# Patient Record
Sex: MALE | Race: WHITE | NOT HISPANIC OR LATINO | Employment: UNEMPLOYED | URBAN - METROPOLITAN AREA
[De-identification: names, ages, dates, MRNs, and addresses within clinical notes are randomized per-mention and may not be internally consistent; named-entity substitution may affect disease eponyms.]

---

## 2019-03-01 ENCOUNTER — HOSPITAL ENCOUNTER (EMERGENCY)
Facility: HOSPITAL | Age: 49
Discharge: HOME | End: 2019-03-01
Attending: EMERGENCY MEDICINE | Admitting: EMERGENCY MEDICINE

## 2019-03-01 ENCOUNTER — APPOINTMENT (EMERGENCY)
Dept: RADIOLOGY | Facility: HOSPITAL | Age: 49
End: 2019-03-01

## 2019-03-01 VITALS
TEMPERATURE: 98.4 F | RESPIRATION RATE: 16 BRPM | OXYGEN SATURATION: 98 % | HEART RATE: 75 BPM | DIASTOLIC BLOOD PRESSURE: 128 MMHG | SYSTOLIC BLOOD PRESSURE: 190 MMHG | WEIGHT: 165.12 LBS

## 2019-03-01 DIAGNOSIS — R07.9 CHEST PAIN, UNSPECIFIED TYPE: Primary | ICD-10-CM

## 2019-03-01 DIAGNOSIS — I10 HYPERTENSION, UNSPECIFIED TYPE: ICD-10-CM

## 2019-03-01 LAB
ALBUMIN SERPL BCP-MCNC: 4 G/DL (ref 3.5–5)
ALP SERPL-CCNC: 70 U/L (ref 46–116)
ALT SERPL W P-5'-P-CCNC: 48 U/L (ref 12–78)
ANION GAP SERPL CALCULATED.3IONS-SCNC: 11 MMOL/L (ref 4–13)
AST SERPL W P-5'-P-CCNC: 25 U/L (ref 5–45)
BASOPHILS # BLD AUTO: 0.09 THOUSANDS/ΜL (ref 0–0.1)
BASOPHILS NFR BLD AUTO: 1 % (ref 0–1)
BILIRUB SERPL-MCNC: 0.2 MG/DL (ref 0.2–1)
BUN SERPL-MCNC: 18 MG/DL (ref 5–25)
CALCIUM SERPL-MCNC: 8.9 MG/DL (ref 8.3–10.1)
CHLORIDE SERPL-SCNC: 108 MMOL/L (ref 100–108)
CO2 SERPL-SCNC: 24 MMOL/L (ref 21–32)
CREAT SERPL-MCNC: 1.36 MG/DL (ref 0.6–1.3)
EOSINOPHIL # BLD AUTO: 0.12 THOUSAND/ΜL (ref 0–0.61)
EOSINOPHIL NFR BLD AUTO: 1 % (ref 0–6)
ERYTHROCYTE [DISTWIDTH] IN BLOOD BY AUTOMATED COUNT: 12 % (ref 11.6–15.1)
GFR SERPL CREATININE-BSD FRML MDRD: 62 ML/MIN/1.73SQ M
GLUCOSE SERPL-MCNC: 92 MG/DL (ref 65–140)
HCT VFR BLD AUTO: 48.9 % (ref 36.5–49.3)
HGB BLD-MCNC: 17.1 G/DL (ref 12–17)
IMM GRANULOCYTES # BLD AUTO: 0.03 THOUSAND/UL (ref 0–0.2)
IMM GRANULOCYTES NFR BLD AUTO: 0 % (ref 0–2)
LYMPHOCYTES # BLD AUTO: 2.17 THOUSANDS/ΜL (ref 0.6–4.47)
LYMPHOCYTES NFR BLD AUTO: 20 % (ref 14–44)
MCH RBC QN AUTO: 32.7 PG (ref 26.8–34.3)
MCHC RBC AUTO-ENTMCNC: 35 G/DL (ref 31.4–37.4)
MCV RBC AUTO: 94 FL (ref 82–98)
MONOCYTES # BLD AUTO: 1.02 THOUSAND/ΜL (ref 0.17–1.22)
MONOCYTES NFR BLD AUTO: 10 % (ref 4–12)
NEUTROPHILS # BLD AUTO: 7.33 THOUSANDS/ΜL (ref 1.85–7.62)
NEUTS SEG NFR BLD AUTO: 68 % (ref 43–75)
NRBC BLD AUTO-RTO: 0 /100 WBCS
PLATELET # BLD AUTO: 261 THOUSANDS/UL (ref 149–390)
PMV BLD AUTO: 10.1 FL (ref 8.9–12.7)
POTASSIUM SERPL-SCNC: 4.2 MMOL/L (ref 3.5–5.3)
PROT SERPL-MCNC: 7.2 G/DL (ref 6.4–8.2)
RBC # BLD AUTO: 5.23 MILLION/UL (ref 3.88–5.62)
SODIUM SERPL-SCNC: 143 MMOL/L (ref 136–145)
TROPONIN I SERPL-MCNC: <0.02 NG/ML
WBC # BLD AUTO: 10.76 THOUSAND/UL (ref 4.31–10.16)

## 2019-03-01 PROCEDURE — 85025 COMPLETE CBC W/AUTO DIFF WBC: CPT | Performed by: EMERGENCY MEDICINE

## 2019-03-01 PROCEDURE — 99285 EMERGENCY DEPT VISIT HI MDM: CPT

## 2019-03-01 PROCEDURE — 84484 ASSAY OF TROPONIN QUANT: CPT | Performed by: EMERGENCY MEDICINE

## 2019-03-01 PROCEDURE — 71045 X-RAY EXAM CHEST 1 VIEW: CPT

## 2019-03-01 PROCEDURE — 80053 COMPREHEN METABOLIC PANEL: CPT | Performed by: EMERGENCY MEDICINE

## 2019-03-01 PROCEDURE — 93005 ELECTROCARDIOGRAM TRACING: CPT

## 2019-03-01 PROCEDURE — 36415 COLL VENOUS BLD VENIPUNCTURE: CPT | Performed by: EMERGENCY MEDICINE

## 2019-03-01 RX ORDER — LORAZEPAM 1 MG/1
1 TABLET ORAL ONCE
Status: COMPLETED | OUTPATIENT
Start: 2019-03-01 | End: 2019-03-01

## 2019-03-01 RX ORDER — AMLODIPINE BESYLATE 2.5 MG/1
5 TABLET ORAL DAILY
Qty: 30 TABLET | Refills: 12 | Status: ON HOLD | OUTPATIENT
Start: 2019-03-01 | End: 2021-10-19 | Stop reason: SDUPTHER

## 2019-03-01 RX ORDER — AMLODIPINE BESYLATE 5 MG/1
10 TABLET ORAL ONCE
Status: COMPLETED | OUTPATIENT
Start: 2019-03-01 | End: 2019-03-01

## 2019-03-01 RX ADMIN — LORAZEPAM 1 MG: 1 TABLET ORAL at 17:14

## 2019-03-01 RX ADMIN — AMLODIPINE BESYLATE 10 MG: 5 TABLET ORAL at 17:58

## 2019-03-01 NOTE — ED PROCEDURE NOTE
PROCEDURE  ECG 12 Lead Documentation  Date/Time: 3/1/2019 5:22 PM  Performed by: Loeksh Marvin MD  Authorized by: Lokesh Marvin MD     Indications / Diagnosis:  CP/SOB  ECG reviewed by me, the ED Provider: yes    Patient location:  ED and bedside  Previous ECG:     Previous ECG:  Unavailable    Comparison to cardiac monitor: Yes    Interpretation:     Interpretation: non-specific    Rate:     ECG rate:  88    ECG rate assessment: normal    Rhythm:     Rhythm: sinus rhythm    Ectopy:     Ectopy: none    QRS:     QRS axis:  Normal    QRS intervals:  Normal  Conduction:     Conduction: normal    ST segments:     ST segments:  Normal  T waves:     T waves: flattening      Flattening:  AVL, III, aVF, V5 and V6  Other findings:     Other findings: LVH, poor R wave progression and U wave    Comments:      NO ECG SIGNS OF ISCHEMIA/ INJURY / R HEART STRAIN / Lelon Cowden, MD  03/01/19 7953

## 2019-03-02 LAB
ATRIAL RATE: 73 BPM
ATRIAL RATE: 88 BPM
P AXIS: 60 DEGREES
P AXIS: 62 DEGREES
PR INTERVAL: 172 MS
PR INTERVAL: 176 MS
QRS AXIS: 35 DEGREES
QRS AXIS: 43 DEGREES
QRSD INTERVAL: 100 MS
QRSD INTERVAL: 84 MS
QT INTERVAL: 332 MS
QT INTERVAL: 378 MS
QTC INTERVAL: 401 MS
QTC INTERVAL: 416 MS
T WAVE AXIS: 55 DEGREES
T WAVE AXIS: 70 DEGREES
VENTRICULAR RATE: 73 BPM
VENTRICULAR RATE: 88 BPM

## 2019-03-02 PROCEDURE — 93010 ELECTROCARDIOGRAM REPORT: CPT | Performed by: INTERNAL MEDICINE

## 2019-03-02 NOTE — ED PROCEDURE NOTE
PROCEDURE  ECG 12 Lead Documentation  Date/Time: 3/1/2019 7:01 PM  Performed by: Marisol Guerrier MD  Authorized by: Marisol Guerrier MD     Indications / Diagnosis:  ER ECG # 2   ECG reviewed by me, the ED Provider: yes    Patient location:  ED and bedside  Previous ECG:     Previous ECG:  Compared to current    Comparison ECG info:  NO CHAGNE COMAPRED TO INITIAL ER ECG    Similarity:  No change    Comparison to cardiac monitor: Yes    Interpretation:     Interpretation: non-specific    Rate:     ECG rate:  73    ECG rate assessment: normal    Rhythm:     Rhythm: sinus rhythm      Rhythm comment:  73  Ectopy:     Ectopy: none    QRS:     QRS axis:  Normal    QRS intervals:  Normal  Conduction:     Conduction: normal    ST segments:     ST segments:  Normal  T waves:     T waves: flattening      Flattening:  AVL and V6  Q waves:     Q waves:  V1, V2 and V3  Other findings:     Other findings: LVH, poor R wave progression and U wave    Comments:      NO ECG SIGNS OF ISCHEMIA/ INJURY / R HEART STRAIN / Denton Saldana MD  03/01/19 Terra Carney

## 2019-03-02 NOTE — DISCHARGE INSTRUCTIONS
DIAGNOSIS; CHEST PAIN/ HIGH BLOOD PRESSURE    -  PLEASE START THE BLOOD PRESSURE MEDICATION TOMORROW--  NORVASC- 1 TABLET ONCE A DAY - THSIS IS A 4 DOLLAR MEDICATION AT [facility]    - IF ANXIETY- STRESS BECOMES MORE OF A PROBLEM-- PLEASE CALL [facility] TO SCHEDULE AN APPOINTMENT--  [phone]    - PLEASE CALL   THE [facility] NUMBER TO GET HELP FINDING A DOCTOR    - BY Our NEGATIVE ER CHEST PAIN WORKUP FOR CHEST PAIN - YOU ARE A LOW RISK CHEST PAIN PATIENT- Approximately 1 out of 100 er chest pain patients like you will go on to have a heart attack 1 month from now     - there is no such thing as a no risk chest pain pt- please return to  the er for any new/ worsening/concerning symptoms to you

## 2019-03-06 NOTE — ED PROVIDER NOTES
History  Chief Complaint   Patient presents with    Chest Pain     Pt  c/o chest pain for three days with n/v/d  Pt  denies cardiac hx  but reports having marriage problems that may be contributing to symptoms  53 yr male-  States going thru a divorice- under a lot of stress-- c /o 3 days of ant chest pain - describes as ache-- -- there more often then not--   Several days ago had  1 day of n/v/d- all non  Bloody -- no abd pain --   No sob-- pain is not pleuritic-- no abrupt onset of ripping/tearing   Migratory type pain       History provided by:  Patient   used: No    Chest Pain   Associated symptoms: nausea and vomiting    Associated symptoms: no abdominal pain and no palpitations        None       History reviewed  No pertinent past medical history  Past Surgical History:   Procedure Laterality Date    CARPAL TUNNEL RELEASE      ULNAR COLLATERAL LIGAMENT RECONSTRUCTION         History reviewed  No pertinent family history  I have reviewed and agree with the history as documented  Social History     Tobacco Use    Smoking status: Current Every Day Smoker   Substance Use Topics    Alcohol use: Yes     Alcohol/week: 1 2 oz     Types: 2 Shots of liquor per week     Comment: daily    Drug use: Never        Review of Systems   Constitutional: Negative  HENT: Negative  Eyes: Negative  Respiratory: Negative  Cardiovascular: Positive for chest pain  Negative for palpitations and leg swelling  Gastrointestinal: Positive for diarrhea, nausea and vomiting  Negative for abdominal distention, abdominal pain, anal bleeding, blood in stool, constipation and rectal pain  Endocrine: Negative  Genitourinary: Negative  Musculoskeletal: Negative  Skin: Negative  Allergic/Immunologic: Negative  Neurological: Negative  Hematological: Negative  Psychiatric/Behavioral: Negative          Physical Exam  Physical Exam   Constitutional: He is oriented to person, place, and time  Non-toxic appearance  He does not appear ill  No distress  avss- htnsive-- pulse ox 98 % on ra- interpretation is normal- no intervention - anxious appearing- non marfanoid body habitus   HENT:   Head: Normocephalic and atraumatic  Eyes: Pupils are equal, round, and reactive to light  EOM are normal    Mm pink   Neck: Normal range of motion  Neck supple  No hepatojugular reflux and no JVD present  No tracheal deviation present  No thyromegaly present  Cardiovascular: Normal rate, regular rhythm, intact distal pulses and normal pulses  No extrasystoles are present  PMI is not displaced  Exam reveals no gallop, no S3, no S4, no distant heart sounds and no friction rub  No murmur heard  No systolic murmur is present  No diastolic murmur is present  Pulmonary/Chest: Effort normal and breath sounds normal  No accessory muscle usage or stridor  No tachypnea  No respiratory distress  Abdominal: Soft  Bowel sounds are normal  He exhibits no distension, no ascites and no mass  There is no splenomegaly or hepatomegaly  There is no tenderness  There is no rebound and no guarding  Soft nt/nd- no peritoneal signs- no cva tenderness-    Musculoskeletal: Normal range of motion  Right lower leg: Normal  He exhibits no tenderness and no edema  Left lower leg: Normal  He exhibits no tenderness and no edema  Equal bilateral radial/dp pulses- no ble edema/calf tenderness/assym/ erythema   Lymphadenopathy:     He has no cervical adenopathy  Neurological: He is alert and oriented to person, place, and time  He is not disoriented  No cranial nerve deficit  Skin: Skin is warm  Capillary refill takes less than 2 seconds  No abrasion, no ecchymosis and no rash noted  He is not diaphoretic  No cyanosis or erythema  No pallor  Nails show no clubbing  Psychiatric: His behavior is normal  His mood appears anxious  He is not agitated  Nursing note and vitals reviewed        Vital Signs  ED Triage Vitals   Temperature Pulse Respirations Blood Pressure SpO2   03/01/19 1645 03/01/19 1645 03/01/19 1645 03/01/19 1709 03/01/19 1645   98 4 °F (36 9 °C) 91 20 (!) 218/126 100 %      Temp Source Heart Rate Source Patient Position - Orthostatic VS BP Location FiO2 (%)   03/01/19 1645 03/01/19 1733 03/01/19 1733 03/01/19 1733 --   Oral Monitor Sitting Left arm       Pain Score       03/01/19 1645       8           Vitals:    03/01/19 1645 03/01/19 1709 03/01/19 1733   BP:  (!) 218/126 (!) 190/128   Pulse: 91  75   Patient Position - Orthostatic VS:   Sitting       Visual Acuity      ED Medications  Medications   LORazepam (ATIVAN) tablet 1 mg (1 mg Oral Given 3/1/19 1714)   amLODIPine (NORVASC) tablet 10 mg (10 mg Oral Given 3/1/19 1758)       Diagnostic Studies  Results Reviewed     Procedure Component Value Units Date/Time    Comprehensive metabolic panel [[id number]]  (Abnormal) Collected:  03/01/19 1707    Lab Status:  Final result Specimen:  Blood from Arm, Left Updated:  03/01/19 1741     Sodium 143 mmol/L      Potassium 4 2 mmol/L      Chloride 108 mmol/L      CO2 24 mmol/L      ANION GAP 11 mmol/L      BUN 18 mg/dL      Creatinine 1 36 mg/dL      Glucose 92 mg/dL      Calcium 8 9 mg/dL      AST 25 U/L      ALT 48 U/L      Alkaline Phosphatase 70 U/L      Total Protein 7 2 g/dL      Albumin 4 0 g/dL      Total Bilirubin 0 20 mg/dL      eGFR 62 ml/min/1 73sq m     Narrative:       National Kidney Disease Education Program recommendations are as follows:  GFR calculation is accurate only with a steady state creatinine  Chronic Kidney disease less than 60 ml/min/1 73 sq  meters  Kidney failure less than 15 ml/min/1 73 sq  meters      Troponin I [[id number]]  (Normal) Collected:  03/01/19 1707    Lab Status:  Final result Specimen:  Blood from Arm, Left Updated:  03/01/19 1741     Troponin I <0 02 ng/mL     CBC and differential [[id number]]  (Abnormal) Collected:  03/01/19 1707    Lab Status:  Final result Specimen:  Blood from Arm, Left Updated:  03/01/19 1715     WBC 10 76 Thousand/uL      RBC 5 23 Million/uL      Hemoglobin 17 1 g/dL      Hematocrit 48 9 %      MCV 94 fL      MCH 32 7 pg      MCHC 35 0 g/dL      RDW 12 0 %      MPV 10 1 fL      Platelets 475 Thousands/uL      nRBC 0 /100 WBCs      Neutrophils Relative 68 %      Immat GRANS % 0 %      Lymphocytes Relative 20 %      Monocytes Relative 10 %      Eosinophils Relative 1 %      Basophils Relative 1 %      Neutrophils Absolute 7 33 Thousands/µL      Immature Grans Absolute 0 03 Thousand/uL      Lymphocytes Absolute 2 17 Thousands/µL      Monocytes Absolute 1 02 Thousand/µL      Eosinophils Absolute 0 12 Thousand/µL      Basophils Absolute 0 09 Thousands/µL                  XR chest 1 view portable   Final Result by Marcia Liang MD (03/01 2019)      No active pulmonary disease              Workstation performed: [id number]                    Procedures  Procedures       Phone Contacts  ED Phone Contact    ED Course  ED Course as of Mar 05 2109   Fri Mar 01, 2019   1721 ER MD MEDICAL DECISION MAKING NOTE-  PT IS LOW RISK FOR PE- WELL S SCORE OF 0- ER MD CLINICAL SUSPICION OF PE IS LOW-- PERC-NEG      1748 CXR PORTABLE- NO SIGN CHANGES FROM PREVIOUS CXR 4/27/12- NO CHANGE IN MEDIASTINUM/CARDIAC SILHOUETTE NO FREE/SQ AIR/ NO INFILTRATE/ PTX/ PULM EDEMA/ PLEURAL EFFUSIONS      1908 ER MD MEDICAL DECISION MAKING NOTE- BASED ON H AND P --  AND ECG/ CXR /LABS- ER MD CLINICAL SUSPICION OF NSTEMI/  TAD/VTE IS LOW            HEART Risk Score      Most Recent Value   History  0 Filed at: 03/01/2019 1908   ECG  1 Filed at: 03/01/2019 1908   Age  1 Filed at: 03/01/2019 1908   Risk Factors  1 Filed at: 03/01/2019 1908   Troponin  0 Filed at: 03/01/2019 1908   Heart Score Risk Calculator   History  0 Filed at: 03/01/2019 1908   ECG  1 Filed at: 03/01/2019 1908   Age  1 Filed at: 03/01/2019 1908   Risk Factors  1 Filed at: 03/01/2019 1908   Troponin  0 Filed at: 03/01/2019 1908   HEART Score  3 Filed at: 03/01/2019 1908   HEART Score  3 Filed at: 03/01/2019 1908                            MDM    Disposition  Final diagnoses:   Chest pain, unspecified type   Hypertension, unspecified type     Time reflects when diagnosis was documented in both MDM as applicable and the Disposition within this note     Time User Action Codes Description Comment    3/1/2019  7:10 PM Yobani Ty Add [R07 9] Chest pain, unspecified type     3/1/2019  7:10 PM Yobani Ty Add [I10] Hypertension, unspecified type       ED Disposition     ED Disposition Condition Date/Time Comment    Discharge Stable Fri Mar 1, 2019 130 [facility] discharge to home/self care  Follow-up Information    None         There are no discharge medications for this patient  No discharge procedures on file      ED Provider  Electronically Signed by           Charisma Carlos MD  03/05/19 0215

## 2021-10-16 ENCOUNTER — HOSPITAL ENCOUNTER (INPATIENT)
Facility: HOSPITAL | Age: 51
LOS: 3 days | Discharge: HOME | DRG: 720 | End: 2021-10-19
Attending: EMERGENCY MEDICINE | Admitting: COLON & RECTAL SURGERY
Payer: COMMERCIAL

## 2021-10-16 ENCOUNTER — APPOINTMENT (EMERGENCY)
Dept: CT IMAGING | Facility: HOSPITAL | Age: 51
DRG: 720 | End: 2021-10-16
Payer: COMMERCIAL

## 2021-10-16 DIAGNOSIS — K57.20 ABSCESS OF SIGMOID COLON DUE TO DIVERTICULITIS: Primary | ICD-10-CM

## 2021-10-16 DIAGNOSIS — K57.92 DIVERTICULITIS: ICD-10-CM

## 2021-10-16 DIAGNOSIS — I10 HYPERTENSION, UNSPECIFIED TYPE: ICD-10-CM

## 2021-10-16 PROBLEM — D72.829 LEUKOCYTOSIS: Status: ACTIVE | Noted: 2021-10-16

## 2021-10-16 PROBLEM — A41.9 SEPSIS (HCC): Status: ACTIVE | Noted: 2021-10-16

## 2021-10-16 PROBLEM — I16.0 HYPERTENSIVE URGENCY: Status: ACTIVE | Noted: 2021-10-16

## 2021-10-16 PROBLEM — R79.89 ELEVATED SERUM CREATININE: Status: ACTIVE | Noted: 2021-10-16

## 2021-10-16 PROBLEM — N20.0 RENAL CALCULI: Status: ACTIVE | Noted: 2021-10-16

## 2021-10-16 LAB
ALBUMIN SERPL BCP-MCNC: 3.5 G/DL (ref 3.5–5)
ALP SERPL-CCNC: 67 U/L (ref 46–116)
ALT SERPL W P-5'-P-CCNC: 27 U/L (ref 12–78)
ANION GAP SERPL CALCULATED.3IONS-SCNC: 12 MMOL/L (ref 4–13)
AST SERPL W P-5'-P-CCNC: 21 U/L (ref 5–45)
BACTERIA UR QL AUTO: NORMAL /HPF
BASOPHILS # BLD AUTO: 0.08 THOUSANDS/ΜL (ref 0–0.1)
BASOPHILS NFR BLD AUTO: 1 % (ref 0–1)
BILIRUB SERPL-MCNC: 0.53 MG/DL (ref 0.2–1)
BILIRUB UR QL STRIP: NEGATIVE
BUN SERPL-MCNC: 15 MG/DL (ref 5–25)
CALCIUM SERPL-MCNC: 8.6 MG/DL (ref 8.3–10.1)
CHLORIDE SERPL-SCNC: 102 MMOL/L (ref 100–108)
CLARITY UR: CLEAR
CO2 SERPL-SCNC: 22 MMOL/L (ref 21–32)
COLOR UR: YELLOW
CREAT SERPL-MCNC: 1.36 MG/DL (ref 0.6–1.3)
EOSINOPHIL # BLD AUTO: 0.02 THOUSAND/ΜL (ref 0–0.61)
EOSINOPHIL NFR BLD AUTO: 0 % (ref 0–6)
ERYTHROCYTE [DISTWIDTH] IN BLOOD BY AUTOMATED COUNT: 12.2 % (ref 11.6–15.1)
GFR SERPL CREATININE-BSD FRML MDRD: 61 ML/MIN/1.73SQ M
GLUCOSE SERPL-MCNC: 84 MG/DL (ref 65–140)
GLUCOSE UR STRIP-MCNC: NEGATIVE MG/DL
HCT VFR BLD AUTO: 45.9 % (ref 36.5–49.3)
HGB BLD-MCNC: 15.8 G/DL (ref 12–17)
HGB UR QL STRIP.AUTO: NEGATIVE
IMM GRANULOCYTES # BLD AUTO: 0.08 THOUSAND/UL (ref 0–0.2)
IMM GRANULOCYTES NFR BLD AUTO: 1 % (ref 0–2)
KETONES UR STRIP-MCNC: ABNORMAL MG/DL
LEUKOCYTE ESTERASE UR QL STRIP: NEGATIVE
LIPASE SERPL-CCNC: 50 U/L (ref 73–393)
LYMPHOCYTES # BLD AUTO: 1.19 THOUSANDS/ΜL (ref 0.6–4.47)
LYMPHOCYTES NFR BLD AUTO: 7 % (ref 14–44)
MCH RBC QN AUTO: 33.5 PG (ref 26.8–34.3)
MCHC RBC AUTO-ENTMCNC: 34.4 G/DL (ref 31.4–37.4)
MCV RBC AUTO: 97 FL (ref 82–98)
MONOCYTES # BLD AUTO: 1.37 THOUSAND/ΜL (ref 0.17–1.22)
MONOCYTES NFR BLD AUTO: 8 % (ref 4–12)
NEUTROPHILS # BLD AUTO: 14.6 THOUSANDS/ΜL (ref 1.85–7.62)
NEUTS SEG NFR BLD AUTO: 83 % (ref 43–75)
NITRITE UR QL STRIP: NEGATIVE
NON-SQ EPI CELLS URNS QL MICRO: NORMAL /HPF
NRBC BLD AUTO-RTO: 0 /100 WBCS
PH UR STRIP.AUTO: 6 [PH]
PLATELET # BLD AUTO: 208 THOUSANDS/UL (ref 149–390)
PMV BLD AUTO: 10.4 FL (ref 8.9–12.7)
POTASSIUM SERPL-SCNC: 4 MMOL/L (ref 3.5–5.3)
PROT SERPL-MCNC: 7.3 G/DL (ref 6.4–8.2)
PROT UR STRIP-MCNC: ABNORMAL MG/DL
RBC # BLD AUTO: 4.72 MILLION/UL (ref 3.88–5.62)
RBC #/AREA URNS AUTO: NORMAL /HPF
SODIUM SERPL-SCNC: 136 MMOL/L (ref 136–145)
SP GR UR STRIP.AUTO: 1.01 (ref 1–1.03)
UROBILINOGEN UR QL STRIP.AUTO: 0.2 E.U./DL
WBC # BLD AUTO: 17.34 THOUSAND/UL (ref 4.31–10.16)
WBC #/AREA URNS AUTO: NORMAL /HPF

## 2021-10-16 PROCEDURE — G1004 CDSM NDSC: HCPCS

## 2021-10-16 PROCEDURE — 96375 TX/PRO/DX INJ NEW DRUG ADDON: CPT

## 2021-10-16 PROCEDURE — 83690 ASSAY OF LIPASE: CPT | Performed by: EMERGENCY MEDICINE

## 2021-10-16 PROCEDURE — 74177 CT ABD & PELVIS W/CONTRAST: CPT

## 2021-10-16 PROCEDURE — 96366 THER/PROPH/DIAG IV INF ADDON: CPT

## 2021-10-16 PROCEDURE — 99254 IP/OBS CNSLTJ NEW/EST MOD 60: CPT | Performed by: NURSE PRACTITIONER

## 2021-10-16 PROCEDURE — 99285 EMERGENCY DEPT VISIT HI MDM: CPT

## 2021-10-16 PROCEDURE — 80053 COMPREHEN METABOLIC PANEL: CPT | Performed by: EMERGENCY MEDICINE

## 2021-10-16 PROCEDURE — 36415 COLL VENOUS BLD VENIPUNCTURE: CPT | Performed by: EMERGENCY MEDICINE

## 2021-10-16 PROCEDURE — 99285 EMERGENCY DEPT VISIT HI MDM: CPT | Performed by: EMERGENCY MEDICINE

## 2021-10-16 PROCEDURE — 81001 URINALYSIS AUTO W/SCOPE: CPT | Performed by: EMERGENCY MEDICINE

## 2021-10-16 PROCEDURE — 96365 THER/PROPH/DIAG IV INF INIT: CPT

## 2021-10-16 PROCEDURE — 85025 COMPLETE CBC W/AUTO DIFF WBC: CPT | Performed by: EMERGENCY MEDICINE

## 2021-10-16 RX ORDER — CALCIUM CARBONATE 200(500)MG
500 TABLET,CHEWABLE ORAL 3 TIMES DAILY PRN
Status: DISCONTINUED | OUTPATIENT
Start: 2021-10-16 | End: 2021-10-19 | Stop reason: HOSPADM

## 2021-10-16 RX ORDER — HYDROMORPHONE HCL/PF 1 MG/ML
1 SYRINGE (ML) INJECTION ONCE
Status: COMPLETED | OUTPATIENT
Start: 2021-10-16 | End: 2021-10-16

## 2021-10-16 RX ORDER — HEPARIN SODIUM 5000 [USP'U]/ML
5000 INJECTION, SOLUTION INTRAVENOUS; SUBCUTANEOUS EVERY 8 HOURS SCHEDULED
Status: DISCONTINUED | OUTPATIENT
Start: 2021-10-16 | End: 2021-10-19 | Stop reason: HOSPADM

## 2021-10-16 RX ORDER — AMLODIPINE BESYLATE 5 MG/1
5 TABLET ORAL ONCE
Status: COMPLETED | OUTPATIENT
Start: 2021-10-16 | End: 2021-10-16

## 2021-10-16 RX ORDER — NICOTINE 21 MG/24HR
21 PATCH, TRANSDERMAL 24 HOURS TRANSDERMAL ONCE
Status: COMPLETED | OUTPATIENT
Start: 2021-10-16 | End: 2021-10-17

## 2021-10-16 RX ORDER — TAMSULOSIN HYDROCHLORIDE 0.4 MG/1
0.4 CAPSULE ORAL
Status: DISCONTINUED | OUTPATIENT
Start: 2021-10-17 | End: 2021-10-19 | Stop reason: HOSPADM

## 2021-10-16 RX ORDER — ONDANSETRON 2 MG/ML
4 INJECTION INTRAMUSCULAR; INTRAVENOUS EVERY 6 HOURS PRN
Status: DISCONTINUED | OUTPATIENT
Start: 2021-10-16 | End: 2021-10-19 | Stop reason: HOSPADM

## 2021-10-16 RX ORDER — LANOLIN ALCOHOL/MO/W.PET/CERES
3 CREAM (GRAM) TOPICAL
Status: DISCONTINUED | OUTPATIENT
Start: 2021-10-16 | End: 2021-10-19 | Stop reason: HOSPADM

## 2021-10-16 RX ORDER — MORPHINE SULFATE 10 MG/ML
8 INJECTION, SOLUTION INTRAMUSCULAR; INTRAVENOUS ONCE
Status: COMPLETED | OUTPATIENT
Start: 2021-10-16 | End: 2021-10-16

## 2021-10-16 RX ORDER — OXYCODONE HYDROCHLORIDE 5 MG/1
5 TABLET ORAL EVERY 4 HOURS PRN
Status: DISCONTINUED | OUTPATIENT
Start: 2021-10-16 | End: 2021-10-19 | Stop reason: HOSPADM

## 2021-10-16 RX ORDER — SODIUM CHLORIDE, SODIUM LACTATE, POTASSIUM CHLORIDE, CALCIUM CHLORIDE 600; 310; 30; 20 MG/100ML; MG/100ML; MG/100ML; MG/100ML
125 INJECTION, SOLUTION INTRAVENOUS CONTINUOUS
Status: DISCONTINUED | OUTPATIENT
Start: 2021-10-16 | End: 2021-10-18

## 2021-10-16 RX ORDER — METOPROLOL TARTRATE 5 MG/5ML
5 INJECTION INTRAVENOUS EVERY 6 HOURS PRN
Status: DISCONTINUED | OUTPATIENT
Start: 2021-10-16 | End: 2021-10-19 | Stop reason: HOSPADM

## 2021-10-16 RX ORDER — METOPROLOL TARTRATE 5 MG/5ML
5 INJECTION INTRAVENOUS ONCE
Status: COMPLETED | OUTPATIENT
Start: 2021-10-16 | End: 2021-10-16

## 2021-10-16 RX ORDER — HYDROMORPHONE HCL/PF 1 MG/ML
0.5 SYRINGE (ML) INJECTION
Status: DISCONTINUED | OUTPATIENT
Start: 2021-10-16 | End: 2021-10-19 | Stop reason: HOSPADM

## 2021-10-16 RX ORDER — OXYCODONE HYDROCHLORIDE 10 MG/1
10 TABLET ORAL EVERY 4 HOURS PRN
Status: DISCONTINUED | OUTPATIENT
Start: 2021-10-16 | End: 2021-10-19 | Stop reason: HOSPADM

## 2021-10-16 RX ORDER — ACETAMINOPHEN 325 MG/1
650 TABLET ORAL EVERY 6 HOURS PRN
Status: DISCONTINUED | OUTPATIENT
Start: 2021-10-16 | End: 2021-10-19 | Stop reason: HOSPADM

## 2021-10-16 RX ADMIN — SODIUM CHLORIDE, SODIUM LACTATE, POTASSIUM CHLORIDE, AND CALCIUM CHLORIDE 500 ML: .6; .31; .03; .02 INJECTION, SOLUTION INTRAVENOUS at 17:04

## 2021-10-16 RX ADMIN — OXYCODONE HYDROCHLORIDE 10 MG: 10 TABLET ORAL at 23:38

## 2021-10-16 RX ADMIN — HYDROMORPHONE HYDROCHLORIDE 1 MG: 1 INJECTION, SOLUTION INTRAMUSCULAR; INTRAVENOUS; SUBCUTANEOUS at 17:29

## 2021-10-16 RX ADMIN — METRONIDAZOLE 500 MG: 500 INJECTION, SOLUTION INTRAVENOUS at 21:24

## 2021-10-16 RX ADMIN — Medication 3 MG: at 21:45

## 2021-10-16 RX ADMIN — AMLODIPINE BESYLATE 5 MG: 5 TABLET ORAL at 22:08

## 2021-10-16 RX ADMIN — HEPARIN SODIUM 5000 UNITS: 5000 INJECTION INTRAVENOUS; SUBCUTANEOUS at 22:08

## 2021-10-16 RX ADMIN — AZTREONAM 2000 MG: 2 INJECTION, POWDER, LYOPHILIZED, FOR SOLUTION INTRAMUSCULAR; INTRAVENOUS at 23:17

## 2021-10-16 RX ADMIN — SODIUM CHLORIDE, SODIUM LACTATE, POTASSIUM CHLORIDE, AND CALCIUM CHLORIDE 125 ML/HR: .6; .31; .03; .02 INJECTION, SOLUTION INTRAVENOUS at 21:23

## 2021-10-16 RX ADMIN — METOROPROLOL TARTRATE 5 MG: 5 INJECTION, SOLUTION INTRAVENOUS at 23:45

## 2021-10-16 RX ADMIN — NICOTINE 21 MG: 21 PATCH, EXTENDED RELEASE TRANSDERMAL at 21:21

## 2021-10-16 RX ADMIN — MORPHINE SULFATE 8 MG: 10 INJECTION INTRAVENOUS at 15:39

## 2021-10-16 RX ADMIN — HYDROMORPHONE HYDROCHLORIDE 0.5 MG: 1 INJECTION, SOLUTION INTRAMUSCULAR; INTRAVENOUS; SUBCUTANEOUS at 21:46

## 2021-10-16 RX ADMIN — IOHEXOL 100 ML: 350 INJECTION, SOLUTION INTRAVENOUS at 16:45

## 2021-10-16 RX ADMIN — METOROPROLOL TARTRATE 5 MG: 5 INJECTION, SOLUTION INTRAVENOUS at 21:37

## 2021-10-17 LAB
ANION GAP SERPL CALCULATED.3IONS-SCNC: 13 MMOL/L (ref 4–13)
BASOPHILS # BLD AUTO: 0.05 THOUSANDS/ΜL (ref 0–0.1)
BASOPHILS NFR BLD AUTO: 0 % (ref 0–1)
BUN SERPL-MCNC: 11 MG/DL (ref 5–25)
CALCIUM SERPL-MCNC: 8.6 MG/DL (ref 8.3–10.1)
CHLORIDE SERPL-SCNC: 103 MMOL/L (ref 100–108)
CO2 SERPL-SCNC: 18 MMOL/L (ref 21–32)
CREAT SERPL-MCNC: 1.1 MG/DL (ref 0.6–1.3)
EOSINOPHIL # BLD AUTO: 0.01 THOUSAND/ΜL (ref 0–0.61)
EOSINOPHIL NFR BLD AUTO: 0 % (ref 0–6)
ERYTHROCYTE [DISTWIDTH] IN BLOOD BY AUTOMATED COUNT: 12.2 % (ref 11.6–15.1)
GFR SERPL CREATININE-BSD FRML MDRD: 78 ML/MIN/1.73SQ M
GLUCOSE SERPL-MCNC: 88 MG/DL (ref 65–140)
HCT VFR BLD AUTO: 43.4 % (ref 36.5–49.3)
HGB BLD-MCNC: 15.1 G/DL (ref 12–17)
IMM GRANULOCYTES # BLD AUTO: 0.1 THOUSAND/UL (ref 0–0.2)
IMM GRANULOCYTES NFR BLD AUTO: 1 % (ref 0–2)
INR PPP: 1.09 (ref 0.84–1.19)
LACTATE SERPL-SCNC: 0.7 MMOL/L (ref 0.5–2)
LYMPHOCYTES # BLD AUTO: 1.11 THOUSANDS/ΜL (ref 0.6–4.47)
LYMPHOCYTES NFR BLD AUTO: 6 % (ref 14–44)
MCH RBC QN AUTO: 33.8 PG (ref 26.8–34.3)
MCHC RBC AUTO-ENTMCNC: 34.8 G/DL (ref 31.4–37.4)
MCV RBC AUTO: 97 FL (ref 82–98)
MONOCYTES # BLD AUTO: 2.09 THOUSAND/ΜL (ref 0.17–1.22)
MONOCYTES NFR BLD AUTO: 11 % (ref 4–12)
NEUTROPHILS # BLD AUTO: 15.22 THOUSANDS/ΜL (ref 1.85–7.62)
NEUTS SEG NFR BLD AUTO: 82 % (ref 43–75)
NRBC BLD AUTO-RTO: 0 /100 WBCS
PLATELET # BLD AUTO: 204 THOUSANDS/UL (ref 149–390)
PMV BLD AUTO: 10.4 FL (ref 8.9–12.7)
POTASSIUM SERPL-SCNC: 3.9 MMOL/L (ref 3.5–5.3)
PROTHROMBIN TIME: 14.1 SECONDS (ref 11.6–14.5)
RBC # BLD AUTO: 4.47 MILLION/UL (ref 3.88–5.62)
SODIUM SERPL-SCNC: 134 MMOL/L (ref 136–145)
WBC # BLD AUTO: 18.58 THOUSAND/UL (ref 4.31–10.16)

## 2021-10-17 PROCEDURE — 99252 IP/OBS CONSLTJ NEW/EST SF 35: CPT | Performed by: INTERNAL MEDICINE

## 2021-10-17 PROCEDURE — 80048 BASIC METABOLIC PNL TOTAL CA: CPT | Performed by: STUDENT IN AN ORGANIZED HEALTH CARE EDUCATION/TRAINING PROGRAM

## 2021-10-17 PROCEDURE — 85610 PROTHROMBIN TIME: CPT | Performed by: NURSE PRACTITIONER

## 2021-10-17 PROCEDURE — 36415 COLL VENOUS BLD VENIPUNCTURE: CPT | Performed by: NURSE PRACTITIONER

## 2021-10-17 PROCEDURE — 83605 ASSAY OF LACTIC ACID: CPT | Performed by: NURSE PRACTITIONER

## 2021-10-17 PROCEDURE — 85025 COMPLETE CBC W/AUTO DIFF WBC: CPT | Performed by: STUDENT IN AN ORGANIZED HEALTH CARE EDUCATION/TRAINING PROGRAM

## 2021-10-17 PROCEDURE — 87040 BLOOD CULTURE FOR BACTERIA: CPT | Performed by: NURSE PRACTITIONER

## 2021-10-17 RX ORDER — AMLODIPINE BESYLATE 10 MG/1
10 TABLET ORAL DAILY
Status: DISCONTINUED | OUTPATIENT
Start: 2021-10-18 | End: 2021-10-19 | Stop reason: HOSPADM

## 2021-10-17 RX ORDER — AMLODIPINE BESYLATE 5 MG/1
5 TABLET ORAL DAILY
Status: DISCONTINUED | OUTPATIENT
Start: 2021-10-17 | End: 2021-10-17

## 2021-10-17 RX ADMIN — HYDROMORPHONE HYDROCHLORIDE 0.5 MG: 1 INJECTION, SOLUTION INTRAMUSCULAR; INTRAVENOUS; SUBCUTANEOUS at 10:22

## 2021-10-17 RX ADMIN — METOROPROLOL TARTRATE 5 MG: 5 INJECTION, SOLUTION INTRAVENOUS at 18:10

## 2021-10-17 RX ADMIN — AZTREONAM 2000 MG: 2 INJECTION, POWDER, LYOPHILIZED, FOR SOLUTION INTRAMUSCULAR; INTRAVENOUS at 08:11

## 2021-10-17 RX ADMIN — METRONIDAZOLE 500 MG: 500 INJECTION, SOLUTION INTRAVENOUS at 21:05

## 2021-10-17 RX ADMIN — METRONIDAZOLE 500 MG: 500 INJECTION, SOLUTION INTRAVENOUS at 12:28

## 2021-10-17 RX ADMIN — AMLODIPINE BESYLATE 5 MG: 5 TABLET ORAL at 08:11

## 2021-10-17 RX ADMIN — HYDROMORPHONE HYDROCHLORIDE 0.5 MG: 1 INJECTION, SOLUTION INTRAMUSCULAR; INTRAVENOUS; SUBCUTANEOUS at 15:52

## 2021-10-17 RX ADMIN — OXYCODONE HYDROCHLORIDE 10 MG: 10 TABLET ORAL at 05:36

## 2021-10-17 RX ADMIN — HEPARIN SODIUM 5000 UNITS: 5000 INJECTION INTRAVENOUS; SUBCUTANEOUS at 05:16

## 2021-10-17 RX ADMIN — HEPARIN SODIUM 5000 UNITS: 5000 INJECTION INTRAVENOUS; SUBCUTANEOUS at 21:04

## 2021-10-17 RX ADMIN — METRONIDAZOLE 500 MG: 500 INJECTION, SOLUTION INTRAVENOUS at 05:16

## 2021-10-17 RX ADMIN — OXYCODONE HYDROCHLORIDE 10 MG: 10 TABLET ORAL at 21:05

## 2021-10-17 RX ADMIN — HEPARIN SODIUM 5000 UNITS: 5000 INJECTION INTRAVENOUS; SUBCUTANEOUS at 12:28

## 2021-10-17 RX ADMIN — AZTREONAM 2000 MG: 2 INJECTION, POWDER, LYOPHILIZED, FOR SOLUTION INTRAMUSCULAR; INTRAVENOUS at 22:53

## 2021-10-17 RX ADMIN — SODIUM CHLORIDE, SODIUM LACTATE, POTASSIUM CHLORIDE, AND CALCIUM CHLORIDE 125 ML/HR: .6; .31; .03; .02 INJECTION, SOLUTION INTRAVENOUS at 08:11

## 2021-10-17 RX ADMIN — TAMSULOSIN HYDROCHLORIDE 0.4 MG: 0.4 CAPSULE ORAL at 15:47

## 2021-10-17 RX ADMIN — AZTREONAM 2000 MG: 2 INJECTION, POWDER, LYOPHILIZED, FOR SOLUTION INTRAMUSCULAR; INTRAVENOUS at 15:11

## 2021-10-18 PROBLEM — M54.9 CHRONIC BACK PAIN: Status: ACTIVE | Noted: 2021-10-18

## 2021-10-18 PROBLEM — G89.29 CHRONIC BACK PAIN: Status: ACTIVE | Noted: 2021-10-18

## 2021-10-18 LAB
ANION GAP SERPL CALCULATED.3IONS-SCNC: 16 MMOL/L (ref 4–13)
BASOPHILS # BLD AUTO: 0.06 THOUSANDS/ΜL (ref 0–0.1)
BASOPHILS NFR BLD AUTO: 1 % (ref 0–1)
BUN SERPL-MCNC: 12 MG/DL (ref 5–25)
CALCIUM SERPL-MCNC: 8.1 MG/DL (ref 8.3–10.1)
CHLORIDE SERPL-SCNC: 104 MMOL/L (ref 100–108)
CO2 SERPL-SCNC: 17 MMOL/L (ref 21–32)
CREAT SERPL-MCNC: 1.01 MG/DL (ref 0.6–1.3)
EOSINOPHIL # BLD AUTO: 0.12 THOUSAND/ΜL (ref 0–0.61)
EOSINOPHIL NFR BLD AUTO: 1 % (ref 0–6)
ERYTHROCYTE [DISTWIDTH] IN BLOOD BY AUTOMATED COUNT: 11.8 % (ref 11.6–15.1)
GFR SERPL CREATININE-BSD FRML MDRD: 87 ML/MIN/1.73SQ M
GLUCOSE SERPL-MCNC: 72 MG/DL (ref 65–140)
HCT VFR BLD AUTO: 42.1 % (ref 36.5–49.3)
HGB BLD-MCNC: 14.4 G/DL (ref 12–17)
IMM GRANULOCYTES # BLD AUTO: 0.05 THOUSAND/UL (ref 0–0.2)
IMM GRANULOCYTES NFR BLD AUTO: 1 % (ref 0–2)
LYMPHOCYTES # BLD AUTO: 1.37 THOUSANDS/ΜL (ref 0.6–4.47)
LYMPHOCYTES NFR BLD AUTO: 12 % (ref 14–44)
MCH RBC QN AUTO: 33 PG (ref 26.8–34.3)
MCHC RBC AUTO-ENTMCNC: 34.2 G/DL (ref 31.4–37.4)
MCV RBC AUTO: 96 FL (ref 82–98)
MONOCYTES # BLD AUTO: 1.07 THOUSAND/ΜL (ref 0.17–1.22)
MONOCYTES NFR BLD AUTO: 10 % (ref 4–12)
NEUTROPHILS # BLD AUTO: 8.39 THOUSANDS/ΜL (ref 1.85–7.62)
NEUTS SEG NFR BLD AUTO: 75 % (ref 43–75)
NRBC BLD AUTO-RTO: 0 /100 WBCS
PLATELET # BLD AUTO: 217 THOUSANDS/UL (ref 149–390)
PMV BLD AUTO: 10.1 FL (ref 8.9–12.7)
POTASSIUM SERPL-SCNC: 3.6 MMOL/L (ref 3.5–5.3)
RBC # BLD AUTO: 4.37 MILLION/UL (ref 3.88–5.62)
SODIUM SERPL-SCNC: 137 MMOL/L (ref 136–145)
WBC # BLD AUTO: 11.06 THOUSAND/UL (ref 4.31–10.16)

## 2021-10-18 PROCEDURE — 85025 COMPLETE CBC W/AUTO DIFF WBC: CPT | Performed by: SURGERY

## 2021-10-18 PROCEDURE — 99232 SBSQ HOSP IP/OBS MODERATE 35: CPT | Performed by: INTERNAL MEDICINE

## 2021-10-18 PROCEDURE — 80048 BASIC METABOLIC PNL TOTAL CA: CPT | Performed by: SURGERY

## 2021-10-18 RX ORDER — POTASSIUM CHLORIDE 14.9 MG/ML
20 INJECTION INTRAVENOUS ONCE
Status: COMPLETED | OUTPATIENT
Start: 2021-10-18 | End: 2021-10-18

## 2021-10-18 RX ORDER — LIDOCAINE 50 MG/G
1 PATCH TOPICAL DAILY
Status: DISCONTINUED | OUTPATIENT
Start: 2021-10-18 | End: 2021-10-19 | Stop reason: HOSPADM

## 2021-10-18 RX ORDER — DEXTROSE AND SODIUM CHLORIDE 5; .45 G/100ML; G/100ML
100 INJECTION, SOLUTION INTRAVENOUS CONTINUOUS
Status: DISCONTINUED | OUTPATIENT
Start: 2021-10-18 | End: 2021-10-19 | Stop reason: HOSPADM

## 2021-10-18 RX ADMIN — AMLODIPINE BESYLATE 10 MG: 10 TABLET ORAL at 08:45

## 2021-10-18 RX ADMIN — TAMSULOSIN HYDROCHLORIDE 0.4 MG: 0.4 CAPSULE ORAL at 15:32

## 2021-10-18 RX ADMIN — SODIUM CHLORIDE, SODIUM LACTATE, POTASSIUM CHLORIDE, AND CALCIUM CHLORIDE 125 ML/HR: .6; .31; .03; .02 INJECTION, SOLUTION INTRAVENOUS at 01:03

## 2021-10-18 RX ADMIN — DEXTROSE AND SODIUM CHLORIDE 100 ML/HR: 5; .45 INJECTION, SOLUTION INTRAVENOUS at 09:48

## 2021-10-18 RX ADMIN — OXYCODONE HYDROCHLORIDE 10 MG: 10 TABLET ORAL at 18:03

## 2021-10-18 RX ADMIN — POTASSIUM CHLORIDE 20 MEQ: 14.9 INJECTION, SOLUTION INTRAVENOUS at 09:55

## 2021-10-18 RX ADMIN — OXYCODONE HYDROCHLORIDE 10 MG: 10 TABLET ORAL at 05:13

## 2021-10-18 RX ADMIN — LIDOCAINE 5% 1 PATCH: 700 PATCH TOPICAL at 21:59

## 2021-10-18 RX ADMIN — OXYCODONE HYDROCHLORIDE 5 MG: 5 TABLET ORAL at 13:38

## 2021-10-18 RX ADMIN — AZTREONAM 2000 MG: 2 INJECTION, POWDER, LYOPHILIZED, FOR SOLUTION INTRAMUSCULAR; INTRAVENOUS at 15:26

## 2021-10-18 RX ADMIN — METRONIDAZOLE 500 MG: 500 INJECTION, SOLUTION INTRAVENOUS at 13:42

## 2021-10-18 RX ADMIN — METRONIDAZOLE 500 MG: 500 INJECTION, SOLUTION INTRAVENOUS at 21:59

## 2021-10-18 RX ADMIN — AZTREONAM 2000 MG: 2 INJECTION, POWDER, LYOPHILIZED, FOR SOLUTION INTRAMUSCULAR; INTRAVENOUS at 22:34

## 2021-10-18 RX ADMIN — METRONIDAZOLE 500 MG: 500 INJECTION, SOLUTION INTRAVENOUS at 05:13

## 2021-10-18 RX ADMIN — AZTREONAM 2000 MG: 2 INJECTION, POWDER, LYOPHILIZED, FOR SOLUTION INTRAMUSCULAR; INTRAVENOUS at 07:12

## 2021-10-18 RX ADMIN — METOROPROLOL TARTRATE 5 MG: 5 INJECTION, SOLUTION INTRAVENOUS at 22:08

## 2021-10-18 RX ADMIN — Medication 3 MG: at 22:08

## 2021-10-19 VITALS
DIASTOLIC BLOOD PRESSURE: 90 MMHG | SYSTOLIC BLOOD PRESSURE: 142 MMHG | RESPIRATION RATE: 16 BRPM | OXYGEN SATURATION: 96 % | HEIGHT: 69 IN | WEIGHT: 163 LBS | BODY MASS INDEX: 24.14 KG/M2 | HEART RATE: 77 BPM | TEMPERATURE: 98.3 F

## 2021-10-19 PROBLEM — A41.9 SEPSIS (HCC): Status: RESOLVED | Noted: 2021-10-16 | Resolved: 2021-10-19

## 2021-10-19 LAB
ANION GAP SERPL CALCULATED.3IONS-SCNC: 10 MMOL/L (ref 4–13)
BASOPHILS # BLD AUTO: 0.07 THOUSANDS/ΜL (ref 0–0.1)
BASOPHILS NFR BLD AUTO: 1 % (ref 0–1)
BUN SERPL-MCNC: 7 MG/DL (ref 5–25)
CALCIUM SERPL-MCNC: 8.7 MG/DL (ref 8.3–10.1)
CHLORIDE SERPL-SCNC: 108 MMOL/L (ref 100–108)
CO2 SERPL-SCNC: 22 MMOL/L (ref 21–32)
CREAT SERPL-MCNC: 0.99 MG/DL (ref 0.6–1.3)
EOSINOPHIL # BLD AUTO: 0.13 THOUSAND/ΜL (ref 0–0.61)
EOSINOPHIL NFR BLD AUTO: 2 % (ref 0–6)
ERYTHROCYTE [DISTWIDTH] IN BLOOD BY AUTOMATED COUNT: 11.8 % (ref 11.6–15.1)
GFR SERPL CREATININE-BSD FRML MDRD: 89 ML/MIN/1.73SQ M
GLUCOSE SERPL-MCNC: 145 MG/DL (ref 65–140)
HCT VFR BLD AUTO: 42 % (ref 36.5–49.3)
HGB BLD-MCNC: 14.6 G/DL (ref 12–17)
IMM GRANULOCYTES # BLD AUTO: 0.05 THOUSAND/UL (ref 0–0.2)
IMM GRANULOCYTES NFR BLD AUTO: 1 % (ref 0–2)
LYMPHOCYTES # BLD AUTO: 1.32 THOUSANDS/ΜL (ref 0.6–4.47)
LYMPHOCYTES NFR BLD AUTO: 15 % (ref 14–44)
MAGNESIUM SERPL-MCNC: 2 MG/DL (ref 1.6–2.6)
MCH RBC QN AUTO: 32.9 PG (ref 26.8–34.3)
MCHC RBC AUTO-ENTMCNC: 34.8 G/DL (ref 31.4–37.4)
MCV RBC AUTO: 95 FL (ref 82–98)
MONOCYTES # BLD AUTO: 1.09 THOUSAND/ΜL (ref 0.17–1.22)
MONOCYTES NFR BLD AUTO: 12 % (ref 4–12)
NEUTROPHILS # BLD AUTO: 6.2 THOUSANDS/ΜL (ref 1.85–7.62)
NEUTS SEG NFR BLD AUTO: 69 % (ref 43–75)
NRBC BLD AUTO-RTO: 0 /100 WBCS
PLATELET # BLD AUTO: 269 THOUSANDS/UL (ref 149–390)
PMV BLD AUTO: 10.3 FL (ref 8.9–12.7)
POTASSIUM SERPL-SCNC: 3.7 MMOL/L (ref 3.5–5.3)
RBC # BLD AUTO: 4.44 MILLION/UL (ref 3.88–5.62)
SODIUM SERPL-SCNC: 140 MMOL/L (ref 136–145)
WBC # BLD AUTO: 8.86 THOUSAND/UL (ref 4.31–10.16)

## 2021-10-19 PROCEDURE — 80048 BASIC METABOLIC PNL TOTAL CA: CPT | Performed by: SURGERY

## 2021-10-19 PROCEDURE — 83735 ASSAY OF MAGNESIUM: CPT | Performed by: SURGERY

## 2021-10-19 PROCEDURE — 99232 SBSQ HOSP IP/OBS MODERATE 35: CPT | Performed by: PHYSICIAN ASSISTANT

## 2021-10-19 PROCEDURE — 85025 COMPLETE CBC W/AUTO DIFF WBC: CPT | Performed by: SURGERY

## 2021-10-19 RX ORDER — METRONIDAZOLE 500 MG/1
500 TABLET ORAL EVERY 8 HOURS SCHEDULED
Qty: 21 TABLET | Refills: 0 | Status: SHIPPED | OUTPATIENT
Start: 2021-10-19 | End: 2021-10-26

## 2021-10-19 RX ORDER — CIPROFLOXACIN 500 MG/1
500 TABLET, FILM COATED ORAL EVERY 12 HOURS SCHEDULED
Qty: 14 TABLET | Refills: 0 | Status: SHIPPED | OUTPATIENT
Start: 2021-10-19 | End: 2021-10-26

## 2021-10-19 RX ORDER — AMLODIPINE BESYLATE 2.5 MG/1
10 TABLET ORAL DAILY
Qty: 120 TABLET | Refills: 0 | Status: SHIPPED | OUTPATIENT
Start: 2021-10-19 | End: 2021-10-21 | Stop reason: SDUPTHER

## 2021-10-19 RX ORDER — POTASSIUM CHLORIDE 20 MEQ/1
20 TABLET, EXTENDED RELEASE ORAL ONCE
Status: COMPLETED | OUTPATIENT
Start: 2021-10-19 | End: 2021-10-19

## 2021-10-19 RX ADMIN — METRONIDAZOLE 500 MG: 500 INJECTION, SOLUTION INTRAVENOUS at 05:50

## 2021-10-19 RX ADMIN — DEXTROSE AND SODIUM CHLORIDE 100 ML/HR: 5; .45 INJECTION, SOLUTION INTRAVENOUS at 01:37

## 2021-10-19 RX ADMIN — AMLODIPINE BESYLATE 10 MG: 10 TABLET ORAL at 09:08

## 2021-10-19 RX ADMIN — POTASSIUM CHLORIDE 20 MEQ: 1500 TABLET, EXTENDED RELEASE ORAL at 09:08

## 2021-10-19 RX ADMIN — AZTREONAM 2000 MG: 2 INJECTION, POWDER, LYOPHILIZED, FOR SOLUTION INTRAMUSCULAR; INTRAVENOUS at 06:42

## 2021-10-21 RX ORDER — AMLODIPINE BESYLATE 10 MG/1
10 TABLET ORAL DAILY
Qty: 60 TABLET | Refills: 0 | Status: SHIPPED | OUTPATIENT
Start: 2021-10-21 | End: 2021-12-20

## 2021-10-22 LAB
BACTERIA BLD CULT: NORMAL
BACTERIA BLD CULT: NORMAL

## 2022-09-13 ENCOUNTER — APPOINTMENT (EMERGENCY)
Dept: CT IMAGING | Facility: HOSPITAL | Age: 52
DRG: 045 | End: 2022-09-13
Payer: COMMERCIAL

## 2022-09-13 ENCOUNTER — HOSPITAL ENCOUNTER (INPATIENT)
Facility: HOSPITAL | Age: 52
LOS: 3 days | Discharge: HOME | DRG: 045 | End: 2022-09-16
Attending: EMERGENCY MEDICINE | Admitting: INTERNAL MEDICINE
Payer: COMMERCIAL

## 2022-09-13 DIAGNOSIS — I10 HYPERTENSION, UNSPECIFIED TYPE: ICD-10-CM

## 2022-09-13 DIAGNOSIS — R09.89 SYMPTOMS OF CEREBROVASCULAR ACCIDENT (CVA): Primary | ICD-10-CM

## 2022-09-13 DIAGNOSIS — I10 HYPERTENSION: ICD-10-CM

## 2022-09-13 DIAGNOSIS — I65.23 BILATERAL CAROTID ARTERY STENOSIS: ICD-10-CM

## 2022-09-13 DIAGNOSIS — R29.90 STROKE-LIKE SYMPTOMS: ICD-10-CM

## 2022-09-13 DIAGNOSIS — I63.9 CVA (CEREBRAL VASCULAR ACCIDENT) (HCC): ICD-10-CM

## 2022-09-13 PROBLEM — I65.29 CAROTID STENOSIS: Status: ACTIVE | Noted: 2022-09-13

## 2022-09-13 PROBLEM — Z87.19 HISTORY OF DIVERTICULITIS: Status: ACTIVE | Noted: 2021-10-16

## 2022-09-13 LAB
2HR DELTA HS TROPONIN: 4 NG/L
ANION GAP SERPL CALCULATED.3IONS-SCNC: 9 MMOL/L (ref 4–13)
APTT PPP: 30 SECONDS (ref 23–37)
BUN SERPL-MCNC: 14 MG/DL (ref 5–25)
CALCIUM SERPL-MCNC: 10.3 MG/DL (ref 8.4–10.2)
CARDIAC TROPONIN I PNL SERPL HS: 13 NG/L
CARDIAC TROPONIN I PNL SERPL HS: 9 NG/L
CHLORIDE SERPL-SCNC: 103 MMOL/L (ref 96–108)
CO2 SERPL-SCNC: 24 MMOL/L (ref 21–32)
CREAT SERPL-MCNC: 1.06 MG/DL (ref 0.6–1.3)
CRP SERPL QL: 1.6 MG/L
ERYTHROCYTE [DISTWIDTH] IN BLOOD BY AUTOMATED COUNT: 12.2 % (ref 11.6–15.1)
ERYTHROCYTE [SEDIMENTATION RATE] IN BLOOD: 13 MM/HOUR (ref 0–19)
FLUAV RNA RESP QL NAA+PROBE: NEGATIVE
FLUBV RNA RESP QL NAA+PROBE: NEGATIVE
GFR SERPL CREATININE-BSD FRML MDRD: 81 ML/MIN/1.73SQ M
GLUCOSE SERPL-MCNC: 102 MG/DL (ref 65–140)
GLUCOSE SERPL-MCNC: 89 MG/DL (ref 65–140)
GLUCOSE SERPL-MCNC: 94 MG/DL (ref 65–140)
HCT VFR BLD AUTO: 51.4 % (ref 36.5–49.3)
HGB BLD-MCNC: 18 G/DL (ref 12–17)
INR PPP: 0.9 (ref 0.84–1.19)
MCH RBC QN AUTO: 32.4 PG (ref 26.8–34.3)
MCHC RBC AUTO-ENTMCNC: 35 G/DL (ref 31.4–37.4)
MCV RBC AUTO: 93 FL (ref 82–98)
PLATELET # BLD AUTO: 324 THOUSANDS/UL (ref 149–390)
PMV BLD AUTO: 9.5 FL (ref 8.9–12.7)
POTASSIUM SERPL-SCNC: 3.8 MMOL/L (ref 3.5–5.3)
PROTHROMBIN TIME: 12.4 SECONDS (ref 11.6–14.5)
RBC # BLD AUTO: 5.55 MILLION/UL (ref 3.88–5.62)
RSV RNA RESP QL NAA+PROBE: NEGATIVE
SARS-COV-2 RNA RESP QL NAA+PROBE: NEGATIVE
SODIUM SERPL-SCNC: 136 MMOL/L (ref 135–147)
TSH SERPL DL<=0.05 MIU/L-ACNC: 0.73 UIU/ML (ref 0.45–4.5)
WBC # BLD AUTO: 14.78 THOUSAND/UL (ref 4.31–10.16)

## 2022-09-13 PROCEDURE — 84484 ASSAY OF TROPONIN QUANT: CPT | Performed by: FAMILY MEDICINE

## 2022-09-13 PROCEDURE — 85610 PROTHROMBIN TIME: CPT | Performed by: EMERGENCY MEDICINE

## 2022-09-13 PROCEDURE — 82948 REAGENT STRIP/BLOOD GLUCOSE: CPT

## 2022-09-13 PROCEDURE — 99285 EMERGENCY DEPT VISIT HI MDM: CPT

## 2022-09-13 PROCEDURE — 85652 RBC SED RATE AUTOMATED: CPT | Performed by: EMERGENCY MEDICINE

## 2022-09-13 PROCEDURE — 85027 COMPLETE CBC AUTOMATED: CPT | Performed by: EMERGENCY MEDICINE

## 2022-09-13 PROCEDURE — 86140 C-REACTIVE PROTEIN: CPT | Performed by: EMERGENCY MEDICINE

## 2022-09-13 PROCEDURE — 36415 COLL VENOUS BLD VENIPUNCTURE: CPT | Performed by: EMERGENCY MEDICINE

## 2022-09-13 PROCEDURE — 70496 CT ANGIOGRAPHY HEAD: CPT

## 2022-09-13 PROCEDURE — 0241U HB NFCT DS VIR RESP RNA 4 TRGT: CPT | Performed by: EMERGENCY MEDICINE

## 2022-09-13 PROCEDURE — 85049 AUTOMATED PLATELET COUNT: CPT | Performed by: FAMILY MEDICINE

## 2022-09-13 PROCEDURE — 70498 CT ANGIOGRAPHY NECK: CPT

## 2022-09-13 PROCEDURE — 99291 CRITICAL CARE FIRST HOUR: CPT | Performed by: EMERGENCY MEDICINE

## 2022-09-13 PROCEDURE — 84484 ASSAY OF TROPONIN QUANT: CPT | Performed by: EMERGENCY MEDICINE

## 2022-09-13 PROCEDURE — 93005 ELECTROCARDIOGRAM TRACING: CPT

## 2022-09-13 PROCEDURE — 80048 BASIC METABOLIC PNL TOTAL CA: CPT | Performed by: EMERGENCY MEDICINE

## 2022-09-13 PROCEDURE — 96374 THER/PROPH/DIAG INJ IV PUSH: CPT

## 2022-09-13 PROCEDURE — 84443 ASSAY THYROID STIM HORMONE: CPT | Performed by: FAMILY MEDICINE

## 2022-09-13 PROCEDURE — 85730 THROMBOPLASTIN TIME PARTIAL: CPT | Performed by: EMERGENCY MEDICINE

## 2022-09-13 RX ORDER — ASPIRIN 81 MG/1
81 TABLET, CHEWABLE ORAL DAILY
Status: DISCONTINUED | OUTPATIENT
Start: 2022-09-14 | End: 2022-09-16 | Stop reason: HOSPADM

## 2022-09-13 RX ORDER — CLOPIDOGREL BISULFATE 75 MG/1
75 TABLET ORAL ONCE
Status: COMPLETED | OUTPATIENT
Start: 2022-09-13 | End: 2022-09-13

## 2022-09-13 RX ORDER — ATORVASTATIN CALCIUM 40 MG/1
40 TABLET, FILM COATED ORAL EVERY EVENING
Status: DISCONTINUED | OUTPATIENT
Start: 2022-09-13 | End: 2022-09-14

## 2022-09-13 RX ORDER — LABETALOL HYDROCHLORIDE 5 MG/ML
10 INJECTION, SOLUTION INTRAVENOUS ONCE
Status: COMPLETED | OUTPATIENT
Start: 2022-09-13 | End: 2022-09-13

## 2022-09-13 RX ORDER — NICOTINE 21 MG/24HR
1 PATCH, TRANSDERMAL 24 HOURS TRANSDERMAL DAILY
Status: DISCONTINUED | OUTPATIENT
Start: 2022-09-14 | End: 2022-09-14

## 2022-09-13 RX ORDER — ACETAMINOPHEN 325 MG/1
650 TABLET ORAL EVERY 6 HOURS PRN
Status: DISCONTINUED | OUTPATIENT
Start: 2022-09-13 | End: 2022-09-14

## 2022-09-13 RX ORDER — ASPIRIN 325 MG
325 TABLET ORAL ONCE
Status: COMPLETED | OUTPATIENT
Start: 2022-09-13 | End: 2022-09-13

## 2022-09-13 RX ORDER — CLOPIDOGREL BISULFATE 75 MG/1
75 TABLET ORAL DAILY
Status: DISCONTINUED | OUTPATIENT
Start: 2022-09-14 | End: 2022-09-16 | Stop reason: HOSPADM

## 2022-09-13 RX ORDER — HEPARIN SODIUM 5000 [USP'U]/ML
5000 INJECTION, SOLUTION INTRAVENOUS; SUBCUTANEOUS EVERY 8 HOURS SCHEDULED
Status: DISCONTINUED | OUTPATIENT
Start: 2022-09-13 | End: 2022-09-13

## 2022-09-13 RX ADMIN — IOHEXOL 100 ML: 350 INJECTION, SOLUTION INTRAVENOUS at 18:46

## 2022-09-13 RX ADMIN — CLOPIDOGREL BISULFATE 75 MG: 75 TABLET ORAL at 20:10

## 2022-09-13 RX ADMIN — ASPIRIN 325 MG ORAL TABLET 325 MG: 325 PILL ORAL at 20:10

## 2022-09-13 RX ADMIN — LABETALOL HYDROCHLORIDE 10 MG: 5 INJECTION, SOLUTION INTRAVENOUS at 19:03

## 2022-09-13 NOTE — ED PROVIDER NOTES
History  Chief Complaint   Patient presents with    STROKE Alert     Pt c/o dizziness L sided facial numbness, L sided arm numbness,L sided leg numbness  Slight facial droop on L side noted in triage  Wife states that patient has had increased confusion  Last known well Bernardo Hernández is a 49 y o  male who presents with the chief complaint of left sided facial droop, arm and leg weakness and some confusion/disorientation  He reports he didn't feel well starting around 11 am this morning but he went to work anyway  He called his wife around 12:30 and reported he felt disoriented and she told him to go home  He had trouble driving home and reports that he may have passed out  When his wife got home tonight he was complaining of left arm, leg and facial numnbess with confusion  His pmh is significant for hypertension and an extensive smoking habit  History provided by:  Patient   used: No    STROKE Alert  Location:  Left sided weakness and numbness  Severity:  Moderate  Onset quality:  Sudden  Duration:  7 hours  Timing:  Unable to specify  Progression:  Unable to specify  Chronicity:  New  Context:  Spontaneous  Relieved by:  Nothing  Worsened by:  Nothing  Ineffective treatments:  Nothing tried   Associated symptoms: no chest pain, no diarrhea, no fever, no nausea, no rash, no shortness of breath and no vomiting        Prior to Admission Medications   Prescriptions Last Dose Informant Patient Reported? Taking? amLODIPine (NORVASC) 10 mg tablet   No No   Sig: Take 1 tablet (10 mg total) by mouth daily      Facility-Administered Medications: None       No past medical history on file  Past Surgical History:   Procedure Laterality Date    CARPAL TUNNEL RELEASE      ULNAR COLLATERAL LIGAMENT RECONSTRUCTION         No family history on file  I have reviewed and agree with the history as documented      E-Cigarette/Vaping    E-Cigarette Use Never User      E-Cigarette/Vaping Substances     Social History     Tobacco Use    Smoking status: Current Every Day Smoker     Packs/day: 2 00    Smokeless tobacco: Never Used   Vaping Use    Vaping Use: Never used   Substance Use Topics    Alcohol use: Not Currently     Alcohol/week: 6 0 standard drinks     Types: 6 Cans of beer per week     Comment: daily    Drug use: Never       Review of Systems   Constitutional: Negative for chills, diaphoresis and fever  Respiratory: Negative for shortness of breath  Cardiovascular: Negative for chest pain and palpitations  Gastrointestinal: Negative for diarrhea, nausea and vomiting  Genitourinary: Negative for dysuria and frequency  Musculoskeletal: Positive for gait problem  Skin: Negative for rash  Neurological: Positive for syncope, weakness and numbness  Psychiatric/Behavioral: Positive for confusion  All other systems reviewed and are negative  Physical Exam  Physical Exam  Vitals and nursing note reviewed  Constitutional:       General: He is in acute distress (mild)  Appearance: He is well-developed  HENT:      Head: Normocephalic and atraumatic  Eyes:      General: Visual field deficit (left eye left outer quadrant) present  Extraocular Movements: Extraocular movements intact  Pupils: Pupils are equal, round, and reactive to light  Neck:      Vascular: No JVD  Cardiovascular:      Rate and Rhythm: Normal rate and regular rhythm  Heart sounds: Normal heart sounds  No murmur heard  No friction rub  No gallop  Pulmonary:      Effort: Pulmonary effort is normal  No respiratory distress  Breath sounds: Normal breath sounds  No wheezing or rales  Chest:      Chest wall: No tenderness  Musculoskeletal:         General: No tenderness  Normal range of motion  Cervical back: Normal range of motion  Skin:     General: Skin is warm and dry  Neurological:      Mental Status: He is alert and oriented to person, place, and time  GCS: GCS eye subscore is 4  GCS verbal subscore is 5  GCS motor subscore is 6  Cranial Nerves: Facial asymmetry (left sided facial droop) present  Motor: Weakness (left arm/leg) present  Comments: Slight left arm drift (more of a bouncing compared to right)   Psychiatric:         Behavior: Behavior normal          Thought Content:  Thought content normal          Judgment: Judgment normal          Vital Signs  ED Triage Vitals   Temperature Pulse Respirations Blood Pressure SpO2   09/13/22 1827 09/13/22 1825 09/13/22 1825 09/13/22 1825 09/13/22 1825   98 °F (36 7 °C) (!) 115 16 (!) 215/128 99 %      Temp src Heart Rate Source Patient Position - Orthostatic VS BP Location FiO2 (%)   -- 09/13/22 1856 09/13/22 1908 -- --    Monitor Lying        Pain Score       --                  Vitals:    09/13/22 1900 09/13/22 1908 09/13/22 1915 09/13/22 1930   BP: (!) 214/99 (!) 171/84 (!) 171/87 (!) 177/95   Pulse: 97 89 86 86   Patient Position - Orthostatic VS:  Lying           Visual Acuity  Visual Acuity    Flowsheet Row Most Recent Value   L Pupil Size (mm) 3   R Pupil Size (mm) 3          ED Medications  Medications   aspirin tablet 325 mg (has no administration in time range)   clopidogrel (PLAVIX) tablet 75 mg (has no administration in time range)   labetalol (NORMODYNE) injection 10 mg (10 mg Intravenous Given 9/13/22 1903)   iohexol (OMNIPAQUE) 350 MG/ML injection (SINGLE-DOSE) 100 mL (100 mL Intravenous Given 9/13/22 1846)       Diagnostic Studies  Results Reviewed     Procedure Component Value Units Date/Time    Sedimentation rate, automated [[id number]]     Lab Status: No result Specimen: Blood     C-reactive protein [[id number]]     Lab Status: No result Specimen: Blood     FLU/RSV/COVID - if FLU/RSV clinically relevant [[id number]]  (Normal) Collected: 09/13/22 1832    Lab Status: Final result Specimen: Nares from Nose Updated: 09/13/22 1917     SARS-CoV-2 Negative     INFLUENZA A PCR Negative INFLUENZA B PCR Negative     RSV PCR Negative    Narrative:      FOR PEDIATRIC PATIENTS - copy/paste COVID Guidelines URL to browser: [email or web address]    SARS-CoV-2 assay is a Nucleic Acid Amplification assay intended for the  qualitative detection of nucleic acid from SARS-CoV-2 in nasopharyngeal  swabs  Results are for the presumptive identification of SARS-CoV-2 RNA  Positive results are indicative of infection with SARS-CoV-2, the virus  causing COVID-19, but do not rule out bacterial infection or co-infection  with other viruses  Laboratories within the [place] and its  territories are required to report all positive results to the appropriate  public health authorities  Negative results do not preclude SARS-CoV-2  infection and should not be used as the sole basis for treatment or other  patient management decisions  Negative results must be combined with  clinical observations, patient history, and epidemiological information  This test has not been FDA cleared or approved  This test has been authorized by FDA under an Emergency Use Authorization  (EUA)  This test is only authorized for the duration of time the  declaration that circumstances exist justifying the authorization of the  emergency use of an in vitro diagnostic tests for detection of SARS-CoV-2  virus and/or diagnosis of COVID-19 infection under section 564(b)(1) of  the Act, 21 U  S C  643OHF-8(P)(8), unless the authorization is terminated  or revoked sooner  The test has been validated but independent review by FDA  and CLIA is pending  Test performed using [employer] GeneXpert: This RT-PCR assay targets N2,  a region unique to SARS-CoV-2  A conserved region in the E-gene was chosen  for pan-Sarbecovirus detection which includes SARS-CoV-2      HS Troponin 0hr (reflex protocol) [[id number]]  (Normal) Collected: 09/13/22 1832    Lab Status: Final result Specimen: Blood from Arm, Left Updated: 09/13/22 1905     hs TnI 0hr 9 ng/L     HS Troponin I 2hr [[id number]]     Lab Status: No result Specimen: Blood     Basic metabolic panel [[id number]]  (Abnormal) Collected: 09/13/22 1832    Lab Status: Final result Specimen: Blood from Arm, Left Updated: 09/13/22 1857     Sodium 136 mmol/L      Potassium 3 8 mmol/L      Chloride 103 mmol/L      CO2 24 mmol/L      ANION GAP 9 mmol/L      BUN 14 mg/dL      Creatinine 1 06 mg/dL      Glucose 102 mg/dL      Calcium 10 3 mg/dL      eGFR 81 ml/min/1 73sq m     Narrative:      [place] guidelines for Chronic Kidney Disease (CKD):     Stage 1 with normal or high GFR (GFR > 90 mL/min/1 73 square meters)    Stage 2 Mild CKD (GFR = 60-89 mL/min/1 73 square meters)    Stage 3A Moderate CKD (GFR = 45-59 mL/min/1 73 square meters)    Stage 3B Moderate CKD (GFR = 30-44 mL/min/1 73 square meters)    Stage 4 Severe CKD (GFR = 15-29 mL/min/1 73 square meters)    Stage 5 End Stage CKD (GFR <15 mL/min/1 73 square meters)  Note: GFR calculation is accurate only with a steady state creatinine    Protime-INR [[id number]]  (Normal) Collected: 09/13/22 1832    Lab Status: Final result Specimen: Blood from Arm, Left Updated: 09/13/22 1854     Protime 12 4 seconds      INR 0 90    APTT [[id number]]  (Normal) Collected: 09/13/22 1832    Lab Status: Final result Specimen: Blood from Arm, Left Updated: 09/13/22 1854     PTT 30 seconds     CBC and Platelet [[id number]]  (Abnormal) Collected: 09/13/22 1832    Lab Status: Final result Specimen: Blood from Arm, Left Updated: 09/13/22 1840     WBC 14 78 Thousand/uL      RBC 5 55 Million/uL      Hemoglobin 18 0 g/dL      Hematocrit 51 4 %      MCV 93 fL      MCH 32 4 pg      MCHC 35 0 g/dL      RDW 12 2 %      Platelets 580 Thousands/uL      MPV 9 5 fL     Fingerstick Glucose (POCT) [[id number]]  (Normal) Collected: 09/13/22 1832    Lab Status: Final result Updated: 09/13/22 1832     POC Glucose 94 mg/dl CTA stroke alert (head/neck)   Final Result by Tori Bah MD (09/13 1934)      1  Occlusion of the right PCA P2 segment, with distal branch reperfusion  2   Critical (greater than 90%) stenoses in the proximal cervical segments of the bilateral internal carotid arteries  Distal cervical and intracranial segments are patent and normal in caliber  Findings were directly discussed with Dr Gomez Romeo  at [facility] PM                         Workstation performed: [id number]         CT stroke alert brain   Final Result by Tori Bah MD (09/13 1935)         1  Possible acute lacunar infarct in the right thalamus  MRI of the brain may be helpful  2   No acute intracranial hemorrhage or mass effect        Findings were directly discussed with Hawa Mcfarland at [facility] PM       Workstation performed: [id number]                    Procedures  ECG 12 Lead Documentation Only    Date/Time: 9/13/2022 7:57 PM  Performed by: Harmony Lema MD  Authorized by: Harmony Lema MD     Indications / Diagnosis:  Cva  ECG reviewed by me, the ED Provider: yes    Patient location:  ED  Previous ECG:     Previous ECG:  Compared to current    Comparison ECG info:  03/01/2019    Similarity:  No change  Interpretation:     Interpretation: normal    Rate:     ECG rate:  99    ECG rate assessment: normal    Rhythm:     Rhythm: sinus rhythm    Ectopy:     Ectopy: none    QRS:     QRS axis:  Normal    QRS intervals:  Normal  Conduction:     Conduction: normal    ST segments:     ST segments:  Normal  T waves:     T waves: normal    CriticalCare Time  Performed by: Harmony eLma MD  Authorized by: Harmony Lema MD     Critical care provider statement:     Critical care time (minutes):  45    Critical care time was exclusive of:  Separately billable procedures and treating other patients and teaching time    Critical care was necessary to treat or prevent imminent or life-threatening deterioration of the following conditions:  CNS failure or compromise    Critical care was time spent personally by me on the following activities:  Blood draw for specimens, obtaining history from patient or surrogate, development of treatment plan with patient or surrogate, discussions with consultants, evaluation of patient's response to treatment, examination of patient, interpretation of cardiac output measurements, ordering and performing treatments and interventions, ordering and review of laboratory studies, ordering and review of radiographic studies and re-evaluation of patient's condition             ED Course                  Stroke Assessment     Row Name 09/13/22 1900             NIH Stroke Scale    Interval Baseline      Level of Consciousness (1a ) 0      LOC Questions (1b ) 0      LOC Commands (1c ) 0      Best Gaze (2 ) 0      Visual (3 ) 1  left visual field defect      Facial Palsy (4 ) 2      Motor Arm, Left (5a ) 1      Motor Arm, Right (5b ) 0      Motor Leg, Left (6a ) 0      Motor Leg, Right (6b ) 0      Limb Ataxia (7 ) 0      Sensory (8 ) 1      Best Language (9 ) 0      Dysarthria (10 ) 0      Extinction and Inattention (11 ) (Formerly Neglect) 0      Total 5              Flowsheet Row Most Recent Value   Thrombolytic Decision Options    Thrombolytic Decision Patient not a candidate  Patient is not a candidate options Unclear time of onset outside appropriate time window                                    MDM  Number of Diagnoses or Management Options  Hypertension: new and requires workup  Symptoms of cerebrovascular accident (CVA): new and requires workup  Diagnosis management comments: Background: 49 y o  male patient presents with s/s concerning for acute CVA including left facial droop, left arm and leg weakness, confusion    Differential DX includes but is not limited to: Acute ischemic CVA,  ICH, TIA, metabolic derangement, less likely seizure, less likely psychiatric disease    Plan: Stroke Alert process and workup, likely admission          Amount and/or Complexity of Data Reviewed  Clinical lab tests: ordered and reviewed  Tests in the radiology section of CPT®: ordered and reviewed  Discuss the patient with other providers: yes (Dr Karly Zambrano with neurology  )    Risk of Complications, Morbidity, and/or Mortality  Presenting problems: high  Diagnostic procedures: high  Management options: high    Patient Progress  Patient progress: stable      Disposition  Final diagnoses:   Symptoms of cerebrovascular accident (CVA)   Hypertension     Time reflects when diagnosis was documented in both MDM as applicable and the Disposition within this note     Time User Action Codes Description Comment    9/13/2022  6:29 PM Gennaro BOB Add [R09 [id number]] Symptoms of cerebrovascular accident (CVA)     9/13/2022  7:57 PM Chance Ashby Hypertension       ED Disposition     ED Disposition   Admit    Condition   Stable    Date/Time   Tue Sep 13, 2022  7:58 PM    Comment   Case was discussed with Dr Pilar Shannon and the patient's admission status was agreed to be Admission Status: inpatient status to the service of Dr Pilar Shannon   Follow-up Information    None         Patient's Medications   Discharge Prescriptions    No medications on file       No discharge procedures on file      PDMP Review     None          ED Provider  Electronically Signed by           Justin Driver MD  09/13/22 2001

## 2022-09-14 ENCOUNTER — APPOINTMENT (INPATIENT)
Dept: VASCULAR ULTRASOUND | Facility: HOSPITAL | Age: 52
DRG: 045 | End: 2022-09-14
Payer: COMMERCIAL

## 2022-09-14 ENCOUNTER — APPOINTMENT (INPATIENT)
Dept: NON INVASIVE DIAGNOSTICS | Facility: HOSPITAL | Age: 52
DRG: 045 | End: 2022-09-14
Payer: COMMERCIAL

## 2022-09-14 ENCOUNTER — APPOINTMENT (OUTPATIENT)
Dept: MRI IMAGING | Facility: HOSPITAL | Age: 52
DRG: 045 | End: 2022-09-14
Payer: COMMERCIAL

## 2022-09-14 PROBLEM — Z72.89 ALCOHOL INTAKE ABOVE RECOMMENDED SENSIBLE LIMITS: Status: ACTIVE | Noted: 2022-09-14

## 2022-09-14 PROBLEM — F10.90 ALCOHOL INTAKE ABOVE RECOMMENDED SENSIBLE LIMITS: Status: ACTIVE | Noted: 2022-09-14

## 2022-09-14 LAB
4HR DELTA HS TROPONIN: -1 NG/L
ALBUMIN SERPL BCP-MCNC: 4.1 G/DL (ref 3.5–5)
ALP SERPL-CCNC: 61 U/L (ref 34–104)
ALT SERPL W P-5'-P-CCNC: 28 U/L (ref 7–52)
ANION GAP SERPL CALCULATED.3IONS-SCNC: 8 MMOL/L (ref 4–13)
AORTIC ROOT: 2.9 CM
AORTIC VALVE MEAN VELOCITY: 11.9 M/S
APICAL FOUR CHAMBER EJECTION FRACTION: 54 %
ASCENDING AORTA: 3.4 CM
AST SERPL W P-5'-P-CCNC: 20 U/L (ref 13–39)
ATRIAL RATE: 99 BPM
AV AREA BY CONTINUOUS VTI: 1.8 CM2
AV AREA PEAK VELOCITY: 1.9 CM2
AV LVOT MEAN GRADIENT: 2 MMHG
AV LVOT PEAK GRADIENT: 4 MMHG
AV MEAN GRADIENT: 6 MMHG
AV PEAK GRADIENT: 13 MMHG
AV REGURGITATION PRESSURE HALF TIME: 564 MS
AV VALVE AREA: 1.77 CM2
AV VELOCITY RATIO: 0.54
BASOPHILS # BLD AUTO: 0.1 THOUSANDS/ΜL (ref 0–0.1)
BASOPHILS NFR BLD AUTO: 1 % (ref 0–1)
BILIRUB SERPL-MCNC: 0.43 MG/DL (ref 0.2–1)
BUN SERPL-MCNC: 13 MG/DL (ref 5–25)
CALCIUM SERPL-MCNC: 9.5 MG/DL (ref 8.4–10.2)
CARDIAC TROPONIN I PNL SERPL HS: 8 NG/L
CHLORIDE SERPL-SCNC: 104 MMOL/L (ref 96–108)
CHOLEST SERPL-MCNC: 255 MG/DL
CO2 SERPL-SCNC: 25 MMOL/L (ref 21–32)
CREAT SERPL-MCNC: 1.07 MG/DL (ref 0.6–1.3)
DOP CALC AO PEAK VEL: 1.82 M/S
DOP CALC AO VTI: 29.53 CM
DOP CALC LVOT AREA: 3.46 CM2
DOP CALC LVOT DIAMETER: 2.1 CM
DOP CALC LVOT PEAK VEL VTI: 15.13 CM
DOP CALC LVOT PEAK VEL: 0.98 M/S
DOP CALC LVOT STROKE INDEX: 26.3 ML/M2
DOP CALC LVOT STROKE VOLUME: 52.38 CM3
E WAVE DECELERATION TIME: 298 MS
EOSINOPHIL # BLD AUTO: 0.19 THOUSAND/ΜL (ref 0–0.61)
EOSINOPHIL NFR BLD AUTO: 2 % (ref 0–6)
ERYTHROCYTE [DISTWIDTH] IN BLOOD BY AUTOMATED COUNT: 12.4 % (ref 11.6–15.1)
EST. AVERAGE GLUCOSE BLD GHB EST-MCNC: 114 MG/DL
FRACTIONAL SHORTENING: 24 % (ref 28–44)
GFR SERPL CREATININE-BSD FRML MDRD: 80 ML/MIN/1.73SQ M
GLUCOSE SERPL-MCNC: 82 MG/DL (ref 65–140)
HBA1C MFR BLD: 5.6 %
HCT VFR BLD AUTO: 48.7 % (ref 36.5–49.3)
HDLC SERPL-MCNC: 53 MG/DL
HGB BLD-MCNC: 16.9 G/DL (ref 12–17)
IMM GRANULOCYTES # BLD AUTO: 0.07 THOUSAND/UL (ref 0–0.2)
IMM GRANULOCYTES NFR BLD AUTO: 1 % (ref 0–2)
INTERVENTRICULAR SEPTUM IN DIASTOLE (PARASTERNAL SHORT AXIS VIEW): 1.2 CM
INTERVENTRICULAR SEPTUM: 1.2 CM (ref 0.6–1.1)
LAAS-AP2: 21.7 CM2
LAAS-AP4: 16.6 CM2
LDLC SERPL CALC-MCNC: 152 MG/DL (ref 0–100)
LEFT ATRIUM SIZE: 3.2 CM
LEFT INTERNAL DIMENSION IN SYSTOLE: 3.2 CM (ref 2.1–4)
LEFT VENTRICULAR INTERNAL DIMENSION IN DIASTOLE: 4.2 CM (ref 3.5–6)
LEFT VENTRICULAR POSTERIOR WALL IN END DIASTOLE: 1.1 CM
LEFT VENTRICULAR STROKE VOLUME: 38 ML
LVSV (TEICH): 38 ML
LYMPHOCYTES # BLD AUTO: 2.4 THOUSANDS/ΜL (ref 0.6–4.47)
LYMPHOCYTES NFR BLD AUTO: 22 % (ref 14–44)
MCH RBC QN AUTO: 32.1 PG (ref 26.8–34.3)
MCHC RBC AUTO-ENTMCNC: 34.7 G/DL (ref 31.4–37.4)
MCV RBC AUTO: 93 FL (ref 82–98)
MONOCYTES # BLD AUTO: 0.92 THOUSAND/ΜL (ref 0.17–1.22)
MONOCYTES NFR BLD AUTO: 8 % (ref 4–12)
MV E'TISSUE VEL-SEP: 4 CM/S
MV PEAK A VEL: 0.95 M/S
MV PEAK E VEL: 62 CM/S
MV STENOSIS PRESSURE HALF TIME: 87 MS
MV VALVE AREA P 1/2 METHOD: 2.53 CM2
NEUTROPHILS # BLD AUTO: 7.24 THOUSANDS/ΜL (ref 1.85–7.62)
NEUTS SEG NFR BLD AUTO: 66 % (ref 43–75)
NRBC BLD AUTO-RTO: 0 /100 WBCS
P AXIS: 59 DEGREES
PLATELET # BLD AUTO: 285 THOUSANDS/UL (ref 149–390)
PLATELET # BLD AUTO: 300 THOUSANDS/UL (ref 149–390)
PMV BLD AUTO: 9.3 FL (ref 8.9–12.7)
PMV BLD AUTO: 9.7 FL (ref 8.9–12.7)
POTASSIUM SERPL-SCNC: 3.6 MMOL/L (ref 3.5–5.3)
PR INTERVAL: 178 MS
PROT SERPL-MCNC: 7.2 G/DL (ref 6.4–8.4)
QRS AXIS: 34 DEGREES
QRSD INTERVAL: 100 MS
QT INTERVAL: 334 MS
QTC INTERVAL: 428 MS
RBC # BLD AUTO: 5.26 MILLION/UL (ref 3.88–5.62)
RIGHT ATRIUM AREA SYSTOLE A4C: 12 CM2
RIGHT VENTRICLE ID DIMENSION: 3.2 CM
SL CV AV DECELERATION TIME RETROGRADE: 1946 MS
SL CV AV PEAK GRADIENT RETROGRADE: 121 MMHG
SL CV LEFT ATRIUM LENGTH A2C: 5.1 CM
SL CV LV EF: 60
SL CV PED ECHO LEFT VENTRICLE DIASTOLIC VOLUME (MOD BIPLANE) 2D: 78 ML
SL CV PED ECHO LEFT VENTRICLE SYSTOLIC VOLUME (MOD BIPLANE) 2D: 40 ML
SODIUM SERPL-SCNC: 137 MMOL/L (ref 135–147)
T WAVE AXIS: 53 DEGREES
TRIGL SERPL-MCNC: 250 MG/DL
VENTRICULAR RATE: 99 BPM
WBC # BLD AUTO: 10.92 THOUSAND/UL (ref 4.31–10.16)

## 2022-09-14 PROCEDURE — 80061 LIPID PANEL: CPT | Performed by: FAMILY MEDICINE

## 2022-09-14 PROCEDURE — 93306 TTE W/DOPPLER COMPLETE: CPT | Performed by: INTERNAL MEDICINE

## 2022-09-14 PROCEDURE — G1004 CDSM NDSC: HCPCS

## 2022-09-14 PROCEDURE — 93010 ELECTROCARDIOGRAM REPORT: CPT | Performed by: INTERNAL MEDICINE

## 2022-09-14 PROCEDURE — 93306 TTE W/DOPPLER COMPLETE: CPT

## 2022-09-14 PROCEDURE — 93880 EXTRACRANIAL BILAT STUDY: CPT

## 2022-09-14 PROCEDURE — 99255 IP/OBS CONSLTJ NEW/EST HI 80: CPT | Performed by: PSYCHIATRY & NEUROLOGY

## 2022-09-14 PROCEDURE — 99254 IP/OBS CNSLTJ NEW/EST MOD 60: CPT | Performed by: PHYSICIAN ASSISTANT

## 2022-09-14 PROCEDURE — 83036 HEMOGLOBIN GLYCOSYLATED A1C: CPT | Performed by: FAMILY MEDICINE

## 2022-09-14 PROCEDURE — 97167 OT EVAL HIGH COMPLEX 60 MIN: CPT

## 2022-09-14 PROCEDURE — 99223 1ST HOSP IP/OBS HIGH 75: CPT | Performed by: INTERNAL MEDICINE

## 2022-09-14 PROCEDURE — 85025 COMPLETE CBC W/AUTO DIFF WBC: CPT | Performed by: FAMILY MEDICINE

## 2022-09-14 PROCEDURE — 70551 MRI BRAIN STEM W/O DYE: CPT

## 2022-09-14 PROCEDURE — 80053 COMPREHEN METABOLIC PANEL: CPT | Performed by: FAMILY MEDICINE

## 2022-09-14 PROCEDURE — 99233 SBSQ HOSP IP/OBS HIGH 50: CPT | Performed by: INTERNAL MEDICINE

## 2022-09-14 RX ORDER — NICOTINE 21 MG/24HR
1 PATCH, TRANSDERMAL 24 HOURS TRANSDERMAL DAILY
Status: DISCONTINUED | OUTPATIENT
Start: 2022-09-14 | End: 2022-09-16 | Stop reason: HOSPADM

## 2022-09-14 RX ORDER — ATORVASTATIN CALCIUM 40 MG/1
80 TABLET, FILM COATED ORAL EVERY EVENING
Status: DISCONTINUED | OUTPATIENT
Start: 2022-09-14 | End: 2022-09-16 | Stop reason: HOSPADM

## 2022-09-14 RX ORDER — FOLIC ACID 1 MG/1
1 TABLET ORAL DAILY
Status: DISCONTINUED | OUTPATIENT
Start: 2022-09-14 | End: 2022-09-16 | Stop reason: HOSPADM

## 2022-09-14 RX ORDER — LIDOCAINE 50 MG/G
1 PATCH TOPICAL DAILY
Status: DISCONTINUED | OUTPATIENT
Start: 2022-09-14 | End: 2022-09-16 | Stop reason: HOSPADM

## 2022-09-14 RX ORDER — LANOLIN ALCOHOL/MO/W.PET/CERES
100 CREAM (GRAM) TOPICAL DAILY
Status: DISCONTINUED | OUTPATIENT
Start: 2022-09-14 | End: 2022-09-16 | Stop reason: HOSPADM

## 2022-09-14 RX ORDER — HEPARIN SODIUM 5000 [USP'U]/ML
5000 INJECTION, SOLUTION INTRAVENOUS; SUBCUTANEOUS EVERY 8 HOURS SCHEDULED
Status: DISCONTINUED | OUTPATIENT
Start: 2022-09-14 | End: 2022-09-16

## 2022-09-14 RX ORDER — ACETAMINOPHEN 325 MG/1
975 TABLET ORAL EVERY 8 HOURS PRN
Status: DISCONTINUED | OUTPATIENT
Start: 2022-09-14 | End: 2022-09-16 | Stop reason: HOSPADM

## 2022-09-14 RX ADMIN — ASPIRIN 81 MG CHEWABLE TABLET 81 MG: 81 TABLET CHEWABLE at 08:57

## 2022-09-14 RX ADMIN — THIAMINE HCL TAB 100 MG 100 MG: 100 TAB at 16:22

## 2022-09-14 RX ADMIN — ATORVASTATIN CALCIUM 40 MG: 40 TABLET, FILM COATED ORAL at 00:18

## 2022-09-14 RX ADMIN — ACETAMINOPHEN 975 MG: 325 TABLET, FILM COATED ORAL at 08:57

## 2022-09-14 RX ADMIN — ACETAMINOPHEN 975 MG: 325 TABLET, FILM COATED ORAL at 16:21

## 2022-09-14 RX ADMIN — FOLIC ACID 1 MG: 1 TABLET ORAL at 16:22

## 2022-09-14 RX ADMIN — LIDOCAINE 5% 1 PATCH: 700 PATCH TOPICAL at 08:56

## 2022-09-14 RX ADMIN — NICOTINE 1 PATCH: 14 PATCH, EXTENDED RELEASE TRANSDERMAL at 16:26

## 2022-09-14 RX ADMIN — CLOPIDOGREL BISULFATE 75 MG: 75 TABLET ORAL at 08:57

## 2022-09-14 RX ADMIN — ACETAMINOPHEN 650 MG: 325 TABLET, FILM COATED ORAL at 02:59

## 2022-09-14 RX ADMIN — ATORVASTATIN CALCIUM 80 MG: 40 TABLET, FILM COATED ORAL at 17:31

## 2022-09-14 RX ADMIN — MULTIPLE VITAMINS W/ MINERALS TAB 1 TABLET: TAB ORAL at 16:22

## 2022-09-14 RX ADMIN — NICOTINE 1 PATCH: 14 PATCH, EXTENDED RELEASE TRANSDERMAL at 00:18

## 2022-09-14 NOTE — OCCUPATIONAL THERAPY NOTE
Occupational Therapy Evaluation      [facility]    9/14/2022    Principal Problem:    Stroke-like symptoms  Active Problems:    History of diverticulitis    Hypertensive urgency    Chronic back pain    Carotid stenosis      No past medical history on file  Past Surgical History:   Procedure Laterality Date    CARPAL TUNNEL RELEASE      ULNAR COLLATERAL LIGAMENT RECONSTRUCTION          09/14/22 1245   OT Last Visit   OT Visit Date 09/14/22   Note Type   Note type Evaluation   Restrictions/Precautions   Other Precautions Fall Risk;Telemetry   Pain Assessment   Pain Assessment Tool 0-10   Pain Score 6   Pain Location/Orientation Location: Back  (lower right back side "sciatic nerve problems")   Home Living   Type of [address] [place] [address] Two level;Bed/bath upstairs;1/2 bath on main level  (3+4 PATT with railing)   Bathroom Shower/Tub Tub/shower unit   Home Equipment   (no DME at baseline)   Prior Function   Level of [place] Independent with ADLs and functional mobility   Lives With [employer]  ("And by Laura Sy  ")   Receives Help From Family   ADL Assistance Independent   IADLs Independent   Falls in the last 6 months 1 to 4  ("yesterday when I got out of the truck, I fell going to the side door but I was able to catch myself with my hands before I went down"  Otherwise no history of falls )   Vocational   (unemployed at the time; "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 ")   Lifestyle   Reciprocal Relationships Son and spouse work during the day  Pt reports for at least half of the day, someone is home  Intrinsic Gratification "love hunting and fishing "   Psychosocial   Psychosocial (WDL) WDL   Subjective   Subjective "I am just watching this to keep things light because of all of the bad news I am getting " (pt referring to watching cartoons)     ADL   Eating Assistance 7  Independent   Grooming Assistance 5 Supervision/Setup  ((S) seated, min (A) to steady in standing)   Grooming Deficit Steadying; Increased time to complete   UB Bathing Assistance 4  Minimal Assistance   LB Bathing Assistance 4  Minimal Assistance   LB Bathing Deficit Steadying   UB Dressing Assistance 5  Supervision/Setup   LB Dressing Assistance 4  Minimal Assistance   LB Dressing Deficit Steadying   Bed Mobility   Supine to Sit 7  Independent   Sit to Supine 7  Independent   Transfers   Sit to Stand 5  Supervision   Additional items Verbal cues   Stand to Sit 5  Supervision   Additional items Verbal cues   Additional Comments pt reports feelign mildly dizzy upin sitting EOB  Pt also then standing immediately upon sitting EOB; OT gave cue for safety to "PAUSE" prior to initiating standing  Functional Mobility   Functional Mobility 4  Minimal assistance   Additional Comments min (A) WITHOUT AD; pt with (+)LOB x2 noted; more significant LOB when making turns  Reports LLE numbness  Discussed with pt anticipate PT to utilize AD  Also discussed with pt fall risks, recommended pt to call for staff (A) if need to get OOB for any reason  Balance   Static Sitting Good   Dynamic Sitting Good   Static Standing Fair -   Dynamic Standing Poor +  ((+)LOB x2 during session)   Activity Tolerance   Activity Tolerance Other (Comment)  (Pt reporting mild chest pain upon standing  Pt states this is normal for him  ECHO in at this time and session finished in order for pt to have ECHO done  RN aware of session )   Nurse Made Aware RN cleared pt for OT evaluation  Pt presents supine in bed, agreeable to session  At the end of session, pt returned to bed with needs/call bell in reach     RUE Assessment   RUE Assessment WFL   LUE Assessment   LUE Assessment X   LUE Overall AROM   L Shoulder Flexion WNL   L Elbow Flexion WNL   L Wrist Flexion WNL   L Mass Grasp WNL   LUE Strength   LUE Overall Strength Deficits  (Noted "lag" with LUE AROM when pt demonstrationg synchonized (B)UE ROM  However, strength tests [facility] t/o and pt able to perform basic ADLs without LUE impacting performance  Pt notes impaired sensation t/o LUE- feelign numb )   Hand Function   Gross Motor Coordination Impaired  (finger to nose testing impaired )   Fine Motor Coordination Impaired   Sensation   Light Touch Partial deficits in the LUE   Vision - Complex Assessment   Tracking Decreased smoothness of horizontal tracking   Additional Comments Peripheral testing WNL  Pt reports blurred vision in (R) visual field  Cognition   Overall Cognitive Status WFL   Arousal/Participation Alert   Attention Within functional limits   Orientation Level Oriented X4   Memory Within functional limits   Following Commands Follows all commands and directions without difficulty   Comments Pt verified ID by stating name and   Assessment   Limitation Decreased ADL status; Decreased UE strength;Decreased endurance;Decreased fine motor control;Decreased self-care trans;Decreased sensation;Decreased Safe judgement during ADL;Visual deficit  (dec'd gross motor, impaired functional balance)   Prognosis Good   Goals   Patient Goals "To get out of here  To stay alive "   LTG Time Frame 7-10   Long Term Goal #1 see goals listed below   Plan   Treatment Interventions ADL retraining;Functional transfer training;Visual perceptual retraining;UE strengthening/ROM; Equipment evaluation/education;Patient/family training;Neuromuscular reeducation; Fine motor coordination activities; Activityengagement;Continued evaluation   Goal Expiration Date 22   OT Frequency 3-5x/wk   Recommendation   OT Discharge Recommendation (S)  Post acute rehabilitation services  (Prema Montelongo pending further mobility assessment with PT  Session limited on this date due to pt with c/o chest pain and pt receiving bedside ECHO  Given the latter, unable to trial use of DME   Pt a high fall risk at this time )   Equipment Recommended Shower/Tub chair with back ($)   AM-PAC Daily Activity Inpatient   Lower Body Dressing 3   Bathing 3   Toileting 3   Upper Body Dressing 4   Grooming 3   Eating 4   Daily Activity Raw Score 20   Daily Activity Standardized Score (Calc for Raw Score >=11) 42 03   AM-PAC Applied Cognition Inpatient   Following a Speech/Presentation 4   Understanding Ordinary Conversation 4   Taking Medications 4   Remembering Where Things Are Placed or Put Away 4   Remembering List of 4-5 Errands 4   Taking Care of Complicated Tasks 4   Applied Cognition Raw Score 24   Applied Cognition Standardized Score 62 21       ASSESSEMENT    Pt is a 49 y o  male seen for OT evaluation s/p admit to [facility] on 9/13/2022 w/ Stroke-like symptoms  CT of the head showed possible acute lacunar infarct in the right thalamus  No acute intracranial hemorrhage or mass effect  CTA of the head/Neck with contrast showed occlusion of the right PCA P2 segment, with distal branch reperfusion  Critical-greater than 90%-stenosis in the proximal cervical segments of the bilateral internal carotid arteries  Comorbidities affecting pt's functional performance at time of assessment are listed above  Personal factors affecting pt at time of IE include:steps to enter environment, difficulty performing ADLS, and difficulty performing IADLS  at this time  Prior to admission, pt was living with spouse, son, and beloved pet  Pt reports self to be completely (I) PTA with all IADLs, ADLs, leisure activities (hunting & fishing)  Upon evaluation: Pt requires (S) for bed mobility, close (S)/CGS sit<>stand, due to reports of mild dizziness, min (A) for standing ADLs (LB dressing, clothing management for toileting, standing to bathe) due to balance deficits, and min (A) for functional mobility   Pt presents below baseline 2* the following deficits impacting occupational performance: weakness, decreased strength, decreased balance, decreased tolerance, impaired GMC, impaired [address] [place], impaired sensation, and impaired problem solving  Pt to benefit from continued skilled OT tx while in the hospital to address deficits as defined above and maximize level of functional independence w ADL's and functional mobility  Occupational Performance areas to address include: grooming, bathing/shower, dressing, functional mobility, community mobility, clothing management, care of pets, job performance/volunteering, and clothing management for toileting   Pt's raw score on the AM-PAC Daily activity inpatient short form is 20, standardized score is 42 03, greater than 39 4  Patients at this level are likely to benefit from DC to home, however pt is a fall risk at this time given 2 episodes of LOB during session requiring min (A) to recover  Should pt improve with use of DME and demonstrate inc'd independence in f/u OT sessions & PT tx sessions, anticipate pt to progress to d/c to home with services  Will continue to assess for appropriate d/c plan  Based on OT evaluation, the assessment(s) completed, performance deficits listed, and current level of function, pt identified as a high complexity evaluation        GOALS:    Pt will achieve the following within specified time frame:  *Perform ADL transfers with mod (I)/(I) for inc'd independence with ADLs/purposeful tasks    *Bed mobility- (I)ly for inc'd independence to manage own comfort and initiate EOB & OOB purposeful tasks    *Perform UB ADL (I) for inc'd independence with self cares    *Perform LB ADL (I) using AE prn for inc'd independence with self cares    *Increase static stand balance to Good and dyn stand balance to Fair+ for inc'd safety with standing purposeful tasks    *Increase stand tolerance x7-10m, with no c/o dizziness and for inc'd tolerance with standing purposeful tasks    *Perform clothing management (I)ly for inc'd independence with self cares    *Participate in 10m UE therex to increase overall stamina/activity tolerance for purposeful tasks    *Perform tub/shower transfer using most appropriate technique (step in vs  Tub seat/transfer bench) mod (I)/(I)ly for inc'd safety and independence with bathing    *Perform sinkside G&H (I)ly, with good safety and good balance for inc'd independence with self cares        Shravan Dodd, OT

## 2022-09-14 NOTE — CONSULTS
NEUROLOGY RESIDENCY CONSULT NOTE     Name: Galen Davila   Age & Sex: 49 y o  male   MRN: [id number]  Unit/Bed#: S -01   Encounter: [id number]  Length of Stay: 1    ASSESSMENT & PLAN     No new Assessment & Plan notes have been filed under this hospital service since the last note was generated  Service: Neurology    Patient is 60-year-old male who experienced left sided weakness and confusion, found to have right thalamic lacunar infarct and /128, also found to have bilateral > 90 % proximal cervical ICA stenosis  1  CVA  Confirmed on MRI  Exam correlates  Patient has multiple risk factors including unmanaged HTN, HLD, FH of MI in mother, 28 years smoking (quit some weeks ago), and heavy alcohol use (bottle of rum/day) until 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  Right thalamic lacunar infarct most likely due to HTN  - echocardiography pending   - continue DAPT 21 days, then d/c clopidogrel and continue ASA   - increase atorvastatin to 80 mg QD  - first 48-72 hours following CVA allow patient to autoregulate BP, particularly given risk due to carotid stenosis    2  Carotid stenosis  > 90% bilateral proximal cervical ICA stenosis  Likely unrelated to current presenting symptoms however same risk factors as above  - consult vascular surgery  - allow BP autoregulation next 24-48 hours    Recommendations for outpatient neurological follow up have yet to be determined  Pending for discharge: echocardiography, vascular surgery assessment and recommendations    SUBJECTIVE     Reason for Consult / Principal Problem: CVA  Hx and PE limited by: not limited    HPI: Galen Davila is a 49 y o  male with history of cervical spine surgery in past and chronic lower back pain, diverticulitis, carpal tunnel syndrome, and ulnar collateral ligament reconstruction, who takes acetaminophen, ibuprofen, and vitamins at home   Patient also has history of alcohol use; no alcohol since one year, before then patient consumed a bottle of rum daily  Patient also smoked nicotine for 35 years until quitting some weeks ago  He experienced sudden onset pan left-sided weakness yesterday morning accompanied by confusion; later in day wife encouraged to go to hospital after noticing left facial droop  Patient was found to have /232 with systolic into 763E; and acute right thalamic lacunar infarct  Patient also found to have > 90 % proximal cervical bilateral ICA stenosis  Today patient reports resolution of confusion and improvement in facial droop however weakness and sensory changes persist subjectively  He denies headache, dizziness, or lightheadedness  Patient was counselled regarding prognosis and plan  Inpatient consult to Neurology  Consult performed by: Jaguar Alexander MD  Consult ordered by: Guyann Castleman, DO          Historical Information   No past medical history on file    Past Surgical History:   Procedure Laterality Date    CARPAL TUNNEL RELEASE      ULNAR COLLATERAL LIGAMENT RECONSTRUCTION       Social History   Social History     Substance and Sexual Activity   Alcohol Use Not Currently    Alcohol/week: 6 0 standard drinks    Types: 6 Cans of beer per week    Comment: daily     Social History     Substance and Sexual Activity   Drug Use Never     E-Cigarette/Vaping    E-Cigarette Use Never User      E-Cigarette/Vaping Substances     Social History     Tobacco Use   Smoking Status Current Every Day Smoker    Packs/day: 2 00   Smokeless Tobacco Never Used     Family History:   Family History   Problem Relation Age of Onset    Heart attack Mother      Meds/Allergies   current meds:   Current Facility-Administered Medications   Medication Dose Route Frequency    acetaminophen (TYLENOL) tablet 975 mg  975 mg Oral Q8H PRN    aspirin chewable tablet 81 mg  81 mg Oral Daily    atorvastatin (LIPITOR) tablet 80 mg  80 mg Oral QPM    clopidogrel (PLAVIX) tablet 75 mg  75 mg Oral Daily    folic acid (FOLVITE) tablet 1 mg  1 mg Oral Daily    lidocaine (LIDODERM) 5 % patch 1 patch  1 patch Topical Daily    multivitamin-minerals (CENTRUM) tablet 1 tablet  1 tablet Oral Daily    nicotine (NICODERM CQ) 14 mg/24hr TD 24 hr patch 1 patch  1 patch Transdermal Daily    thiamine tablet 100 mg  100 mg Oral Daily    and PTA meds:   Prior to Admission Medications   Prescriptions Last Dose Informant Patient Reported? Taking? amLODIPine (NORVASC) 10 mg tablet   No No   Sig: Take 1 tablet (10 mg total) by mouth daily      Facility-Administered Medications: None     Allergies   Allergen Reactions    Penicillins Anaphylaxis       Review of previous medical records was completed  Review of Systems    OBJECTIVE     Patient ID: Anup Carpenter is a 49 y o  male  Vitals:   Vitals:    22 0100 22 0200 22 0400 22 0600   BP: (!) 182/102 (!) 172/98 (!) 174/100 (!) 184/102   BP Location: Right arm Right arm Right arm Right arm   Pulse: 76 80 69 71   Resp: 18 17  18   Temp:       SpO2: 96% 95%  97%   Weight:       Height:          Body mass index is 24 22 kg/m²  No intake or output data in the 24 hours ending 22 0926    Temperature:   Temp (24hrs), Av 1 °F (36 7 °C), Min:98 °F (36 7 °C), Max:98 1 °F (36 7 °C)    Temperature: 98 1 °F (36 7 °C)    Invasive Devices: Invasive Devices  Report    Peripheral Intravenous Line  Duration           Peripheral IV 22 Left Antecubital <1 day                Physical Exam  Eyes:      Extraocular Movements: EOM normal       Pupils: Pupils are equal, round, and reactive to light  Neurological:      Mental Status: He is oriented to person, place, and time  Coordination: Finger-Nose-Finger Test normal       Deep Tendon Reflexes:      Reflex Scores:       Tricep reflexes are 2+ on the right side and 2+ on the left side  Bicep reflexes are 2+ on the right side and 2+ (complicated by IV placement) on the left side         Brachioradialis reflexes are 2+ on the right side and 2+ on the left side  Patellar reflexes are 2+ on the right side and 2+ on the left side  Achilles reflexes are 2+ on the right side and 2+ on the left side  Psychiatric:         Speech: Speech normal           Neurologic Exam     Mental Status   Oriented to person, place, and time  Oriented to person  Oriented to place  Oriented to time  Oriented to year, month and date  Registration: recalls 3 of 3 objects  Recall at 5 minutes: recalls 3 of 3 objects  Attention: normal    Speech: speech is normal   Level of consciousness: alert    Cranial Nerves     CN II   Visual acuity: normal  Left visual field deficit: may be due to foreign body from     CN III, IV, VI   Pupils are equal, round, and reactive to light  Extraocular motions are normal    Pupils: (left pupil larger)    CN V   Right facial sensation deficit: none  Left facial sensation deficit: complete    CN VII   Right facial weakness: left facial droop      CN VIII   Hearing: impaired (some hearing loss at baseline due to accident in )    CN IX, X   Palate: symmetric    CN XI   Right sternocleidomastoid strength: normal  Left sternocleidomastoid strength: normal  Right trapezius strength: normal  Left trapezius strength: weak (limited due to chronic shoulder pain)    CN XII   Tongue deviation: none    Motor Exam     Strength   Right neck flexion: 5/5  Left neck flexion: 5/5  Right neck extension: 5/5  Left neck extension: 5/5  Right deltoid: 5/5  Left deltoid: 4/5  Right biceps: 5/5  Left biceps: 4/5  Right triceps: 5/5  Left triceps: 4/5  Right wrist flexion: 5/5  Left wrist flexion: 4/5  Right wrist extension: 5/5  Left wrist extension: 4/5  Right interossei: 5/5  Left interossei: 4/5  Right iliopsoas: 5/5  Left iliopsoas: 4/5  Right quadriceps: 5/5  Left quadriceps: 4/5  Right hamstrin/5  Left hamstrin/5  Right anterior tibial: 5/5  Left anterior tibial: 4/5  Right posterior tibial: 5/5  Left posterior tibial: 4/5  Right peroneal: 5/5  Left peroneal: 4/5  Right gastroc: 5/5  Left gastroc: 4/5Patient has chronic lower back pain that limits some LE assessment     Sensory Exam   Right arm light touch: normal  Left arm light touch: decreased from elbow  Right leg light touch: normal  Left leg light touch: decreased from knee  Vibration normal    Right arm vibration: normal  Left arm vibration: normal  Right leg vibration: normal  Left leg vibration: normal  Proprioception normal    Right arm proprioception: normal  Left arm proprioception: normal  Right leg proprioception: normal  Left leg proprioception: normal  Right arm pinprick: normal  Left arm pinprick: decreased from elbow  Right leg pinprick: normal  Left leg pinprick: decreased from knee    Gait, Coordination, and Reflexes     Coordination   Finger to nose coordination: normal    Tremor   Resting tremor: absent  Intention tremor: absent    Reflexes   Right brachioradialis: 2+  Left brachioradialis: 2+  Right biceps: 2+  Left biceps: 2+ (complicated by IV placement)  Right triceps: 2+  Left triceps: 2+  Right patellar: 2+  Left patellar: 2+  Right achilles: 2+  Left achilles: 2+  Right plantar: normal  Left plantar: normal           LABORATORY DATA     Labs: I have personally reviewed pertinent reports      Results from last 7 days   Lab Units 09/14/22  0514 09/13/22  2357 09/13/22  1832   WBC Thousand/uL 10 92*  --  14 78*   HEMOGLOBIN g/dL 16 9  --  18 0*   HEMATOCRIT % 48 7  --  51 4*   PLATELETS Thousands/uL 285 300 324   NEUTROS PCT % 66  --   --    MONOS PCT % 8  --   --       Results from last 7 days   Lab Units 09/14/22  0514 09/13/22  1832   POTASSIUM mmol/L 3 6 3 8   CHLORIDE mmol/L 104 103   CO2 mmol/L 25 24   BUN mg/dL 13 14   CREATININE mg/dL 1 07 1 06   CALCIUM mg/dL 9 5 10 3*   ALK PHOS U/L 61  --    ALT U/L 28  --    AST U/L 20  --               Results from last 7 days   Lab Units 09/13/22  1832   INR  0 90   PTT seconds 30               IMAGING & DIAGNOSTIC TESTING     Radiology Results: I have personally reviewed pertinent reports  CTA stroke alert (head/neck)   Final Result by Israel Jamison MD (09/13 1934)      1  Occlusion of the right PCA P2 segment, with distal branch reperfusion  2   Critical (greater than 90%) stenoses in the proximal cervical segments of the bilateral internal carotid arteries  Distal cervical and intracranial segments are patent and normal in caliber  Findings were directly discussed with Dr Flakita Linares  at [facility] PM                         Workstation performed: [id number]         CT stroke alert brain   Final Result by Israel Jamison MD (09/13 1935)         1  Possible acute lacunar infarct in the right thalamus  MRI of the brain may be helpful  2   No acute intracranial hemorrhage or mass effect  Findings were directly discussed with Elton Wiggins at [facility] PM       Workstation performed: [id number]         VAS carotid complete study    (Results Pending)   MRI Inpatient Order    (Results Pending)       Other Diagnostic Testing: I have personally reviewed pertinent reports  ACTIVE MEDICATIONS     Current Facility-Administered Medications   Medication Dose Route Frequency    acetaminophen (TYLENOL) tablet 975 mg  975 mg Oral Q8H PRN    aspirin chewable tablet 81 mg  81 mg Oral Daily    atorvastatin (LIPITOR) tablet 40 mg  40 mg Oral QPM    clopidogrel (PLAVIX) tablet 75 mg  75 mg Oral Daily    lidocaine (LIDODERM) 5 % patch 1 patch  1 patch Topical Daily    nicotine (NICODERM CQ) 14 mg/24hr TD 24 hr patch 1 patch  1 patch Transdermal Daily       Prior to Admission medications    Medication Sig Start Date End Date Taking?  Authorizing Provider   amLODIPine (NORVASC) 10 mg tablet Take 1 tablet (10 mg total) by mouth daily 10/21/21 12/20/21  Alejandra Regan PA-C         CODE STATUS & ADVANCED DIRECTIVES     Code Status: Level 1 - Full Code  Advance Directive and Living Will:      Power of :    POLST:        VTE Pharmacologic Prophylaxis: anticoagulation contraindicated, patient on DAPT with ASA and clopidogrel  VTE Mechanical Prophylaxis: sequential compression device    ==  Radha Valdivia MD   Psychiatry, PGY-1

## 2022-09-14 NOTE — ASSESSMENT & PLAN NOTE
Blood Pressure: (!) 182/112    History of hypertension managed on Amlodipine 10 mg - does not take regularly    Plan:   Medications held: Amlodipine to allow permissive HTN for now      Monitor blood pressure

## 2022-09-14 NOTE — ASSESSMENT & PLAN NOTE
Patient reports history of alcohol use  With previously documented he has not drink in over year however today pt stated active intake of 4-8 beers per day       Plan:  · CIWA protocol   · Multivitamin   · Monitor for s/sx of alcohol withdrawal

## 2022-09-14 NOTE — SPEECH THERAPY NOTE
Speech Language/Pathology  Speech/Language Pathology  Assessment    Patient Name: Paulo Masters  TSEUW'H Date: 9/14/2022     Problem List  Principal Problem:    Stroke-like symptoms  Active Problems:    History of diverticulitis    Hypertensive urgency    Chronic back pain    Carotid stenosis    Past Medical History  No past medical history on file  Past Surgical History  Past Surgical History:   Procedure Laterality Date    CARPAL TUNNEL RELEASE      ULNAR COLLATERAL LIGAMENT RECONSTRUCTION       Paulo Masters is a 49 y o  male with a PMH of hypertension, diverticulitis who presents with stroke-like symptoms of left-sided weakness, numbness, confusion starting around 0800 this a m  while driving to work  Patient was able to pull over safely and wait for symptoms to improve slightly  He then drove home and took his blood pressure medication  Wife arrived later with no improvement in symptoms and noting left-sided facial droop and they came to the ED  In the ED initial NIH score of 5 - blood pressure was elevated to 214/99  Stroke alert called patient had CT head CTA head neck showing as above  Discuss case with Dr [facility]rology who advised fluid with aspirin Plavix and continue stroke pathway  Consult received for speech/swallow eval on stroke pathway  Pt passed nsg swallow screen; tolerating regular diet w/o s/s dysphagia or aspiration  No speech/language deficits reported  MRI: 9/14/22  Focal right thalamic acute to subacute infarct  Additional linear areas of acute to subacute ischemia involving the medial right temporal-occipital region compatible with PCA territory infarcts  Right PCA occlusion noted on CTA  No need for formal speech/swallow eval at this time  Reconsult if needed      Slime Azul CCC-SLP  Speech Pathologist  Available via  tiger text

## 2022-09-14 NOTE — ASSESSMENT & PLAN NOTE
· Denies any abdominal pain admission today, having daily bowel movements normal caliber, denies any blood  · Patient has history of diverticulitis in October 2021 with concern for abscess of the time  Treated with aztreonam and Flagyl due to severe penicillin allergy    · Continue to monitor for changes

## 2022-09-14 NOTE — ASSESSMENT & PLAN NOTE
No known hx  Patient's history 20 year, 1-2 pack per day smoker  Recently stopped smoking cigarettes hopes to quit  On nicotine patch and currently still vaping  W/u:   · CTA neck as above with occlusion right PCA P2 segment, with distal branch reperfusion  Critical - greater than 90%-stenosis in the proximal cervical segment of the bilateral internal carotid arteries  Distal cervical and intracranial segments are patent and normal in caliber  · Patient receive aspirin 325 mg and Plavix in the ED  Plan:  · Continue aspirin 81 mg daily, atorvastatin 40 mg started    · Consult vascular - recommendations appreciated, will f/u

## 2022-09-14 NOTE — ASSESSMENT & PLAN NOTE
Blood Pressure: (!) 184/100    History of hypertension managed on Amlodipine 10 mg - does not take regularly    Plan:   Medications held: Amlodipine to allow permissive HTN for now      Monitor blood pressure

## 2022-09-14 NOTE — PROGRESS NOTES
[facility]  Progress Note - Justin Bran 1970, 49 y o  male MRN: [id number]  Unit/Bed#: S -01 Encounter: [id number]  Primary Care Provider: Rosa Elena Wu DO   Date and time admitted to hospital: 9/13/2022  6:24 PM    * Stroke-like symptoms  Assessment & Plan  POA: Encephalopathy present on admission due to likely CVA evidence by confusion disorientation that brought the patient to the ED  Required stroke pathway neuro checks  Episode of left sided weakness, confusion starting at approximately 0800 9/13  In the ED initial NIH score of 5, patient had CT head and CT a head neck per stroke protocol  Was loaded with aspirin and Plavix  Neurology consulted in the ED with recommendations to continue stroke pathway  BP elevated at presentation at 214/99 now Blood Pressure: (!) 184/100     W/u:  · EKG on presentation to the ER showed normal sinus rhythm rate of 99 with a normal axis, normal intervals and no signs of ischemia  · CT of the head showed possible acute lacunar infarct in the right thalamus  No acute intracranial hemorrhage or mass effect  · CTA of the head/Neck with contrast showed occlusion of the right PCA P2 segment, with distal branch reperfusion  Critical-greater than 90%-stenosis in the proximal cervical segments of the bilateral internal carotid arteries  Distal cervical and intracranial segments are patent and normal in caliber  · MRI brain wo contrast 9/14 Impression: 1  Focal right thalamic acute to subacute infarct  Additional linear areas of acute to subacute ischemia involving the medial right temporal-occipital region compatible with PCA territory infarcts  Right PCA occlusion noted on CTA  2  Mild chronic microtraumatic ischemic changes  · Echo: EF 60% G1DD    Plan - Stroke pathway:  · Atorvastatin 40 mg q d    · Continue dual antiplatelet  · PT/OT/Speech eval, appreciate recs   · Neurosurgery onboard, appreciate recs  · Allow for permissive hypertension with -200 for 48 hours  · Neuro checks      Carotid stenosis  Assessment & Plan  No known hx  Patient's history 20 year, 1-2 pack per day smoker  Recently stopped smoking cigarettes hopes to quit  On nicotine patch and currently still vaping  W/u:   · CTA neck as above with occlusion right PCA P2 segment, with distal branch reperfusion  Critical - greater than 90%-stenosis in the proximal cervical segment of the bilateral internal carotid arteries  Distal cervical and intracranial segments are patent and normal in caliber  · Patient receive aspirin 325 mg and Plavix in the ED  Plan:  · Continue aspirin 81 mg daily, atorvastatin 40 mg started  · Consult vascular - recommendations appreciated, will f/u    Alcohol intake above recommended sensible limits  Assessment & Plan  Patient reports history of alcohol use  With previously documented he has not drink in over year however today pt stated active intake of 4-8 beers per day  Plan:  · CIWA protocol   · Multivitamin   · Monitor for s/sx of alcohol withdrawal     Chronic back pain  Assessment & Plan  Patient reports history of C6-C7 cervical fusion  Plan:  · Continues with upper and lower back pain controlled usually with Tylenol  · Tylenol 975 q8hrs prn       Hypertensive urgency  Assessment & Plan  Blood Pressure: (!) 184/100    History of hypertension managed on Amlodipine 10 mg - does not take regularly    Plan:   Medications held: Amlodipine to allow permissive HTN for now   Monitor blood pressure      History of diverticulitis  Assessment & Plan  Denies any abdominal pain admission today, having daily bowel movements normal caliber, denies any blood  Patient has history of diverticulitis in October 2021 with concern for abscess of the time  Treated with aztreonam and Flagyl due to severe penicillin allergy      Plan:  · Continue to monitor for changes       VTE Pharmacologic Prophylaxis: VTE Score: 6 ASA + plavix / VTE w/ heparin     Patient Centered Rounds: I performed bedside rounds with nursing staff today  Discussions with Specialists or Other Care Team Provider:     Education and Discussions with Family / Patient: Updated  (significant other) at bedside  Current Length of Stay: 1 day(s)  Current Patient Status: Inpatient   Discharge Plan: Anticipate discharge in 24-48 hrs to rehab facility  Code Status: Level 1 - Full Code    Subjective:   Patient seen examined at bedside this a m  Neuro resident also present in room/examining patient during this time  Weakness of left lower extremity and sensory deficits noted  I personally examined patient at bedside this afternoon and he is reporting new onset numbness in his left forehead in front of left ear and down left side of neck that began after MRI this afternoon  On exam he is still able to localize light touch but reports it is diminished compared to R side  I notified Neurology of these changes  Patient otherwise without acute complaints  Per chart review it was previously reported by patient that he has been without alcohol for about a year  However, it was revealed during exam by consulting provider patient active intake 4-8 beers per day  CIWA protocol initiated  Objective:     Vitals:   Temp (24hrs), Av 1 °F (36 7 °C), Min:98 °F (36 7 °C), Max:98 1 °F (36 7 °C)    Temp:  [98 °F (36 7 °C)-98 1 °F (36 7 °C)] 98 1 °F (36 7 °C)  HR:  [] 71  Resp:  [16-18] 18  BP: (171-243)/() 184/100  SpO2:  [94 %-100 %] 97 %  Body mass index is 24 22 kg/m²  Input and Output Summary (last 24 hours):   No intake or output data in the 24 hours ending 22 1532    Physical Exam:   Physical Exam  Vitals reviewed  Constitutional:       General: He is not in acute distress  HENT:      Head: Atraumatic  Nose: Nose normal    Eyes:      General: No scleral icterus  Right eye: No discharge           Left eye: No discharge  Cardiovascular:      Rate and Rhythm: Regular rhythm  Tachycardia present  Heart sounds: Normal heart sounds  No murmur heard  Pulmonary:      Effort: No respiratory distress  Breath sounds: Normal breath sounds  No wheezing or rales  Abdominal:      General: Bowel sounds are normal  There is no distension  Musculoskeletal:      Right lower leg: No edema  Left lower leg: No edema  Skin:     General: Skin is warm  Coloration: Skin is not jaundiced  Neurological:      Mental Status: He is alert and oriented to person, place, and time  Sensory: Sensory deficit present  Motor: Weakness (LLE) present  No tremor or abnormal muscle tone        Coordination: Finger-Nose-Finger Test abnormal       Comments: Diminished sensation left lower extremity to thigh  Also w/ diminished sensation LUE  Diminished sensation left side of forehead in front of ear and left side of neck   Psychiatric:         Mood and Affect: Mood normal          Behavior: Behavior normal           Additional Data:     Labs:  Results from last 7 days   Lab Units 09/14/22  0514   WBC Thousand/uL 10 92*   HEMOGLOBIN g/dL 16 9   HEMATOCRIT % 48 7   PLATELETS Thousands/uL 285   NEUTROS PCT % 66   LYMPHS PCT % 22   MONOS PCT % 8   EOS PCT % 2     Results from last 7 days   Lab Units 09/14/22  0514   SODIUM mmol/L 137   POTASSIUM mmol/L 3 6   CHLORIDE mmol/L 104   CO2 mmol/L 25   BUN mg/dL 13   CREATININE mg/dL 1 07   ANION GAP mmol/L 8   CALCIUM mg/dL 9 5   ALBUMIN g/dL 4 1   TOTAL BILIRUBIN mg/dL 0 43   ALK PHOS U/L 61   ALT U/L 28   AST U/L 20   GLUCOSE RANDOM mg/dL 82     Results from last 7 days   Lab Units 09/13/22  1832   INR  0 90     Results from last 7 days   Lab Units 09/13/22  2352 09/13/22  1832   POC GLUCOSE mg/dl 89 94     Results from last 7 days   Lab Units 09/14/22  0514   HEMOGLOBIN A1C % 5 6           Lines/Drains:  Invasive Devices  Report    Peripheral Intravenous Line  Duration Peripheral IV 09/13/22 Left Antecubital <1 day                  Telemetry:  Telemetry Orders (From admission, onward)             48 Hour Telemetry Monitoring  Continuous x 48 hours        References:    Telemetry Guidelines   Question:  Reason for 48 Hour Telemetry  Answer:  Acute CVA (<24 hrs old, hemispheric strokes, selected brainstem strokes, cardiac arrhythmias)                 Telemetry Reviewed: Normal Sinus Rhythm  Indication for Continued Telemetry Use: Acute CVA             Imaging: Reviewed radiology reports from this admission including: CT head and MRI brain    Recent Cultures (last 7 days):         Last 24 Hours Medication List:   Current Facility-Administered Medications   Medication Dose Route Frequency Provider Last Rate    acetaminophen  975 mg Oral Q8H PRN Coco Zapata MD      aspirin  81 mg Oral Daily Diamond Plush, DO      atorvastatin  80 mg Oral QPM Brown Shanks MD      clopidogrel  75 mg Oral Daily Diamond Plush, DO      folic acid  1 mg Oral Daily Coco Zapata MD      lidocaine  1 patch Topical Daily Coco Zapata MD      multivitamin-minerals  1 tablet Oral Daily Coco Zapata MD      nicotine  1 patch Transdermal Daily Diamond Plush, DO      thiamine  100 mg Oral Daily Coco Zapata MD          Today, Patient Was Seen By: Coco Zapata MD    **Please Note: This note may have been constructed using a voice recognition system  **

## 2022-09-14 NOTE — H&P
[address] [birth year], 49 y o  male MRN: [id number]  Unit/Bed#: S -01 Encounter: [id number]  Primary Care Provider: Anna Napoles DO   Date and time admitted to hospital: 9/13/2022  6:24 PM    * Stroke-like symptoms  Assessment & Plan  Episode of left sided weakness, confusion starting at approximately 0800 this AM(9/13) while driving to work  Pt pulled over and then was able to drive himself home  Symptoms lasted through the day and were not resolved as Wife get home so came to ED      In the ED initial NIH score of 5, patient had CT head and CT a head neck per stroke protocol  Was loaded with aspirin and Plavix  Neurology consulted in the ED with recommendations to continue stroke pathway  BP elevated at presentation at 214/99 now Blood Pressure: (!) 194/112    · EKG on presentation to the ER showed normal sinus rhythm rate of 99 with a normal axis, normal intervals and no signs of ischemia  · CT of the head showed possible acute lacunar infarct in the right thalamus  No acute intracranial hemorrhage or mass effect  · CTA of the head/Neck with contrast showed occlusion of the right PCA P2 segment, with distal branch reperfusion  Critical-greater than 90%-stenosis in the proximal cervical segments of the bilateral internal carotid arteries  Distal cervical and intracranial segments are patent and normal in caliber  · ABCD Score: 5 places at moderate risk  · Pt was out of the window for any TPA  Plan - Stroke pathway:  · MRI brain, Transthoracic Echocardiogram, monitor on telemetry  · Lipid Panel, HbA1c  · Add Atorvastatin 40 mg q d  · Continue dual antiplatelet  · Consult Neurology  · PT/OT/Speech eval  · Allow for permissive hypertension with -200 for 48 hours  · Neuro checks      Carotid stenosis  Assessment & Plan  · No previous history on arrival   Patient's history 20 year, 1-2 pack per day smoker    Recently stopped smoking cigarettes hopes to quit  On nicotine patch and currently still vaping  · CTA neck as above with occlusion right PCA P2 segment, with distal branch reperfusion  Critical - greater than 90%-stenosis in the proximal cervical segment of the bilateral internal carotid arteries  Distal cervical and intracranial segments are patent and normal in caliber  · Patient receive aspirin 325 mg and Plavix in the ED  Plan:  · Will order carotid duplex ultrasound  · Continue aspirin 81 mg daily, atorvastatin 40 mg started  · Consult vascular - recommendations appreciated    Hypertensive urgency  Assessment & Plan  Blood Pressure: (!) 194/112    History of hypertension managed on Amlodipine 10 mg - does not take regularly    Plan:   Medications held: Amlodipine to allow permissive HTN for now   Monitor blood pressure      Chronic back pain  Assessment & Plan  · Patient reports history of C6-C7 cervical fusion  · Continues with upper and lower back pain controlled usually with Tylenol  · Will continue Tylenol 650 mg q 6 hours p r n  For pain      History of diverticulitis  Assessment & Plan  · Denies any abdominal pain admission today, having daily bowel movements normal caliber, denies any blood  · Patient has history of diverticulitis in October 2021 with concern for abscess of the time  Treated with aztreonam and Flagyl due to severe penicillin allergy  · Continue to monitor for changes    VTE Pharmacologic Prophylaxis: VTE Score: 6 High Risk (Score >/= 5) - Pharmacological DVT Prophylaxis Contraindicated  Sequential Compression Devices Ordered  Code Status: Level 1 - Full Code   Discussion with family: Updated  (wife) at bedside  Anticipated Length of Stay: Patient will be admitted on an inpatient basis with an anticipated length of stay of greater than 2 midnights secondary to Stroke like symptoms        Total Time for Visit, including Counseling / Coordination of Care: 45 minutes Greater than 50% of this total time spent on direct patient counseling and coordination of care  Chief Complaint: Left sided weakness, numbness, confusion, facial droop    History of Present Illness:  Alexandra Gonzáles is a 49 y o  male with a PMH of hypertension, diverticulitis who presents with stroke-like symptoms of left-sided weakness, numbness, confusion starting around 0800 this a m  while driving to work  Patient was able to pull over safely and wait for symptoms to improve slightly  He then drove home and took his blood pressure medication  Wife arrived later with no improvement in symptoms and noting left-sided facial droop and they came to the ED  In the ED initial NIH score of 5 - blood pressure was elevated to 214/99  Stroke alert called patient had CT head CTA head neck showing as above  Discuss case with Dr Kun Valentino Neurology who advised fluid with aspirin Plavix and continue stroke pathway  Patient was admitted to [facility] service on stroke pathway, MRI order placed continued workup and management ongoing  Review of Systems:  Review of Systems   Constitutional: Negative for appetite change, chills, diaphoresis and fever  HENT: Negative for congestion, postnasal drip, rhinorrhea, sinus pressure, sinus pain and trouble swallowing  Eyes: Negative for pain, redness and visual disturbance  Respiratory: Negative for cough, chest tightness, shortness of breath and wheezing  Cardiovascular: Negative for chest pain, palpitations and leg swelling  Gastrointestinal: Negative for abdominal pain, blood in stool, constipation, diarrhea and nausea  Endocrine: Negative for polydipsia and polyuria  Genitourinary: Negative for difficulty urinating and dysuria  Musculoskeletal: Positive for back pain and neck pain  Skin: Negative for pallor and rash  Neurological: Positive for facial asymmetry, weakness and numbness  Negative for dizziness, seizures, syncope, speech difficulty and headaches  Psychiatric/Behavioral: Negative for confusion  The patient is not nervous/anxious  Past Medical and Surgical History:   No past medical history on file  Past Surgical History:   Procedure Laterality Date    CARPAL TUNNEL RELEASE      ULNAR COLLATERAL LIGAMENT RECONSTRUCTION         Meds/Allergies:  Prior to Admission medications    Medication Sig Start Date End Date Taking? Authorizing Provider   amLODIPine (NORVASC) 10 mg tablet Take 1 tablet (10 mg total) by mouth daily 10/21/21 12/20/21  Jin Regan PA-C     I have reviewed home medications with patient personally  Allergies: Allergies   Allergen Reactions    Penicillins Anaphylaxis       Social History:  Marital Status: /Civil Union   Occupation:   Patient Pre-hospital Living Situation: Home  Patient Pre-hospital Level of Mobility: walks  Patient Pre-hospital Diet Restrictions:  None  Substance Use History:   Social History     Substance and Sexual Activity   Alcohol Use Not Currently    Alcohol/week: 6 0 standard drinks    Types: 6 Cans of beer per week    Comment: daily     Social History     Tobacco Use   Smoking Status Current Every Day Smoker    Packs/day: 2 00   Smokeless Tobacco Never Used     Social History     Substance and Sexual Activity   Drug Use Never       Family History:  No family history on file  Physical Exam:     Vitals:   Blood Pressure: (!) 194/112 (09/13/22 2245)  Pulse: 92 (09/13/22 2245)  Temperature: 98 °F (36 7 °C) (09/13/22 1827)  Respirations: 18 (09/13/22 2245)  Weight - Scale: 74 4 kg (164 lb 0 4 oz) (09/13/22 1831)  SpO2: 98 % (09/13/22 2245)    Physical Exam  Vitals and nursing note reviewed  Constitutional:       General: He is not in acute distress  Appearance: Normal appearance  HENT:      Head: Normocephalic and atraumatic        Right Ear: Tympanic membrane and external ear normal       Left Ear: Tympanic membrane and external ear normal       Nose: Nose normal  No congestion  Mouth/Throat:      Mouth: Mucous membranes are moist       Pharynx: No oropharyngeal exudate  Eyes:      Extraocular Movements: Extraocular movements intact  Conjunctiva/sclera: Conjunctivae normal       Pupils: Pupils are equal, round, and reactive to light  Cardiovascular:      Rate and Rhythm: Normal rate and regular rhythm  Pulses: Normal pulses  Heart sounds: Normal heart sounds  No murmur heard  Pulmonary:      Effort: Pulmonary effort is normal       Breath sounds: Normal breath sounds  No wheezing, rhonchi or rales  Abdominal:      General: Bowel sounds are normal       Palpations: Abdomen is soft  Tenderness: There is no abdominal tenderness  There is no guarding or rebound  Musculoskeletal:         General: Normal range of motion  Cervical back: Normal range of motion and neck supple  Right lower leg: No edema  Left lower leg: No edema  Comments: Mild left upper extremity weakness 4+ over 5  Lymphadenopathy:      Cervical: No cervical adenopathy  Skin:     General: Skin is warm  Capillary Refill: Capillary refill takes less than 2 seconds  Findings: No bruising or erythema  Neurological:      Mental Status: He is alert and oriented to person, place, and time  Sensory: Sensory deficit present  Motor: Weakness present  Comments: Noted left-sided facial droop with smile  Decreased sensation left upper extremity and left lower extremity  Cranial nerves 2-12 intact  Right upper extremity 5/5 strength testing, left upper extremity slightly decreased 4+ out of 5  Lower extremity 5/5 bilaterally    Intact finger-to-nose  Negative horizontal or vertical nystagmus noted on exam   Psychiatric:         Mood and Affect: Mood normal          Behavior: Behavior normal           Additional Data:     Lab Results:  Results from last 7 days   Lab Units 09/13/22  1832   WBC Thousand/uL 14 78*   HEMOGLOBIN g/dL 18 0*   HEMATOCRIT % 51 4*   PLATELETS Thousands/uL 324     Results from last 7 days   Lab Units 09/13/22  1832   SODIUM mmol/L 136   POTASSIUM mmol/L 3 8   CHLORIDE mmol/L 103   CO2 mmol/L 24   BUN mg/dL 14   CREATININE mg/dL 1 06   ANION GAP mmol/L 9   CALCIUM mg/dL 10 3*   GLUCOSE RANDOM mg/dL 102     Results from last 7 days   Lab Units 09/13/22  1832   INR  0 90     Results from last 7 days   Lab Units 09/13/22  1832   POC GLUCOSE mg/dl 94               Imaging: Reviewed radiology reports from this admission including: CT head  CTA stroke alert (head/neck)   Final Result by Celestina Novak MD (09/13 1934)      1  Occlusion of the right PCA P2 segment, with distal branch reperfusion  2   Critical (greater than 90%) stenoses in the proximal cervical segments of the bilateral internal carotid arteries  Distal cervical and intracranial segments are patent and normal in caliber  Findings were directly discussed with Dr Tayler Darnell  at [facility] PM                         Workstation performed: [id number]         CT stroke alert brain   Final Result by Celestina Novak MD (09/13 1935)         1  Possible acute lacunar infarct in the right thalamus  MRI of the brain may be helpful  2   No acute intracranial hemorrhage or mass effect  Findings were directly discussed with Destiny Palacio at 7:25 PM       Workstation performed: [id number]         VAS carotid complete study    (Results Pending)   MRI Inpatient Order    (Results Pending)       EKG and Other Studies Reviewed on Admission:   · EKG: NSR  HR 99, normal axis, normal intervals, no signs of ischemia  ** Please Note: This note has been constructed using a voice recognition system   **

## 2022-09-14 NOTE — ASSESSMENT & PLAN NOTE
· Patient reports history of C6-C7 cervical fusion  · Continues with upper and lower back pain controlled usually with Tylenol  · Will continue Tylenol 650 mg q 6 hours p r n   For pain

## 2022-09-14 NOTE — ASSESSMENT & PLAN NOTE
· No previous history on arrival   Patient's history 20 year, 1-2 pack per day smoker  Recently stopped smoking cigarettes hopes to quit  On nicotine patch and currently still vaping  · CTA neck as above with occlusion right PCA P2 segment, with distal branch reperfusion  Critical - greater than 90%-stenosis in the proximal cervical segment of the bilateral internal carotid arteries  Distal cervical and intracranial segments are patent and normal in caliber  · Patient receive aspirin 325 mg and Plavix in the ED  Plan:  · Will order carotid duplex ultrasound  · Continue aspirin 81 mg daily, atorvastatin 40 mg started    · Consult vascular - recommendations appreciated

## 2022-09-14 NOTE — ASSESSMENT & PLAN NOTE
POA: Episode of left sided weakness, confusion starting at approximately 0800 this AM(9/13) while driving to work  Pt pulled over and then was able to drive himself home  Symptoms lasted through the day and were not resolved as Wife get home so came to ED In the ED initial NIH score of 5, patient had CT head and CT a head neck per stroke protocol  Was loaded with aspirin and Plavix  Neurology consulted in the ED with recommendations to continue stroke pathway  BP elevated at presentation at 214/99 now Blood Pressure: (!) 184/100     W/u:  · EKG on presentation to the ER showed normal sinus rhythm rate of 99 with a normal axis, normal intervals and no signs of ischemia  · CT of the head showed possible acute lacunar infarct in the right thalamus  No acute intracranial hemorrhage or mass effect  · CTA of the head/Neck with contrast showed occlusion of the right PCA P2 segment, with distal branch reperfusion  Critical-greater than 90%-stenosis in the proximal cervical segments of the bilateral internal carotid arteries  Distal cervical and intracranial segments are patent and normal in caliber  · MRI brain wo contrast 9/14 Impression: 1  Focal right thalamic acute to subacute infarct  Additional linear areas of acute to subacute ischemia involving the medial right temporal-occipital region compatible with PCA territory infarcts  Right PCA occlusion noted on CTA  2  Mild chronic microtraumatic ischemic changes  · Echo: EF 60% G1DD    Plan - Stroke pathway:  · Atorvastatin 40 mg q d    · Continue dual antiplatelet  · PT/OT/Speech eval, appreciate recs   · Neurosurgery onboard, appreciate recs  · Allow for permissive hypertension with -200 for 48 hours  · Neuro checks

## 2022-09-14 NOTE — PLAN OF CARE
Problem: OCCUPATIONAL THERAPY ADULT  Goal: Performs self-care activities at highest level of function for planned discharge setting  See evaluation for individualized goals  Description: Treatment Interventions: ADL retraining, Functional transfer training, Visual perceptual retraining, UE strengthening/ROM, Equipment evaluation/education, Patient/family training, Neuromuscular reeducation, Fine motor coordination activities, Activityengagement, Continued evaluation  Equipment Recommended: Shower/Tub chair with back ($)       See flowsheet documentation for full assessment, interventions and recommendations  Outcome: Progressing  Note: Limitation: Decreased ADL status, Decreased UE strength, Decreased endurance, Decreased fine motor control, Decreased self-care trans, Decreased sensation, Decreased Safe judgement during ADL, Visual deficit (dec'd gross motor, impaired functional balance)  Prognosis: Good  Assessment: Pt is a 49 y o  male seen for OT evaluation s/p admit to [facility] on 9/13/2022 w/ Stroke-like symptoms  CT of the head showed possible acute lacunar infarct in the right thalamus  No acute intracranial hemorrhage or mass effect  CTA of the head/Neck with contrast showed occlusion of the right PCA P2 segment, with distal branch reperfusion  Critical-greater than 90%-stenosis in the proximal cervical segments of the bilateral internal carotid arteries  Comorbidities affecting pt's functional performance at time of assessment are listed above  Personal factors affecting pt at time of IE include:steps to enter environment, difficulty performing ADLS, and difficulty performing IADLS  at this time  Prior to admission, pt was living with spouse, son, and beloved pet  Pt reports self to be completely (I) PTA with all IADLs, ADLs, leisure activities (hunting & fishing)   Upon evaluation: Pt requires (S) for bed mobility, close (S)/CGS sit<>stand, due to reports of mild dizziness, min (A) for standing ADLs (LB dressing, clothing management for toileting, standing to bathe) due to balance deficits, and min (A) for functional mobility  Pt presents below baseline 2* the following deficits impacting occupational performance: weakness, decreased strength, decreased balance, decreased tolerance, impaired GMC, impaired [address], impaired sensation, and impaired problem solving  Pt to benefit from continued skilled OT tx while in the hospital to address deficits as defined above and maximize level of functional independence w ADL's and functional mobility  Occupational Performance areas to address include: grooming, bathing/shower, dressing, functional mobility, community mobility, clothing management, care of pets, job performance/volunteering, and clothing management for toileting   Pt's raw score on the AM-PAC Daily activity inpatient short form is 20, standardized score is 42 03, greater than 39 4  Patients at this level are likely to benefit from DC to home, however pt is a fall risk at this time given 2 episodes of LOB during session requiring min (A) to recover  Should pt improve with use of DME and demonstrate inc'd independence in f/u OT sessions & PT tx sessions, anticipate pt to progress to d/c to home with services  Will continue to assess for appropriate d/c plan  Based on OT evaluation, the assessment(s) completed, performance deficits listed, and current level of function, pt identified as a high complexity evaluation  OT Discharge Recommendation: (S) Post acute rehabilitation services ([facility] pending further mobility assessment with PT  Session limited on this date due to pt with c/o chest pain and pt receiving bedside ECHO  Given the latter, unable to trial use of DME   Pt a high fall risk at this time )

## 2022-09-14 NOTE — CONSULTS
Consultation -Vascular Surgery  Faheem Solano 49 y o  male MRN: [id number]  Unit/Bed#: S -01 Encounter: [id number]      ASSESSMENT:  50 yo M p/w stroke like sx (L sided weakness, L facial droop, confusion)    9/13 CTA head/neck: Occlusion of the right PCA P2 segment  Critical (> 90%) stenoses in the proximal cervical segments of the bilateral internal carotid arteries  Distal cervical and intracranial segments are patent     9/14 MRI brain: Focal right thalamic acute to subacute infarct  Additional linear areas of acute to subacute ischemia involving the medial right temporal-occipital region compatible with PCA territory infarcts  Right PCA occlusion noted on CTA  Mild chronic microtraumatic ischemic changes  Plan:  - sx are improving  - severe b/l ICA stenosis appreciated on CTA  Will obtain carotid duplex for surveillance purposes  Unsure if these are actually sx given thalamic stroke and PCA occlusion  ? Symptomatic on left side given vision changes  - f/u neuro c/s  - ECHO pending  - continue asa/statin/plavix  - BP management per neuro/SLIM  - would benefit from b/l carotid artery intervention given % of stenosis, timing tbd    Rest of care per primary      Reason for Consult / Principal Problem:    HPI: Faheem Solano is a 49y o  year old male PMH HTN who presents with Stroke-like symptoms  Pt states yesterday was driving and started feeling upper and lower extremity weakness  Admits to numbness and tingling sensation in both left upper and left lower extremities  Wife also states that pt was confused yesterday, he didn't know where he was  Also admits to numbness in left side of face and change in vision on the left eye  When explaining change in vision of left eye, he admits to blurriness but then also states he has to "move his head in order to see things with the left eye"  Also admits to right eye blurriness but states that is chronic for him   States today all his sx are still present but states they have improved  Pt is on antiHTN medication but does say that he forgets to take them frequently  Smoking hx of 2PPD x 35 years, but quit approx 3 weeks ago  Also admits to ETOH use  Hx includes 1 "bottle of rum"/day but quit drinking "hard alcohol" approx 1 year ago  Admits to still drinking anywhere from 4-8 beers/day  Of note, he does endorse a hx of visual auras of a bright light that are associated with migraines  Review of Systems   Constitutional: Negative for chills and fever  Respiratory: Negative  Cardiovascular: Negative  Gastrointestinal: Negative  Genitourinary: Negative  Neurological: Positive for dizziness, weakness, numbness and headaches  Psychiatric/Behavioral: Positive for confusion  All other systems reviewed and are negative  Historical Information   No past medical history on file    Past Surgical History:   Procedure Laterality Date    CARPAL TUNNEL RELEASE      ULNAR COLLATERAL LIGAMENT RECONSTRUCTION       Social History   Social History     Substance and Sexual Activity   Alcohol Use Not Currently    Alcohol/week: 6 0 standard drinks    Types: 6 Cans of beer per week    Comment: daily, heavy use until 2021     Social History     Substance and Sexual Activity   Drug Use Never     Social History     Tobacco Use   Smoking Status Former Smoker    Packs/day: 2 00    Years: 35 00    Pack years: 70 00   Smokeless Tobacco Never Used     Family History   Problem Relation Age of Onset    Heart attack Mother        Meds/Allergies     Medications Prior to Admission   Medication    amLODIPine (NORVASC) 10 mg tablet     Current Facility-Administered Medications   Medication Dose Route Frequency    acetaminophen (TYLENOL) tablet 975 mg  975 mg Oral Q8H PRN    aspirin chewable tablet 81 mg  81 mg Oral Daily    atorvastatin (LIPITOR) tablet 80 mg  80 mg Oral QPM    clopidogrel (PLAVIX) tablet 75 mg  75 mg Oral Daily    lidocaine (LIDODERM) 5 % patch 1 patch  1 patch Topical Daily    nicotine (NICODERM CQ) 14 mg/24hr TD 24 hr patch 1 patch  1 patch Transdermal Daily       Allergies   Allergen Reactions    Penicillins Anaphylaxis       Objective     Blood pressure (!) 184/102, pulse 71, temperature 98 1 °F (36 7 °C), resp  rate 18, height 5' 9" (1 753 m), weight 74 4 kg (164 lb 0 4 oz), SpO2 97 %  No intake or output data in the 24 hours ending 09/14/22 1259    PHYSICAL EXAM  Physical Exam  Vitals and nursing note reviewed  Constitutional:       General: He is not in acute distress  Appearance: Normal appearance  He is normal weight  He is not ill-appearing, toxic-appearing or diaphoretic  HENT:      Head: Normocephalic and atraumatic  Eyes:      Extraocular Movements: Extraocular movements intact  Cardiovascular:      Rate and Rhythm: Normal rate  Pulmonary:      Effort: Pulmonary effort is normal  No respiratory distress  Musculoskeletal:         General: No swelling or tenderness  Skin:     General: Skin is warm  Capillary Refill: Capillary refill takes less than 2 seconds  Neurological:      General: No focal deficit present  Mental Status: He is alert and oriented to person, place, and time  Comments: Strength 5/5 upper and lower extremities   Psychiatric:         Mood and Affect: Mood normal          Behavior: Behavior normal            Lab Results:   I have personally reviewed pertinent lab results    , CBC:   Lab Results   Component Value Date    WBC 10 92 (H) 09/14/2022    HGB 16 9 09/14/2022    HCT 48 7 09/14/2022    MCV 93 09/14/2022     09/14/2022    MCH 32 1 09/14/2022    MCHC 34 7 09/14/2022    RDW 12 4 09/14/2022    MPV 9 7 09/14/2022    NRBC 0 09/14/2022   , CMP:   Lab Results   Component Value Date    SODIUM 137 09/14/2022    K 3 6 09/14/2022     09/14/2022    CO2 25 09/14/2022    BUN 13 09/14/2022    CREATININE 1 07 09/14/2022    CALCIUM 9 5 09/14/2022    AST 20 09/14/2022    ALT 28 09/14/2022    ALKPHOS 61 09/14/2022    EGFR 80 09/14/2022   , Coagulation:   Lab Results   Component Value Date    INR 0 90 09/13/2022   , Urinalysis: No results found for: Tedra Grcay, SPECGRAV, PHUR, LEUKOCYTESUR, NITRITE, PROTEINUA, GLUCOSEU, KETONESU, BILIRUBINUR, BLOODU  Imaging: I have personally reviewed pertinent reports  9/13 CT stroke alert: IMPRESSION:  1  Possible acute lacunar infarct in the right thalamus  MRI of the brain may be helpful  2   No acute intracranial hemorrhage or mass effect  9/13 CTA head/neck:    IMPRESSION:  1  Occlusion of the right PCA P2 segment, with distal branch reperfusion  2   Critical (greater than 90%) stenoses in the proximal cervical segments of the bilateral internal carotid arteries  Distal cervical and intracranial segments are patent and normal in caliber  9/14 MRI brain: IMPRESSION:  1  Focal right thalamic acute to subacute infarct  Additional linear areas of acute to subacute ischemia involving the medial right temporal-occipital region compatible with PCA territory infarcts  Right PCA occlusion noted on CTA  2  Mild chronic microtraumatic ischemic changes  EKG, Pathology, and Other Studies: I have personally reviewed pertinent reports  Counseling / Coordination of Care  Total time spent today  30 minutes  Greater than 50% of total time was spent with the patient and / or family counseling and / or coordination of care           Costa Wells PA-C  9/14/2022 12:59 PM

## 2022-09-14 NOTE — ASSESSMENT & PLAN NOTE
Denies any abdominal pain admission today, having daily bowel movements normal caliber, denies any blood  Patient has history of diverticulitis in October 2021 with concern for abscess of the time  Treated with aztreonam and Flagyl due to severe penicillin allergy      Plan:  · Continue to monitor for changes

## 2022-09-14 NOTE — UTILIZATION REVIEW
Initial Clinical Review    Admission: Date/Time/Statement:   Admission Orders (From admission, onward)     Ordered        09/13/22 [address]  Once                      Orders Placed This Encounter   Procedures    INPATIENT ADMISSION     Standing Status:   Standing     Number of Occurrences:   1     Order Specific Question:   Level of Care     Answer:   Med Surg [16]     Order Specific Question:   Estimated length of stay     Answer:   More than 2 Midnights     Order Specific Question:   Certification     Answer:   I certify that inpatient services are medically necessary for this patient for a duration of greater than two midnights  See H&P and MD Progress Notes for additional information about the patient's course of treatment  ED Arrival Information     Expected   -    Arrival   9/13/2022 18:05    Acuity   Immediate            Means of arrival   Walk-In    Escorted by   Spouse    Service   Hospitalist    Admission type   Emergency            Arrival complaint   Left sided abd pain/Facial numbness/Vision problem            Chief Complaint   Patient presents with    STROKE Alert     Pt c/o dizziness L sided facial numbness, L sided arm numbness,L sided leg numbness  Slight facial droop on L side noted in triage  Wife states that patient has had increased confusion  Last known well 1030       Initial Presentation: 49 y o  male presented to the ED  with stroke-like symptoms of left-sided weakness, numbness, confusion starting around 0800 this a m  while driving to work  Patient was able to pull over safely and wait for symptoms to improve slightly  He then drove home and took his blood pressure medication  Wife arrived later with no improvement in symptoms and noting left-sided facial droop and they came to the ED    PMH: diverticulitis, HTN  PE: L sided facial droop with smile, decreased sensation to LUE and LLE   Plan: Inpatient admission for evaluation and treatment of: stoke like symptoms, carotid stenosis, hypertensive urgency: stroke pathway, MRI brain, TTE, telemetry, atorvastatin, contulst Neurology, neuro checks, PT/OT, carotid duplex ultrasound, continue ASA, consult Vascular, hold amlodipine for permissive HTN  Date: 9/14   Day 2:     Internal medicine: atorvastatin, continue ual antiplatelet, allow for permissive HTN, neuro checks, continue ASA, consult Vascular for carotid stenosis  Pt stated today that he has an active intake of 4-8 beers per day  CIWA protocol  Neurology consult: Echo pending, continue DAPT for 21 days, increase atorvastatin to 80 mg daily, consult Vascular  Vascular consult: severe b/l ICA stenosis appreciated on CTA  Will obtain carotid duplex for surveillance purposes  Echo pending  Continue ASA/statin/Plavix       ED Triage Vitals   Temperature Pulse Respirations Blood Pressure SpO2   09/13/22 1827 09/13/22 1825 09/13/22 1825 09/13/22 1825 09/13/22 1825   98 °F (36 7 °C) (!) 115 16 (!) 215/128 99 %      Temp src Heart Rate Source Patient Position - Orthostatic VS BP Location FiO2 (%)   -- 09/13/22 1856 09/13/22 1908 09/13/22 2245 --    Monitor Lying Right arm       Pain Score       09/13/22 2300       4          Wt Readings from Last 1 Encounters:   09/13/22 74 4 kg (164 lb 0 4 oz)     Additional Vital Signs:     Date/Time Temp Pulse Resp BP MAP (mmHg) SpO2 O2 Device   09/14/22 0600 -- 71 18 184/102 Abnormal  131 97 % None (Room air)   09/14/22 0400 -- 69 -- 174/100 Abnormal  -- -- --   09/14/22 0200 -- 80 17 172/98 Abnormal  128 95 % None (Room air)   09/14/22 0100 -- 76 18 182/102 Abnormal  135 96 % None (Room air)   09/14/22 0000 -- 71 -- 179/102 Abnormal  133 95 % None (Room air)   09/13/22 2300 98 1 °F (36 7 °C) 69 18 180/100 Abnormal  -- 95 % None (Room air)   09/13/22 2245 -- 92 18 194/112 Abnormal  -- 98 % None (Room air)   09/13/22 2205 -- 80 -- -- -- 97 % --   09/13/22 2200 -- 79 -- 182/112 Abnormal  -- 97 % --   09/13/22 2145 -- 80 -- 183/113 Abnormal  -- 97 % --   09/13/22 2131 -- -- -- 205/123 Abnormal  -- -- --   09/13/22 2115 -- 80 -- 176/104 Abnormal  -- 98 % --   09/13/22 2100 -- 87 -- 193/114 Abnormal  -- 99 % --   09/13/22 2045 -- 82 -- 190/114 Abnormal  -- 98 % --   09/13/22 2030 -- 87 18 201/119 Abnormal  -- 100 % None (Room air)   09/13/22 2015 -- 83 18 181/96 Abnormal  -- 98 % None (Room air)   09/13/22 2000 -- 86 18 178/98 Abnormal  131 96 % None (Room air)   09/13/22 1945 -- 84 -- 180/94 Abnormal  -- 95 % --   09/13/22 1930 -- 86 18 177/95 Abnormal  -- 95 % None (Room air)   09/13/22 1915 -- 86 -- 171/87 Abnormal  -- 96 % None (Room air)   09/13/22 1910 -- 89 -- -- -- 95 % --   09/13/22 1908 -- 89 16 171/84 Abnormal  -- 96 % None (Room air)   09/13/22 1905 -- 94 -- -- -- 97 % --   09/13/22 1900 -- 97 16 214/99 Abnormal  -- 99 % None (Room air)   09/13/22 1856 -- 97 16 199/104 Abnormal  -- 100 % None (Room air)   09/13/22 1845 -- 106 Abnormal  -- 222/102 Abnormal  -- 94 % --   09/13/22 1833 -- -- -- 243/123 Abnormal  -- -- --   09/13/22 1827 98 °F (36 7 °C) -- -- -- -- -- --       Pertinent Labs/Diagnostic Test Results:   CTA stroke alert (head/neck)   Final Result by Jesu Grullon MD (09/13 1934)      1  Occlusion of the right PCA P2 segment, with distal branch reperfusion  2   Critical (greater than 90%) stenoses in the proximal cervical segments of the bilateral internal carotid arteries  Distal cervical and intracranial segments are patent and normal in caliber  Findings were directly discussed with Dr Jon Olea  at [facility]                         Workstation performed: [id number]         CT stroke alert brain   Final Result by Jesu Grullon MD (09/13 1935)         1  Possible acute lacunar infarct in the right thalamus  MRI of the brain may be helpful  2   No acute intracranial hemorrhage or mass effect        Findings were directly discussed with Keren West at [facility]       Workstation performed: [id number]         VAS carotid complete study    (Results Pending)     9/14 MRI brain:  IMPRESSION:  1  Focal right thalamic acute to subacute infarct  Additional linear areas of acute to subacute ischemia involving the medial right temporal-occipital region compatible with PCA territory infarcts  Right PCA occlusion noted on CTA  2  Mild chronic microtraumatic ischemic changes  9/14 Echo: Interpretation Summary         Left Ventricle: Left ventricular cavity size is normal  Wall thickness is normal  The left ventricular ejection fraction is 60%  Systolic function is normal  Wall motion is normal  Diastolic function is mildly abnormal, consistent with grade I (abnormal) relaxation    Aortic Valve: There is mild to moderate regurgitation    Pericardium: There is a trivial pericardial effusion anterior to the heart  There is no echocardiographic evidence of tamponade      9/13 EKG:  Normal sinus rhythm  Biatrial enlargement  Left ventricular hypertrophy with repolarization abnormality  Cannot rule out Septal infarct , age undetermined  Abnormal ECG  When compared with ECG of 01-MAR-2019 18:49,  ST now depressed in Lateral leads  Inverted T waves have replaced nonspecific T wave abnormality in Lateral leads    Results from last 7 days   Lab Units 09/13/22  1832   SARS-COV-2  Negative     Results from last 7 days   Lab Units 09/14/22  0514 09/13/22  2357 09/13/22  1832   WBC Thousand/uL 10 92*  --  14 78*   HEMOGLOBIN g/dL 16 9  --  18 0*   HEMATOCRIT % 48 7  --  51 4*   PLATELETS Thousands/uL 285 300 324   NEUTROS ABS Thousands/µL 7 24  --   --          Results from last 7 days   Lab Units 09/14/22  0514 09/13/22  1832   SODIUM mmol/L 137 136   POTASSIUM mmol/L 3 6 3 8   CHLORIDE mmol/L 104 103   CO2 mmol/L 25 24   ANION GAP mmol/L 8 9   BUN mg/dL 13 14   CREATININE mg/dL 1 07 1 06   EGFR ml/min/1 73sq m 80 81   CALCIUM mg/dL 9 5 10 3*     Results from last 7 days   Lab Units 09/14/22  0514   AST U/L 20   ALT U/L 28   ALK PHOS U/L 61   TOTAL PROTEIN g/dL 7 2   ALBUMIN g/dL 4 1   TOTAL BILIRUBIN mg/dL 0 43     Results from last 7 days   Lab Units 09/13/22 2352 09/13/22  1832   POC GLUCOSE mg/dl 89 94     Results from last 7 days   Lab Units 09/14/22  0514 09/13/22  1832   GLUCOSE RANDOM mg/dL 82 102         Results from last 7 days   Lab Units 09/13/22 2357 09/13/22 2010 09/13/22  1832   HS TNI 0HR ng/L  --   --  9   HS TNI 2HR ng/L  --  13  --    HSTNI D2 ng/L  --  4  --    HS TNI 4HR ng/L 8  --   --    HSTNI D4 ng/L -1  --   --          Results from last 7 days   Lab Units 09/13/22  1832   PROTIME seconds 12 4   INR  0 90   PTT seconds 30     Results from last 7 days   Lab Units 09/13/22 2010   TSH 3RD GENERATON uIU/mL 0 730         Results from last 7 days   Lab Units 09/13/22 2010   CRP mg/L 1 6   SED RATE mm/hour 13                 Results from last 7 days   Lab Units 09/13/22  1832   INFLUENZA A PCR  Negative   INFLUENZA B PCR  Negative   RSV PCR  Negative         ED Treatment:   Medication Administration from 09/13/2022 1805 to 09/13/2022 2307       Date/Time Order Dose Route Action     09/13/2022 1903 labetalol (NORMODYNE) injection 10 mg 10 mg Intravenous Given     09/13/2022 1846 iohexol (OMNIPAQUE) 350 MG/ML injection (SINGLE-DOSE) 100 mL 100 mL Intravenous Given     09/13/2022 2010 aspirin tablet 325 mg 325 mg Oral Given     09/13/2022 2010 clopidogrel (PLAVIX) tablet 75 mg 75 mg Oral Given        No past medical history on file    Present on Admission:   Hypertensive urgency   Chronic back pain   Carotid stenosis      Admitting Diagnosis: Dizziness [R42]  Hypertension [I10]  Symptoms of cerebrovascular accident (CVA) [R09 89]  Age/Sex: 49 y o  male  Admission Orders:  Scheduled Medications:  aspirin, 81 mg, Oral, Daily  atorvastatin, 40 mg, Oral, QPM  clopidogrel, 75 mg, Oral, Daily  lidocaine, 1 patch, Topical, Daily  nicotine, 1 patch, Transdermal, Daily      Continuous IV Infusions: PRN Meds:  acetaminophen, 975 mg, Oral, Q8H PRN        IP CONSULT TO NEUROLOGY  IP CONSULT TO PHYSICAL MEDICINE REHAB  IP CONSULT TO CASE MANAGEMENT  IP CONSULT TO NUTRITION SERVICES    Network Utilization Review Department  ATTENTION: Please call with any questions or concerns to [phone] and carefully listen to the prompts so that you are directed to the right person  All voicemails are confidential   Saul Perdomo all requests for admission clinical reviews, approved or denied determinations and any other requests to dedicated fax number below belonging to the campus where the patient is receiving treatment   List of dedicated fax numbers for the Facilities:  [address] DENIALS (Administrative/Medical Necessity) [phone]   [address] (Maternity/NICU/Pediatrics) [phone]   [address]  [address] [place] [address] [address] [place] [address] [address] [place] [address] [phone]

## 2022-09-14 NOTE — PLAN OF CARE
Problem: MOBILITY - ADULT  Goal: Maintain or return to baseline ADL function  Description: INTERVENTIONS:  -  Assess patient's ability to carry out ADLs; assess patient's baseline for ADL function and identify physical deficits which impact ability to perform ADLs (bathing, care of mouth/teeth, toileting, grooming, dressing, etc )  - Assess/evaluate cause of self-care deficits   - Assess range of motion  - Assess patient's mobility; develop plan if impaired  - Assess patient's need for assistive devices and provide as appropriate  - Encourage maximum independence but intervene and supervise when necessary  - Involve family in performance of ADLs  - Assess for home care needs following discharge   - Consider OT consult to assist with ADL evaluation and planning for discharge  - Provide patient education as appropriate  9/14/2022 1945 by Dana Juárez RN  Outcome: Progressing  9/14/2022 1945 by Dana Juárez RN  Outcome: Progressing  Goal: Maintains/Returns to pre admission functional level  Description: INTERVENTIONS:  - Perform BMAT or MOVE assessment daily    - Set and communicate daily mobility goal to care team and patient/family/caregiver  - Collaborate with rehabilitation services on mobility goals if consulted  - Perform Range of Motion 3 times a day  - Reposition patient every 2 hours  - Dangle patient 3 times a day  - Stand patient 3 times a day  - Ambulate patient 3 times a day  - Out of bed to chair 3 times a day   - Out of bed for meals 3 times a day  - Out of bed for toileting  - Record patient progress and toleration of activity level   9/14/2022 1945 by Dana Juárez RN  Outcome: Progressing  9/14/2022 1945 by Dana Juárez RN  Outcome: Progressing     Problem: MOBILITY - ADULT  Goal: Maintains/Returns to pre admission functional level  Description: INTERVENTIONS:  - Perform BMAT or MOVE assessment daily    - Set and communicate daily mobility goal to care team and patient/family/caregiver     - Collaborate with rehabilitation services on mobility goals if consulted  - Perform Range of Motion 3 times a day  - Reposition patient every 2 hours  - Dangle patient 3 times a day  - Stand patient 3 times a day  - Ambulate patient 3 times a day  - Out of bed to chair 3 times a day   - Out of bed for meals 3 times a day  - Out of bed for toileting  - Record patient progress and toleration of activity level   9/14/2022 1945 by Dana Juárez RN  Outcome: Progressing  9/14/2022 1945 by Dana Juárez RN  Outcome: Progressing     Problem: Potential for Falls  Goal: Patient will remain free of falls  Description: INTERVENTIONS:  - Educate patient/family on patient safety including physical limitations  - Instruct patient to call for assistance with activity   - Consult OT/PT to assist with strengthening/mobility   - Keep Call bell within reach  - Keep bed low and locked with side rails adjusted as appropriate  - Keep care items and personal belongings within reach  - Initiate and maintain comfort rounds  - Make Fall Risk Sign visible to staff  - Offer Toileting every 2 Hours, in advance of need  - Initiate/Maintain bed alarm  - Apply yellow socks and bracelet for high fall risk patients  - Consider moving patient to room near nurses station  9/14/2022 1945 by Dana Juárez RN  Outcome: Progressing  9/14/2022 1945 by Dana Juárez RN  Outcome: Progressing     Problem: Neurological Deficit  Goal: Neurological status is stable or improving  Description: Interventions:  - Monitor and assess patient's level of consciousness, motor function, sensory function, and level of assistance needed for ADLs  - Monitor and report changes from baseline  Collaborate with interdisciplinary team to initiate plan and implement interventions as ordered  - Provide and maintain a safe environment  - Consider seizure precautions  - Consider fall precautions  - Consider aspiration precautions  - Consider bleeding precautions    Outcome: Progressing     Problem: Activity Intolerance/Impaired Mobility  Goal: Mobility/activity is maintained at optimum level for patient  Description: Interventions:  - Assess and monitor patient  barriers to mobility and need for assistive/adaptive devices  - Assess patient's emotional response to limitations  - Collaborate with interdisciplinary team and initiate plans and interventions as ordered  - Encourage independent activity per ability   - Maintain proper body alignment  - Perform active/passive rom as tolerated/ordered  - Plan activities to conserve energy   - Turn patient as appropriate  Outcome: Progressing     Problem: Communication Impairment  Goal: Ability to express needs and understand communication  Description: Assess patient's communication skills and ability to understand information  Patient will demonstrate use of effective communication techniques, alternative methods of communication and understanding even if not able to speak  - Encourage communication and provide alternate methods of communication as needed  - Collaborate with case management/ for discharge needs  - Include patient/family/caregiver in decisions related to communication  Outcome: Progressing     Problem: Potential for Aspiration  Goal: Non-ventilated patient's risk of aspiration is minimized  Description: Assess and monitor vital signs, respiratory status, and labs (WBC)  Monitor for signs of aspiration (tachypnea, cough, rales, wheezing, cyanosis, fever)  - Assess and monitor patient's ability to swallow  - Place patient up in chair to eat if possible  - HOB up at 90 degrees to eat if unable to get patient up into chair   - Supervise patient during oral intake  - Instruct patient/ family to take small bites  - Instruct patient/ family to take small single sips when taking liquids    - Follow patient-specific strategies generated by speech pathologist   Outcome: Progressing  Goal: Ventilated patient's risk of aspiration is minimized  Description: Assess and monitor vital signs, respiratory status, airway cuff pressure, and labs (WBC)  Monitor for signs of aspiration (tachypnea, cough, rales, wheezing, cyanosis, fever)  - Elevate head of bed 30 degrees if patient has tube feeding   - Monitor tube feeding  Outcome: Progressing     Problem: Nutrition  Goal: Nutrition/Hydration status is improving  Description: Monitor and assess patient's nutrition/hydration status for malnutrition (ex- brittle hair, bruises, dry skin, pale skin and conjunctiva, muscle wasting, smooth red tongue, and disorientation)  Collaborate with interdisciplinary team and initiate plan and interventions as ordered  Monitor patient's weight and dietary intake as ordered or per policy  Utilize nutrition screening tool and intervene per policy  Determine patient's food preferences and provide high-protein, high-caloric foods as appropriate  - Assist patient with eating   - Allow adequate time for meals   - Encourage patient to take dietary supplement as ordered  - Collaborate with clinical nutritionist   - Include patient/family/caregiver in decisions related to nutrition    Outcome: Progressing

## 2022-09-14 NOTE — ASSESSMENT & PLAN NOTE
Episode of left sided weakness, confusion starting at approximately 0800 this AM(9/13) while driving to work  Pt pulled over and then was able to drive himself home  Symptoms lasted through the day and were not resolved as Wife get home so came to ED      In the ED initial NIH score of 5, patient had CT head and CT a head neck per stroke protocol  Was loaded with aspirin and Plavix  Neurology consulted in the ED with recommendations to continue stroke pathway  BP elevated at presentation at 214/99 now Blood Pressure: (!) 182/112    · EKG on presentation to the ER showed normal sinus rhythm rate of 99 with a normal axis, normal intervals and no signs of ischemia  · CT of the head showed possible acute lacunar infarct in the right thalamus  No acute intracranial hemorrhage or mass effect  · CTA of the head/Neck with contrast showed occlusion of the right PCA P2 segment, with distal branch reperfusion  Critical-greater than 90%-stenosis in the proximal cervical segments of the bilateral internal carotid arteries  Distal cervical and intracranial segments are patent and normal in caliber  · ABCD Score: 5 places at moderate risk  · Pt was out of the window for any TPA  Plan - Stroke pathway:  · MRI brain, Transthoracic Echocardiogram, monitor on telemetry  · Lipid Panel, HbA1c  · Atorvastatin 40 mg q d , Aspirin 81 q d    · Consult Neurology  · PT/OT/Speech eval  · Allow for permissive hypertension with -200 for 48 hours  · Neuro checks

## 2022-09-15 LAB
ALBUMIN SERPL BCP-MCNC: 4 G/DL (ref 3.5–5)
ALP SERPL-CCNC: 62 U/L (ref 34–104)
ALT SERPL W P-5'-P-CCNC: 26 U/L (ref 7–52)
ANION GAP SERPL CALCULATED.3IONS-SCNC: 9 MMOL/L (ref 4–13)
AST SERPL W P-5'-P-CCNC: 21 U/L (ref 13–39)
BILIRUB SERPL-MCNC: 0.5 MG/DL (ref 0.2–1)
BUN SERPL-MCNC: 16 MG/DL (ref 5–25)
CALCIUM SERPL-MCNC: 9.5 MG/DL (ref 8.4–10.2)
CHLORIDE SERPL-SCNC: 107 MMOL/L (ref 96–108)
CO2 SERPL-SCNC: 21 MMOL/L (ref 21–32)
CREAT SERPL-MCNC: 1.16 MG/DL (ref 0.6–1.3)
ERYTHROCYTE [DISTWIDTH] IN BLOOD BY AUTOMATED COUNT: 12.2 % (ref 11.6–15.1)
GFR SERPL CREATININE-BSD FRML MDRD: 73 ML/MIN/1.73SQ M
GLUCOSE SERPL-MCNC: 94 MG/DL (ref 65–140)
HCT VFR BLD AUTO: 49.7 % (ref 36.5–49.3)
HGB BLD-MCNC: 16.9 G/DL (ref 12–17)
MCH RBC QN AUTO: 32.1 PG (ref 26.8–34.3)
MCHC RBC AUTO-ENTMCNC: 34 G/DL (ref 31.4–37.4)
MCV RBC AUTO: 94 FL (ref 82–98)
PLATELET # BLD AUTO: 259 THOUSANDS/UL (ref 149–390)
PMV BLD AUTO: 9.6 FL (ref 8.9–12.7)
POTASSIUM SERPL-SCNC: 3.7 MMOL/L (ref 3.5–5.3)
PROT SERPL-MCNC: 7.1 G/DL (ref 6.4–8.4)
RBC # BLD AUTO: 5.27 MILLION/UL (ref 3.88–5.62)
SODIUM SERPL-SCNC: 137 MMOL/L (ref 135–147)
WBC # BLD AUTO: 9.25 THOUSAND/UL (ref 4.31–10.16)

## 2022-09-15 PROCEDURE — 97163 PT EVAL HIGH COMPLEX 45 MIN: CPT

## 2022-09-15 PROCEDURE — 99232 SBSQ HOSP IP/OBS MODERATE 35: CPT | Performed by: INTERNAL MEDICINE

## 2022-09-15 PROCEDURE — 93880 EXTRACRANIAL BILAT STUDY: CPT | Performed by: SURGERY

## 2022-09-15 PROCEDURE — 99232 SBSQ HOSP IP/OBS MODERATE 35: CPT | Performed by: PHYSICIAN ASSISTANT

## 2022-09-15 PROCEDURE — 85027 COMPLETE CBC AUTOMATED: CPT | Performed by: FAMILY MEDICINE

## 2022-09-15 PROCEDURE — 80053 COMPREHEN METABOLIC PANEL: CPT | Performed by: FAMILY MEDICINE

## 2022-09-15 PROCEDURE — 99223 1ST HOSP IP/OBS HIGH 75: CPT | Performed by: INTERNAL MEDICINE

## 2022-09-15 PROCEDURE — 99232 SBSQ HOSP IP/OBS MODERATE 35: CPT | Performed by: PSYCHIATRY & NEUROLOGY

## 2022-09-15 RX ORDER — POTASSIUM CHLORIDE 20 MEQ/1
20 TABLET, EXTENDED RELEASE ORAL ONCE
Status: COMPLETED | OUTPATIENT
Start: 2022-09-15 | End: 2022-09-15

## 2022-09-15 RX ADMIN — LIDOCAINE 5% 1 PATCH: 700 PATCH TOPICAL at 10:42

## 2022-09-15 RX ADMIN — FOLIC ACID 1 MG: 1 TABLET ORAL at 10:41

## 2022-09-15 RX ADMIN — ACETAMINOPHEN 975 MG: 325 TABLET, FILM COATED ORAL at 00:18

## 2022-09-15 RX ADMIN — NICOTINE 1 PATCH: 14 PATCH, EXTENDED RELEASE TRANSDERMAL at 10:41

## 2022-09-15 RX ADMIN — THIAMINE HCL TAB 100 MG 100 MG: 100 TAB at 10:41

## 2022-09-15 RX ADMIN — POTASSIUM CHLORIDE 20 MEQ: 1500 TABLET, EXTENDED RELEASE ORAL at 10:41

## 2022-09-15 RX ADMIN — ACETAMINOPHEN 975 MG: 325 TABLET, FILM COATED ORAL at 20:00

## 2022-09-15 RX ADMIN — ATORVASTATIN CALCIUM 80 MG: 40 TABLET, FILM COATED ORAL at 18:17

## 2022-09-15 RX ADMIN — MULTIPLE VITAMINS W/ MINERALS TAB 1 TABLET: TAB ORAL at 10:41

## 2022-09-15 RX ADMIN — CLOPIDOGREL BISULFATE 75 MG: 75 TABLET ORAL at 10:40

## 2022-09-15 RX ADMIN — ASPIRIN 81 MG CHEWABLE TABLET 81 MG: 81 TABLET CHEWABLE at 10:40

## 2022-09-15 RX ADMIN — ACETAMINOPHEN 975 MG: 325 TABLET, FILM COATED ORAL at 10:39

## 2022-09-15 NOTE — CASE MANAGEMENT
Case Management Assessment & Discharge Planning Note    Patient name [facility]  Location S /S -78 MRN [id number]  : 1970 Date 9/15/2022       Current Admission Date: 2022  Current Admission Diagnosis:Stroke-like symptoms   Patient Active Problem List    Diagnosis Date Noted    Alcohol intake above recommended sensible limits 2022    Stroke-like symptoms 2022    Carotid stenosis 2022    Chronic back pain 10/18/2021    History of diverticulitis 10/16/2021    Renal calculi 10/16/2021    Hypertensive urgency 10/16/2021    Elevated serum creatinine 10/16/2021      LOS (days): 2  Geometric Mean LOS (GMLOS) (days): 2 90  Days to GMLOS:1 1     OBJECTIVE:    Risk of Unplanned Readmission Score: 8 01         Current admission status: Inpatient  Referral Reason: Stroke    Preferred Pharmacy:   [facility] [place] [address]  Phone: [phone] Fax: [phone]    Primary Care Provider: Naga Mera DO    Primary Insurance: [address] [place]  Secondary Insurance:     ASSESSMENT:  [address] 26 Proxies    There are no active Health Care Proxies on file  Patient Information  Admitted from[de-identified] Home  Mental Status: Alert  During Assessment patient was accompanied by: Not accompanied during assessment  Assessment information provided by[de-identified] Patient  Primary Caregiver: Self  Support Systems: Family members  What city do you live in?: [facility] entry access options   Select all that apply : Stairs  Number of steps to enter home : 1  Type of Current Residence: 2 story home  Upon entering residence, is there a bedroom on the main floor (no further steps)?: No  A bedroom is located on the following floor levels of residence (select all that apply):: 2nd Floor  Upon entering residence, is there a bathroom on the main floor (no further steps)?: Yes  Number of steps to 2nd floor from main floor: One Flight  In the last 12 months, was there a time when you were not able to pay the mortgage or rent on time?: No  In the last 12 months, how many places have you lived?: 1  In the last 12 months, was there a time when you did not have a steady place to sleep or slept in a shelter (including now)?: No  Homeless/housing insecurity resource given?: N/A  Living Arrangements: Lives w/ Spouse/significant other  Is patient a ?: No    Activities of Daily Living Prior to Admission  Functional Status: Independent  Completes ADLs independently?: Yes  Ambulates independently?: Yes  Does patient use assisted devices?: No  Does patient currently own DME?: No  Does patient have a history of Outpatient Therapy (PT/OT)?: Yes  Does the patient have a history of Short-Term Rehab?: No  Does patient have a history of HHC?: No  Does patient currently have [facility]?: No    Patient Information Continued  Does patient have prescription coverage?: Yes  Within the past 12 months, you worried that your food would run out before you got the money to buy more : Never true  Within the past 12 months, the food you bought just didn't last and you didn't have money to get more : Never true  Food insecurity resource given?: N/A  Does patient receive dialysis treatments?: No  Does patient have a history of substance abuse?: No  Does patient have a history of Mental Health Diagnosis?: No    Means of Transportation  Means of Transport to Appts[de-identified] Drives Self  In the past 12 months, has lack of transportation kept you from medical appointments or from getting medications?: No  In the past 12 months, has lack of transportation kept you from meetings, work, or from getting things needed for daily living?: No  Was application for public transport provided?: N/A    DISCHARGE DETAILS:    Discharge planning discussed with[de-identified] Patient  Freedom of Choice: Yes  Comments - Freedom of Choice: FOC discussed regarding therapy recs for IP rehab   Pt declining at this time, requesting OPPT  CM informed Dr Angelina Eddy     Were Treatment Team discharge recommendations reviewed with patient/caregiver?: Yes  Did patient/caregiver verbalize understanding of patient care needs?: Yes  Were patient/caregiver advised of the risks associated with not following Treatment Team discharge recommendations?: Yes    Contacts  Patient Contacts: Patient  Contact Method:  In Person  Reason/Outcome: Continuity of Care, Discharge Planning    Other Referral/Resources/Interventions Provided:  Interventions: Outpatient PT    Treatment Team Recommendation: Acute Rehab  Discharge Destination Plan[de-identified] Home

## 2022-09-15 NOTE — ASSESSMENT & PLAN NOTE
Blood Pressure: (!) 170/105    History of hypertension managed on Amlodipine 10 mg - does not take regularly    Plan:   Medications held: Amlodipine to allow permissive HTN for now   Monitor blood pressure   Per cardiology recommend restarting amlodipine and adding ACE or ARB as necessary for further control

## 2022-09-15 NOTE — CONSULTS
NEUROLOGY RESIDENCY PROGRESS NOTE     Name: Leora Nevarez   Age & Sex: 49 y o  male   MRN: [id number]  Unit/Bed#: S -01   Encounter: [id number]    Leora Nevarez will need follow up in 4 weeks with neurovascular  Pending for discharge: cardiology consult    ASSESSMENT & PLAN     No new Assessment & Plan notes have been filed under this hospital service since the last note was generated  Service: Neurology    Patient is 81-year-old male who experienced left sided weakness and confusion, found to have right thalamic lacunar infarct and /128, also found to have bilateral > 90 % proximal cervical ICA stenosis      1  CVA  - continue to allow BP to autoregulate for next 24 hours, re-evaluate tomorrow  - recommend continue DAPT until seen and evaluated by vascular surgery outpatient  - continue atorvastatin 80 mg QHS  - follow-up echocardiography  - recommend follow PT/OT recommendations for appropriate disposition     2  Carotid stenosis  > 90% bilateral proximal cervical ICA stenosis  - follow-up vascular surgery  - follow-up cardiology        SUBJECTIVE     Patient was seen and examined  No acute events overnight  Patient was evaluated for headache/neck pain in setting of bilateral carotid stenosis  Patient range of motion not limited  He describes the pain as inferior to the occipital protuberance  The pain is familiar and he associated with history of cervical spine surgery  Pain is not sensitive to touch and responds to acetaminophen  Patient denies headache or vision change  He continues to experience left-sided weakness and numbness however feels slightly improved from yesterday  Pertinent Negatives include: headaches, amaurosis, diplopia, other visual changes , dizziness or lightheadedness    Review of Systems    OBJECTIVE     Patient ID: Leora Nevarez is a 49 y o  male      Vitals:    09/14/22 2200 09/15/22 0200 09/15/22 0600 09/15/22 0836   BP: (!) 153/107 (!) 148/103 (!) 159/108 166/100   BP Location:   Right arm Right arm   Pulse: 81 58 68 62   Resp: 18 18 19 16   Temp:    97 8 °F (36 6 °C)   TempSrc:    Oral   SpO2:   96% 97%   Weight:       Height:          Temperature:   Temp (24hrs), Av 4 °F (36 9 °C), Min:97 8 °F (36 6 °C), Max:98 7 °F (37 1 °C)    Temperature: 97 8 °F (36 6 °C)      Physical Exam  Eyes:      Extraocular Movements: EOM normal       Pupils: Pupils are equal, round, and reactive to light  Neurological:      Mental Status: He is oriented to person, place, and time  Coordination: Finger-Nose-Finger Test normal    Psychiatric:         Speech: Speech normal           Neurologic Exam     Mental Status   Oriented to person, place, and time  Speech: speech is normal   Level of consciousness: alert    Cranial Nerves     CN II   Right visual field deficit: none  Left visual field deficit: none     CN III, IV, VI   Pupils are equal, round, and reactive to light  Extraocular motions are normal      CN V   Right facial sensation deficit: none  Left facial sensation deficit: complete    CN VII   Right facial weakness: none  Left facial weakness: central    CN VIII   Hearing: impaired (baseline)    CN XI   Right sternocleidomastoid strength: normal  Left sternocleidomastoid strength: normal  Right trapezius strength: normal  Left trapezius strength: limited by chronic pain      CN XII   Tongue deviation: none    Motor Exam     Strength   Right deltoid: 5/5  Left deltoid: 4/5  Right biceps: 5/5  Left biceps: 4/5  Right triceps: 5/5  Left triceps: 4/5  Right wrist flexion: 5/5  Left wrist flexion: 4/5  Right wrist extension: 5/5  Left wrist extension: 4/5  Right iliopsoas: 5/5  Left iliopsoas: 4/5  Right quadriceps: 5/5  Left quadriceps: 4/5  Right hamstrin/5  Left hamstrin/5  Right anterior tibial: 5/5  Left anterior tibial: 4/5  Right posterior tibial: 5/5  Left posterior tibial: 4/5  Right peroneal: 5/5  Left peroneal: 4/5  Right gastroc: 5/5  Left gastroc: 4/5    Sensory Exam   Right arm light touch: normal  Left arm light touch: normal  Right leg light touch: normal  Left leg light touch: decreased from knee    Gait, Coordination, and Reflexes     Coordination   Finger to nose coordination: normal    Tremor   Resting tremor: absent  Intention tremor: absent    Reflexes   Reflexes 2+ except as noted  LABORATORY DATA     Labs: I have personally reviewed pertinent reports  Results from last 7 days   Lab Units 09/15/22  0525 09/14/22  0514 09/13/22  2357 09/13/22  1832   WBC Thousand/uL 9 25 10 92*  --  14 78*   HEMOGLOBIN g/dL 16 9 16 9  --  18 0*   HEMATOCRIT % 49 7* 48 7  --  51 4*   PLATELETS Thousands/uL 259 285 300 324   NEUTROS PCT %  --  66  --   --    MONOS PCT %  --  8  --   --       Results from last 7 days   Lab Units 09/15/22  0525 09/14/22  0514 09/13/22  1832   SODIUM mmol/L 137 137 136   POTASSIUM mmol/L 3 7 3 6 3 8   CHLORIDE mmol/L 107 104 103   CO2 mmol/L 21 25 24   BUN mg/dL 16 13 14   CREATININE mg/dL 1 16 1 07 1 06   CALCIUM mg/dL 9 5 9 5 10 3*   ALK PHOS U/L 62 61  --    ALT U/L 26 28  --    AST U/L 21 20  --               Results from last 7 days   Lab Units 09/13/22  1832   INR  0 90   PTT seconds 30               IMAGING & DIAGNOSTIC TESTING     Radiology Results: I have personally reviewed pertinent reports  VAS carotid complete study   Final Result by Mandy Garcia MD (23/44 4154)      MRI brain wo contrast   Final Result by Keenan Joseph MD (09/14 1041)   1  Focal right thalamic acute to subacute infarct  Additional linear areas of acute to subacute ischemia involving the medial right temporal-occipital region compatible with PCA territory infarcts  Right PCA occlusion noted on CTA  2  Mild chronic microtraumatic ischemic changes  Workstation performed: [id number]         CTA stroke alert (head/neck)   Final Result by Carolyn Stroud MD (09/13 1934)      1    Occlusion of the right PCA P2 segment, with distal branch reperfusion  2   Critical (greater than 90%) stenoses in the proximal cervical segments of the bilateral internal carotid arteries  Distal cervical and intracranial segments are patent and normal in caliber  Findings were directly discussed with Dr Richard Clarke  at [facility] PM                         Workstation performed: [id number]         CT stroke alert brain   Final Result by Balaji Huggins MD (09/13 1935)         1  Possible acute lacunar infarct in the right thalamus  MRI of the brain may be helpful  2   No acute intracranial hemorrhage or mass effect  Findings were directly discussed with Claudine Chance at [facility] PM       Workstation performed: [id number]             Other Diagnostic Testing: I have personally reviewed pertinent reports  ACTIVE MEDICATIONS     Current Facility-Administered Medications   Medication Dose Route Frequency    acetaminophen (TYLENOL) tablet 975 mg  975 mg Oral Q8H PRN    aspirin chewable tablet 81 mg  81 mg Oral Daily    atorvastatin (LIPITOR) tablet 80 mg  80 mg Oral QPM    clopidogrel (PLAVIX) tablet 75 mg  75 mg Oral Daily    folic acid (FOLVITE) tablet 1 mg  1 mg Oral Daily    heparin (porcine) subcutaneous injection 5,000 Units  5,000 Units Subcutaneous Q8H [address]    lidocaine (LIDODERM) 5 % patch 1 patch  1 patch Topical Daily    multivitamin-minerals (CENTRUM) tablet 1 tablet  1 tablet Oral Daily    nicotine (NICODERM CQ) 14 mg/24hr TD 24 hr patch 1 patch  1 patch Transdermal Daily    potassium chloride (K-DUR,KLOR-CON) CR tablet 20 mEq  20 mEq Oral Once    thiamine tablet 100 mg  100 mg Oral Daily       Prior to Admission medications    Medication Sig Start Date End Date Taking?  Authorizing Provider   amLODIPine (NORVASC) 10 mg tablet Take 1 tablet (10 mg total) by mouth daily 10/21/21 12/20/21  Flash Regan PA-C         VTE Pharmacologic Prophylaxis: DAPT  VTE Mechanical Prophylaxis: sequential compression device    ==  Cipriano Cartagena MD   Psychiatry, PGY-1

## 2022-09-15 NOTE — ASSESSMENT & PLAN NOTE
No known hx  Patient's history 20 year, 1-2 pack per day smoker  Recently stopped smoking cigarettes hopes to quit  On nicotine patch and currently still vaping  W/u:   · CTA neck as above with occlusion right PCA P2 segment, with distal branch reperfusion  Critical - greater than 90%-stenosis in the proximal cervical segment of the bilateral internal carotid arteries  Distal cervical and intracranial segments are patent and normal in caliber  · Patient received aspirin 325 mg and Plavix in the ED  Plan:  · Continue aspirin 81 mg daily, plavix 75mg daily, atorvastatin 80 mg started  · Per vascular surgery, follow up in office as outpatient in 2-4 weeks, continue dual antiplatelet therapy and high-dose statin until that time  · Per cardiology, no further cardiac workup prior to surgery recommended, recommend restarting amlodipine once end of permissive HTN window and adding ACEI or ARB at that time if needed

## 2022-09-15 NOTE — PHYSICAL THERAPY NOTE
PHYSICAL THERAPY EVALUATION NOTE    Patient Name: Leonor Rojas  [id number]'M Date: 9/15/2022  AGE:   49 y o  Mrn:   [id number]  ADMIT DX:  Dizziness [R42]  Hypertension [I10]  Symptoms of cerebrovascular accident (CVA) [R09 89]    No past medical history on file  Length Of Stay: 2  PHYSICAL THERAPY EVALUATION :   09/15/22 1600   PT Last Visit   PT Visit Date 09/15/22   Pain Assessment   Pain Assessment Tool 0-10   Pain Score No Pain   Restrictions/Precautions   Other Precautions Telemetry; Fall Risk   Home Living   Type of [address] [place] [address] Two level;1/2 bath on main level;Bed/bath upstairs; Other (Comment)  (1+3 PATT)   Additional Comments lives w/ spouse  ambulates w/o assistive device  no DME  independent w/ ADLs and IADLs  1 fall in last 6 months  General   Additional Pertinent History 9/15/22 at 14:14 blood pressure was 170/105   Family/Caregiver Present No   Cognition   Arousal/Participation Alert   Attention Within functional limits   Orientation Level Oriented to person; Other (Comment)  (pt was identified w/ full name, birth date)   Following Commands Follows all commands and directions without difficulty   Subjective   Subjective pt seen sitting on edge of bed  agreed to PT eval  denied pain or dizziness  pt states having left foot numbness  also having numbness of the left supraorbital area  RUE Assessment   RUE Assessment WFL   LUE Assessment   LUE Assessment WFL   RLE Assessment   RLE Assessment WFL  (5/5)   LLE Assessment   LLE Assessment WFL  (4+ to 5/5)   Coordination   Sensation X   Heel to Shin Impaired  (L LE)   Transfers   Sit to Stand 7  Independent   Additional items Increased time required   Stand to Sit 7  Independent   Additional items Increased time required   Ambulation/Elevation   Gait pattern Decreased L stance; Foward flexed; Short stride   Gait Assistance 5  Supervision   Additional items Verbal cues  (for full step length, posture)   Assistive Device None; Other (Comment)  (pt declined trial of assistive device use)   Distance 240, 180 feet w/ standing rest break   Stair Management Assistance 5  Supervision   Additional items Verbal cues; Increased time required  (for left foot clearance)   Stair Management Technique One rail L  (reciprocal up, nonreciprocal down)   Number of Stairs 7   Ambulation/Elevation Additional Comments Functional Gait Assesment: 16/30  Comfortable Gait Speed: 0 73 m/s   Balance   Static Sitting Good   Dynamic Sitting Fair   Static Standing Fair   Dynamic Standing Fair -   Ambulatory Fair -   Activity Tolerance   Activity Tolerance Patient limited by fatigue   Nurse Made Aware spoke to [facility] CM   Assessment   Prognosis Good   Problem List Decreased strength;Decreased endurance; Impaired balance;Decreased mobility; Decreased coordination;Decreased safety awareness; Impaired vision; Impaired sensation   Assessment Pt presents with stroke-like symptoms of left-sided weakness, numbness, confusion  Dx: right thalamic CVA, carotid stenosis, and HTN emergency  order placed for PT eval and tx, w/ activity order of up w/ assist  pt presents w/ comorbidities of HTN, chronic LBP, and cervical spine surgery and personal factors of living in 2 story house, stair(s) to enter home, positive fall history, tobacco use and alcohol abuse  pt presents w/ weakness, decreased endurance, impaired balance, gait deviations, altered sensation, impaired coordination, decreased safety awareness and fall risk   these impairments are evident in findings from physical examination (weakness, altered sensation and impaired coordination), mobility assessment (need for standby assist w/ gait and stair use when usually mobilizing independently, tolerance to only 220 feet of ambulation and need for cueing for mobility technique), and Comfortable Gait Speed: 0 73 m/s (less than 1 0 m/s indicates need for intervention to address falls risk) and Functional Gait Assessment: 16/30 (less than 22 indicates falls risk)  pt needed input for task focus and mobility technique  pt is at risk for falls due to physical and safety awareness deficits  pt's clinical presentation is unstable/unpredictable (evident in poor blood pressure control, need for standby assist w/ ambulation and stair use when usually mobilizing independently, tolerance to only 240 feet of ambulation and need for input for mobility technique/safety)  pt needs inpatient PT tx to improve mobility deficits and progress mobility training as appropriate  discharge recommendation is for outpatient PT to reduce fall risk and maximize level of functional independence  Goals   Patient Goals go back to work  STG Expiration Date 09/25/22   Short Term Goal #1 pt will: Increase L LE strength 1/2 grade to facilitate independent mobility, Ambulate 400 ft  with least restrictive assistive device independently w/o LOB to improve functional independence, Navigate 10 stair(s) independently with unilateral handrail to facilitate return to previous living environment, Increase ambulatory balance 1 grade to decrease risk for falls, Complete exercise program independently to increase strength and endurance, Tolerate 3 hr OOB to faciliate upright tolerance, Improve gait speed to 0 83 m/s to reduce fall risk and increase independence and Improve Functional Gait Assessment score to 21 or greater to facilitate independence and decrease fall risk   PT Treatment Day 0   Plan   Treatment/Interventions Functional transfer training;LE strengthening/ROM; Elevations; Therapeutic exercise; Endurance training;Patient/family training;Equipment eval/education;Gait training   PT Frequency 2-3x/wk   Recommendation   PT Discharge Recommendation Home with outpatient rehabilitation   AM-PAC Basic Mobility Inpatient   Turning in Bed Without Bedrails 4   Lying on Back to Sitting on Edge of Flat Bed 4 Moving Bed to Chair 4   Standing Up From Chair 4   Walk in Room 3   Climb 3-5 Stairs 3   Basic Mobility Inpatient Raw Score 22   Basic Mobility Standardized Score 47 4   Highest Level Of Mobility   -[facility] Goal 7: Walk 25 feet or more   -HLM Achieved 8: Walk 250 feet ot more   Barthel Index   Feeding 10   Bathing 0   Grooming Score 5   Dressing Score 10   Bladder Score 10   Bowels Score 10   Toilet Use Score 10   Transfers (Bed/Chair) Score 15   Mobility (Level Surface) Score 10   Stairs Score 5   Barthel Index Score 85   End of Consult   Patient Position at End of Consult Seated edge of bed; All needs within reach     Functional Gait Assessment  2/3 Gait level surface  2/3 Change in gait speed  2/3 Gait with horizontal head turns  2/3 Gait with vertical head turns  1/3 Gait and pivot turn  1/3 Step over obstacle  1/3 Gait with narrow base of support  1/3 Gait with eyes closed  2/3 Ambulating backwards  2/3 Steps  16/30 Total score (<22/30 puts patient at an increased risk for falling)    Comfortable Gait Speed: 0 73 m/s  Gait Speed Interpretation:  Gain of 0 1 m/s is a predictor of well-being in those w/ abnormal walking speed compared to age-patched peers  <0 7m/s is at an increased risk for falls    Household ambulator: <0 4 m/s  Limited community ambulator: 0 4-0 8 m/s  [employer] ambulator: 0 8-1 2 m/s  Able to safely cross streets: >1 2 m/s     The patient's AM-PAC Basic Mobility Inpatient Short Form Raw Score is 22  A Raw score of greater than 16 suggests the patient may benefit from discharge to home  Please also refer to the recommendation of the Physical Therapist for safe discharge planning  Skilled PT recommended while in hospital and upon DC to progress pt toward treatment goals       Ruiz Cardoso, PT

## 2022-09-15 NOTE — PROGRESS NOTES
Progress Note - Vascular Surgery   Anup Carpenter 49 y o  male MRN: [id number]  Unit/Bed#: S -01 Encounter: [id number]    Assessment:  50 yo M p/w R thalamic stroke (L sided weakness, L facial droop, confusion), also found to have severe b/l carotid stenosis (>90%)     9/13 CTA head/neck: Occlusion of the right PCA P2 segment  Critical (> 90%) stenoses in the proximal cervical segments of the bilateral internal carotid arteries   Distal cervical and intracranial segments are patent      9/14 MRI brain: Focal right thalamic acute to subacute infarct  Additional linear areas of acute to subacute ischemia involving the medial right temporal-occipital region compatible with PCA territory infarcts  Right PCA occlusion noted on CTA  Mild chronic microtraumatic ischemic changes        Plan:  - f/u in office in 2-4 weeks  - HTN control  - cont high dose statin on d/c  - cont DAPT on d/c  Would recommend continuing DAPT indefinitely for now, at least until carotid intervention    Rest of care per primary    Subjective/Objective     Subjective: sx continue to improve  Still c/o left foot numbness    Objective:     Blood pressure 166/100, pulse 62, temperature 97 8 °F (36 6 °C), temperature source Oral, resp  rate 16, height 5' 9" (1 753 m), weight 74 4 kg (164 lb), SpO2 97 %  ,Body mass index is 24 22 kg/m²  No intake or output data in the 24 hours ending 09/15/22 0958    Invasive Devices  Report    Peripheral Intravenous Line  Duration           Peripheral IV 09/13/22 Left Antecubital 1 day                Physical Exam  Vitals and nursing note reviewed  Constitutional:       General: He is not in acute distress  Appearance: Normal appearance  He is normal weight  He is not ill-appearing, toxic-appearing or diaphoretic  HENT:      Head: Normocephalic and atraumatic  Cardiovascular:      Rate and Rhythm: Normal rate  Pulmonary:      Effort: Pulmonary effort is normal  No respiratory distress     Skin: General: Skin is warm and dry  Capillary Refill: Capillary refill takes less than 2 seconds  Neurological:      General: No focal deficit present  Mental Status: He is alert and oriented to person, place, and time  Psychiatric:         Mood and Affect: Mood normal          Behavior: Behavior normal             Scheduled Meds:  Current Facility-Administered Medications   Medication Dose Route Frequency Provider Last Rate    acetaminophen  975 mg Oral Q8H PRN Iline Apgar, MD      aspirin  81 mg Oral Daily Catrinapaz Escalera, DO      atorvastatin  80 mg Oral QPM Angel Tang MD      clopidogrel  75 mg Oral Daily Catrina Jw, DO      folic acid  1 mg Oral Daily Iline Apgar, MD      heparin (porcine)  5,000 Units Subcutaneous [facility] Iline Apgar, MD      lidocaine  1 patch Topical Daily Iline Apgar, MD      multivitamin-minerals  1 tablet Oral Daily Iline Apgar, MD      nicotine  1 patch Transdermal Daily Catrinapaz Escalera, DO      potassium chloride  20 mEq Oral [employer], DO      thiamine  100 mg Oral Daily Iline Apgar, MD       Continuous Infusions:   PRN Meds:   acetaminophen      Lab, Imaging and other studies:  I have personally reviewed pertinent lab results    , CBC:   Lab Results   Component Value Date    WBC 9 25 09/15/2022    HGB 16 9 09/15/2022    HCT 49 7 (H) 09/15/2022    MCV 94 09/15/2022     09/15/2022    MCH 32 1 09/15/2022    MCHC 34 0 09/15/2022    RDW 12 2 09/15/2022    MPV 9 6 09/15/2022   , CMP:   Lab Results   Component Value Date    SODIUM 137 09/15/2022    K 3 7 09/15/2022     09/15/2022    CO2 21 09/15/2022    BUN 16 09/15/2022    CREATININE 1 16 09/15/2022    CALCIUM 9 5 09/15/2022    AST 21 09/15/2022    ALT 26 09/15/2022    ALKPHOS 62 09/15/2022    EGFR 73 09/15/2022   , Coagulation: No results found for: PT, INR, APTT, Urinalysis: No results found for: Elio Last 27, [facility], LEUKOCYTESUR, NITRITE, PROTEINUA, ASHLIE Francis, BLOODU  VTE Pharmacologic Prophylaxis: Heparin  VTE Mechanical Prophylaxis: sequential compression device      Gisella Roblero PA-C  9/15/2022 9:58 AM

## 2022-09-15 NOTE — ASSESSMENT & PLAN NOTE
POA: Episode of left sided weakness, confusion starting at approximately 0800 this AM(9/13) while driving to work  Pt pulled over and then was able to drive himself home  Symptoms lasted through the day and were not resolved as Wife get home so came to ED In the ED initial NIH score of 5, patient had CT head and CT a head neck per stroke protocol  Was loaded with aspirin and Plavix  Neurology consulted in the ED with recommendations to continue stroke pathway  BP elevated at presentation at 214/99 now Blood Pressure: (!) 170/105     W/u:  · EKG on presentation to the ER showed normal sinus rhythm rate of 99 with a normal axis, normal intervals and no signs of ischemia  · CT of the head showed possible acute lacunar infarct in the right thalamus  No acute intracranial hemorrhage or mass effect  · CTA of the head/Neck with contrast showed occlusion of the right PCA P2 segment, with distal branch reperfusion  Critical-greater than 90%-stenosis in the proximal cervical segments of the bilateral internal carotid arteries  Distal cervical and intracranial segments are patent and normal in caliber  · MRI brain wo contrast 9/14 Impression: 1  Focal right thalamic acute to subacute infarct  Additional linear areas of acute to subacute ischemia involving the medial right temporal-occipital region compatible with PCA territory infarcts  Right PCA occlusion noted on CTA  2  Mild chronic microtraumatic ischemic changes  · Echo: EF 60% G1DD    Plan - Stroke pathway:  · Atorvastatin 80 mg q d , continue dual antiplatelet  · Recommended pt d/c to post acute rehab; pt is not agreeable but will attend OPPT  · Per neurology, permissive hypertension until tomorrow, will re-evaluate at that time    · Neuro checks

## 2022-09-15 NOTE — PLAN OF CARE
Problem: PHYSICAL THERAPY ADULT  Goal: Performs mobility at highest level of function for planned discharge setting  See evaluation for individualized goals  Description: Treatment/Interventions: Functional transfer training, LE strengthening/ROM, Elevations, Therapeutic exercise, Endurance training, Patient/family training, Equipment eval/education, Gait training          See flowsheet documentation for full assessment, interventions and recommendations  Note: Prognosis: Good  Problem List: Decreased strength, Decreased endurance, Impaired balance, Decreased mobility, Decreased coordination, Decreased safety awareness, Impaired vision, Impaired sensation  Assessment: Pt presents with stroke-like symptoms of left-sided weakness, numbness, confusion  Dx: right thalamic CVA, carotid stenosis, and HTN emergency  order placed for PT eval and tx, w/ activity order of up w/ assist  pt presents w/ comorbidities of HTN, chronic LBP, and cervical spine surgery and personal factors of living in 2 story house, stair(s) to enter home, positive fall history, tobacco use and alcohol abuse  pt presents w/ weakness, decreased endurance, impaired balance, gait deviations, altered sensation, impaired coordination, decreased safety awareness and fall risk  these impairments are evident in findings from physical examination (weakness, altered sensation and impaired coordination), mobility assessment (need for standby assist w/ gait and stair use when usually mobilizing independently, tolerance to only 220 feet of ambulation and need for cueing for mobility technique), and Comfortable Gait Speed: 0 73 m/s (less than 1 0 m/s indicates need for intervention to address falls risk) and Functional Gait Assessment: 16/30 (less than 22 indicates falls risk)  pt needed input for task focus and mobility technique  pt is at risk for falls due to physical and safety awareness deficits   pt's clinical presentation is unstable/unpredictable (evident in poor blood pressure control, need for standby assist w/ ambulation and stair use when usually mobilizing independently, tolerance to only 240 feet of ambulation and need for input for mobility technique/safety)  pt needs inpatient PT tx to improve mobility deficits and progress mobility training as appropriate  discharge recommendation is for outpatient PT to reduce fall risk and maximize level of functional independence  PT Discharge Recommendation: Home with outpatient rehabilitation    See flowsheet documentation for full assessment

## 2022-09-15 NOTE — UTILIZATION REVIEW
Please note, member remains in house as of today 9/15/2022    Inpatient Admission Authorization Request   NOTIFICATION OF INPATIENT ADMISSION/INPATIENT AUTHORIZATION REQUEST   SERVICING FACILITY:   [address]  Tax ID: [id number]  NPI: [id number]  Place of Service: Inpatient [facility]  [address]  Place of Service Code: 24     ATTENDING PROVIDER:  Attending Name and NPI#: Mary Jo Wang, Kimmy Chaoas Krishna [[id number]]  Address: [address]  Phone: [phone]     UTILIZATION REVIEW CONTACT:  Yg Lawson Utilization   Network Utilization Review Department  Phone: [phone]  Fax: [phone]  Email: [email or web address]     PHYSICIAN ADVISORY SERVICES:  FOR WHLN-DH-IJNS REVIEW - MEDICAL NECESSITY DENIAL  Phone: [phone]  Fax: [phone]  Email: [email or web address]     TYPE OF REQUEST:  Inpatient Status     ADMISSION INFORMATION:  ADMISSION DATE/TIME: 9/13/22  7:59 PM  PATIENT DIAGNOSIS CODE/DESCRIPTION:  Dizziness [R42]  Hypertension [I10]  Symptoms of cerebrovascular accident (CVA) [R09 89]  DISCHARGE DATE/TIME: No discharge date for patient encounter  IMPORTANT INFORMATION:  Please contact Yg Lawson directly with any questions or concerns regarding this request  Department voicemails are confidential     Send requests for admission clinical reviews, concurrent reviews, approvals, and administrative denials due to lack of clinical to fax [phone]

## 2022-09-15 NOTE — PROGRESS NOTES
[facility]  Progress Note - Alfonso Vazquez 1970, 49 y o  male MRN: [id number]  Unit/Bed#: S -Roe Encounter: [id number]  Primary Care Provider: Guicho Franklin DO   Date and time admitted to hospital: 9/13/2022  6:24 PM    * Stroke-like symptoms  Assessment & Plan  POA: Episode of left sided weakness, confusion starting at approximately 0800 this AM(9/13) while driving to work  Pt pulled over and then was able to drive himself home  Symptoms lasted through the day and were not resolved as Wife get home so came to ED In the ED initial NIH score of 5, patient had CT head and CT a head neck per stroke protocol  Was loaded with aspirin and Plavix  Neurology consulted in the ED with recommendations to continue stroke pathway  BP elevated at presentation at 214/99 now Blood Pressure: (!) 170/105     W/u:  · EKG on presentation to the ER showed normal sinus rhythm rate of 99 with a normal axis, normal intervals and no signs of ischemia  · CT of the head showed possible acute lacunar infarct in the right thalamus  No acute intracranial hemorrhage or mass effect  · CTA of the head/Neck with contrast showed occlusion of the right PCA P2 segment, with distal branch reperfusion  Critical-greater than 90%-stenosis in the proximal cervical segments of the bilateral internal carotid arteries  Distal cervical and intracranial segments are patent and normal in caliber  · MRI brain wo contrast 9/14 Impression: 1  Focal right thalamic acute to subacute infarct  Additional linear areas of acute to subacute ischemia involving the medial right temporal-occipital region compatible with PCA territory infarcts  Right PCA occlusion noted on CTA  2  Mild chronic microtraumatic ischemic changes      · Echo: EF 60% G1DD    Plan - Stroke pathway:  · Atorvastatin 80 mg q d , continue dual antiplatelet  · Recommended pt d/c to post acute rehab; pt is not agreeable but will attend OPPT  · Per neurology, permissive hypertension until tomorrow, will re-evaluate at that time  · Neuro checks      Carotid stenosis  Assessment & Plan  No known hx  Patient's history 20 year, 1-2 pack per day smoker  Recently stopped smoking cigarettes hopes to quit  On nicotine patch and currently still vaping  W/u:   · CTA neck as above with occlusion right PCA P2 segment, with distal branch reperfusion  Critical - greater than 90%-stenosis in the proximal cervical segment of the bilateral internal carotid arteries  Distal cervical and intracranial segments are patent and normal in caliber  · Patient received aspirin 325 mg and Plavix in the ED  Plan:  · Continue aspirin 81 mg daily, plavix 75mg daily, atorvastatin 80 mg started  · Per vascular surgery, follow up in office as outpatient in 2-4 weeks, continue dual antiplatelet therapy and high-dose statin until that time  · Per cardiology, no further cardiac workup prior to surgery recommended, recommend restarting amlodipine once end of permissive HTN window and adding ACEI or ARB at that time if needed  Alcohol intake above recommended sensible limits  Assessment & Plan  Patient reports history of alcohol use  With previously documented he has not drink in over year however today pt stated active intake of 4-8 beers per day  Plan:  · CIWA protocol   · Multivitamin   · Monitor for s/sx of alcohol withdrawal     Chronic back pain  Assessment & Plan  Patient reports history of C6-C7 cervical fusion  Plan:  · Continues with upper and lower back pain controlled usually with Tylenol  · Tylenol 975 q8hrs prn       Hypertensive urgency  Assessment & Plan  Blood Pressure: (!) 170/105    History of hypertension managed on Amlodipine 10 mg - does not take regularly    Plan:   Medications held: Amlodipine to allow permissive HTN for now      Monitor blood pressure   Per cardiology recommend restarting amlodipine and adding ACE or ARB as necessary for further control  History of diverticulitis  Assessment & Plan  Denies any abdominal pain admission today, having daily bowel movements normal caliber, denies any blood  Patient has history of diverticulitis in October 2021 with concern for abscess of the time  Treated with aztreonam and Flagyl due to severe penicillin allergy  Plan:  · Continue to monitor for changes        VTE Pharmacologic Prophylaxis: VTE Score: 6 High Risk (Score >/= 5) - Pharmacological DVT Prophylaxis Ordered: ASA and plavix / VTE with heparin (patient refusing heparin at this time)  Sequential Compression Devices Ordered  Patient Centered Rounds: I performed bedside rounds with nursing staff today  Discussions with Specialists or Other Care Team Provider: Neurology, vascular surgery, cardiology, PT, OT, SLP, case management    Education and Discussions with Family / Patient: Updated  (wife) at bedside  Current Length of Stay: 2 day(s)  Current Patient Status: Inpatient   Discharge Plan: Anticipate discharge tomorrow to home  Code Status: Level 1 - Full Code    Subjective:   Patient seen and examined at bedside  Of note, there are multiple fast-food wrappers present at bedside  He states he continues to have some numbness in the anterior left portion of his neck and on dorsal surface of his left foot  He is unsure whether he is continuing to have weakness on the left side of his body, he states he is unable to assess this due to the IV in his left arm and other additional monitors attached to the left side of his body  He denies headache, chest pain, abdominal pain in his arms and legs  He also denies N/V/D/C, dizziness, palpitations, or any other acute concerns this time  He endorses back pain, which is chronic  I discussed with him and his wife this morning OT's recommendations that he attend post acute rehab  He is not amenable to this at this time    Per case management, he is open to outpatient physical therapy, but would not qualify for home physical therapy at this time because he is not home-bound  Objective:     Vitals:   Temp (24hrs), Av 4 °F (36 9 °C), Min:97 8 °F (36 6 °C), Max:98 7 °F (37 1 °C)    Temp:  [97 8 °F (36 6 °C)-98 7 °F (37 1 °C)] 97 8 °F (36 6 °C)  HR:  [58-84] 75  Resp:  [16-19] 16  BP: (147-179)/(100-116) 170/105  SpO2:  [95 %-97 %] 96 %  Body mass index is 24 22 kg/m²  Input and Output Summary (last 24 hours):   No intake or output data in the 24 hours ending 09/15/22 1436    Physical Exam:   Physical Exam  Vitals reviewed  Constitutional:       General: He is not in acute distress  Appearance: He is not ill-appearing, toxic-appearing or diaphoretic  HENT:      Head: Normocephalic and atraumatic  Nose: Nose normal       Mouth/Throat:      Mouth: Mucous membranes are moist       Pharynx: Oropharynx is clear  Eyes:      General: No scleral icterus  Right eye: No discharge  Left eye: No discharge  Extraocular Movements: Extraocular movements intact  Conjunctiva/sclera: Conjunctivae normal    Cardiovascular:      Rate and Rhythm: Normal rate and regular rhythm  Heart sounds: Normal heart sounds  No murmur heard  Pulmonary:      Effort: Pulmonary effort is normal  No respiratory distress  Breath sounds: Normal breath sounds  No stridor  No wheezing, rhonchi or rales  Abdominal:      General: Bowel sounds are normal  There is no distension  Palpations: Abdomen is soft  Musculoskeletal:         General: Normal range of motion  Right lower leg: No edema  Left lower leg: No edema  Skin:     General: Skin is warm  Coloration: Skin is not jaundiced  Neurological:      Mental Status: He is alert and oriented to person, place, and time  Sensory: Sensory deficit (left anterior neck, left dorsal hand, left dorsal foot) present  Motor: Weakness (LLE) present  No tremor or abnormal muscle tone     Psychiatric: Mood and Affect: Mood normal          Behavior: Behavior normal           Additional Data:     Labs:  Results from last 7 days   Lab Units 09/15/22  0525 09/14/22  0514   WBC Thousand/uL 9 25 10 92*   HEMOGLOBIN g/dL 16 9 16 9   HEMATOCRIT % 49 7* 48 7   PLATELETS Thousands/uL 259 285   NEUTROS PCT %  --  66   LYMPHS PCT %  --  22   MONOS PCT %  --  8   EOS PCT %  --  2     Results from last 7 days   Lab Units 09/15/22  0525   SODIUM mmol/L 137   POTASSIUM mmol/L 3 7   CHLORIDE mmol/L 107   CO2 mmol/L 21   BUN mg/dL 16   CREATININE mg/dL 1 16   ANION GAP mmol/L 9   CALCIUM mg/dL 9 5   ALBUMIN g/dL 4 0   TOTAL BILIRUBIN mg/dL 0 50   ALK PHOS U/L 62   ALT U/L 26   AST U/L 21   GLUCOSE RANDOM mg/dL 94     Results from last 7 days   Lab Units 09/13/22  1832   INR  0 90     Results from last 7 days   Lab Units 09/13/22  2352 09/13/22  1832   POC GLUCOSE mg/dl 89 94     Results from last 7 days   Lab Units 09/14/22  0514   HEMOGLOBIN A1C % 5 6           Lines/Drains:  Invasive Devices  Report    Peripheral Intravenous Line  Duration           Peripheral IV 09/13/22 Left Antecubital 1 day                  Telemetry:  Telemetry Orders (From admission, onward)             48 Hour Telemetry Monitoring  Continuous x 48 hours        Expiring   References:    Telemetry Guidelines   Question:  Reason for 48 Hour Telemetry  Answer:  Acute CVA (<24 hrs old, hemispheric strokes, selected brainstem strokes, cardiac arrhythmias)                  Telemetry Reviewed: Normal Sinus Rhythm  Indication for Continued Telemetry Use: Acute CVA             Imaging: Reviewed radiology reports from this admission including: CT head, MRI brain and CTA stroke alert (head/neck), VAS carotid complete study    Recent Cultures (last 7 days):         Last 24 Hours Medication List:   Current Facility-Administered Medications   Medication Dose Route Frequency Provider Last Rate    acetaminophen  975 mg Oral Q8H PRN Dilia White MD      aspirin 81 mg Oral Daily Marleny Sames, DO      atorvastatin  80 mg Oral QPM Abundio Alvarez MD      clopidogrel  75 mg Oral Daily Marleny Sames, DO      folic acid  1 mg Oral Daily Rosi Woods MD      heparin (porcine)  5,000 Units Subcutaneous [facility] Rosi Woods MD      lidocaine  1 patch Topical Daily Rosi Woods MD      multivitamin-minerals  1 tablet Oral Daily Rosi Woods MD      nicotine  1 patch Transdermal Daily Marleny Sames, DO      thiamine  100 mg Oral Daily Rosi Woods MD          Today, Patient Was Seen By: Sakshi Lobato DO    **Please Note: This note may have been constructed using a voice recognition system  **

## 2022-09-15 NOTE — CONSULTS
Consultation - Cardiology Team 1  Jeremy Nicolas 49 y o  male MRN: [id number]  Unit/Bed#: S -01 Encounter: [id number]  09/15/22  10:26 AM    Assessment/ Plan:  1  Preoperative cardiac risk stratification  - presenting with stroke-like symptoms - imaging revealed acute CVA and critical stenosis of bilateral ICAs  - vascular surgery consulted - planning for outpatient surgical intervention with TCAR vs  CEA  - presenting EKG - NSR, LVH, nonspecific T-wave abnormality in lateral leads  - telemetry review - NSR, HR 70s, no significant events noted  - echo yesterday - LVEF 60%, no RWMA, grade 1 DD, mild-to-moderate AI, trivial pericardial effusion anterior to the heart with no evidence of tamponade  - functional capacity - independent at baseline, contractor by trade, does not experience any anginal symptoms with activity  - reasonable to consider outpatient stress test given risk factors of hyperlipidemia, hypertension, and tobacco abuse  - patient least moderate risk for any cardiac events intra or postoperatively secondary to AI on echo, but can proceed with surgery whenever it is scheduled    2  Carotid artery stenosis  - CTA head/neck (09/13/2022)  - occlusion of right PCA P2 segment, > 90% stenosis in the proximal cervical segments of the bilateral ICAs  - VAS carotid (9/14/2022) study - 70-99% stenosis of R/L ICA  - vascular surgery following - outpatient surgical intervention as above  - continue aspirin 81 mg p o  Daily, atorvastatin 80 mg p o  Daily, Plavix 75 mg p o  Daily    3  CVA  - MRI brain (9/14/2022) - focal right thalamic acute to subacute infarct  - neurology following - allow 48-72 hours for patient to autoregulate BP  - continue aspirin, atorvastatin, Plavix - d/c Plavix after 21 days per Neurology    4  Hypertensive urgency  - blood pressure on arrival 215/128 - last documented at 166/100  - home antihypertensive regimen - amlodipine 10 mg p o   Daily; can resume when appropriate per Neurology  - can add additional medications if necessary after amlodipine administration    5  Hyperlipidemia  - lipid panel (09/14/2022) - / triglycerides 250/ HDL 53/   - continue atorvastatin    6  Tobacco abuse  - currently attempting to quit  - encouraged continued cessation    History of Present Illness   Physician Requesting Consult: Staci Mansfield MD    Reason for Consult / Principal Problem:  Preoperative risk stratification    HPI: Carol Brooks is a 49y o  year old male with past medical history of tobacco abuse, hyperlipidemia, and hypertension who presents to the ED on 09/13/2022 with stroke-like symptoms  MRI brain revealed a right thalamic stroke and CT head/neck demonstrated greater than 90% stenosis of the proximal bilateral ICAs  Vascular surgery was consulted and are planning for outpatient surgical intervention with TCAR vs CEA  Neurology is planning for DAPT x21 days with discontinue Plavix thereafter and continuation of aspirin only  Patient was also noted to be extremely hypertensive on arrival with blood pressure of 215/128  Cardiology was consulted for preoperative risk stratification in addition to hypertension management  Upon examination, patient is sitting at the side of the bed and is comfortable  Notes he is still having some left eye visual changes, but has improved since presentation  From a heart standpoint, patient is fairly active and is independent at baseline  He works as a contractor  He denies any anginal symptoms of chest pain/pressure, shortness of breath, nausea, vomiting, diaphoresis, or palpitations at rest or with activity  He does note some throat "tightness" with prolonged exertion  However, with day-to-day activities, he does not experience this  He denies any leg swelling, abdominal bloating, or orthopnea  Patient does endorse a tobacco history, and is currently attempting to quit for which I encouraged    He does have fast food at the bedside, for which I educated him on the importance of a heart healthy diet  He denies any alcohol or drug abuse  He does not follow with a cardiologist outpatient  Inpatient consult to Cardiology  Consult performed by: JOSEPHINE Swann  Consult ordered by: Benny Berry PA-C      EKG: NSR, LVH, nonspecific T-wave abnormality in lateral leads    Review of Systems   Constitutional: Negative for appetite change, chills, diaphoresis, fatigue and fever  HENT: Negative  Eyes: Positive for visual disturbance  Respiratory: Negative for chest tightness, shortness of breath and wheezing  Cardiovascular: Negative for chest pain, palpitations and leg swelling  Gastrointestinal: Negative  Negative for abdominal pain, nausea and vomiting  Endocrine: Negative  Genitourinary: Negative  Musculoskeletal: Negative  Skin: Negative  Allergic/Immunologic: Negative  Neurological: Negative for dizziness, syncope, weakness and light-headedness  Hematological: Negative  Psychiatric/Behavioral: Negative  Historical Information   No past medical history on file    Past Surgical History:   Procedure Laterality Date    CARPAL TUNNEL RELEASE      ULNAR COLLATERAL LIGAMENT RECONSTRUCTION       Social History     Substance and Sexual Activity   Alcohol Use Not Currently    Alcohol/week: 6 0 standard drinks    Types: 6 Cans of beer per week    Comment: daily, heavy use until 2021     Social History     Substance and Sexual Activity   Drug Use Never     Social History     Tobacco Use   Smoking Status Former Smoker    Packs/day: 2 00    Years: 35 00    Pack years: 70 00   Smokeless Tobacco Never Used     Family History:   Family History   Problem Relation Age of Onset    Heart attack Mother      Meds/Allergies   current meds:   Current Facility-Administered Medications   Medication Dose Route Frequency    acetaminophen (TYLENOL) tablet 975 mg  975 mg Oral Q8H PRN    aspirin chewable tablet 81 mg  81 mg Oral Daily    atorvastatin (LIPITOR) tablet 80 mg  80 mg Oral QPM    clopidogrel (PLAVIX) tablet 75 mg  75 mg Oral Daily    folic acid (FOLVITE) tablet 1 mg  1 mg Oral Daily    heparin (porcine) subcutaneous injection 5,000 Units  5,000 Units Subcutaneous Q8H [address]    lidocaine (LIDODERM) 5 % patch 1 patch  1 patch Topical Daily    multivitamin-minerals (CENTRUM) tablet 1 tablet  1 tablet Oral Daily    nicotine (NICODERM CQ) 14 mg/24hr TD 24 hr patch 1 patch  1 patch Transdermal Daily    thiamine tablet 100 mg  100 mg Oral Daily     Allergies   Allergen Reactions    Penicillins Anaphylaxis     Objective   Vitals: Blood pressure 166/100, pulse 62, temperature 97 8 °F (36 6 °C), temperature source Oral, resp  rate 16, height 5' 9" (1 753 m), weight 74 4 kg (164 lb), SpO2 97 %  , Body mass index is 24 22 kg/m² ,   Orthostatic Blood Pressures    Flowsheet Row Most Recent Value   Blood Pressure 166/100 filed at 09/15/2022 6609   Patient Position - Orthostatic VS Lying filed at 09/15/2022 6839        Systolic (92YEX), LMI:691 , Min:147 , BP     Diastolic (15GYL), VKH:642, Min:100, Max:116    No intake or output data in the 24 hours ending 09/15/22 1026    Invasive Devices  Report    Peripheral Intravenous Line  Duration           Peripheral IV 22 Left Antecubital 1 day                Physical Exam  Constitutional:       General: He is not in acute distress  Appearance: Normal appearance  He is not ill-appearing  HENT:      Head: Normocephalic and atraumatic  Nose: Nose normal       Mouth/Throat:      Mouth: Mucous membranes are moist       Pharynx: Oropharynx is clear  Eyes:      Pupils: Pupils are equal, round, and reactive to light  Cardiovascular:      Rate and Rhythm: Normal rate and regular rhythm  Pulses: Normal pulses  Heart sounds: Normal heart sounds  No murmur heard    Pulmonary:      Effort: Pulmonary effort is normal       Breath sounds: Normal breath sounds  No wheezing, rhonchi or rales  Abdominal:      General: Bowel sounds are normal       Palpations: Abdomen is soft  Musculoskeletal:         General: Normal range of motion  Cervical back: Normal range of motion  Right lower leg: No edema  Left lower leg: No edema  Skin:     General: Skin is warm and dry  Capillary Refill: Capillary refill takes less than 2 seconds  Neurological:      General: No focal deficit present  Mental Status: He is alert and oriented to person, place, and time     Psychiatric:         Mood and Affect: Mood normal          Behavior: Behavior normal      Lab Results:   Troponins:     CBC with diff:   Results from last 7 days   Lab Units 09/15/22  0525 09/14/22  0514 09/13/22  2357 09/13/22  1832   WBC Thousand/uL 9 25 10 92*  --  14 78*   HEMOGLOBIN g/dL 16 9 16 9  --  18 0*   HEMATOCRIT % 49 7* 48 7  --  51 4*   MCV fL 94 93  --  93   PLATELETS Thousands/uL 259 285 300 324   MCH pg 32 1 32 1  --  32 4   MCHC g/dL 34 0 34 7  --  35 0   RDW % 12 2 12 4  --  12 2   MPV fL 9 6 9 7 9 3 9 5   NRBC AUTO /100 WBCs  --  0  --   --      CMP:   Results from last 7 days   Lab Units 09/15/22  0525 09/14/22  0514 09/13/22  1832   POTASSIUM mmol/L 3 7 3 6 3 8   CHLORIDE mmol/L 107 104 103   CO2 mmol/L 21 25 24   BUN mg/dL 16 13 14   CREATININE mg/dL 1 16 1 07 1 06   CALCIUM mg/dL 9 5 9 5 10 3*   AST U/L 21 20  --    ALT U/L 26 28  --    ALK PHOS U/L 62 61  --    EGFR ml/min/1 73sq m 73 80 81

## 2022-09-15 NOTE — ASSESSMENT & PLAN NOTE
Patient reports history of C6-C7 cervical fusion  Plan:  · Continues with upper and lower back pain controlled usually with Tylenol    · Tylenol 975 q8hrs prn

## 2022-09-16 VITALS
DIASTOLIC BLOOD PRESSURE: 114 MMHG | HEART RATE: 74 BPM | BODY MASS INDEX: 24.29 KG/M2 | TEMPERATURE: 97.9 F | HEIGHT: 69 IN | OXYGEN SATURATION: 99 % | WEIGHT: 164 LBS | SYSTOLIC BLOOD PRESSURE: 175 MMHG | RESPIRATION RATE: 18 BRPM

## 2022-09-16 LAB
ANION GAP SERPL CALCULATED.3IONS-SCNC: 8 MMOL/L (ref 4–13)
BASOPHILS # BLD AUTO: 0.08 THOUSANDS/ΜL (ref 0–0.1)
BASOPHILS NFR BLD AUTO: 1 % (ref 0–1)
BUN SERPL-MCNC: 15 MG/DL (ref 5–25)
CALCIUM SERPL-MCNC: 9.4 MG/DL (ref 8.4–10.2)
CHLORIDE SERPL-SCNC: 109 MMOL/L (ref 96–108)
CO2 SERPL-SCNC: 22 MMOL/L (ref 21–32)
CREAT SERPL-MCNC: 1.03 MG/DL (ref 0.6–1.3)
EOSINOPHIL # BLD AUTO: 0.22 THOUSAND/ΜL (ref 0–0.61)
EOSINOPHIL NFR BLD AUTO: 3 % (ref 0–6)
ERYTHROCYTE [DISTWIDTH] IN BLOOD BY AUTOMATED COUNT: 12.3 % (ref 11.6–15.1)
GFR SERPL CREATININE-BSD FRML MDRD: 84 ML/MIN/1.73SQ M
GLUCOSE SERPL-MCNC: 92 MG/DL (ref 65–140)
HCT VFR BLD AUTO: 46.3 % (ref 36.5–49.3)
HGB BLD-MCNC: 16.1 G/DL (ref 12–17)
IMM GRANULOCYTES # BLD AUTO: 0.04 THOUSAND/UL (ref 0–0.2)
IMM GRANULOCYTES NFR BLD AUTO: 1 % (ref 0–2)
LYMPHOCYTES # BLD AUTO: 2.31 THOUSANDS/ΜL (ref 0.6–4.47)
LYMPHOCYTES NFR BLD AUTO: 26 % (ref 14–44)
MCH RBC QN AUTO: 32.5 PG (ref 26.8–34.3)
MCHC RBC AUTO-ENTMCNC: 34.8 G/DL (ref 31.4–37.4)
MCV RBC AUTO: 93 FL (ref 82–98)
MONOCYTES # BLD AUTO: 0.74 THOUSAND/ΜL (ref 0.17–1.22)
MONOCYTES NFR BLD AUTO: 8 % (ref 4–12)
NEUTROPHILS # BLD AUTO: 5.39 THOUSANDS/ΜL (ref 1.85–7.62)
NEUTS SEG NFR BLD AUTO: 61 % (ref 43–75)
NRBC BLD AUTO-RTO: 0 /100 WBCS
PLATELET # BLD AUTO: 249 THOUSANDS/UL (ref 149–390)
PMV BLD AUTO: 9.8 FL (ref 8.9–12.7)
POTASSIUM SERPL-SCNC: 4 MMOL/L (ref 3.5–5.3)
RBC # BLD AUTO: 4.96 MILLION/UL (ref 3.88–5.62)
SODIUM SERPL-SCNC: 139 MMOL/L (ref 135–147)
WBC # BLD AUTO: 8.78 THOUSAND/UL (ref 4.31–10.16)

## 2022-09-16 PROCEDURE — 85025 COMPLETE CBC W/AUTO DIFF WBC: CPT

## 2022-09-16 PROCEDURE — 99239 HOSP IP/OBS DSCHRG MGMT >30: CPT | Performed by: INTERNAL MEDICINE

## 2022-09-16 PROCEDURE — 80048 BASIC METABOLIC PNL TOTAL CA: CPT

## 2022-09-16 RX ORDER — AMLODIPINE BESYLATE 10 MG/1
10 TABLET ORAL DAILY
Qty: 30 TABLET | Refills: 0 | Status: SHIPPED | OUTPATIENT
Start: 2022-09-16 | End: 2022-10-11 | Stop reason: SDUPTHER

## 2022-09-16 RX ORDER — AMLODIPINE BESYLATE 5 MG/1
5 TABLET ORAL ONCE
Status: COMPLETED | OUTPATIENT
Start: 2022-09-16 | End: 2022-09-16

## 2022-09-16 RX ORDER — LOSARTAN POTASSIUM 50 MG/1
50 TABLET ORAL DAILY
Status: DISCONTINUED | OUTPATIENT
Start: 2022-09-16 | End: 2022-09-16 | Stop reason: HOSPADM

## 2022-09-16 RX ORDER — ATORVASTATIN CALCIUM 80 MG/1
80 TABLET, FILM COATED ORAL EVERY EVENING
Qty: 30 TABLET | Refills: 0 | Status: SHIPPED | OUTPATIENT
Start: 2022-09-16 | End: 2022-10-11 | Stop reason: SDUPTHER

## 2022-09-16 RX ORDER — ASPIRIN 81 MG/1
81 TABLET, CHEWABLE ORAL DAILY
Qty: 30 TABLET | Refills: 0 | Status: SHIPPED | OUTPATIENT
Start: 2022-09-17 | End: 2022-10-28

## 2022-09-16 RX ORDER — LOSARTAN POTASSIUM 50 MG/1
50 TABLET ORAL DAILY
Qty: 30 TABLET | Refills: 0 | Status: SHIPPED | OUTPATIENT
Start: 2022-09-17 | End: 2022-10-11 | Stop reason: SDUPTHER

## 2022-09-16 RX ORDER — AMLODIPINE BESYLATE 10 MG/1
10 TABLET ORAL DAILY
Status: DISCONTINUED | OUTPATIENT
Start: 2022-09-17 | End: 2022-09-16 | Stop reason: HOSPADM

## 2022-09-16 RX ORDER — CLOPIDOGREL BISULFATE 75 MG/1
75 TABLET ORAL DAILY
Qty: 21 TABLET | Refills: 0 | Status: SHIPPED | OUTPATIENT
Start: 2022-09-17 | End: 2022-09-21

## 2022-09-16 RX ORDER — AMLODIPINE BESYLATE 5 MG/1
5 TABLET ORAL DAILY
Status: DISCONTINUED | OUTPATIENT
Start: 2022-09-16 | End: 2022-09-16

## 2022-09-16 RX ADMIN — THIAMINE HCL TAB 100 MG 100 MG: 100 TAB at 08:12

## 2022-09-16 RX ADMIN — NICOTINE 1 PATCH: 14 PATCH, EXTENDED RELEASE TRANSDERMAL at 08:13

## 2022-09-16 RX ADMIN — AMLODIPINE BESYLATE 5 MG: 5 TABLET ORAL at 10:39

## 2022-09-16 RX ADMIN — AMLODIPINE BESYLATE 5 MG: 5 TABLET ORAL at 14:14

## 2022-09-16 RX ADMIN — ASPIRIN 81 MG CHEWABLE TABLET 81 MG: 81 TABLET CHEWABLE at 08:12

## 2022-09-16 RX ADMIN — LIDOCAINE 5% 1 PATCH: 700 PATCH TOPICAL at 08:13

## 2022-09-16 RX ADMIN — ACETAMINOPHEN 975 MG: 325 TABLET, FILM COATED ORAL at 08:10

## 2022-09-16 RX ADMIN — ACETAMINOPHEN 975 MG: 325 TABLET, FILM COATED ORAL at 17:35

## 2022-09-16 RX ADMIN — MULTIPLE VITAMINS W/ MINERALS TAB 1 TABLET: TAB ORAL at 08:12

## 2022-09-16 RX ADMIN — CLOPIDOGREL BISULFATE 75 MG: 75 TABLET ORAL at 08:12

## 2022-09-16 RX ADMIN — LOSARTAN POTASSIUM 50 MG: 50 TABLET, FILM COATED ORAL at 14:14

## 2022-09-16 RX ADMIN — FOLIC ACID 1 MG: 1 TABLET ORAL at 08:12

## 2022-09-16 RX ADMIN — ATORVASTATIN CALCIUM 80 MG: 40 TABLET, FILM COATED ORAL at 17:36

## 2022-09-16 NOTE — ASSESSMENT & PLAN NOTE
POA: Episode of left sided weakness, confusion starting at approximately 0800 this AM(9/13) while driving to work  Pt pulled over and then was able to drive himself home  Symptoms lasted through the day and were not resolved as Wife get home so came to ED In the ED initial NIH score of 5, patient had CT head and CT a head neck per stroke protocol  Was loaded with aspirin and Plavix  Neurology consulted in the ED with recommendations to continue stroke pathway  BP elevated at presentation at 214/99 now Blood Pressure: 157/97     W/u:  · EKG on presentation to the ER showed normal sinus rhythm rate of 99 with a normal axis, normal intervals and no signs of ischemia  · CT of the head showed possible acute lacunar infarct in the right thalamus  No acute intracranial hemorrhage or mass effect  · CTA of the head/Neck with contrast showed occlusion of the right PCA P2 segment, with distal branch reperfusion  Critical-greater than 90%-stenosis in the proximal cervical segments of the bilateral internal carotid arteries  Distal cervical and intracranial segments are patent and normal in caliber  · MRI brain wo contrast 9/14 Impression: 1  Focal right thalamic acute to subacute infarct  Additional linear areas of acute to subacute ischemia involving the medial right temporal-occipital region compatible with PCA territory infarcts  Right PCA occlusion noted on CTA  2  Mild chronic microtraumatic ischemic changes  · Echo: EF 60% G1DD    Plan:  · Patient to d/c on atorvastatin 80 mg q d , dual antiplatelet  He has been instructed to continue dual antiplatelet at minimum until follow up with vascular surgery (scheduled 9/27)  · Patient to attend outpatient PT/OT, referrals placed  · Patient to discharge on previous home med of amlodipine 10mg, additional new med of losartan 50mg  Patient to follow up with PCP and cardiology outpatient to tailor medication regimen PRN

## 2022-09-16 NOTE — PROGRESS NOTES
RN notified MD that patient has numbness in neck and back, patient said has had a few days ago  BP continued to be elevated, RN notified MD each time vitals were taken  Medications prescribed and documented in the [facility]  Patient wishes to go home and is currently cleared for discharge

## 2022-09-16 NOTE — ASSESSMENT & PLAN NOTE
Patient reports history of C6-C7 cervical fusion  Plan:  · Continues with upper and lower back pain controlled usually with Tylenol

## 2022-09-16 NOTE — DISCHARGE INSTR - AVS FIRST PAGE
Dear Genaro Willis,     It was our pleasure to care for you here at [facility]  It is our hope that we were always able to exceed the expected standards for your care during your stay  You were hospitalized due to stroke  You were cared for on the [place] floor by Sakshi Lobato DO under the service of Kim Solis MD with the [facility] who covers for your primary care physician (PCP), Bismark Templeton DO, while you were hospitalized  If you have any questions or concerns related to this hospitalization, you may contact us at [phone]  For follow up as well as any medication refills, we recommend that you follow up with your primary care physician  A registered nurse will reach out to you by phone within a few days after your discharge to answer any additional questions that you may have after going home  However, at this time we provide for you here, the most important instructions / recommendations at discharge:     Notable Medication Adjustments -   You were started on several medications in the hospital  Continue taking these until you follow up with vascular surgery in 2-4 weeks, at which time please ask them for further recommendations  Aspirin 81mg daily  Clopidogrel 75 mg daily  Atorvastatin 80mg daily  Amlodipine 10mg daily  Losartan 50mg daily  Testing Required after Discharge -   None  Important follow up information -   Follow up with vascular surgery in 2-4 weeks  Follow up with cardiology as an outpatient  Follow up with neurology as an outpatient  Other Instructions -   Please call your PCP's office to schedule follow up visit within 2-3 business days    Please review this entire after visit summary as additional general instructions including medication list, appointments, activity, diet, any pertinent wound care, and other additional recommendations from your care team that may be provided for you       Sincerely,     Karl Justice, DO

## 2022-09-16 NOTE — ASSESSMENT & PLAN NOTE
No known hx  Patient's history 20 year, 1-2 pack per day smoker  Recently stopped smoking cigarettes hopes to quit  On nicotine patch and currently still vaping  W/u:   · CTA neck as above with occlusion right PCA P2 segment, with distal branch reperfusion  Critical - greater than 90%-stenosis in the proximal cervical segment of the bilateral internal carotid arteries  Distal cervical and intracranial segments are patent and normal in caliber  · Patient received aspirin 325 mg and Plavix in the ED  Plan:  · D/c on aspirin 81 mg daily, plavix 75mg daily, atorvastatin 80 mg started in house  · Per vascular surgery, follow up in office as outpatient, continue dual antiplatelet therapy and high-dose statin until that time  · Per cardiology, no further cardiac workup prior to surgery recommended  Outpatient referral placed for f/u HTN

## 2022-09-16 NOTE — PLAN OF CARE
Problem: Activity Intolerance/Impaired Mobility  Goal: Mobility/activity is maintained at optimum level for patient  Description: Interventions:  - Assess and monitor patient  barriers to mobility and need for assistive/adaptive devices  - Assess patient's emotional response to limitations  - Collaborate with interdisciplinary team and initiate plans and interventions as ordered  - Encourage independent activity per ability   - Maintain proper body alignment  - Perform active/passive rom as tolerated/ordered  - Plan activities to conserve energy   - Turn patient as appropriate  Outcome: Progressing     Problem: Communication Impairment  Goal: Ability to express needs and understand communication  Description: Assess patient's communication skills and ability to understand information  Patient will demonstrate use of effective communication techniques, alternative methods of communication and understanding even if not able to speak  - Encourage communication and provide alternate methods of communication as needed  - Collaborate with case management/ for discharge needs  - Include patient/family/caregiver in decisions related to communication  Outcome: Progressing     Problem: Nutrition  Goal: Nutrition/Hydration status is improving  Description: Monitor and assess patient's nutrition/hydration status for malnutrition (ex- brittle hair, bruises, dry skin, pale skin and conjunctiva, muscle wasting, smooth red tongue, and disorientation)  Collaborate with interdisciplinary team and initiate plan and interventions as ordered  Monitor patient's weight and dietary intake as ordered or per policy  Utilize nutrition screening tool and intervene per policy  Determine patient's food preferences and provide high-protein, high-caloric foods as appropriate  - Assist patient with eating   - Allow adequate time for meals   - Encourage patient to take dietary supplement as ordered    - Collaborate with clinical nutritionist   - Include patient/family/caregiver in decisions related to nutrition    Outcome: Progressing

## 2022-09-16 NOTE — PLAN OF CARE
Problem: MOBILITY - ADULT  Goal: Maintain or return to baseline ADL function  Description: INTERVENTIONS:  -  Assess patient's ability to carry out ADLs; assess patient's baseline for ADL function and identify physical deficits which impact ability to perform ADLs (bathing, care of mouth/teeth, toileting, grooming, dressing, etc )  - Assess/evaluate cause of self-care deficits   - Assess range of motion  - Assess patient's mobility; develop plan if impaired  - Assess patient's need for assistive devices and provide as appropriate  - Encourage maximum independence but intervene and supervise when necessary  - Involve family in performance of ADLs  - Assess for home care needs following discharge   - Consider OT consult to assist with ADL evaluation and planning for discharge  - Provide patient education as appropriate  Outcome: Progressing  Goal: Maintains/Returns to pre admission functional level  Description: INTERVENTIONS:  - Perform BMAT or MOVE assessment daily    - Set and communicate daily mobility goal to care team and patient/family/caregiver  - Collaborate with rehabilitation services on mobility goals if consulted  - Perform Range of Motion 2 times a day  - Reposition patient every 2 hours    - Dangle patient 2 times a day  - Stand patient 2 times a day  - Ambulate patient 2 times a day  - Out of bed to chair 2 times a day   - Out of bed for meals 2 times a day  - Out of bed for toileting  - Record patient progress and toleration of activity level   Outcome: Progressing     Problem: Potential for Falls  Goal: Patient will remain free of falls  Description: INTERVENTIONS:  - Educate patient/family on patient safety including physical limitations  - Instruct patient to call for assistance with activity   - Consult OT/PT to assist with strengthening/mobility   - Keep Call bell within reach  - Keep bed low and locked with side rails adjusted as appropriate  - Keep care items and personal belongings within reach  - Initiate and maintain comfort rounds  - Make Fall Risk Sign visible to staff  - Offer Toileting every 2 Hours, in advance of need  - Initiate/Maintain bed alarm  - Obtain necessary fall risk management equipment  - Apply yellow socks and bracelet for high fall risk patients  - Consider moving patient to room near nurses station  Outcome: Progressing     Problem: Neurological Deficit  Goal: Neurological status is stable or improving  Description: Interventions:  - Monitor and assess patient's level of consciousness, motor function, sensory function, and level of assistance needed for ADLs  - Monitor and report changes from baseline  Collaborate with interdisciplinary team to initiate plan and implement interventions as ordered  - Provide and maintain a safe environment  - Consider seizure precautions  - Consider fall precautions  - Consider aspiration precautions  - Consider bleeding precautions  Outcome: Progressing     Problem: Activity Intolerance/Impaired Mobility  Goal: Mobility/activity is maintained at optimum level for patient  Description: Interventions:  - Assess and monitor patient  barriers to mobility and need for assistive/adaptive devices  - Assess patient's emotional response to limitations  - Collaborate with interdisciplinary team and initiate plans and interventions as ordered  - Encourage independent activity per ability   - Maintain proper body alignment  - Perform active/passive rom as tolerated/ordered  - Plan activities to conserve energy   - Turn patient as appropriate  Outcome: Progressing     Problem: Communication Impairment  Goal: Ability to express needs and understand communication  Description: Assess patient's communication skills and ability to understand information  Patient will demonstrate use of effective communication techniques, alternative methods of communication and understanding even if not able to speak       - Encourage communication and provide alternate methods of communication as needed  - Collaborate with case management/ for discharge needs  - Include patient/family/caregiver in decisions related to communication  Outcome: Progressing     Problem: Potential for Aspiration  Goal: Non-ventilated patient's risk of aspiration is minimized  Description: Assess and monitor vital signs, respiratory status, and labs (WBC)  Monitor for signs of aspiration (tachypnea, cough, rales, wheezing, cyanosis, fever)  - Assess and monitor patient's ability to swallow  - Place patient up in chair to eat if possible  - HOB up at 90 degrees to eat if unable to get patient up into chair   - Supervise patient during oral intake  - Instruct patient/ family to take small bites  - Instruct patient/ family to take small single sips when taking liquids  - Follow patient-specific strategies generated by speech pathologist   Outcome: Progressing  Goal: Ventilated patient's risk of aspiration is minimized  Description: Assess and monitor vital signs, respiratory status, airway cuff pressure, and labs (WBC)  Monitor for signs of aspiration (tachypnea, cough, rales, wheezing, cyanosis, fever)  - Elevate head of bed 30 degrees if patient has tube feeding   - Monitor tube feeding  Outcome: Progressing     Problem: Nutrition  Goal: Nutrition/Hydration status is improving  Description: Monitor and assess patient's nutrition/hydration status for malnutrition (ex- brittle hair, bruises, dry skin, pale skin and conjunctiva, muscle wasting, smooth red tongue, and disorientation)  Collaborate with interdisciplinary team and initiate plan and interventions as ordered  Monitor patient's weight and dietary intake as ordered or per policy  Utilize nutrition screening tool and intervene per policy  Determine patient's food preferences and provide high-protein, high-caloric foods as appropriate  - Assist patient with eating   - Allow adequate time for meals    - Encourage patient to take dietary supplement as ordered  - Collaborate with clinical nutritionist   - Include patient/family/caregiver in decisions related to nutrition    Outcome: Progressing

## 2022-09-16 NOTE — ASSESSMENT & PLAN NOTE
Denies any abdominal pain admission today, having daily bowel movements normal caliber, denies any blood  Patient has history of diverticulitis in October 2021 with concern for abscess of the time  Treated with aztreonam and Flagyl due to severe penicillin allergy  Plan:  · Discussed with patient today importance of balanced diet and good fiber intake  Patient has been picky with regard to previous food intake due to concern for possible re-flare of diverticulitis  Spoke with patient and scheduled f/u appointment with Dr. Varela per patient on 07/30/20. Patient confirmed appt.

## 2022-09-16 NOTE — ASSESSMENT & PLAN NOTE
Patient reports history of alcohol use  With previously documented he has not drink in over year however today pt stated active intake of 4-8 beers per day  Plan:  · Counseled patient on alcohol cessation prior to d/c, especially important given ongoing tylenol use for chronic pain

## 2022-09-16 NOTE — ASSESSMENT & PLAN NOTE
Blood Pressure: 157/97    History of hypertension managed on Amlodipine 10 mg - does not take regularly/takes  medication    Plan:   Patient discharged on amlodipine 10mg qd and losartan 50mg qd  Instructed to follow up with PCP's office and cardiology as needed for tailoring of BP meds

## 2022-09-17 NOTE — DISCHARGE SUMMARY
[facility]  Discharge- Justin Woodswright 1970, 49 y o  male MRN: [id number]  Unit/Bed#: S -01 Encounter: [id number]  Primary Care Provider: Hamzah Patel DO   Date and time admitted to hospital: 9/13/2022  6:24 PM    * Stroke-like symptoms  Assessment & Plan  POA: Episode of left sided weakness, confusion starting at approximately 0800 this AM(9/13) while driving to work  Pt pulled over and then was able to drive himself home  Symptoms lasted through the day and were not resolved as Wife get home so came to ED In the ED initial NIH score of 5, patient had CT head and CT a head neck per stroke protocol  Was loaded with aspirin and Plavix  Neurology consulted in the ED with recommendations to continue stroke pathway  BP elevated at presentation at 214/99 now Blood Pressure: 157/97     W/u:  · EKG on presentation to the ER showed normal sinus rhythm rate of 99 with a normal axis, normal intervals and no signs of ischemia  · CT of the head showed possible acute lacunar infarct in the right thalamus  No acute intracranial hemorrhage or mass effect  · CTA of the head/Neck with contrast showed occlusion of the right PCA P2 segment, with distal branch reperfusion  Critical-greater than 90%-stenosis in the proximal cervical segments of the bilateral internal carotid arteries  Distal cervical and intracranial segments are patent and normal in caliber  · MRI brain wo contrast 9/14 Impression: 1  Focal right thalamic acute to subacute infarct  Additional linear areas of acute to subacute ischemia involving the medial right temporal-occipital region compatible with PCA territory infarcts  Right PCA occlusion noted on CTA  2  Mild chronic microtraumatic ischemic changes  · Echo: EF 60% G1DD    Plan:  · Patient to d/c on atorvastatin 80 mg q d , dual antiplatelet   He has been instructed to continue dual antiplatelet at minimum until follow up with vascular surgery (scheduled )   · Patient to attend outpatient PT/OT, referrals placed  · Patient to discharge on previous home med of amlodipine 10mg, additional new med of losartan 50mg  Patient to follow up with PCP and cardiology outpatient to tailor medication regimen PRN  Carotid stenosis  Assessment & Plan  No known hx  Patient's history 20 year, 1-2 pack per day smoker  Recently stopped smoking cigarettes hopes to quit  On nicotine patch and currently still vaping  W/u:   · CTA neck as above with occlusion right PCA P2 segment, with distal branch reperfusion  Critical - greater than 90%-stenosis in the proximal cervical segment of the bilateral internal carotid arteries  Distal cervical and intracranial segments are patent and normal in caliber  · Patient received aspirin 325 mg and Plavix in the ED  Plan:  · D/c on aspirin 81 mg daily, plavix 75mg daily, atorvastatin 80 mg started in house  · Per vascular surgery, follow up in office as outpatient, continue dual antiplatelet therapy and high-dose statin until that time  · Per cardiology, no further cardiac workup prior to surgery recommended  Outpatient referral placed for f/u HTN  Alcohol intake above recommended sensible limits  Assessment & Plan  Patient reports history of alcohol use  With previously documented he has not drink in over year however today pt stated active intake of 4-8 beers per day  Plan:  · Counseled patient on alcohol cessation prior to d/c, especially important given ongoing tylenol use for chronic pain  Chronic back pain  Assessment & Plan  Patient reports history of C6-C7 cervical fusion  Plan:  · Continues with upper and lower back pain controlled usually with Tylenol  Hypertensive urgency  Assessment & Plan  Blood Pressure: 157/97    History of hypertension managed on Amlodipine 10 mg - does not take regularly/takes  medication    Plan:   Patient discharged on amlodipine 10mg qd and losartan 50mg qd  Instructed to follow up with PCP's office and cardiology as needed for tailoring of BP meds  History of diverticulitis  Assessment & Plan  Denies any abdominal pain admission today, having daily bowel movements normal caliber, denies any blood  Patient has history of diverticulitis in October 2021 with concern for abscess of the time  Treated with aztreonam and Flagyl due to severe penicillin allergy  Plan:  · Discussed with patient today importance of balanced diet and good fiber intake  Patient has been picky with regard to previous food intake due to concern for possible re-flare of diverticulitis  Medical Problems             Resolved Problems  Date Reviewed: 9/16/2022   None               Discharging Resident: Claudette Edge DO  Discharging Attending: Staci Mansfield MD  PCP: Salvatore Varela DO  Admission Date:   Admission Orders (From admission, onward)     Ordered        09/13/22 1959  INPATIENT ADMISSION  Once                      Discharge Date: 09/16/22    Consultations During Hospital Stay:  · Neurology, SLP, OT, vascular surgery, cardiology, PT, case managment    Procedures Performed:   · None    Significant Findings / Test Results:   MRI brain wo contrast    Result Date: 9/14/2022  Impression: 1  Focal right thalamic acute to subacute infarct  Additional linear areas of acute to subacute ischemia involving the medial right temporal-occipital region compatible with PCA territory infarcts  Right PCA occlusion noted on CTA  2  Mild chronic microtraumatic ischemic changes  Workstation performed: [id number]     CT stroke alert brain    Result Date: 9/13/2022  Impression: 1  Possible acute lacunar infarct in the right thalamus  MRI of the brain may be helpful  2   No acute intracranial hemorrhage or mass effect   Findings were directly discussed with Bethany Olvera at [facility] PM  Workstation performed: [id number]     CTA stroke alert (head/neck)    Result Date: 9/13/2022  Impression: 1   Occlusion of the right PCA P2 segment, with distal branch reperfusion  2   Critical (greater than 90%) stenoses in the proximal cervical segments of the bilateral internal carotid arteries  Distal cervical and intracranial segments are patent and normal in caliber  Findings were directly discussed with Dr Allison Theodore  at [facility] PM  Workstation performed: [id number]       Incidental Findings:   · None     Test Results Pending at Discharge (will require follow up): · None     Outpatient Tests Requested:  · None    Complications:  None    Reason for Admission: stroke-like symptoms    Hospital Course:   Gudelia Cordova is a 49 y o  male patient who originally presented to the hospital on 9/13/2022 due to stroke-like symptoms (left-sided weakness as well as numbness in left arm and some confusion)  He was found to have hypertensive urgency upon arrival, CT scan with possible acute lacunar infarct in R thalamus and critical (greater than 90%) stenosis of bilateral internal carotid arteries  Echo with EF 60%  Per vascular surgery, he will need outpatient follow up for non emergent surgical intervention; cardiology consult for cardiac risk determined patient is moderate risk for surgery but will not require additional cardiac workup prior to surgery  He was started on aspirin and plavix, in addition to high-dose statin  Permissive hypertension for 72 hours, then restarted on amlodipine home dose and additional ARB  He is agreeable to follow up as an outpatient and is cleared for discharge  Please see above list of diagnoses and related plan for additional information  Condition at Discharge: good    Discharge Day Visit / Exam:   Subjective:  Seen and examined at bedside  He continues to have some residual numbness in his left anterior neck and the dorsal and plantar surfaces of the left foot, although he feels these are slowly continuing to resolve   He feels his gait is a bit slower than previously and that he may be limited in some activities until he is able to complete PT, but feels he will be able to perform ADLs without too much difficulty  He does express some concern for potential hematuria without dysuria or other urinary symptoms; urine sample in room was examined and was not concerning for gross hematuria  He denied any shortness of breath, headache, chest pain, abdominal pain, lower extremity edema, N/V/D/C, or other concerns at that time  Per RN, he reported new numbness shortly prior to planned discharge time in the left shoulder and neck areas  I returned to the patient's room and examined him; he stated to me he had a small area of numbness inferior to the medial aspect of the left scapula, which had since resolved, and that his neck numbness was unchanged from prior exam  He additionally mentioned pain in his left shoulder which he says is his typical pain due to cervical stenosis  He states he believes this was due to anxiety and/or the new medications causing his blood pressure to decrease  I informed him that his blood pressure was still elevated at that time, and so I believed it unlikely that this transient numbness in his back was due to a significant change to his blood pressure  Following this discussion, I presented him with the option to stay in the hospital for an additional night of observation and monitoring  He stated that due to his belief his symptoms might be caused by anxiety, he would prefer to return home where he would be more comfortable  I discussed with him risks and benefits of discharge at that time, including the possibility of an additional cerebrovascular event  He expressed understanding and elected to proceed with discharge to home and return if necessary for additional evaluation  I additionally discussed with him the importance of continuing the medications started in the hospital and following up with additional providers as an outpatient   He expressed understanding and was agreeable to the plan as outlined for follow up  Vitals: Blood Pressure: (!) 175/114 (MD notified) (09/16/22 1557)  Pulse: 74 (09/16/22 1557)  Temperature: 97 9 °F (36 6 °C) (09/16/22 0746)  Temp Source: Oral (09/15/22 2035)  Respirations: 18 (09/16/22 1557)  Height: 5' 9" (175 3 cm) (09/14/22 1310)  Weight - Scale: 74 4 kg (164 lb) (09/14/22 1310)  SpO2: 99 % (09/16/22 1557)  Exam:   Physical Exam  Vitals reviewed  Constitutional:       General: He is not in acute distress  Appearance: He is not ill-appearing, toxic-appearing or diaphoretic  HENT:      Head: Normocephalic and atraumatic  Right Ear: External ear normal       Left Ear: External ear normal       Nose: Nose normal       Mouth/Throat:      Mouth: Mucous membranes are moist       Pharynx: Oropharynx is clear  Eyes:      General: No scleral icterus  Right eye: No discharge  Left eye: No discharge  Extraocular Movements: Extraocular movements intact  Conjunctiva/sclera: Conjunctivae normal    Cardiovascular:      Rate and Rhythm: Normal rate and regular rhythm  Pulses: Normal pulses  Heart sounds: Normal heart sounds  Pulmonary:      Effort: Pulmonary effort is normal  No respiratory distress  Breath sounds: Normal breath sounds  No stridor  No wheezing, rhonchi or rales  Abdominal:      General: Bowel sounds are normal  There is no distension  Palpations: Abdomen is soft  Tenderness: There is no abdominal tenderness  There is no guarding  Musculoskeletal:         General: No tenderness  Normal range of motion  Cervical back: Normal range of motion  Right lower leg: No edema  Left lower leg: No edema  Skin:     General: Skin is warm  Coloration: Skin is not jaundiced  Neurological:      Mental Status: He is alert and oriented to person, place, and time  Sensory: Sensory deficit (left anterior neck, left dorsal hand, left dorsal foot) present        Motor: Weakness (LLE) present  No tremor or abnormal muscle tone  Psychiatric:         Mood and Affect: Mood normal          Behavior: Behavior normal           Discussion with Family: Updated  (wife) at bedside  Discharge instructions/Information to patient and family:   See after visit summary for information provided to patient and family  Provisions for Follow-Up Care:  See after visit summary for information related to follow-up care and any pertinent home health orders  Disposition:   Home    Planned Readmission: approximately 4-6 weeks for carotid artery stenosis intervention with vascular surgery    Discharge Medications:  See after visit summary for reconciled discharge medications provided to patient and/or family        **Please Note: This note may have been constructed using a voice recognition system**

## 2022-09-19 NOTE — UTILIZATION REVIEW
Notification of Discharge   This is a Notification of Discharge from our facility [address]  Please be advised that this patient has been discharge from our facility  Below you will find the admission and discharge date and time including the patients disposition  UTILIZATION REVIEW CONTACT:  Julien Garcia MA  Utilization   Network Utilization Review Department  Phone: [phone] x carefully listen to the prompts  All voicemails are confidential   Email: [email or web address]     PHYSICIAN ADVISORY SERVICES:  FOR [id number] REVIEW - MEDICAL NECESSITY DENIAL  Phone: [phone]  Fax: [phone]  Email: [email or web address]     PRESENTATION DATE: 9/13/2022  6:24 PM  OBERVATION ADMISSION DATE:   INPATIENT ADMISSION DATE: 9/13/22  7:59 PM   DISCHARGE DATE: 9/16/2022  7:54 PM  DISPOSITION: Home/Self Care Home/Self Care      IMPORTANT INFORMATION:  Send all requests for admission clinical reviews, approved or denied determinations and any other requests to dedicated fax number below belonging to the campus where the patient is receiving treatment   List of dedicated fax numbers:  [address] DENIALS (Administrative/Medical Necessity) [phone]   [address] (Maternity/NICU/Pediatrics) [phone]   [facility] [phone]   130 [facility] [phone]   [address] [place] [address] [phone]   [address] [phone]   [facility]  [phone]   [address] [facility] [address]  [address] [facility] 1000 [facility] [phone]

## 2022-09-20 ENCOUNTER — TRANSITIONAL CARE MANAGEMENT (OUTPATIENT)
Dept: FAMILY MEDICINE CLINIC | Facility: CLINIC | Age: 52
End: 2022-09-20

## 2022-09-20 ENCOUNTER — OFFICE VISIT (OUTPATIENT)
Dept: FAMILY MEDICINE CLINIC | Facility: CLINIC | Age: 52
End: 2022-09-20
Payer: COMMERCIAL

## 2022-09-20 VITALS
BODY MASS INDEX: 25.74 KG/M2 | DIASTOLIC BLOOD PRESSURE: 100 MMHG | WEIGHT: 164 LBS | HEART RATE: 80 BPM | SYSTOLIC BLOOD PRESSURE: 154 MMHG | TEMPERATURE: 97.2 F | OXYGEN SATURATION: 98 % | HEIGHT: 67 IN | RESPIRATION RATE: 18 BRPM

## 2022-09-20 DIAGNOSIS — I63.531 ACUTE ISCHEMIC RIGHT POSTERIOR CEREBRAL ARTERY (PCA) STROKE (HCC): ICD-10-CM

## 2022-09-20 DIAGNOSIS — I16.0 HYPERTENSIVE URGENCY: ICD-10-CM

## 2022-09-20 DIAGNOSIS — R31.0 GROSS HEMATURIA: Primary | ICD-10-CM

## 2022-09-20 DIAGNOSIS — I65.23 SYMPTOMATIC CAROTID ARTERY STENOSIS, BILATERAL: ICD-10-CM

## 2022-09-20 DIAGNOSIS — I10 ESSENTIAL HYPERTENSION: ICD-10-CM

## 2022-09-20 LAB
BACTERIA UR QL AUTO: ABNORMAL /HPF
BILIRUB UR QL STRIP: NEGATIVE
CLARITY UR: ABNORMAL
COLOR UR: ABNORMAL
GLUCOSE UR STRIP-MCNC: NEGATIVE MG/DL
HGB UR QL STRIP.AUTO: ABNORMAL
KETONES UR STRIP-MCNC: NEGATIVE MG/DL
LEUKOCYTE ESTERASE UR QL STRIP: ABNORMAL
NITRITE UR QL STRIP: NEGATIVE
NON-SQ EPI CELLS URNS QL MICRO: ABNORMAL /HPF
PH UR STRIP.AUTO: 6 [PH]
PROT UR STRIP-MCNC: ABNORMAL MG/DL
RBC #/AREA URNS AUTO: ABNORMAL /HPF
SP GR UR STRIP.AUTO: 1.02 (ref 1–1.03)
UROBILINOGEN UR STRIP-ACNC: <2 MG/DL
WBC #/AREA URNS AUTO: ABNORMAL /HPF

## 2022-09-20 PROCEDURE — 1111F DSCHRG MED/CURRENT MED MERGE: CPT | Performed by: FAMILY MEDICINE

## 2022-09-20 PROCEDURE — 99215 OFFICE O/P EST HI 40 MIN: CPT | Performed by: FAMILY MEDICINE

## 2022-09-20 PROCEDURE — 99496 TRANSJ CARE MGMT HIGH F2F 7D: CPT | Performed by: FAMILY MEDICINE

## 2022-09-20 PROCEDURE — 81001 URINALYSIS AUTO W/SCOPE: CPT | Performed by: FAMILY MEDICINE

## 2022-09-20 PROCEDURE — 3725F SCREEN DEPRESSION PERFORMED: CPT | Performed by: FAMILY MEDICINE

## 2022-09-20 PROCEDURE — 87086 URINE CULTURE/COLONY COUNT: CPT | Performed by: FAMILY MEDICINE

## 2022-09-20 NOTE — PROGRESS NOTES
FAMILY MEDICINE TRANSITION OF CARE OFFICE VISIT  [facility] - [facility]ILY [facility]ICINE    NAME: Bety Yu  AGE: 49 y o  SEX: male  : 1970       DATE: 2022    Assessment and Plan     Diagnoses and all orders for this visit:    Gross hematuria  -     Cancel: UA w Reflex to Microscopic w Reflex to Culture - Clinic Collect  -     Cancel: UA w Reflex to Microscopic w Reflex to Culture - Clinic Collect; Future  -     UA w Reflex to Microscopic w Reflex to Culture - Clinic Collect  -     Cancel: Urinalysis with microscopic  -     Cancel: Urine culture  -     Urinalysis with microscopic  -     Urine culture    Symptomatic carotid artery stenosis, bilateral  -     Ambulatory Referral to Neurology; Future    Acute ischemic right posterior cerebral artery (PCA) stroke [facility])  -     Ambulatory Referral to Neurology; Future    Essential hypertension    Hypertensive urgency  -     Ambulatory Referral to Neurology; Future    recommend follow up with vascular surgery  He does have blood tinged urine without clots, check UA with microscopy  He will be off plavix in 21 days total per hospital notes  He has been on losartan and amlodipine for past 4 days, will readjust meds post op-due to severe carotid stenosis do not want it lowER  ADVISED TO START TAKING ASPIRIN AND PLAVIX AFTER LUNCH INSTEAD OF EMPTY STOMACH WITH BREAKFAST  ALSO ADVISED TO REPORT or go to the emergency room IF HEMATURIA WORSENS FURTHER  Continue atorvastatin 80 mg at bedtime  Advised to take antihypertensives in the morning  Wife present in the room and given dependent history  He would also benefit from physical therapy and occupational  Therapy and has appointments for such scheduled  He does not have an appointment with Neurology at this time      - Counseling Documentation: patient was counseled regarding: diagnostic results, instructions for management, risk factor reductions, prognosis, patient and family education, impressions, risks and benefits of treatment options, importance of compliance with treatment and folllow up with vascular surgery and also neurology and urology  - Counseling Time: counseling time more than 50% of visit: 45 minutes  - Barriers to treatment: lack of compliance, poor understanding of prognosis,   - Medication Side Effects: Adverse side effects of medications were reviewed with the patient/guardian today  - Self Referrals: Yes     Transitional Care Management Review     Theron Sandoval is a 49 y o  male here for TCM follow-up    During the TCM phone call patient stated:    TCM Call     Date and time call was made  9/20/2022 11:44 AM    Hospital care reviewed  Records reviewed    Patient was hospitialized at  [address] [place] [address]    Date of Admission  09/13/22    Date of discharge  09/16/22    Diagnosis  stroke    Disposition  Home    Were the patients medications reviewed and updated  Yes    Current Symptoms  None      TCM Call     Post hospital issues  None    Scheduled for follow up? Yes    Did you obtain your prescribed medications  Yes    Do you need help managing your prescriptions or medications  No    Is transportation to your appointment needed  No    I have advised the patient to call PCP with any new or worsening symptoms  Nicko Perez MA          History of Present Illness          TCM Call     Date and time call was made  9/20/2022 11:44 AM    Hospital care reviewed  Records reviewed    Patient was hospitialized at  [address] [place] [address]    Date of Admission  09/13/22    Date of discharge  09/16/22    Diagnosis  stroke    Disposition  Home    Were the patients medications reviewed and updated  Yes    Current Symptoms  None      TCM Call     Post hospital issues  None    Scheduled for follow up?   Yes    Did you obtain your prescribed medications  Yes    Do you need help managing your prescriptions or medications  No    Is transportation to your appointment needed  No    I have advised the patient to call PCP with any new or worsening symptoms    Soniya Damico MA      Persistent weakness- LLE, dysathria, brain fog, speech difficulty, feels imbalance and impaired depth perception since his CVA      The following portions of the patient's history were reviewed and updated as appropriate: allergies, current medications, past family history, past medical history, past social history, past surgical history and problem list     Review of Systems       Review of Systems   Constitutional: Negative for chills and fever  HENT: Negative for congestion, rhinorrhea and sore throat  Eyes: Negative for discharge, redness and itching  Respiratory: Negative for chest tightness, shortness of breath and wheezing  Cardiovascular: Negative for chest pain and palpitations  Gastrointestinal: Negative for abdominal pain, constipation and diarrhea  Genitourinary: Positive for hematuria  Negative for dysuria  Skin: Negative for pallor and rash  Neurological: Positive for dizziness, speech difficulty and weakness  Negative for numbness and headaches  Mental fogginess+       Active Problem List     Patient Active Problem List   Diagnosis    History of diverticulitis    Renal calculi    Hypertensive urgency    Elevated serum creatinine    Chronic back pain    Stroke-like symptoms    Carotid stenosis    Alcohol intake above recommended sensible limits       Objective     /100 (BP Location: Left arm, Patient Position: Sitting, Cuff Size: Adult)   Pulse 80   Temp (!) 97 2 °F (36 2 °C) (Temporal)   Resp 18   Ht 5' 7" (1 702 m)   Wt 74 4 kg (164 lb)   SpO2 98%   BMI 25 69 kg/m²     Physical Exam  Vitals and nursing note reviewed  Constitutional:       Appearance: Normal appearance  HENT:      Right Ear: External ear normal       Left Ear: External ear normal    Eyes:      General:         Right eye: No discharge  Left eye: No discharge        Conjunctiva/sclera: Conjunctivae normal    Cardiovascular:      Rate and Rhythm: Normal rate and regular rhythm  Heart sounds: No murmur heard  Pulmonary:      Effort: Pulmonary effort is normal       Breath sounds: Normal breath sounds  No wheezing  Abdominal:      General: There is no distension  Palpations: Abdomen is soft  Tenderness: There is no abdominal tenderness  Musculoskeletal:      Right lower leg: No edema  Left lower leg: No edema  Skin:     Findings: No lesion or rash  Neurological:      Mental Status: He is alert  Cranial Nerves: Dysarthria and facial asymmetry (RIGHT LOWER FACIAL DROOP) present  Sensory: Sensory deficit (lle) present  Motor: Weakness (4/5 lle) present  Coordination: Coordination abnormal       Gait: Gait abnormal       Deep Tendon Reflexes: Reflexes abnormal    Psychiatric:         Mood and Affect: Mood normal          Thought Content: Thought content normal          Laboratory Results: I have personally reviewed the pertinent laboratory results/reports     Radiology/Other Diagnostic Testing Results: I have personally reviewed pertinent reports  MRI brain wo contrast    Result Date: 9/14/2022  MRI BRAIN WITHOUT CONTRAST INDICATION: Stroke Alert, Left sided weakness, numbness  CT head:  Possible acute lacunar infarct in the right thalamus  COMPARISON:   CT AND CTA HEAD 9/13/2022  MRI BRAIN 5/11/2012 TECHNIQUE:  Sagittal T1, axial T2, axial FLAIR, axial T1, axial [place] and axial diffusion imaging  IMAGE QUALITY:  Diagnostic  FINDINGS: BRAIN PARENCHYMA:  Focal 8 x 16 mm focus of diffusion restriction within the lateral right thalamus on DWI  Additional linear areas of mild diffusion restriction within the medial right temporal occipital regions compatible with acute to subacute right PCA territory infarcts  s There is no discrete mass, mass effect or midline shift  There is no intracranial hemorrhage     Small scattered hyperintensities on T2/FLAIR imaging are noted in the periventricular and subcortical white matter demonstrating an appearance that is statistically most likely to represent mild microangiopathic change  VENTRICLES:  Normal for the patient's age  SELLA AND PITUITARY GLAND:  Normal  ORBITS:  Normal  PARANASAL SINUSES:  Mucosal thickening of the sinuses with no air fluid levels  VASCULATURE:  Evaluation of the major intracranial vasculature demonstrates appropriate flow voids  CALVARIUM AND SKULL BASE:  Normal  EXTRACRANIAL SOFT TISSUES:  Normal      1  Focal right thalamic acute to subacute infarct  Additional linear areas of acute to subacute ischemia involving the medial right temporal-occipital region compatible with PCA territory infarcts  Right PCA occlusion noted on CTA  2  Mild chronic microtraumatic ischemic changes  Workstation performed: [id number]     CT stroke alert brain    Result Date: 9/13/2022  CT BRAIN - STROKE ALERT PROTOCOL INDICATION:   Stroke Alert  COMPARISON:  4/30/2012 TECHNIQUE:  CT examination of the brain was performed  In addition to axial images, coronal reformatted images were created and submitted for interpretation  Radiation dose length product (DLP) for this visit:  984 mGy-cm   This examination, like all CT scans performed in the [facility], was performed utilizing techniques to minimize radiation dose exposure, including the use of iterative reconstruction and automated exposure control  IMAGE QUALITY:  Diagnostic  FINDINGS:  PARENCHYMA: Subtle, ill-defined hypodensity along the ventral lateral aspect of the right thalamus  No intracranial hemorrhage or mass effect  VENTRICLES AND EXTRA-AXIAL SPACES:  No hydrocephalus or extra-axial collection  VISUALIZED ORBITS AND PARANASAL SINUSES:  Intact globes and orbits  Mild mucosal thickening in the ethmoid air cells  CALVARIUM AND EXTRACRANIAL SOFT TISSUES:  No lytic or blastic lesion or fracture       1   Possible acute lacunar infarct in the right thalamus  MRI of the brain may be helpful  2   No acute intracranial hemorrhage or mass effect  Findings were directly discussed with Destiny Palacio at 7:25 PM  Workstation performed: [id number]     VAS carotid complete study    Result Date: 9/15/2022   [facility] REPORT CLINICAL: Indications: Patient presents for further characterization of carotid artery disease on recent CT  Patient presented to ER  yesterday for stroke-like symptoms  Operative History: No cardiovascular surgeries noted Risk Factors The patient has history of HTN and smoking (current) 2 ppd  Clinical Right Pressure:  161/117 mm Hg, Left Pressure:  177/116 mm Hg  FINDINGS:  Right        Impression  PSV  EDV (cm/s)  Direction of Flow  Ratio  Dist  ICA                 94          39                      1 14  Mid  ICA                 286          73                      3 48  Prox  ICA    70 - 99%    431         183                      5 25  Dist CCA                  66          22                            Mid CCA                   82          21                      0 74  Prox CCA                 111          23                            Ext Carotid              167          12                      2 03  Prox Vert                 82          27  Antegrade                 Subclavian               121           0                             Left         Impression  PSV  EDV (cm/s)  Direction of Flow  Ratio  Dist  ICA                 76          21                      1 29  Mid  ICA                 179          42                      3 04  Prox   ICA    70 - 99%    398         177                      6 77  Dist CCA                  39          10                            Mid CCA                   59          15                      0 77  Prox CCA                  76          11                            Ext Carotid  Moderate    288          34                      4 89  Prox Vert                 72          21  Antegrade Subclavian               137          21                               CONCLUSION:  Impression RIGHT: There is 70-99% stenosis in the internal carotid artery  Plaque is heterogenous and irregular  Vertebral artery flow is antegrade  There is no significant subclavian artery disease  LEFT: There is 70-99% stenosis in the internal carotid artery  Plaque is heterogenous and irregular  There is moderate disease noted in the external carotid artery  Vertebral artery flow is antegrade  There is no significant subclavian artery disease  There is no previous study for comparison  Recommend repeat testing in 6month as per protocol unless otherwise indicated  Internal carotid artery stenosis determination by consensus criteria from: Travis Alejo , et al  Carotid Artery Stenosis: Gray-Scale and Doppler US Diagnosis - Society of Radiologists in [address] [place] [address], Radiology 2003; [phone]  SIGNATURE: Electronically Signed by: Jo Serrano on 2022-09-15 07:56:44 AM    CTA stroke alert (head/neck)    Result Date: 9/13/2022  CTA NECK AND BRAIN WITH CONTRAST INDICATION: Stroke Alert COMPARISON:   None  TECHNIQUE:   Post contrast imaging was performed after administration of iodinated contrast through the neck and brain  Post contrast axial 0 625 mm images timed to opacify the arterial system  3D rendering was performed on an independent workstation  MIP reconstructions performed  Coronal reconstructions were performed of the noncontrast portion of the brain  Radiation dose length product (DLP) for this visit:  1056 mGy-cm   This examination, like all CT scans performed in the [facility], was performed utilizing techniques to minimize radiation dose exposure, including the use of iterative reconstruction and automated exposure control     IV Contrast:  100 mL of iohexol (OMNIPAQUE)  IMAGE QUALITY:   Diagnostic FINDINGS: CERVICAL VASCULATURE AORTIC ARCH AND GREAT VESSELS: No calcified plaque in the aortic arch  No stenosis in the subclavian arteries  RIGHT VERTEBRAL ARTERY CERVICAL SEGMENT:  Normal origin  The vessel is normal in caliber throughout the neck  LEFT VERTEBRAL ARTERY CERVICAL SEGMENT: Origin directly from the aortic arch  Nondominant or hypoplastic vessel without stenosis or occlusion  RIGHT EXTRACRANIAL CAROTID SEGMENT:  Normal caliber common carotid artery  No stenosis at the bifurcation  Critical (greater than 90%) stenosis of the internal carotid artery just distal to the origin  Suggestion of soft tissue density surrounding the  proximal ICA could represent inflammation  Normal caliber throughout the remainder of the cervical segment  LEFT EXTRACRANIAL CAROTID SEGMENT:  Normal caliber common carotid artery  Mild calcified plaque the bifurcation  Critical (greater than 90%) stenosis in the internal carotid artery just distal to the origin  Normal caliber throughout the remainder of the cervical segment  Distal cervical segment is tortuous  NASCET criteria was used to determine the degree of internal carotid artery diameter stenosis  INTRACRANIAL VASCULATURE INTERNAL CAROTID ARTERIES:  No stenosis in the carotid siphons  Patent ophthalmic arteries  ANTERIOR CIRCULATION:  Left A1 segment is severely hypoplastic  Anterior communicating artery identified  Mild narrowing of the right A2 segment  MIDDLE CEREBRAL ARTERY CIRCULATION:  Somewhat diminutive proximal middle cerebral arteries without evidence of focal stenosis or occlusion  DISTAL VERTEBRAL ARTERIES:  Normal distal vertebral arteries  Posterior inferior cerebellar arteries are patent  BASILAR ARTERY:  Basilar artery is normal in caliber  Superior cerebellar arteries are not well-visualized  POSTERIOR CEREBRAL ARTERIES: Left posterior communicating artery  Tapered narrowing and occlusion of the right P2 segment  Reperfusion of distal P3 branches  VENOUS STRUCTURES:  Patent dural venous sinuses  NON VASCULAR ANATOMY BONY STRUCTURES:  No C6-C7 anterior and interbody fusion  At least moderate bilateral neural foraminal narrowing C5-6 and C6-7  SOFT TISSUES OF THE NECK:  No soft tissue swelling  THORACIC INLET:  Mild paraseptal emphysema in the lung apices  1   Occlusion of the right PCA P2 segment, with distal branch reperfusion  2   Critical (greater than 90%) stenoses in the proximal cervical segments of the bilateral internal carotid arteries  Distal cervical and intracranial segments are patent and normal in caliber  Findings were directly discussed with Dr Maureen Worley  at [facility] PM  Workstation performed: [id number]     Echo complete w/ contrast if indicated    Result Date: 9/14/2022    Left Ventricle: Left ventricular cavity size is normal  Wall thickness is normal  The left ventricular ejection fraction is 60%  Systolic function is normal  Wall motion is normal  Diastolic function is mildly abnormal, consistent with grade I (abnormal) relaxation    Aortic Valve: There is mild to moderate regurgitation    Pericardium: There is a trivial pericardial effusion anterior to the heart  There is no echocardiographic evidence of tamponade          Current Medications     Current Outpatient Medications:     amLODIPine (NORVASC) 10 mg tablet, Take 1 tablet (10 mg total) by mouth daily, Disp: 30 tablet, Rfl: 0    aspirin 81 mg chewable tablet, Chew 1 tablet (81 mg total) daily, Disp: 30 tablet, Rfl: 0    atorvastatin (LIPITOR) 80 mg tablet, Take 1 tablet (80 mg total) by mouth every evening, Disp: 30 tablet, Rfl: 0    clopidogrel (PLAVIX) 75 mg tablet, Take 1 tablet (75 mg total) by mouth daily for 21 days, Disp: 21 tablet, Rfl: 0    losartan (COZAAR) 50 mg tablet, Take 1 tablet (50 mg total) by mouth daily, Disp: 30 tablet, Rfl: 0    Lisseth Mayfield MD  [facility]

## 2022-09-21 DIAGNOSIS — R31.0 GROSS HEMATURIA: Primary | ICD-10-CM

## 2022-09-21 LAB — BACTERIA UR CULT: NORMAL

## 2022-09-22 ENCOUNTER — TELEPHONE (OUTPATIENT)
Dept: UROLOGY | Facility: AMBULATORY SURGERY CENTER | Age: 52
End: 2022-09-22

## 2022-09-22 NOTE — TELEPHONE ENCOUNTER
Npt Referral-Gross Hematuria- spoke with pt who still has blood in urine,please review records/results scheduling protocol

## 2022-09-22 NOTE — TELEPHONE ENCOUNTER
Chart reviewed, recent urine testing done, no infection but + for innumerable RBCs       Please schedule within 4 weeks with AP or MD

## 2022-09-22 NOTE — TELEPHONE ENCOUNTER
Complaint/Diagnosis:Gross Hematuria    Insurance:[facility]    History of cancer:no    Previous urologist:no    Outside Testing/where:[facility]    If yes,what kind:[facility]    Records requested/where:[facility]    Preferred location: [facility]

## 2022-09-26 ENCOUNTER — TELEPHONE (OUTPATIENT)
Dept: NEUROLOGY | Facility: CLINIC | Age: 52
End: 2022-09-26

## 2022-09-26 NOTE — TELEPHONE ENCOUNTER
Called patient  Provided date/time/location of appointment  He accepted the appointment and was appreciative

## 2022-09-26 NOTE — TELEPHONE ENCOUNTER
Discussed with Slime Hawkins  Will schedule patient for 11/23/22 at 9:30 am at the [facility] office with Dr Umang Woodard  Sent message to [facility] for overbooking  Will await once officially booked before calling the patient

## 2022-09-26 NOTE — TELEPHONE ENCOUNTER
Post CVA Discharge Follow Up  Hospitalization: 9/13/22-9/16/22    Called patient  Since discharge, he denies experiencing any new or worsening stroke-like symptoms  Patient reports he continues to have the following symptoms: left sided weakness and numbness, burry vision in left eye, and intermittent disorientation  He claims symptoms are about the same since discharge with no changes/improvements/worsening  He is ambulating independently as well as preforming his own ADLs  His wife helps manage the patient's medications  Reviewed appointments - patient successfully followed up with PCP  Vascular surgery tomorrow on 9/26/22  OT/PT on 9/28/22  I reviewed medications with him  Patient states how his PCP spoke with the vascular surgeon regarding blood in his urine  They decided to stop the plavix on 9/20/22  Patient reports how the PCP instructed for him to take iron  He is taking the ASA 81 mg daily  He reports much improvement with hematuria  He reports issues with intimacy and how he has been unable to have intercourse since the stroke due to suspected erectile dysfunction  He will discuss further at the urology appointment on 10/3/22  During this call, we reviewed stroke type, symptoms, personal risk factors and management, medications, and resources  Patient verbalizes understanding  Offered to send stroke education binder in the mail, he is agreeable to this  I mailed the information as requested  As for risk factors, patient reports monitoring his BP at home  He is unable to report the last BP  He states how it has been elevated  He will further discuss with vascular surgery tomorrow  He is a non smoker  He states how he quit smoking cigarettes about one month ago  He admits to vaping  Encouraged for patient to follow a low salt / low cholesterol / diabetic friendly diet  Will discuss with Dr Arnaldo Jordan MA to gather stroke hospital follow up appointment  Patient prefers [facility] addressed all his questions  At the conclusion of the conversation, patient denies having any further questions or concerns

## 2022-09-27 ENCOUNTER — OFFICE VISIT (OUTPATIENT)
Dept: VASCULAR SURGERY | Facility: CLINIC | Age: 52
End: 2022-09-27
Payer: COMMERCIAL

## 2022-09-27 VITALS
HEART RATE: 78 BPM | SYSTOLIC BLOOD PRESSURE: 144 MMHG | HEIGHT: 67 IN | BODY MASS INDEX: 25.74 KG/M2 | DIASTOLIC BLOOD PRESSURE: 98 MMHG | TEMPERATURE: 97.3 F | WEIGHT: 164 LBS

## 2022-09-27 DIAGNOSIS — I65.23 CAROTID STENOSIS, BILATERAL: Primary | ICD-10-CM

## 2022-09-27 DIAGNOSIS — I63.9 WEAKNESS DUE TO ACUTE CEREBROVASCULAR ACCIDENT (CVA) (HCC): ICD-10-CM

## 2022-09-27 DIAGNOSIS — R53.1 WEAKNESS DUE TO ACUTE CEREBROVASCULAR ACCIDENT (CVA) (HCC): ICD-10-CM

## 2022-09-27 DIAGNOSIS — I65.23 BILATERAL CAROTID ARTERY STENOSIS: ICD-10-CM

## 2022-09-27 PROCEDURE — 99245 OFF/OP CONSLTJ NEW/EST HI 55: CPT | Performed by: SURGERY

## 2022-09-27 RX ORDER — CLOPIDOGREL BISULFATE 75 MG/1
75 TABLET ORAL DAILY
Qty: 30 TABLET | Refills: 3 | Status: SHIPPED | OUTPATIENT
Start: 2022-09-27

## 2022-09-27 RX ORDER — FERROUS SULFATE 325(65) MG
325 TABLET ORAL
COMMUNITY

## 2022-09-27 NOTE — PATIENT INSTRUCTIONS
Follow up with Dr Roby Evans 3 weeks  Will need carotid intervention after 6 weeks since time of stroke  Start plavix today  Continue lipitor and aspirin

## 2022-09-27 NOTE — PROGRESS NOTES
Assessment/Plan:    Carotid stenosis  Admitted to [facility] 2 weeks ago with HTN urgency, right occipital and thalamic acute CVA  Bilateral 90% ICA stenosis on CTA    CVA unlikely seconday to ICA stenosis given territory  Will allow total 6 weeks to pass since CVA prior to carotid intervention  RTC 3 weeks with Dr Griselda Cortez asa/statin/plavix  Will need right TCAR first, given high lesion  Will treat left ICA second, will also need TCAR       Diagnoses and all orders for this visit:    Carotid stenosis, bilateral  -     clopidogrel (Plavix) 75 mg tablet; Take 1 tablet (75 mg total) by mouth daily    Weakness due to acute cerebrovascular accident (CVA) ([facility] [address] )  -     Ambulatory Referral to Vascular Surgery    Bilateral carotid artery stenosis    Other orders  -     ferrous sulfate 325 (65 Fe) mg tablet; Take 325 mg by mouth daily with breakfast          Subjective:      Patient ID: Giovana Salinas is a 49 y o  male  Patient admitted to [facility] 9/13-9/16 for stroke-like symptoms  Patient presented to ED w/ episode of L-sided weakness and confusion that has lasted throughout the day  Patient states that he continues to experience some residual L-sided weakness/numbness, confusion and visual disturbance in L eye  Admitted to [facility] 2 weeks for HTN urgency and CVA  Has thalamic and right occipital acute CVA  Still has left leg weakness, left eye visual disturbance  On aspirin, was unable to tolerate plavix due to hematuria  Quit smoking recently, denies claudication  Yet to see Neurology and PT since discharge  HPI    The following portions of the patient's history were reviewed and updated as appropriate: allergies, current medications, past family history, past medical history, past social history, past surgical history and problem list     Review of Systems   Constitutional: Positive for activity change  HENT: Negative  Eyes: Negative  Respiratory: Negative  Cardiovascular: Negative  Gastrointestinal: Negative  Endocrine: Negative  Genitourinary: Negative  Musculoskeletal: Positive for back pain  Skin: Negative  Allergic/Immunologic: Negative  Neurological: Positive for weakness and numbness  Hematological: Negative  Psychiatric/Behavioral: Positive for confusion  I have reviewed the ROS above and made changes as needed  Objective:      /98 (BP Location: Right arm, Patient Position: Sitting)   Pulse 78   Temp (!) 97 3 °F (36 3 °C) (Tympanic)   Ht 5' 7" (1 702 m)   Wt 74 4 kg (164 lb)   BMI 25 69 kg/m²          Physical Exam    General  Exam: alert, awake, oriented, no distress, consistent with stated age    Integumentary  Exam: warm, dry, no gross lesions, no bruises and normal color    Head and Neck  Exam: supple, no bruits, trachea midline, no JVD, no mass or palpable nodes  Left proximal neck incision     Eye  Exam: extraoccular movements intact, no scleral icterus, sclera clear, pupils equal round and reactive to light    ENMT  Exam: oral mucosa pink and moist    Chest and Lung  Exam: chest normal without deformity, bilaterally expansive, clear to auscultation    Cardiovascular  Exam: regular rate, regular rhythm, no murmurs, no rubs or gallops    Adbomen  Exam: soft, non-tender, non-distended, no pulsatile abdominal masses, no abdominal bruit    Peripheral Vascular  Exam: no clubbing of the digits of the upper extremity, no cyanosis, no edema, both feet are warm, radial pulses 2+ bilaterally, skin well perfused, without and no varicosities      No widened popliteal pulse noted bilaterally    Upper Extremity:  Palpation: Radial pulse- Bilateral 2+    Lower Extremity:  Palpation: Femoral pulse- Bilateral 2+         Popliteal pulse - Bilateral 2+        Dorsalis Pedis - Bilateral 2+        Posterior tibial - Bilateral 2+    Neurologic  Exam:alert, oriented x 3, cranial nerves II-XII grossly intact  Normal with exception LLE 4/5 motor

## 2022-09-27 NOTE — ASSESSMENT & PLAN NOTE
Admitted to [facility] 2 weeks ago with HTN urgency, right occipital and thalamic acute CVA    Bilateral 90% ICA stenosis on CTA    CVA unlikely seconday to ICA stenosis given territory  Will allow total 6 weeks to pass since CVA prior to carotid intervention  RTC 3 weeks with Dr Galloway  asa/statin/plavix  Will need right TCAR first, given high lesion  Will treat left ICA second, will also need TCAR

## 2022-09-28 ENCOUNTER — EVALUATION (OUTPATIENT)
Dept: PHYSICAL THERAPY | Facility: CLINIC | Age: 52
End: 2022-09-28
Payer: COMMERCIAL

## 2022-09-28 ENCOUNTER — EVALUATION (OUTPATIENT)
Dept: OCCUPATIONAL THERAPY | Facility: CLINIC | Age: 52
End: 2022-09-28
Payer: COMMERCIAL

## 2022-09-28 DIAGNOSIS — R41.840 COGNITIVE ATTENTION DEFICIT: Primary | ICD-10-CM

## 2022-09-28 DIAGNOSIS — R09.89 SYMPTOMS OF CEREBROVASCULAR ACCIDENT (CVA): ICD-10-CM

## 2022-09-28 DIAGNOSIS — R29.90 STROKE-LIKE SYMPTOMS: ICD-10-CM

## 2022-09-28 DIAGNOSIS — R26.9 GAIT ABNORMALITY: Primary | ICD-10-CM

## 2022-09-28 DIAGNOSIS — I63.9 CEREBROVASCULAR ACCIDENT (CVA), UNSPECIFIED MECHANISM (HCC): ICD-10-CM

## 2022-09-28 PROBLEM — R31.0 GROSS HEMATURIA: Status: ACTIVE | Noted: 2022-09-28

## 2022-09-28 PROBLEM — Z71.2 ENCOUNTER TO DISCUSS TEST RESULTS: Status: ACTIVE | Noted: 2022-09-28

## 2022-09-28 PROCEDURE — 97166 OT EVAL MOD COMPLEX 45 MIN: CPT

## 2022-09-28 PROCEDURE — 97162 PT EVAL MOD COMPLEX 30 MIN: CPT

## 2022-09-28 PROCEDURE — 97530 THERAPEUTIC ACTIVITIES: CPT

## 2022-09-28 PROCEDURE — 97112 NEUROMUSCULAR REEDUCATION: CPT

## 2022-09-28 NOTE — PROGRESS NOTES
OCCUPATIONAL THERAPY INITIAL EVALUATION:    9/28/2022  Cecilia Sauer  1970  [id number]  Layman Lanny DO ABDELRAHMAN   Diagnosis ICD-10-CM Associated Orders   1  Cognitive attention deficit  R41 840    2  Symptoms of cerebrovascular accident (CVA)  R09 89 Ambulatory referral to Occupational Therapy   3  Stroke-like symptoms  R29 90 Ambulatory referral to Occupational Therapy       Patient was treated by JOSIANE Abbott under the supervision of [address] [place] [address], OTR/L  Assessment/Plan    Skilled Analysis:  Cecilia Sauer is a 49 y o  male referred to Occupational Therapy s/p Cognitive attention deficit [R41 840]  Pt participated in skilled OT evaluation and following formalized testing as well as clinical observation, Pt presents with the following areas of deficit: LUE numbness and discomfort in posterior/scapula and down to rib area, decreased speed and rate of manipulation with FM demands, ataxia with forward reaching, decreased proprioception with vision occluded, decreased concentration for prolonged time periods, memory recall, direction following, higher level executive functioning, and decreased visual teaming skills  Pt did demo good LUE strength with MMT today  Sensation testing will be completed next session  OTS provided pt with visual HEP including pencil push-ups, saccades and pursuits for completion at home  Pt will benefit from skilled Occupational Therapy services 2-3x/week for 8 weeks with focus on HEP, functional cognition, functional vision, endurance and FM coordination demands in order to return to  role, worker role as a contractor, and increased self-confidence for IADL's  Cecilia Sauer is in agreement with POC  Short Term Goals = Long Term Goals (8) WKS  Strength/Endurance  · Pt will demo with G tolerance to in stance exercise x 50+ minutes with minimal rest breaks required for increased engagement in work contractor role     · Pt will demo with G carryover of HEP to improve functional progression towards goals in Plan of care and for improved functional use of LUE     FMC/GMC/Functional Performance  · Pt will increase LUE rate of manipulation by 15% for all FM tests for improved functional performance with salient tasks   · Pt will demo with decreased LUE ataxia with functional reach for normalized movement pattern for hammering nails and for improved hand to target accuracy x 90% when reaching for items in tool belt  Modalities  · Pt will tolerate IASTM/TENS for improved motor and sensory performance for overall improved pain and sensation to inc safety and engagement with work fxn tasks  Cognition  - Direction Following  Pt will increase verbal and written 2-3 step direction following with G processing time and 90% accuracy for improved work demands/social performance and IADLs         -  Memory  o Pt will demo G recall of 90% of verbal / written information utilizing memory strategy of choice for improved STM/delayed memory   o Pt will demo G carryover of use of internal/external memory strategy aides for improved recall of daily events, improved executive functioning with G accuracy   - Attention  - Pt will report improved daily focus/attention for longer time periods > 6 hrs without report of neuro fatigue in order to improve performance for work role    VISION  · Pt will increase oculomotor control for improved saccades, pursuits, con/divergent tasks for improved reading of blue prints, near point focus x 90% accuracy with no increase in symptoms  · Pt will demo with improved visual perceptual skills (depth perception / visual processing)  x 90% for improved accuracy of visual discrimination and spatial relationship tasks         Subjective    SUBJECTIVE: "There are times I have to look at my wife so I don't feel uneasy   If we are in the grocery store I have to look at my wife to make sure I am okay "  "My depth perception is off, it's not where it needs to be " PATIENT GOAL: "I want to be able to get back to hunting, fishing, and working"      Patient-Specific Functional Scale   Task is scored 0 (unable to perform activity) to 10 (ability to perform activity independently)    Activity Date: 9/28/2022 Date:   1  Concentration 4/10    2  Disorientation 2 5/10    3  LUE weakness/numbness 5/10    4  Vision 5/10        HISTORY OF PRESENT ILLNESS:   Pt is a 49 y o  male who was referred to Occupational Therapy s/p  Cognitive attention deficit [R41 840]  Pt presented at hospital on 9/13/2022 and was in the hospital for about 5 days, reporting initial symptoms of disorientation and confusion while driving  Pt reported having to use a GPS to get home and does not remember actually driving home  Pt's wife reported pt dragging his left foot and left hand locking up when leaving for the ED  CT work up revealed acute lacunar infarct in the right thalamus and no acute intracranial hemorrhage or mass effect  MRI revealed focal right thalamic acute to subacute infarct and additional linear areas of acute to subacute ischemia involving the medial right temporal-occipital region compatible with PCA territory infarcts  CTA revealed right PCA occlusion and ischemic changes  Pt has a PMH bilateral ulnar nerve release, cervical stenosis, cervial spine surgery (1998), and reports no disc at L4/L5  Elijah Ou resides with wife and son in a 2 story home, with full set of stairs inside and 3 stairs to enter from outside the home  Pt is independent with most ADL/IADLs; not cooking at this time  Pt reports no dizziness and is having no difficulty with buttoning, zipping, or opening containers  Pt reports blurriness and eye closing by itself over time throughout the day  Additionally pt reports his depth perception is not where he wants it to be and personally decided to stop driving for now  He was not told in the hospital to stop driving    Pt is a contractor and likes hunting, 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  Pt reports the following difficulties post stroke: concentration, disorientation (after 5-6 hrs during day), numbness/weakness in LUE with prolonged activity, L hand "locking," and picking up small items such as his pills            PMH:   Past Medical History:   Diagnosis Date    Stroke ([facility] [address] )          Pain Levels   Restin numbness/discomfort LUE: next to spinal column/scapula    With Activity:  0 Increased discomfort/numbness LUE: Rib cage and into shoulder      Objective     Impairment Observations:    RUE LUE Comments           UPPER EXTREMITY FUNCTION   Intact Impaired Dominant Hand: right     /PINCH STRENGTH             Dynamometer    - Gross Grasp 90 lbs 80 lbs subnormal   Pinch Meter     - Pincer 14 lbs 10 lbs subnormal    - Tripod 23 lbs 15 lbs subnormal    - Lateral 18 lbs  13 lbs subnormal     AROM (Seated)         ALL full    Elevation      Shoulder FF        Shoulder Ext        Shoulder Abd        Shoulder Add        Horizontal Abd        Horizontal Add        Elbow Flex        Elbow Ext        Pronation        Supination        Wrist Flex        Wrist Ext        Digit Flex        Digit Ex        Composite Grasp        Hook Grasp        Subluxation  None None      MMT             Shoulder FF 5/5 5/5    Shoulder Ext 5/5 5/5    Shoulder Abduction 5/5 5/5    Shoulder Adduction 5/5 5/5    Elbow Flex 5/5 5/5    Elbow Ext 5/5 5/5    Wrist Flex 5/5 5/5    Wrist Ext 5/5 5/5    Gross Grasp 5/5 5/5      SENSATION      Monofilament Testing  Normal: 2 83 mm    Diminished light touch: 3 61 mm    Diminished protective sensation: 4 31 mm    Loss of protective sensation: 4 56    Complete loss of sensation / deep pressure: 6 65 Will further assess Will further assess    Sharp / Maridee Karla  Will further assess Will further assess    Proprioception  Impaired    Hot/Cold Temp Will further assess Will further assess    Stereognosis   Intact Clearly identified: eraser, paperclip, and marble COORDINATION      Opposition Intact Impaired opposition to lateral D5, double tapping    Finger to Nose Intact Impaired Ataxia, worsens with vision occluded   Rapid Alternating Movement Unable to Assess today Unable to Assess today    9 Hole Peg Test 22 seconds 25 85  seconds subnormal Grasped more than one peg at a time, dropped 2 pegs on board, difficulty grasping single peg   Fxnl Dexterity Test 22 36 seconds 31 95 seconds   subnormal, decreased memory recall of board formation, decreased speed   Trung Hand Function Test         - Handwriting         - Card Turning         - Small Item p/u         - Simulated Feeding         - Stacking Checkers         - Moving Light Objects         - Moving Heavy Objects        TONE: MODIFIED JEMAL SCALE   Unable to assess today   No increase in muscle tone (0)      Slight Increase in muscle tone with catch and release or min resist at end range (1)      Slight Increase in muscle tone with catch and release, followed by min resistance through remainder of range (1+)      Increased muscle tone through full range, able to be moved easily (2)      Considerable increase in tone, difficult to move (3)      Rigid in Flexion/Extension (4)              Vision       Comments                                        VISION SCREEN         NONE (wife thinks he needs readers)          Symptoms Include headache and blurred vision     Last Eye Exam Unsure            Visual Acuity (near) R eye  20/200     L eye 20/70    Binocularity (near) exotropia and exophoria    Binocularity (far) orthotropia and exophoria    Convergence  Diplopia reported at 0 inches  Decreased eye teaming    Accommodation Blurriness/loss of focus Reported at 6-7 inches    Bomberbot             Alignment  misalignment and suppression of near Decreased B/L eye teaming skills (L eye difficulty converging and holding)   Mobility             Pursuits slightly jerky in horizontal and vertical planes            Saccades slightly jerky and inaccurate in horizontal, vertical and diagonal planes (under shooting)  Visual fatigue seen (resetting eyes)   Reports "head feels numb"           Range of Motion WFL    Visual perceptual midline             Midline             Horizon          SCORE DEFICIT RANGE Comments                                         COGNITIVE FXN                    MOCA (8 1)         Education Level = 12 years     Visuospatial/executive functioning 1/5  Difficulty understanding directions  Clock incorrect time   Naming 2/3  Lion--> tiger   Memory: 1st trial: 5     Memory: 2nd trial:      Attention/concentration 2/     List of letters:       Serial Seven Subtraction: 0/3  Unable to subtract 7 from 100   Language/sentence repetition: 2     Language Fluency:       Abstract/Correlational Thinkin/2     Delayed Recall:      Orientation:      Memory Index Score 15                                        Total Score 30 mild neurocognitive deficits          OTHER PLANNED THERAPY INTERVENTIONS:   In stance neuro re-ed  Task-Oriented Training  TENS for pain (PRN)  FMC (Speed / reaction and pincer grasping)  GMC  Proximal to distal teaming  Timed Trials  Manual tx  IASTM  Hand to target  Sensory re-ed (Cutaneous/Proprioceptive)  Seated functional reach: crossing midline  WBearing strategies   Closed chain activities  Open chain activities  HEP (vision/cog)  Convergence/Visual teaming   Visual pursuits/saccades   Executive Functioning   Delayed recall  Memory strategies  Attention  Working Memory   Higher Level Visual Processing     INTERVENTION COMMENTS:  Diagnosis: Cognitive attention deficit [R41 840]  Precautions: Currently not driving   FOTO: not completed, please complete next session*  Insurance: Payor: [address] / Plan: [address] / Product Type: Medicaid HMO /   1 of ___ visits, PN due 10/28/2022  POC Expires: 2022

## 2022-09-28 NOTE — PROGRESS NOTES
Problem List Items Addressed This Visit        Cardiovascular and Mediastinum    Erectile dysfunction due to arterial insufficiency     Carlos A Miles has been having difficulty getting and maintaining an erection  This is likely related to his age, his recent stroke with likely underlying vascular disease, and previous history of smoking  He did stop smoking relatively recently, he is still using a nicotine patch in a vaporizer device which can cause vaso constriction from the delivery of nicotine  Additionally, the changes in his baseline state of health could be having a psychological effect on his overall sense of well-being and sexual function  Plan:  I recommend treatment of his gross hematuria at the current time, consideration can be given to a 5 alpha reductase inhibitor versus other therapies such as penile injection therapy in the future if needed  It is hoped that with the stoppage of smoking he will have some recovery of his endothelial function with improved sexual function outcomes            Genitourinary    Renal calculi - Primary     He has passed kidney stones previously, 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, construction, given that this can cause sweating and volume depletion with increased rates of stone formation  I do recommend medical metabolic stone workup to look for underlying hypocitraturia or hypercalciuria or other metabolic derangement that may be increasing his rates of stone formation going forward         Relevant Orders    Ambulatory Referral to Nephrology    Gross hematuria     Sarah Katz had gross hematuria when starting Plavix, this has since resolved  His upper tract imaging from your ago did show some nonobstructing stone burden  He is due for a CT renal protocol which has been ordered    We will complete his workup by way of cystoscopy with urine cytology at his next visit         Relevant Medications    ALPRAZolam (XANAX) 1 mg tablet    Other Relevant Orders    Cytology, urine PSA Total, Diagnostic    CT renal protocol    Basic metabolic panel       Other    Encounter to discuss test results     I discussed with him all today's findings including some enlargement the prostate without prostate nodules  He will go for PSA testing and imaging as well         Family history of prostate cancer     Jim's father did have prostate cancer, he was killed prior to undergoing definitive therapy, he was diagnosed in his 46s  The prostate is smooth, there are no nodules, it is roughly 25-30 grams  I have ordered a PSA  I would expect his PSA to be less than 3 or so given his age         Encounter for screening colonoscopy     Unclear as to possible family history of colon cancer  I reviewed with him the recommendations for even patients without a family history to undergo screening colonoscopy  He will consider this and a referral has been placed         Relevant Orders    Ambulatory Referral to Gastroenterology                  Assessment and plan:       Please see problem oriented charting for the assessment plan of today's urological complaints    Bon Elizabeth      Chief Complaint     Chief Complaint   Patient presents with    Follow-up         History of Present Illness     Jamal Hernandez is a 49 y o  gentleman with a bout of diverticulitis sometime ago prompting imaging  He also underwent urine testing at that time which showed hematuria  Imaging shows bilateral nonobstructing stone burden  I spoke with him about the complete workup for hematuria including cystoscopy and he wishes to proceed to his workup  Given his extensive yet nonobstructing stone burden bilaterally I do recommend a 24 hour urine study for medical metabolic stone workup to decrease stone formation rates in the future  Personal urologic history is significant for no issues  He did notice hematuria when being on Plavix, this has since resolved, he does not have clots    He does have a smoking history having quit relatively recently when his white quit smoking       The following portions of the patient's history were reviewed and updated as appropriate: allergies, current medications, past family history, past medical history, past social history, past surgical history and problem list     Detailed Urologic History     - please refer to HPI    Review of Systems     Review of Systems   Constitutional: Negative  HENT: Negative  Eyes: Negative  Respiratory: Negative  Cardiovascular: Negative  Gastrointestinal: Negative  Endocrine: Negative  Genitourinary: Positive for hematuria  Musculoskeletal: Negative  Skin: Negative  Allergic/Immunologic: Negative  Neurological: Negative  Hematological: Negative  Psychiatric/Behavioral: Negative  Allergies     Allergies   Allergen Reactions    Penicillins Anaphylaxis       Physical Exam     Physical Exam  Vitals reviewed  Constitutional:       General: He is not in acute distress  Appearance: Normal appearance  He is not ill-appearing, toxic-appearing or diaphoretic  HENT:      Head: Normocephalic and atraumatic  Eyes:      General: No scleral icterus  Right eye: No discharge  Left eye: No discharge  Cardiovascular:      Pulses: Normal pulses  Pulmonary:      Effort: Pulmonary effort is normal    Abdominal:      General: There is no distension  Palpations: There is no mass  Genitourinary:     Comments: Normal Aaron stage, normal meatus, no hernias, testes are normal bilaterally, normal perineum  Prostate is 25-30 grams and smooth without nodules  Musculoskeletal:         General: No swelling  Skin:     General: Skin is warm  Neurological:      General: No focal deficit present  Mental Status: He is alert and oriented to person, place, and time  Cranial Nerves: No cranial nerve deficit     Psychiatric:         Mood and Affect: Mood normal          Behavior: Behavior normal  Thought Content:  Thought content normal          Judgment: Judgment normal              Vital Signs  Vitals:    10/03/22 0918   BP: 140/98   BP Location: Left arm   Patient Position: Sitting   Cuff Size: Large   Pulse: 76   Resp: 16   SpO2: 98%   Weight: 75 7 kg (166 lb 12 8 oz)   Height: 5' 7" (1 702 m)         Current Medications       Current Outpatient Medications:     ALPRAZolam (XANAX) 1 mg tablet, Take 1 tablet (1 mg total) by mouth 1 (one) time for 1 dose Take 1 hour prior to cystoscopy, Disp: 1 tablet, Rfl: 0    amLODIPine (NORVASC) 10 mg tablet, Take 1 tablet (10 mg total) by mouth daily, Disp: 30 tablet, Rfl: 0    aspirin 81 mg chewable tablet, Chew 1 tablet (81 mg total) daily, Disp: 30 tablet, Rfl: 0    atorvastatin (LIPITOR) 80 mg tablet, Take 1 tablet (80 mg total) by mouth every evening, Disp: 30 tablet, Rfl: 0    clopidogrel (Plavix) 75 mg tablet, Take 1 tablet (75 mg total) by mouth daily, Disp: 30 tablet, Rfl: 3    ferrous sulfate 325 (65 Fe) mg tablet, Take 325 mg by mouth daily with breakfast, Disp: , Rfl:     losartan (COZAAR) 50 mg tablet, Take 1 tablet (50 mg total) by mouth daily, Disp: 30 tablet, Rfl: 0      Active Problems     Patient Active Problem List   Diagnosis    History of diverticulitis    Renal calculi    Hypertensive urgency    Elevated serum creatinine    Chronic back pain    Stroke-like symptoms    Carotid stenosis    Alcohol intake above recommended sensible limits    Gross hematuria    Encounter to discuss test results    Family history of prostate cancer    Erectile dysfunction due to arterial insufficiency    Encounter for screening colonoscopy         Past Medical History     Past Medical History:   Diagnosis Date    Stroke [facility])          Surgical History     Past Surgical History:   Procedure Laterality Date    CARPAL TUNNEL RELEASE      CERVICAL FUSION      ULNAR COLLATERAL LIGAMENT RECONSTRUCTION           Family History     Family History Problem Relation Age of Onset    Heart attack Mother     Diabetes Mother     Hypertension Mother     Colon cancer Father     No Known Problems Sister     No Known Problems Sister     No Known Problems Sister     No Known Problems Son          Social History     Social History     Social History     Tobacco Use   Smoking Status Former Smoker    Packs/day: 2 00    Years: 35 00    Pack years: 70 00    Types: Cigarettes    Quit date: 2022    Years since quittin 1   Smokeless Tobacco Never Used         Pertinent Lab Values     Lab Results   Component Value Date    CREATININE 1 03 2022       No results found for: PSA          Pertinent Imaging       The patient's images were reviewed by me personally and also in real time with them in the examination room using our PACS imaging system  The imaging findings are significant for bilateral nonobstructing stone burden, no renal contour abnormalities, no hydronephrosis

## 2022-09-28 NOTE — PROGRESS NOTES
PT Evaluation     Today's date: 2022  Patient name: Jamal Hernandez  : 1970  MRN: [id number]  Referring provider: Nate Marion DO  Dx:   Encounter Diagnosis     ICD-10-CM    1  Gait abnormality  R26 9    2  Symptoms of cerebrovascular accident (CVA)  R09 89 Ambulatory referral to Physical Therapy   3  Cerebrovascular accident (CVA), unspecified mechanism ([facility] [place] [address] )  I63 9 Ambulatory referral to Physical Therapy   4  Stroke-like symptoms  R29 90 Ambulatory referral to Physical Therapy       Start Time: 1200  Stop Time: 1300  Total time in clinic (min): 60 minutes    Assessment  Assessment details: Mr  Jamal Hernandez is a 49year-old male reporting to [facility] OP PT for initial evaluation of mobility and function s/p R thalamic and occipital CVA sustained 22  Pt was referred for consultation by Dr Demetrice Branham DO  PMHx significant for bilateral carotid stenosis (90% BL) and hypertension  Upon examination testing, pt was found to present with objective findings of deficiencies in balance integration with proximal L LE weakness and ataxia  Regina Danielle would benefit from skilled PT care to maximize his mobility and facilitate his return to [facility] while assisting him in meeting his personal goals  Impairments: abnormal coordination, abnormal gait, abnormal muscle tone, abnormal movement, impaired balance, impaired physical strength, lacks appropriate home exercise program, weight-bearing intolerance and poor body mechanics    Symptom irritability: highUnderstanding of Dx/Px/POC: good   Prognosis: good    Goals  ST weeks  1  Pt will demonstrate independence with initial HEP    2  Pt will improve FGA score minimally by 3 points  3  Pt will demonstrate significant improvements in steadiness with MCTSIB testing  LT-12 weeks  1  Pt will demonstrate independence with finalized HEP  3  Pt will score above predicted FOTO score at DC           Plan  Patient would benefit from: skilled physical therapy and skilled occupational therapy  Referral necessary: No  Planned modality interventions: biofeedback  Planned therapy interventions: gait training, home exercise program, patient education, therapeutic exercise, therapeutic activities, neuromuscular re-education and manual therapy  Frequency: 2x week  Duration in weeks: 8  Plan of Care beginning date: 9/28/2022  Plan of Care expiration date: 12/2/2022  Treatment plan discussed with: patient and family        Subjective Evaluation    History of Present Illness  Mechanism of injury: Presents accompanied by his wife Vicky Jimenes, ambulating with no AD  Is now several weeks post-CVA, 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)  Feels his CVA has most affected his vision (blurred L eye), numbness/tingling in his L ankle and foot, numbness/tingling at his temple, and c/o feeling like his brain stops communicating with the left side of his body  Very motivated in getting back to a point of using all of his skillsets  Per chart review with vascular:   9/13/22  Admitted to [facility] 2 weeks ago with HTN urgency, right occipital and thalamic acute CVA    Bilateral 90% ICA stenosis on CTA     CVA unlikely seconday to ICA stenosis given territory  Will allow total 6 weeks to pass since CVA prior to carotid intervention  RTC 3 weeks with Dr Bruce Neither asa/statin/plavix  Will need right TCAR first, given high lesion  Will treat left ICA second, will also need TCAR          Recurrent probem    Quality of life: fair    Pain  No pain reported    Social Support  Steps to enter house: yes  Stairs in house: yes   Lives in: multiple-level home  Lives with: spouse    Employment status: not working  Hand dominance: right    Treatments  Previous treatment: medication  Current treatment: medication, physical therapy and occupational therapy  Discharged from (in last 30 days): inpatient hospitalization  Patient Goals  Patient goals for therapy: increased motion, improved balance, increased strength, independence with ADLs/IADLs, return to sport/leisure activities and return to work  Patient goal: "To get back to hunting "        Objective     VITALS: Seated, manual, R UE  BP: 148/86 mmHg  HR: 79 BPM  O2: 96 %  RR: 16 RPM    Balance Test     6 Minute Walk Test (ft): IE: NA   MCTSIB (s): IE: Firm EO: 30s  Firm EC: 30s  Foam EO: 30s  Foam EC: 30s, increased shakiness   FGA: IE: 25/30   Gait Speed (m/s): IE: 1 2 m/s   5x Sit To Stand (s): IE: 13 44s no UE   TUG (s): IE: 5 72s no AD     Gait Analysis: WFL, slightly higher L LE step amplitude  Stair Navigation: No UE, reciprocal ascending/descending       Coordination Left Right   Heel To Shin Some ataxia, slowed Smooth/fast   Finger To Nose  Some ataxia, slowed, inaccurate 25% of the time  Smooth/fast/accurate   Rapid Alternating Movement       UE Uncoordinated, slower    LE Slower to maintain coordination Slower to maintain coordination         Sensation Left Right   Kinesthesia Diminished 25% of the time greater hallux    Light Touch Diminished L ankle (volar, heel, big toe)    Sharp/Dull       2 Point Discrimination             Manual Muscle Testing  Left Right   LE Hip: 4/5 flexion  Knee: 4/5 flexion  Ankle: 5/5 Hip: 5/5  Knee: 5/5  Ankle: 5/5          Short Term Goal Expiration Date:(10/28/22)  Long Term Goal Expiration Date: (12/2/22)  POC Expiration Date: (12/2/22)     Precautions: Significant BL carotid occlusion, HTN       Manuals 9/28/22 IE                                       Neuro Re-Ed         Pt Education Human balance system components, vestibular and oculomotor integration, influence of sensory deficits on ambulation, diminished kinesthesia, neuroplasticity with post-CVA rehabilitation                                                       Ther Ex                                                                        Ther Activity                        Gait Training                        Modalities

## 2022-10-03 ENCOUNTER — TELEPHONE (OUTPATIENT)
Dept: NEPHROLOGY | Facility: CLINIC | Age: 52
End: 2022-10-03

## 2022-10-03 ENCOUNTER — OFFICE VISIT (OUTPATIENT)
Dept: UROLOGY | Facility: CLINIC | Age: 52
End: 2022-10-03
Payer: COMMERCIAL

## 2022-10-03 ENCOUNTER — OFFICE VISIT (OUTPATIENT)
Dept: PHYSICAL THERAPY | Facility: CLINIC | Age: 52
End: 2022-10-03
Payer: COMMERCIAL

## 2022-10-03 ENCOUNTER — APPOINTMENT (OUTPATIENT)
Dept: LAB | Facility: AMBULARY SURGERY CENTER | Age: 52
End: 2022-10-03
Attending: UROLOGY
Payer: COMMERCIAL

## 2022-10-03 ENCOUNTER — TELEPHONE (OUTPATIENT)
Dept: UROLOGY | Facility: CLINIC | Age: 52
End: 2022-10-03

## 2022-10-03 ENCOUNTER — OFFICE VISIT (OUTPATIENT)
Dept: OCCUPATIONAL THERAPY | Facility: CLINIC | Age: 52
End: 2022-10-03
Payer: COMMERCIAL

## 2022-10-03 VITALS
WEIGHT: 166.8 LBS | OXYGEN SATURATION: 98 % | HEIGHT: 67 IN | SYSTOLIC BLOOD PRESSURE: 140 MMHG | RESPIRATION RATE: 16 BRPM | DIASTOLIC BLOOD PRESSURE: 98 MMHG | BODY MASS INDEX: 26.18 KG/M2 | HEART RATE: 76 BPM

## 2022-10-03 DIAGNOSIS — Z80.42 FAMILY HISTORY OF PROSTATE CANCER: ICD-10-CM

## 2022-10-03 DIAGNOSIS — R26.9 GAIT ABNORMALITY: ICD-10-CM

## 2022-10-03 DIAGNOSIS — N52.01 ERECTILE DYSFUNCTION DUE TO ARTERIAL INSUFFICIENCY: ICD-10-CM

## 2022-10-03 DIAGNOSIS — R31.0 GROSS HEMATURIA: ICD-10-CM

## 2022-10-03 DIAGNOSIS — R09.89 SYMPTOMS OF CEREBROVASCULAR ACCIDENT (CVA): Primary | ICD-10-CM

## 2022-10-03 DIAGNOSIS — R41.840 COGNITIVE ATTENTION DEFICIT: Primary | ICD-10-CM

## 2022-10-03 DIAGNOSIS — Z12.11 ENCOUNTER FOR SCREENING COLONOSCOPY: ICD-10-CM

## 2022-10-03 DIAGNOSIS — R29.90 STROKE-LIKE SYMPTOMS: ICD-10-CM

## 2022-10-03 DIAGNOSIS — N20.0 RENAL CALCULI: Primary | ICD-10-CM

## 2022-10-03 DIAGNOSIS — Z71.2 ENCOUNTER TO DISCUSS TEST RESULTS: ICD-10-CM

## 2022-10-03 DIAGNOSIS — I63.9 CEREBROVASCULAR ACCIDENT (CVA), UNSPECIFIED MECHANISM (HCC): ICD-10-CM

## 2022-10-03 DIAGNOSIS — R09.89 SYMPTOMS OF CEREBROVASCULAR ACCIDENT (CVA): ICD-10-CM

## 2022-10-03 LAB
ANION GAP SERPL CALCULATED.3IONS-SCNC: 6 MMOL/L (ref 4–13)
BUN SERPL-MCNC: 13 MG/DL (ref 5–25)
CALCIUM SERPL-MCNC: 9.5 MG/DL (ref 8.3–10.1)
CHLORIDE SERPL-SCNC: 110 MMOL/L (ref 96–108)
CO2 SERPL-SCNC: 23 MMOL/L (ref 21–32)
CREAT SERPL-MCNC: 1.14 MG/DL (ref 0.6–1.3)
GFR SERPL CREATININE-BSD FRML MDRD: 74 ML/MIN/1.73SQ M
GLUCOSE P FAST SERPL-MCNC: 78 MG/DL (ref 65–99)
POTASSIUM SERPL-SCNC: 3.8 MMOL/L (ref 3.5–5.3)
PSA SERPL-MCNC: 1.6 NG/ML (ref 0–4)
SODIUM SERPL-SCNC: 139 MMOL/L (ref 135–147)

## 2022-10-03 PROCEDURE — 36415 COLL VENOUS BLD VENIPUNCTURE: CPT

## 2022-10-03 PROCEDURE — 80048 BASIC METABOLIC PNL TOTAL CA: CPT

## 2022-10-03 PROCEDURE — 97112 NEUROMUSCULAR REEDUCATION: CPT

## 2022-10-03 PROCEDURE — 99205 OFFICE O/P NEW HI 60 MIN: CPT | Performed by: UROLOGY

## 2022-10-03 PROCEDURE — 88112 CYTOPATH CELL ENHANCE TECH: CPT | Performed by: PATHOLOGY

## 2022-10-03 PROCEDURE — 84153 ASSAY OF PSA TOTAL: CPT

## 2022-10-03 PROCEDURE — 97110 THERAPEUTIC EXERCISES: CPT

## 2022-10-03 PROCEDURE — 97530 THERAPEUTIC ACTIVITIES: CPT

## 2022-10-03 RX ORDER — ALPRAZOLAM 1 MG/1
1 TABLET ORAL ONCE
Qty: 1 TABLET | Refills: 0 | Status: SHIPPED | OUTPATIENT
Start: 2022-10-03 | End: 2022-10-24

## 2022-10-03 NOTE — ASSESSMENT & PLAN NOTE
Christine Gomez had gross hematuria when starting Plavix, this has since resolved  His upper tract imaging from your ago did show some nonobstructing stone burden  He is due for a CT renal protocol which has been ordered    We will complete his workup by way of cystoscopy with urine cytology at his next visit

## 2022-10-03 NOTE — ASSESSMENT & PLAN NOTE
Elijah Davila has been having difficulty getting and maintaining an erection  This is likely related to his age, his recent stroke with likely underlying vascular disease, and previous history of smoking  He did stop smoking relatively recently, he is still using a nicotine patch in a vaporizer device which can cause vaso constriction from the delivery of nicotine  Additionally, the changes in his baseline state of health could be having a psychological effect on his overall sense of well-being and sexual function  Plan:  I recommend treatment of his gross hematuria at the current time, consideration can be given to a 5 alpha reductase inhibitor versus other therapies such as penile injection therapy in the future if needed    It is hoped that with the stoppage of smoking he will have some recovery of his endothelial function with improved sexual function outcomes

## 2022-10-03 NOTE — TELEPHONE ENCOUNTER
Patient needs a cystoscopy in 4 weeks  He was advised on blood work prior and getting the CT done  I do not see anything in this time frame for cysto  Please advise  Provider:    Return in about 4 weeks (around 10/31/2022) for for cystoscopy

## 2022-10-03 NOTE — PROGRESS NOTES
Occupational Therapy Daily Note:    Today's date: 10/3/2022  Patient name: Vitaly Quigley  : 1970  MRN: [id number]   Referring provider: Rock Lara DO  Dx:   Encounter Diagnoses   Name Primary?  Cognitive attention deficit Yes    Symptoms of cerebrovascular accident (CVA)     Stroke-like symptoms      Patient was treated by JOSIANE Valladares under the supervision of [address] [place] [address], OTR/L  Subjective: "I am starting to feel weakness and some pain in my shoulder from reaching behind me" (related to the peg activity)  Per wife's report "I still have to repeat things to him and then he will ask me about the same thing again 5 minutes later"    Objective: Pt engaged in skilled OT treatment session with focus on HEP, functional cognition, functional vision, endurance and FM coordination demands in order to return to  role, worker role as a contractor, and increased self-confidence for IADL's to increase engagement, endurance, tolerance, and independence with daily ADL and IADL tasks       Completed rest of IE testing:  SENSATION        Monofilament Testing  Normal: 2 83 mm     Diminished light touch: 3 61 mm     Diminished protective sensation: 4 31 mm     Loss of protective sensation: 4 56     Complete loss of sensation / deep pressure: 6 65 2 83 mm 2 83 mm ventral/dorsal forearm, upper arm, neck      3 61 mm ventral/dorsal hand          Sharp / Dull  Intact  Intact      Proprioception   Impaired     Hot/Cold Temp Intact Intact     Stereognosis    Intact Clearly identified: eraser, paperclip, and marble     L eye:  20/70 with 0 errors  20/50 with 1 error    R eye:  20/50 R eye     B/L: + blurriness  20/50      CPT Code Minutes                                           Task Details        Therapeutic Activity  Pt engaged in standing peg activity on blue foam square with 4# wrist weight unilaterally LUE and foam board placed in front of pt and visual pattern placed to the right and left to have the pt rotate neck to visually scan for the peg placement  Pt required to reach/grasp for the peg with vision occluded in order to focus on problem solving, visual scanning, visual perceptional, grasp/reach, balance and overall LUE coordination  Pt engaged in brain builder activity focusing on visuospatial and executive functioning at the table top  Pt required to focus on 2D visual pattern to then translate it to a 3D building with blocks on table top  Pt solved 1/2 of the brain builders in the intermediate level  FOTO completed increased time  Neuro Re-Ed               Therapeutic Exercise  Pt tolerated 3 min x 2 (prograde/retrograde motions with LUE at 1 1 resist) standing on UBE with focus on increasing proximal UE strength, cardiopulmonary endurance, act tolerance and sustained grasp on handle to inc indep and safety with ADL/IADL fxn and fxnl reaching tasks  Manual          Self Care           Assessment: Tolerated treatment well  Pt tolerated peg activity with moderate difficulty reporting 3/10 pain in shoulder and increased weakness in LUE as activity progressed  Pt reported "I feel like that took me forever, before my stroke I would have been able to do that fast" due to LUE weakness and vision processing speed  Pt placed pegs with 100% accuracy on foam board according to the visual pattern  Pt tolerated UBE with minimal difficulty having 2/10 pain in the ulnar nerve due to prior bilateral ulnar nerve release  Pt demo increased difficulty with brain builder due to difficulty understanding visual directions without solution  Pt required increased visional processing time and had increased difficulty understanding amount of blocks compared to the visional picture  Patient would benefit from continued skilled OT focusing on tasks related to his worker role       Plan: Continued skilled OT per POC Review internal memory strategies (5 W's), give homework for home (problem solving)     INTERVENTION COMMENTS:  Diagnosis: Cognitive attention deficit [R41 895]  Precautions: Currently not driving    FOTO: Completed  Insurance: Payor: [address] / Plan: [address] / Product Type: Medicaid HMO /   2 of 10 visits, PN due 10/28/2022  POC Expires: 11/28/2022

## 2022-10-03 NOTE — TELEPHONE ENCOUNTER
Called pt to schedule consult in the EO  Pt answered stating he can not schedule anything without his wife being home because she is his ride  The pt wrote down our number stating that his wife will call us back to make this appointment

## 2022-10-03 NOTE — TELEPHONE ENCOUNTER
cNew Patient Intake Form   Patient Details   Alexandra Gonzáles     1970     [id number]     Insurance Information   Name of [place] [address] for Esequiel Haddad    Does the patient need an insurance referral? no   If patient has [employer], please ask if they will be using their [employer]  Appointment Information   Who is calling to schedule? If not patient, what is callers name? Spouse   Referring Provider Dr Jalaine Hodgkins    Referring Provider Number [phone]   Reason for Appt (Diagnosis) Renal calculi   Is patient aware of why they are being referred? yes   Does Patient have labs done at [place] [address]? If not, where do they go?  yes   Has patient had labs / urine work done? List date of most recent lab / urine work  yes   Has patient had a BMP & CBC done in the past 2 years? If so, list the date yes   Has patient been hospitalized recently? If yes, list name and location of hospital they were in  yes   Has patient been seen by a Nephrologist before? If yes, list name, location and phone number no    Has patient been see by another Speciality before (ex  Neurology, urology, cardiology)? If yes, please list name, and speciality  vascular, urology, neurology   Has the patient had imaging done? If so, list the most recent date and type of imaging yes   Does the patient has a stone analysis report if history of kidney stones? yes    Appointment Details   Is there a referral on file?  yes    Appointment Date  11/3   Location [facility]   Miscellaneous

## 2022-10-03 NOTE — PROGRESS NOTES
Daily Note     Today's date: 10/3/2022  Patient name: Leonor Rojas  : 1970  MRN: [id number]  Referring provider: Adarsh Fontaine DO  Dx:   Encounter Diagnosis     ICD-10-CM    1  Symptoms of cerebrovascular accident (CVA)  R09 89    2  Cerebrovascular accident (CVA), unspecified mechanism ([address] )  I63 9    3  Gait abnormality  R26 9    4  Stroke-like symptoms  R29 90        Start Time:   Stop Time:   Total time in clinic (min): 45 minutes    Subjective: Doing well tonight! Accompanied by his wife following OT  Feels he is walking better than last week  Continuing to have L-sided numbness  Continues to monitor BP - home reading today was 146/90 mmHg, R UE  Objective: See treatment diary below      Assessment: Session delayed due to FOTO completion for OT however was rapidly able to progress through interventions focused on balance deficits revealed at IE  Next session to trial agility ladder drills and treadmill ambulation with grade progression to further challenge and assess coordination, session durations likely to only last 30-45 minutes going forward  Will reassess level of function likely in next several visits  Plan: Continue per plan of care  Progress treatment as tolerated         Short Term Goal Expiration Date:(10/28/22)  Long Term Goal Expiration Date: (22)  POC Expiration Date: (22)     Precautions: Significant BL carotid occlusion, HTN       Manuals 22 IE 10/3                                      Neuro Re-Ed         Pt Education Human balance system components, vestibular and oculomotor integration, influence of sensory deficits on ambulation, diminished kinesthesia, neuroplasticity with post-CVA rehabilitation       Amb w/HT/HN  x100' fwd HT  x100' bwd HT/HN ea       Amb w/Turns  360 deg every 3 steps  2x50' CCW  1x50' CW      Foam Beams  // bars (long)  No UE  x1 lap lat  x1 lap lat w/HT  x2 laps lat w/HN  x2 laps fwd tandem  x2 laps fwd tandem w/R UE 15# over shoulder bolster carry                                Ther Ex                                                                        Ther Activity                        Gait Training                        Modalities

## 2022-10-03 NOTE — ASSESSMENT & PLAN NOTE
I discussed with him all today's findings including some enlargement the prostate without prostate nodules    He will go for PSA testing and imaging as well

## 2022-10-03 NOTE — ASSESSMENT & PLAN NOTE
Jim's father did have prostate cancer, he was killed prior to undergoing definitive therapy, he was diagnosed in his 46s  The prostate is smooth, there are no nodules, it is roughly 25-30 grams  I have ordered a PSA    I would expect his PSA to be less than 3 or so given his age

## 2022-10-03 NOTE — ASSESSMENT & PLAN NOTE
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

## 2022-10-03 NOTE — ASSESSMENT & PLAN NOTE
Unclear as to possible family history of colon cancer  I reviewed with him the recommendations for even patients without a family history to undergo screening colonoscopy    He will consider this and a referral has been placed

## 2022-10-05 PROCEDURE — 88112 CYTOPATH CELL ENHANCE TECH: CPT | Performed by: PATHOLOGY

## 2022-10-06 ENCOUNTER — APPOINTMENT (OUTPATIENT)
Dept: PHYSICAL THERAPY | Facility: CLINIC | Age: 52
End: 2022-10-06

## 2022-10-06 ENCOUNTER — APPOINTMENT (OUTPATIENT)
Dept: OCCUPATIONAL THERAPY | Facility: CLINIC | Age: 52
End: 2022-10-06

## 2022-10-10 ENCOUNTER — OFFICE VISIT (OUTPATIENT)
Dept: PHYSICAL THERAPY | Facility: CLINIC | Age: 52
End: 2022-10-10
Payer: COMMERCIAL

## 2022-10-10 ENCOUNTER — OFFICE VISIT (OUTPATIENT)
Dept: OCCUPATIONAL THERAPY | Facility: CLINIC | Age: 52
End: 2022-10-10
Payer: COMMERCIAL

## 2022-10-10 DIAGNOSIS — R09.89 SYMPTOMS OF CEREBROVASCULAR ACCIDENT (CVA): ICD-10-CM

## 2022-10-10 DIAGNOSIS — R09.89 SYMPTOMS OF CEREBROVASCULAR ACCIDENT (CVA): Primary | ICD-10-CM

## 2022-10-10 DIAGNOSIS — R41.840 COGNITIVE ATTENTION DEFICIT: Primary | ICD-10-CM

## 2022-10-10 DIAGNOSIS — R29.90 STROKE-LIKE SYMPTOMS: ICD-10-CM

## 2022-10-10 DIAGNOSIS — I63.9 CEREBROVASCULAR ACCIDENT (CVA), UNSPECIFIED MECHANISM (HCC): ICD-10-CM

## 2022-10-10 PROCEDURE — 97112 NEUROMUSCULAR REEDUCATION: CPT | Performed by: PHYSICAL THERAPIST

## 2022-10-10 PROCEDURE — 97110 THERAPEUTIC EXERCISES: CPT

## 2022-10-10 PROCEDURE — 97530 THERAPEUTIC ACTIVITIES: CPT

## 2022-10-10 PROCEDURE — 97116 GAIT TRAINING THERAPY: CPT | Performed by: PHYSICAL THERAPIST

## 2022-10-10 NOTE — PROGRESS NOTES
Daily Note     Today's date: 10/10/2022  Patient name: Anup Carpenter  : 1970  MRN: [id number]  Referring provider: Manuel Simeon DO  Dx: No diagnosis found  Subjective: Patient reports feeling well today, no new changes to report      Objective: See treatment diary below      Assessment:   Pt significantly fatigue at end of session  Struggled to keep balance on foam with head turns and with turn to toss weighted ball  Demonstrated good push off with resistive walking  Plan to continue per POC at next session  Plan: Continue per plan of care        Short Term Goal Expiration Date:(10/28/22)  Long Term Goal Expiration Date: (22)  POC Expiration Date: (22)     Precautions: Significant BL carotid occlusion, HTN       Manuals 22 IE 10/3 10/10                                     Neuro Re-Ed         Pt Education Human balance system components, vestibular and oculomotor integration, influence of sensory deficits on ambulation, diminished kinesthesia, neuroplasticity with post-CVA rehabilitation       Amb w/HT/HN  x100' fwd HT  x100' bwd HT/HN ea       Amb w/Turns  360 deg every 3 steps  2x50' CCW  1x50' CW      Foam Beams  // bars (long)  No UE  x1 lap lat  x1 lap lat w/HT  x2 laps lat w/HN  x2 laps fwd tandem  x2 laps fwd tandem w/R UE 15# over shoulder bolster carry   Solo step:    HT/hn - 3 laps ea     perturbations with MTB: 3 laps ea    Tandem with pull mtb - 2 laps - 117 bpm    Side stepping pull mT - 2 labs ea - 118 bpm    wtd ball toss - rb - 3 laps side to side - 115bpm     Treadmill walking   7 min -  - 10% - 2 1 mph forward    7 min direction changes fwd/bkwd turns with obstacles random - 10% grade 1 2 mph     throughout                      Ther Ex                                                                        Ther Activity                        Gait Training                        Modalities

## 2022-10-10 NOTE — PROGRESS NOTES
Occupational Therapy Daily Note:    Today's date: 10/10/2022  Patient name: Gay Moise   : 1970  MRN: [id number]   Referring provider: Chaparro Fleming DO  Dx:   Encounter Diagnoses   Name Primary? • Cognitive attention deficit Yes   • Symptoms of cerebrovascular accident (CVA)    • Stroke-like symptoms      Patient was treated by JOSIANE Ball under the supervision of [address] [place] [address], OTR/L  Subjective: "My vision is good in the morning but gets worse during the day" "I am feeling iffy still"  Reports feeling like his concentration is improving slowly  CPT Code Minutes                                           Task Details        Therapeutic Activity  Pt engaged in laying supine on mat with nuts/bolts board required to focus on rotating the nuts/bolts off the board  Then OTR placed the nuts into pt hand to engage in in hand manipulation to move nut from his medial side of the hand to each of his finger tips to place back onto the board  Pt focused on shoulder flexion and abduction movements with 3lb weight placed on LUE focusing on in hand manipulation, LUE coordination, [address], and vision (saccades), and proprioception relying on his motor/sensory system  Pt engaged in standing activity at the mirror on a blue foam pad working on mental manipulation worksheet (Using the key to follow the correct math) focusing on balance, vision, sustained attention, concentration and direction following  Pt wore peripheral taping glasses for 5 min focusing on convergence, sustained near point focus, and saccades of his vision  Neuro Re-Ed               Therapeutic Exercise  Pt tolerated 2 5 min x 2 (prograde/retrograde motions with LUE at 1 3 resist) and 2 5 min x 2 unilateral LUE standing on UBE with focus on increasing proximal UE strength, cardiopulmonary endurance, act tolerance and sustained grasp on handle to inc indep and safety with ADL/IADL fxn and fxnl reaching tasks  Manual          Self Care           Assessment: Tolerated treatment well  Pt tolerated the UBE good with reported shoulder pain following the 10 minutes  Pt stated he does not enjoy the UBE but knows it increases his strength  Pt transitioned to nuts and bolts activity with reported LUE shoulder popping with shoulder flexion motion  Pt increased difficulty with in hand-manipulation with smaller sized nuts/bolts  Pt reported 3/10 headache during worksheet activity after 10 minutes sustaining attention  Pt engaged in peripheral tapping with fair tolerance reported decreased concentration during  Pt seen to have difficulty with direction following during worksheet activity, making multiple mathematical mistakes, needing verbal cuing from OTR/OTS  Patient would benefit from continued skilled OT focusing on tasks related to his worker role       Plan: Continued skilled OT per POC    INTERVENTION COMMENTS:  Diagnosis: Cognitive attention deficit [R41 840]  Precautions: Currently not driving    FOTO: Completed  Insurance: Payor: Hung Harrison / Plan: Hung Harrison / Product Type: Medicaid HMO /   3 of 10 visits, PN due 10/28/2022  POC Expires: 11/28/2022

## 2022-10-11 ENCOUNTER — TELEPHONE (OUTPATIENT)
Dept: FAMILY MEDICINE CLINIC | Facility: CLINIC | Age: 52
End: 2022-10-11

## 2022-10-11 DIAGNOSIS — R09.89 SYMPTOMS OF CEREBROVASCULAR ACCIDENT (CVA): ICD-10-CM

## 2022-10-11 DIAGNOSIS — I63.9 CVA (CEREBRAL VASCULAR ACCIDENT) (HCC): ICD-10-CM

## 2022-10-11 DIAGNOSIS — I10 HYPERTENSION: ICD-10-CM

## 2022-10-11 DIAGNOSIS — I10 HYPERTENSION, UNSPECIFIED TYPE: ICD-10-CM

## 2022-10-11 DIAGNOSIS — R29.90 STROKE-LIKE SYMPTOMS: ICD-10-CM

## 2022-10-11 RX ORDER — LOSARTAN POTASSIUM 50 MG/1
50 TABLET ORAL DAILY
Qty: 90 TABLET | Refills: 0 | Status: SHIPPED | OUTPATIENT
Start: 2022-10-11 | End: 2023-01-09

## 2022-10-11 RX ORDER — AMLODIPINE BESYLATE 10 MG/1
10 TABLET ORAL DAILY
Qty: 90 TABLET | Refills: 0 | Status: SHIPPED | OUTPATIENT
Start: 2022-10-11 | End: 2023-01-09

## 2022-10-11 RX ORDER — ATORVASTATIN CALCIUM 80 MG/1
80 TABLET, FILM COATED ORAL EVERY EVENING
Qty: 90 TABLET | Refills: 0 | Status: SHIPPED | OUTPATIENT
Start: 2022-10-11 | End: 2023-01-09

## 2022-10-11 NOTE — TELEPHONE ENCOUNTER
atorvastatin (LIPITOR) 80 mg tablet Take 1 tablet (80 mg total) by mouth every evening     losartan (COZAAR) 50 mg tablet Take 1 tablet (50 mg total) by mouth daily     amLODIPine (NORVASC) 10 mg tablet Take 1 tablet (10 mg total) by mouth daily     [facility] [address] OTONIEL GALINDO on [address]

## 2022-10-12 ENCOUNTER — OFFICE VISIT (OUTPATIENT)
Dept: OCCUPATIONAL THERAPY | Facility: CLINIC | Age: 52
End: 2022-10-12
Payer: COMMERCIAL

## 2022-10-12 ENCOUNTER — OFFICE VISIT (OUTPATIENT)
Dept: PHYSICAL THERAPY | Facility: CLINIC | Age: 52
End: 2022-10-12
Payer: COMMERCIAL

## 2022-10-12 DIAGNOSIS — R29.90 STROKE-LIKE SYMPTOMS: ICD-10-CM

## 2022-10-12 DIAGNOSIS — I63.9 CEREBROVASCULAR ACCIDENT (CVA), UNSPECIFIED MECHANISM (HCC): ICD-10-CM

## 2022-10-12 DIAGNOSIS — R09.89 SYMPTOMS OF CEREBROVASCULAR ACCIDENT (CVA): Primary | ICD-10-CM

## 2022-10-12 DIAGNOSIS — R41.840 COGNITIVE ATTENTION DEFICIT: Primary | ICD-10-CM

## 2022-10-12 DIAGNOSIS — R26.9 GAIT ABNORMALITY: ICD-10-CM

## 2022-10-12 PROCEDURE — 97530 THERAPEUTIC ACTIVITIES: CPT

## 2022-10-12 PROCEDURE — 97112 NEUROMUSCULAR REEDUCATION: CPT

## 2022-10-12 PROCEDURE — 97140 MANUAL THERAPY 1/> REGIONS: CPT

## 2022-10-12 PROCEDURE — 97116 GAIT TRAINING THERAPY: CPT

## 2022-10-12 NOTE — PROGRESS NOTES
Daily Note     Today's date: 10/12/2022  Patient name: Leonor Rojas  : 1970  MRN: [id number]  Referring provider: Adarsh Fontaine DO  Dx:   Encounter Diagnosis     ICD-10-CM    1  Symptoms of cerebrovascular accident (CVA)  R09 89    2  Cerebrovascular accident (CVA), unspecified mechanism ([facility] [address] )  I63 9    3  Gait abnormality  R26 9    4  Stroke-like symptoms  R29 90        Start Time: 905  Stop Time: 100  Total time in clinic (min): 60 minutes    Subjective: Patient reports no new complaints this session  Patient reports some pressure in his head after exercises on Foam Beams and with walking carrying bolster with resistance  Patient states he would like to hurting this weekend, it is muzzle/rifle  Recommend someone goes with him  Objective: See treatment diary below      Assessment: Tolerated treatment well  Sherry Spatz participated in skilled PT session focused on balance, gait, and endurance  Patient continues to struggle to keep balance on foam with head turns and with turn to toss ball  Patient notes B/L calf burn  Patient would continue to benefit from skilled PT interventions to address deficits with balance, gait, and endurance  Patient demonstrated fatigue post treatment      Plan: Continue per plan of care        Short Term Goal Expiration Date:(10/28/22)  Long Term Goal Expiration Date: (22)   POC Expiration Date: (22)     Precautions: Significant BL carotid occlusion, HTN       Manuals 22 IE 10/3 10/10 10/12                                    Neuro Re-Ed         Pt Education Human balance system components, vestibular and oculomotor integration, influence of sensory deficits on ambulation, diminished kinesthesia, neuroplasticity with post-CVA rehabilitation       Amb w/HT/HN  x100' fwd HT  x100' bwd HT/HN ea       Amb w/Turns  360 deg every 3 steps  2x50' CCW  1x50' CW      Foam Beams  // bars (long)  No UE  x1 lap lat  x1 lap lat w/HT  x2 laps lat w/HN  x2 laps fwd tandem  x2 laps fwd tandem w/R UE 15# over shoulder bolster carry   Solo step:    HT/hn - 3 laps ea     perturbations with MTB: 3 laps ea    Tandem with pull mtb - 2 laps - 117 bpm    Side stepping pull mT - 2 labs ea - 118 bpm    wtd ball toss - rb - 3 laps side to side - 115bpm SOLO    Foam beams  Side stepping w/HT/HN  Tandem w/HT/HN  X 3 laps ea  HR 99    No Foam  Walking w/ MTB pull carrying 15# bolster 1/4 length x 3 laps    Side stepping MTB pull carrying 15# bolster 1/4 length x 3laps      Red Mat w/wts underneath  Fwd w/tennis ball toss Hand to Hand x 4 laps    Red Mat w/wts underneath  Sidestepping w/ball toss  HR 99          Treadmill walking   7 min -  - 10% - 2 1 mph forward    7 min direction changes fwd/bkwd turns with obstacles random - 10% grade 1 2 mph     throughout  TM unavailable                    Ther Ex                                                                        Ther Activity                        Gait Training    SOLO   Fwd/bwd walking w/change of direction                      Modalities

## 2022-10-12 NOTE — PROGRESS NOTES
Occupational Therapy Daily Note:    Today's date: 10/12/2022  Patient name: Paulo Masters  : 1970  MRN: [id number]  Referring provider: Kyle Elder,   Dx:   Encounter Diagnoses   Name Primary? • Cognitive attention deficit Yes   • Stroke-like symptoms        Subjective: "My arm is really tired"  Objective: Pt engaged in skilled OT treatment session with focus on fxnl cognition, short term memory, fxnl attention, UE NMR, UE endurance, FMC/GMC and FMS/GMS to increase engagement, endurance, tolerance, and independence with daily ADL and IADL tasks  CPT Code Minutes                                           Task Details        Therapeutic Activity 40 Pt engaged in dynamic activity to improve functional performance in ocular-motor control, FMC/FMS w/added cog demands of memory recall and reading comprehension w/direction follow promoting successful engagement in home mngt tasks and return to worker role  Pt instructed to retrieve marbles using green resistive clip engaging left hand, alternating attention to letter/number placement  Pt noted with frequently loosing place when sequencing letters demo-frustration at times  Neuro Re-Ed 10 For improved convergence and overall oculomotor neuro re-education, peripheral field taping for 10 min provided focusing on sustained convergence while engaging w/"Keeping in Mind" task placed table top to address sustained convergence  Pt required to compare/contrast symbols completing math problems per written direction follow  Pt noted with increased difficulty to sustain focus on task noting blurred vision w/minimal HA  Therapeutic Exercise               Manual 10 Ktape applied to L bicep region promoting pain mngt and proximal stabilization  Educated pt and spouse on s/s skin integrity and safe tape removal   Recommended tape removal post 2 days unless skin issues noted           Self Care           Assessment: Tolerated treatment well  Noted droppage about 25% of time w/mass practice, pt stated fatigue with prolonged use of LUE  Pt required repetitive reenforcement of symbol meaning during cog task with increased comprehension of task w/practice  Reviewed purpose and benefits of therapy for successful rehabilitation to return to meaningful occupation as pt frequently became frustrated and verbalized "I feel dumb" during OT session  Pt and spouse stated pt is to see eye Dr regarding vision concerns as it has been a while since his last eye exam   Therapist provided spouse and pt with Dr Judy estevez as referral for exam   Patient would benefit from continued skilled OT focusing on carryover of"Keeping in Mind" task from last session      Plan: Continued skilled OT per POC    INTERVENTION COMMENTS:  Diagnosis: Cognitive attention deficit [R41 840]  Precautions: Currently not driving    FOTO: Completed  Insurance: Payor: Marlin Garcia / Plan: Marlin Garcia / Product Type: Medicaid HMO /   4 of 10 visits, PN due 10/28/2022  POC Expires: 11/28/2022

## 2022-10-17 ENCOUNTER — OFFICE VISIT (OUTPATIENT)
Dept: OCCUPATIONAL THERAPY | Facility: CLINIC | Age: 52
End: 2022-10-17
Payer: COMMERCIAL

## 2022-10-17 ENCOUNTER — OFFICE VISIT (OUTPATIENT)
Dept: PHYSICAL THERAPY | Facility: CLINIC | Age: 52
End: 2022-10-17
Payer: COMMERCIAL

## 2022-10-17 DIAGNOSIS — R09.89 SYMPTOMS OF CEREBROVASCULAR ACCIDENT (CVA): Primary | ICD-10-CM

## 2022-10-17 DIAGNOSIS — R29.90 STROKE-LIKE SYMPTOMS: ICD-10-CM

## 2022-10-17 DIAGNOSIS — I63.9 CEREBROVASCULAR ACCIDENT (CVA), UNSPECIFIED MECHANISM (HCC): ICD-10-CM

## 2022-10-17 DIAGNOSIS — R26.9 GAIT ABNORMALITY: ICD-10-CM

## 2022-10-17 DIAGNOSIS — R41.840 COGNITIVE ATTENTION DEFICIT: Primary | ICD-10-CM

## 2022-10-17 PROCEDURE — 97116 GAIT TRAINING THERAPY: CPT

## 2022-10-17 PROCEDURE — 97140 MANUAL THERAPY 1/> REGIONS: CPT

## 2022-10-17 PROCEDURE — 97112 NEUROMUSCULAR REEDUCATION: CPT

## 2022-10-17 PROCEDURE — 97530 THERAPEUTIC ACTIVITIES: CPT

## 2022-10-17 PROCEDURE — 97110 THERAPEUTIC EXERCISES: CPT

## 2022-10-17 NOTE — PROGRESS NOTES
Daily Note     Today's date: 10/17/2022  Patient name: Nathanael Napoles  : 1970  MRN: [id number]  Referring provider: Liyah Pinon DO  Dx:   Encounter Diagnosis     ICD-10-CM    1  Symptoms of cerebrovascular accident (CVA)  R09 89    2  Cerebrovascular accident (CVA), unspecified mechanism ([address] )  I63 9    3  Gait abnormality  R26 9    4  Stroke-like symptoms  R29 90                   Subjective: Christine Gomez states that due to the weather and his OT session, he is experiencing more of a headache and pressure in his head today  SPR 4/10 start of session  He reports that he did not go hunting this weekend, but did do some painting  Objective: See treatment diary below      Assessment: Christine Gomez tolerated treatment well with general fatigue noted throughout session and no change in overall reported head pressure  Focus on sessions remains on higher level balance exercises with endurance and emphasis on simulating work related tasks  Ladder climbing was added to simulate multiple work-related activities with carrying a bolster up and down  With repetition, he gradually became more sure-footed with rare catching of toes  He remains challenged on foam with primary recruitment of ankle strategies  Minor decrease in postural instability was reported compared to previous session  Due to pressure in the head at start of session, treadmill was performed at a decreased speed with no turning to limit any increase in headache  He would benefit from continued skilled PT to improve his endurance, balance, and strength to help meet his goals and return him to his PLOF  Plan: Continue per plan of care  Progress treatment as tolerated         Short Term Goal Expiration Date:(10/28/22)  Long Term Goal Expiration Date: (22)   POC Expiration Date: (22)     Precautions: Significant BL carotid occlusion, HTN       Manuals 22 IE 10/3 10/10 10/12 10/17                                   Neuro Re-Ed         Pt Education Human balance system components, vestibular and oculomotor integration, influence of sensory deficits on ambulation, diminished kinesthesia, neuroplasticity with post-CVA rehabilitation       Amb w/HT/HN  x100' fwd HT  x100' bwd HT/HN ea       Amb w/Turns  360 deg every 3 steps  2x50' CCW  1x50' CW      Foam Beams  // bars (long)  No UE  x1 lap lat  x1 lap lat w/HT  x2 laps lat w/HN  x2 laps fwd tandem  x2 laps fwd tandem w/R UE 15# over shoulder bolster carry   Solo step:    HT/hn - 3 laps ea     perturbations with MTB: 3 laps ea    Tandem with pull mtb - 2 laps - 117 bpm    Side stepping pull mT - 2 labs ea - 118 bpm    wtd ball toss - rb - 3 laps side to side - 115bpm SOLO    Foam beams  Side stepping w/HT/HN  Tandem w/HT/HN  X 3 laps ea  HR 99    No Foam  Walking w/ MTB pull carrying 15# bolster 1/4 length x 3 laps    Side stepping MTB pull carrying 15# bolster 1/4 length x 3laps      Red Mat w/wts underneath  Fwd w/tennis ball toss Hand to Hand x 4 laps    Red Mat w/wts underneath  Sidestepping w/ball toss  HR 99       Solo     foam beams  Side stepping w/ HT/HN x4 laps ea    Tandem with HT/HN x4 laps ea     Treadmill walking   7 min -  - 10% - 2 1 mph forward    7 min direction changes fwd/bkwd turns with obstacles random - 10% grade 1 2 mph     throughout  TM unavailable Solo   1 8 mph x5 min  1 8 mph 8% incline x5 min    /90 mmHg post                     Ther Ex                                                                        Ther Activity        Climb ladder     Solo with CGA  Up/down small A-frame, reaching overhead at top    No weight x3    5# bolster x3    10# bolster x3           Gait Training    SOLO   Fwd/bwd walking w/change of direction                      Modalities

## 2022-10-17 NOTE — PROGRESS NOTES
Occupational Therapy Daily Note:    Today's date: 10/17/2022  Patient name: Stephanie Harvey  : 1970  MRN: [id number]  Referring provider: Brian Steve DO  Dx:   Encounter Diagnosis   Name Primary? • Cognitive attention deficit Yes     Patient was treated by JOSIANE Farrar under the supervision of [address] [place] [address], OTR/L  Subjective: "I have a bad headache today with pressure and my eyes are blurry, I think it is because of the weather"     Objective: Pt engaged in skilled OT treatment session with focus on fxnl cognition, short term memory, fxnl attention, UE NMR, UE endurance, FMC/GMC and FMS/GMS to increase engagement, endurance, tolerance, and independence with daily ADL and IADL tasks  CPT Code Minutes                                           Task Details        Therapeutic Activity       For improved convergence and overall oculomotor control, pt engaged in table top activity for 10 min engaging in "'Keeping in Mind" worksheet  Pt required to follow directions to calculate the math equations  Neuro Re-Ed               Therapeutic Exercise  Pt tolerated 2 5 min x 2 prograde/retrograde motions B/L and 2 5 min x 2 unilateral RUE at 1 4 resist standing at UBE with focus on increasing proximal UE strength, endurance, act tolerance and ROM to inc indep and safety with ADL/IADL fxn and fxnl reaching tasks  Pt provided a HEP with resistive green putty for focus on UE strength, FMC/FMS, and UE endurance       Exercises  Puttycise tools:   Standard  in CCW L hand - 10x  Cap turn with standard turning CCW L hand - 10x  L-bar radial deviation- 5x   · Putty Squeezes - 1 x daily - 2-3 x weekly - 2 sets - 10 reps  · Rolling Putty on Table - 1 x daily - 2-3 x weekly - 2 sets - 10 reps  · Tip Pinch with Putty - 1 x daily - 2-3 x weekly - 2 sets - 10 reps  · 3-Point Pinch with Putty - 1 x daily - 2-3 x weekly - 2 sets - 10 reps  · Key Pinch with Putty - 1 x daily - 2-3 x weekly - 2 sets - 10 reps  · Finger Pinch and Pull with Putty - 1 x daily - 2-3 x weekly - 2 sets - 10 reps  · Finger Lumbricals with Putty - 1 x daily - 2-3 x weekly - 2 sets - 10 reps         Manual  K-Tape applied x3 bands to LUE focusing on pain mngt and proximal stabilization  Applied Y strip from proximal humerus to top of supraspinatus and mid trap  Applied another Y strip from deltoid tuberosity to anterior/posterior deltoid muscle, and an I-band from coracoid process to posterior scapula/supraspinatus border  Self Care           Assessment: Tolerated treatment well  Pt tolerated UBE well with no pain or tightness with increased resistance  Pt somewhat receptive to putty exercises stating he felt childish but knows that it is helpful for his L hand strength  Pt reported 5/10 shoulder pain with puttycise tools turning CCW, stated he would not be able to turn CW due to shoulder pain with possible shoulder impingement  Pt tolerated "Keeping in mind" worksheet fair with demo increased repetition to look at directions  Pt reported increased headache and eye blurriness following activity  OTR/OTS will continue to assess benefit of k-tape as applied in new areas of shoulder due to possible shoulder impingement or inflammation of rotator cuff muscles    Patient would benefit from continued skilled OT         Plan: Continued skilled OT per POC Shoulder impingement assessments    INTERVENTION COMMENTS:  Diagnosis: Cognitive attention deficit [R41 840]  Precautions: Currently not driving    FOTO: Completed  Insurance: Payor: Riley File / Plan: Riley File / Product Type: Medicaid HMO /   5 of 10 visits, PN due 10/28/2022  POC Expires: 11/28/2022

## 2022-10-18 ENCOUNTER — OFFICE VISIT (OUTPATIENT)
Dept: FAMILY MEDICINE CLINIC | Facility: CLINIC | Age: 52
End: 2022-10-18
Payer: COMMERCIAL

## 2022-10-18 VITALS
HEIGHT: 67 IN | BODY MASS INDEX: 26.21 KG/M2 | OXYGEN SATURATION: 98 % | SYSTOLIC BLOOD PRESSURE: 132 MMHG | HEART RATE: 66 BPM | WEIGHT: 167 LBS | TEMPERATURE: 97.5 F | DIASTOLIC BLOOD PRESSURE: 90 MMHG

## 2022-10-18 DIAGNOSIS — I65.23 SYMPTOMATIC CAROTID ARTERY STENOSIS, BILATERAL: ICD-10-CM

## 2022-10-18 DIAGNOSIS — I63.531 ACUTE ISCHEMIC RIGHT POSTERIOR CEREBRAL ARTERY (PCA) STROKE (HCC): ICD-10-CM

## 2022-10-18 DIAGNOSIS — F17.200 ENCOUNTER FOR SCREENING FOR MALIGNANT NEOPLASM OF LUNG IN CURRENT SMOKER WITH 30 PACK YEAR HISTORY OR GREATER: ICD-10-CM

## 2022-10-18 DIAGNOSIS — I10 ESSENTIAL HYPERTENSION: ICD-10-CM

## 2022-10-18 DIAGNOSIS — Z12.2 ENCOUNTER FOR SCREENING FOR MALIGNANT NEOPLASM OF LUNG IN CURRENT SMOKER WITH 30 PACK YEAR HISTORY OR GREATER: ICD-10-CM

## 2022-10-18 DIAGNOSIS — N20.0 BILATERAL NEPHROLITHIASIS: ICD-10-CM

## 2022-10-18 DIAGNOSIS — Z00.00 ANNUAL PHYSICAL EXAM: Primary | ICD-10-CM

## 2022-10-18 DIAGNOSIS — R31.0 GROSS HEMATURIA: ICD-10-CM

## 2022-10-18 PROCEDURE — 99396 PREV VISIT EST AGE 40-64: CPT | Performed by: FAMILY MEDICINE

## 2022-10-18 NOTE — PROGRESS NOTES
[address] [facility] FAMILY MEDICINE    NAME: Mayela Reina  AGE: 49 y o  SEX: male  : 1970     DATE: 10/18/2022     Assessment and Plan:     Problem List Items Addressed This Visit        Cardiovascular and Mediastinum    Symptomatic carotid artery stenosis, bilateral       Genitourinary    Bilateral nephrolithiasis    Gross hematuria      Other Visit Diagnoses     Annual physical exam    -  Primary    Encounter for screening for malignant neoplasm of lung in current smoker with 30 pack year history or greater        Relevant Orders    CT lung screening program    Acute ischemic right posterior cerebral artery (PCA) stroke ([facility] [address] )        Essential hypertension            I discussed with him that he is a candidate for lung cancer CT screening  The following Shared Decision-Making points were covered:  1  Benefits of screening were discussed, including the rates of reduction in death from lung cancer and other causes  Harms of screening were reviewed, including false positive tests, radiation exposure levels, risks of invasive procedures, risks of complications of screening, and risk of overdiagnosis  2  I counseled on the importance of adherence to annual lung cancer LDCT screening, impact of co-morbidities, and ability or willingness to undergo diagnosis and treatment  3  I counseled on the importance of maintaining abstinence as a former smoker or was counseled on the importance of smoking cessation if a current smoker    Review of Eligibility Criteria: He meets all of the criteria for Lung Cancer Screening  · He is 49 y o    · He has 20 pack year tobacco history and is a current smoker or has quit within the past 15 years  · He presents no signs or symptoms of lung cancer    After discussion, the patient decided to elect lung cancer screening        Colonoscopy is scheduled    States hematuria stopped after he started iron tablets, he is on plavix and aspirin, and will undergo ICA intervention after 6 weeks of medical therapy  Refused shingrix and PCV-20 today  Immunizations and preventive care screenings were discussed with patient today  Appropriate education was printed on patient's after visit summary  Discussed risks and benefits of prostate cancer screening  We discussed the controversial history of PSA screening for prostate cancer in the [place] as well as the risk of over detection and over treatment of prostate cancer by way of PSA screening  The patient understands that PSA blood testing is an imperfect way to screen for prostate cancer and that elevated PSA levels in the blood may also be caused by infection, inflammation, prostatic trauma or manipulation, urological procedures, or by benign prostatic enlargement  The role of the digital rectal examination in prostate cancer screening was also discussed and I discussed with him that there is large interobserver variability in the findings of digital rectal examination  Counseling:  Alcohol/drug use: discussed moderation in alcohol intake, the recommendations for healthy alcohol use, and avoidance of illicit drug use  Dental Health: discussed importance of regular tooth brushing, flossing, and dental visits  Injury prevention: discussed safety/seat belts, safety helmets, smoke detectors, carbon dioxide detectors, and smoking near bedding or upholstery  Sexual health: discussed sexually transmitted diseases, partner selection, use of condoms, avoidance of unintended pregnancy, and contraceptive alternatives  · Exercise: the importance of regular exercise/physical activity was discussed  Recommend exercise 3-5 times per week for at least 30 minutes  BMI Counseling: Body mass index is 26 16 kg/m²   The BMI is above normal  Nutrition recommendations include decreasing portion sizes, encouraging healthy choices of fruits and vegetables, decreasing fast food intake, consuming healthier snacks, limiting drinks that contain sugar, moderation in carbohydrate intake and reducing intake of cholesterol  Exercise recommendations include moderate physical activity 150 minutes/week  No pharmacotherapy was ordered  Rationale for BMI follow-up plan is due to patient being overweight or obese  Depression Screening and Follow-up Plan: Patient was screened for depression during today's encounter  They screened negative with a PHQ-2 score of 0  Return in about 3 months (around 1/18/2023) for Next scheduled follow up  Chief Complaint:     Chief Complaint   Patient presents with   • Physical Exam      History of Present Illness:     Adult Annual Physical   Patient here for a comprehensive physical exam  The patient reports no problems  Diet and Physical Activity  · Diet/Nutrition: well balanced diet and consuming 3-5 servings of fruits/vegetables daily  · Exercise: no formal exercise  Depression Screening  PHQ-2/9 Depression Screening    Little interest or pleasure in doing things: 0 - not at all  Feeling down, depressed, or hopeless: 0 - not at all  PHQ-2 Score: 0  PHQ-2 Interpretation: Negative depression screen       General Health  · Sleep: sleeps well  · Hearing: grossly normal   · Vision: most recent eye exam >1 year ago  · Dental: regular dental visits and brushes teeth once daily   Health  · Symptoms include: none     Review of Systems:     Review of Systems   Constitutional: Negative for chills and fever  HENT: Negative for congestion, rhinorrhea and sore throat  Eyes: Negative for discharge, redness and itching  Respiratory: Negative for chest tightness, shortness of breath and wheezing  Cardiovascular: Negative for chest pain and palpitations  Gastrointestinal: Negative for abdominal pain, constipation and diarrhea  Genitourinary: Negative for dysuria  Skin: Negative for pallor and rash  Neurological: Positive for weakness  Negative for dizziness, numbness and headaches  Past Medical History:     Past Medical History:   Diagnosis Date   • Stroke [facility])       Past Surgical History:     Past Surgical History:   Procedure Laterality Date   • CARPAL TUNNEL RELEASE     • CERVICAL FUSION     • ULNAR COLLATERAL LIGAMENT RECONSTRUCTION        Family History:     Family History   Problem Relation Age of Onset   • Heart attack Mother    • Diabetes Mother    • Hypertension Mother    • Colon cancer Father    • No Known Problems Sister    • No Known Problems Sister    • No Known Problems Sister    • No Known Problems Son       Social History:     Social History     Socioeconomic History   • Marital status: /Civil Union     Spouse name: None   • Number of children: None   • Years of education: None   • Highest education level: None   Occupational History   • None   Tobacco Use   • Smoking status: Former Smoker     Packs/day: 2 00     Years: 35 00     Pack years: 70 00     Types: Cigarettes     Quit date: 2022     Years since quittin 2   • Smokeless tobacco: Never Used   Vaping Use   • Vaping Use: Every day   • Substances: Nicotine   Substance and Sexual Activity   • Alcohol use: Not Currently     Alcohol/week: 6 0 standard drinks     Types: 6 Cans of beer per week     Comment: daily, heavy use until    • Drug use: Never   • Sexual activity: None   Other Topics Concern   • None   Social History Narrative   • None     Social Determinants of Health     Financial Resource Strain: Not on file   Food Insecurity: No Food Insecurity   • Worried About Running Out of Food in the Last Year: Never true   • Ran Out of Food in the Last Year: Never true   Transportation Needs: No Transportation Needs   • Lack of Transportation (Medical): No   • Lack of Transportation (Non-Medical):  No   Physical Activity: Not on file   Stress: Not on file   Social Connections: Not on file   Intimate Partner Violence: Not on file   Housing Stability: Low Risk • Unable to Pay for Housing in the Last Year: No   • Number of Places Lived in the Last Year: 1   • Unstable Housing in the Last Year: No      Current Medications:     Current Outpatient Medications   Medication Sig Dispense Refill   • amLODIPine (NORVASC) 10 mg tablet Take 1 tablet (10 mg total) by mouth daily 90 tablet 0   • aspirin 81 mg chewable tablet Chew 1 tablet (81 mg total) daily 30 tablet 0   • atorvastatin (LIPITOR) 80 mg tablet Take 1 tablet (80 mg total) by mouth every evening 90 tablet 0   • clopidogrel (Plavix) 75 mg tablet Take 1 tablet (75 mg total) by mouth daily 30 tablet 3   • ferrous sulfate 325 (65 Fe) mg tablet Take 325 mg by mouth daily with breakfast     • losartan (COZAAR) 50 mg tablet Take 1 tablet (50 mg total) by mouth daily 90 tablet 0   • ALPRAZolam (XANAX) 1 mg tablet Take 1 tablet (1 mg total) by mouth 1 (one) time for 1 dose Take 1 hour prior to cystoscopy 1 tablet 0     No current facility-administered medications for this visit  Allergies: Allergies   Allergen Reactions   • Penicillins Anaphylaxis      Physical Exam:     /90 (BP Location: Left arm, Patient Position: Sitting, Cuff Size: Adult)   Pulse 66   Temp 97 5 °F (36 4 °C) (Temporal)   Ht 5' 7" (1 702 m)   Wt 75 8 kg (167 lb)   SpO2 98%   BMI 26 16 kg/m²     Physical Exam  Vitals and nursing note reviewed  Constitutional:       General: He is not in acute distress  Appearance: Normal appearance  He is well-developed and normal weight  He is not ill-appearing or toxic-appearing  HENT:      Head: Normocephalic and atraumatic  Right Ear: Tympanic membrane, ear canal and external ear normal       Left Ear: Tympanic membrane, ear canal and external ear normal       Nose: No mucosal edema or rhinorrhea  Mouth/Throat:      Mouth: Mucous membranes are not pale, dry and not cyanotic  Pharynx: Oropharynx is clear  No oropharyngeal exudate or posterior oropharyngeal erythema     Eyes: General: No scleral icterus  Right eye: No discharge  Left eye: No discharge  Extraocular Movements: Extraocular movements intact  Conjunctiva/sclera: Conjunctivae normal       Pupils: Pupils are equal, round, and reactive to light  Cardiovascular:      Rate and Rhythm: Normal rate and regular rhythm  Heart sounds: Normal heart sounds  No murmur heard  No gallop  Pulmonary:      Effort: Pulmonary effort is normal  No respiratory distress  Breath sounds: Normal breath sounds  No wheezing or rales  Abdominal:      General: Abdomen is flat  Palpations: Abdomen is soft  Tenderness: There is no abdominal tenderness  Musculoskeletal:         General: No swelling, tenderness, deformity or signs of injury  Cervical back: Normal range of motion  Right lower leg: No edema  Left lower leg: No edema  Skin:     General: Skin is warm  Coloration: Skin is not jaundiced or pale  Findings: No rash  Neurological:      Mental Status: He is alert and oriented to person, place, and time  Mental status is at baseline  Comments: Cranial Nerves: Dysarthria and facial asymmetry (RIGHT LOWER FACIAL DROOP) present  Sensory: Sensory deficit (lle) present  Motor: Weakness (4/5 lle) present  Coordination: Coordination abnormal       Gait: Gait abnormal       Deep Tendon Reflexes: Reflexes abnormal     Psychiatric:         Mood and Affect: Mood normal          Behavior: Behavior normal          Thought Content:  Thought content normal          Judgment: Judgment normal           Deanna MD Lynsey  [address] [facility]

## 2022-10-18 NOTE — PATIENT INSTRUCTIONS

## 2022-10-19 ENCOUNTER — APPOINTMENT (OUTPATIENT)
Dept: PHYSICAL THERAPY | Facility: CLINIC | Age: 52
End: 2022-10-19

## 2022-10-19 ENCOUNTER — APPOINTMENT (OUTPATIENT)
Dept: OCCUPATIONAL THERAPY | Facility: CLINIC | Age: 52
End: 2022-10-19

## 2022-10-21 NOTE — PROGRESS NOTES
Problem List Items Addressed This Visit        Genitourinary    Bilateral nephrolithiasis    Gross hematuria - Primary       Other    Encounter to discuss test results    Family history of prostate cancer            Discussion:    Meghna Schuler did well with his cystoscopy today  This is negative for malignant findings  Urine cytology is negative  He is due for cross sectional imaging which has been ordered  PSA 1 6, low risk for prostate cancer  He has been having easy bruising since being on Plavix  He will return in some weeks for review of his CT scan, this is negative for worsening stone burden or concern for malignant findings proximally he can see us on yearly basis for follow-up and prostate cancer screening      Assessment and plan:     Please see problem oriented charting for the assessment plan of today's urological complaints    [place]      Chief Complaint     Chief Complaint   Patient presents with   • Cystoscopy         History of Present Illness     Natalie Johnson is a 49 y o  man with gross hematuria, history of bilateral nephrolithiasis, family history of prostate cancer  He had hematuria after starting Plavix, this has since resolved  Voiding well at this time  He did take Xanax prior to cystoscopy today due to severe anxiety regarding this  He did tolerate this well  The following portions of the patient's history were reviewed and updated as appropriate: allergies, current medications, past family history, past medical history, past social history, past surgical history and problem list     Detailed Urologic History     - please refer to HPI    Review of Systems     Review of Systems   Constitutional: Negative  HENT: Negative  Eyes: Negative  Respiratory: Negative  Cardiovascular: Negative  Gastrointestinal: Negative  Endocrine: Negative  Genitourinary: Negative  Musculoskeletal: Negative  Skin: Negative  Allergic/Immunologic: Negative  Neurological: Negative  Hematological: Bruises/bleeds easily  Psychiatric/Behavioral: Negative  Allergies     Allergies   Allergen Reactions   • Penicillins Anaphylaxis       Physical Exam     Physical Exam  Vitals reviewed  Constitutional:       General: He is not in acute distress  Appearance: Normal appearance  He is not ill-appearing, toxic-appearing or diaphoretic  HENT:      Head: Normocephalic and atraumatic  Eyes:      General: No scleral icterus  Right eye: No discharge  Left eye: No discharge  Cardiovascular:      Pulses: Normal pulses  Pulmonary:      Effort: Pulmonary effort is normal    Abdominal:      General: There is no distension  Palpations: There is no mass  Genitourinary:     Penis: Normal     Musculoskeletal:         General: No swelling  Skin:     General: Skin is warm  Neurological:      General: No focal deficit present  Mental Status: He is alert and oriented to person, place, and time  Cranial Nerves: No cranial nerve deficit  Psychiatric:         Mood and Affect: Mood normal          Behavior: Behavior normal          Thought Content:  Thought content normal          Judgment: Judgment normal              Vital Signs  Vitals:    10/24/22 1434   BP: 140/88   Weight: 75 3 kg (166 lb)   Height: 5' 7" (1 702 m)         Current Medications       Current Outpatient Medications:   •  ALPRAZolam (XANAX) 1 mg tablet, Take 1 tablet (1 mg total) by mouth 1 (one) time for 1 dose Take 1 hour prior to cystoscopy, Disp: 1 tablet, Rfl: 0  •  amLODIPine (NORVASC) 10 mg tablet, Take 1 tablet (10 mg total) by mouth daily, Disp: 90 tablet, Rfl: 0  •  aspirin 81 mg chewable tablet, Chew 1 tablet (81 mg total) daily, Disp: 30 tablet, Rfl: 0  •  atorvastatin (LIPITOR) 80 mg tablet, Take 1 tablet (80 mg total) by mouth every evening, Disp: 90 tablet, Rfl: 0  •  clopidogrel (Plavix) 75 mg tablet, Take 1 tablet (75 mg total) by mouth daily, Disp: 30 tablet, Rfl: 3  •  ferrous sulfate 325 (65 Fe) mg tablet, Take 325 mg by mouth daily with breakfast, Disp: , Rfl:   •  gabapentin (NEURONTIN) 100 mg capsule, Take 100 mg by mouth 3 (three) times a day, Disp: , Rfl:   •  losartan (COZAAR) 50 mg tablet, Take 1 tablet (50 mg total) by mouth daily, Disp: 90 tablet, Rfl: 0      Active Problems     Patient Active Problem List   Diagnosis   • History of diverticulitis   • Bilateral nephrolithiasis   • Hypertensive urgency   • Elevated serum creatinine   • Chronic back pain   • Stroke-like symptoms   • Symptomatic carotid artery stenosis, bilateral   • Alcohol intake above recommended sensible limits   • Gross hematuria   • Encounter to discuss test results   • Family history of prostate cancer   • Erectile dysfunction due to arterial insufficiency   • Encounter for screening colonoscopy         Past Medical History     Past Medical History:   Diagnosis Date   • Stroke [facility])          Surgical History     Past Surgical History:   Procedure Laterality Date   • CARPAL TUNNEL RELEASE     • CERVICAL FUSION     • ULNAR COLLATERAL LIGAMENT RECONSTRUCTION           Family History     Family History   Problem Relation Age of Onset   • Heart attack Mother    • Diabetes Mother    • Hypertension Mother    • Colon cancer Father    • No Known Problems Sister    • No Known Problems Sister    • No Known Problems Sister    • No Known Problems Son          Social History     Social History     Social History     Tobacco Use   Smoking Status Former Smoker   • Packs/day: 2 00   • Years: 35 00   • Pack years: 70 00   • Types: Cigarettes   • Quit date: 2022   • Years since quittin 2   Smokeless Tobacco Never Used         Pertinent Lab Values     Lab Results   Component Value Date    CREATININE 1 14 10/03/2022       Lab Results   Component Value Date    PSA 1 6 10/03/2022     Low risk for prostate cancer        Pertinent Imaging      Due for CT scan, this has been ordered

## 2022-10-24 ENCOUNTER — OFFICE VISIT (OUTPATIENT)
Dept: PHYSICAL THERAPY | Facility: CLINIC | Age: 52
End: 2022-10-24
Payer: COMMERCIAL

## 2022-10-24 ENCOUNTER — PROCEDURE VISIT (OUTPATIENT)
Dept: UROLOGY | Facility: CLINIC | Age: 52
End: 2022-10-24
Payer: COMMERCIAL

## 2022-10-24 ENCOUNTER — OFFICE VISIT (OUTPATIENT)
Dept: OCCUPATIONAL THERAPY | Facility: CLINIC | Age: 52
End: 2022-10-24
Payer: COMMERCIAL

## 2022-10-24 VITALS
SYSTOLIC BLOOD PRESSURE: 140 MMHG | DIASTOLIC BLOOD PRESSURE: 88 MMHG | BODY MASS INDEX: 26.06 KG/M2 | WEIGHT: 166 LBS | HEIGHT: 67 IN

## 2022-10-24 DIAGNOSIS — R29.90 STROKE-LIKE SYMPTOMS: ICD-10-CM

## 2022-10-24 DIAGNOSIS — R09.89 SYMPTOMS OF CEREBROVASCULAR ACCIDENT (CVA): Primary | ICD-10-CM

## 2022-10-24 DIAGNOSIS — R09.89 SYMPTOMS OF CEREBROVASCULAR ACCIDENT (CVA): ICD-10-CM

## 2022-10-24 DIAGNOSIS — Z80.42 FAMILY HISTORY OF PROSTATE CANCER: ICD-10-CM

## 2022-10-24 DIAGNOSIS — R31.0 GROSS HEMATURIA: Primary | ICD-10-CM

## 2022-10-24 DIAGNOSIS — Z71.2 ENCOUNTER TO DISCUSS TEST RESULTS: ICD-10-CM

## 2022-10-24 DIAGNOSIS — R41.840 COGNITIVE ATTENTION DEFICIT: Primary | ICD-10-CM

## 2022-10-24 DIAGNOSIS — I63.9 CEREBROVASCULAR ACCIDENT (CVA), UNSPECIFIED MECHANISM (HCC): ICD-10-CM

## 2022-10-24 DIAGNOSIS — N20.0 BILATERAL NEPHROLITHIASIS: ICD-10-CM

## 2022-10-24 PROCEDURE — 99213 OFFICE O/P EST LOW 20 MIN: CPT | Performed by: UROLOGY

## 2022-10-24 PROCEDURE — 97110 THERAPEUTIC EXERCISES: CPT

## 2022-10-24 PROCEDURE — 97112 NEUROMUSCULAR REEDUCATION: CPT | Performed by: PHYSICAL THERAPIST

## 2022-10-24 PROCEDURE — 97110 THERAPEUTIC EXERCISES: CPT | Performed by: PHYSICAL THERAPIST

## 2022-10-24 PROCEDURE — 52000 CYSTOURETHROSCOPY: CPT | Performed by: UROLOGY

## 2022-10-24 PROCEDURE — 97530 THERAPEUTIC ACTIVITIES: CPT

## 2022-10-24 RX ORDER — GABAPENTIN 100 MG/1
100 CAPSULE ORAL 3 TIMES DAILY
COMMUNITY
End: 2022-11-03

## 2022-10-24 NOTE — PROGRESS NOTES
Occupational Therapy Daily Note:    Today's date: 10/24/2022  Patient name: Roberto Carlos Zaragoza  : 1970  MRN: [id number]   Referring provider: Mahnaz Flowers DO  Dx:   Encounter Diagnoses   Name Primary? • Cognitive attention deficit Yes   • Stroke-like symptoms    • Symptoms of cerebrovascular accident (CVA)      Patient was treated by JOSIANE Stockton under the supervision of Sarah Carvalho OTR/L  Subjective: "Things have been going okay, I didn't get to go hunting and I am just getting back from a procedure so I am on Xanax right now"      Objective: Pt engaged in skilled OT treatment session with focus on fxnl cognition, short term memory, fxnl attention, UE NMR, UE endurance, FMC/GMC and FMS/GMS to increase engagement, endurance, tolerance, and independence with daily ADL and IADL tasks  CPT Code Minutes                                           Task Details        Therapeutic Activity 30 For improved oculomotor control, visual saccades, and visual pursuits pt engaged in table top activity focusing on "Seek out Words" worksheet  Pt required to locate words in 5 x 5 square that are adjoining left/right, up/down, or diagonally  Pt engaged in velcro roller activities; long handled brush, key, and medium cylinder 1x10 LUE with 3# wrist weight on with focus to FMS/FMC, endurance, and UE strength  Pt transitioned to rotating nuts and bolts on vertical dowel using tripod rotation motion with 3# wrist weight focusing on FMC/FMS, coordination, and prehension patterns  OTS had to downgrade task by taking off wrist weight due to shoulder pain  Neuro Re-Ed               Therapeutic Exercise 30 Pt tolerated 2 5 min x 2 prograde/retrograde motions B/L and 2 5 min x 2 unilateral RUE at 1 4 resist standing at UBE with focus on increasing proximal UE strength, endurance, act tolerance and ROM to inc indep and safety with ADL/IADL fxn and fxnl reaching tasks       Pt engaged in strengthening exercises with DBs standing at mirror to increase UE strength, endurance, ROM, and mobility  Exercises completed:  Bicep curls (supinated) 10 lbs: 2 set x 10  Bicep curls (pronated)10lbs: 2 set x 10   Shoulder press: 2 set x 10  Lateral raises 8lbs: 1 set x 10 (terminated due to pain in L shoulder)  FF 8lbs: 1 set x 10             Manual          Self Care           Assessment: Tolerated treatment well  Pt reporting when tearing K-tape off from last session presented with open wounds  Pt presened with scabbed spots from open wound on top of shoulder in today's session  Pt demo fair tolerance to strengthening exercises, needing to decrease weight for shoulder press and lateral raises due to pain in L shoulder  OTS terminated strengthening exercises on second set of lateral raises due to pain in shoulder  Pt demo good tolerance to bicep curls able to increase weight to 10 lbs due to 8lbs being too easy  Pt transitioned to velcro activities with no reported pain in shoulder or L hand  Pt transitioned to nuts/bolts on small dowels reported 8/10 L shoulder pain after massed practice, OTS removing wrist weight and continuing task with pt reporting 5/10 pain  Pt demo fair tolerance to vision exercise with reported increased blurriness  Patient would benefit from continued skilled OT  Plan: Continued skilled OT per POC Shoulder impingement assessments and pt requested to complete strengthening exercises next session to see if his shoulder will hurt the same amount      INTERVENTION COMMENTS:  Diagnosis: Cognitive attention deficit [R41 470]  Precautions: Currently not driving    FOTO: Completed  Insurance: Payor: [id number] [place] / Plan: [address] [place] / Product Type: Medicaid HMO /   6 of 10 visits, PN due 10/28/2022  POC Expires: 11/28/2022

## 2022-10-24 NOTE — PROGRESS NOTES
Office Cystoscopy Procedure Note    Indication:    Hematuria    Informed consent   The risks, benefits, complications, treatment options, and expected outcomes were discussed with the patient  The patient concurred with the proposed plan and provided informed consent  Anesthesia  Lidocaine jelly 2%    Antibiotic prophylaxis   None    Procedure  The patient was placed in the supineposition, was prepped and draped in the usual manner using sterile technique, and 2% lidocaine jelly instilled into the urethra  A 17 F flexible cystoscope was then inserted into the urethra and the urethra and bladder carefully examined  The following findings were noted:    Findings:  Urethra:  Normal  Prostate:  Lateral lobe hypertrophy, no lesions  Bladder:  Normal mucosa, no lesions, tumors, defects, or stones  Ureteral orifices:  Orthotopic  Other findings:  None, retroflexed view confirms    Specimens: None                 Complications:    None; patient tolerated the procedure well           Disposition: To home            Condition: Stable    Plan: Cystoscopy today is negative for malignant findings  Shows only mild lateral lobe hypertrophy  Cystoscopy     Date/Time 10/24/2022 3:35 PM     Performed by  Parth Lawrence MD     Authorized by Parth Lawrence MD      Universal Protocol:  Consent: Verbal consent obtained  Written consent obtained  Risks and benefits: risks, benefits and alternatives were discussed  Consent given by: patient  Patient understanding: patient states understanding of the procedure being performed  Patient consent: the patient's understanding of the procedure matches consent given  Procedure consent: procedure consent matches procedure scheduled  Relevant documents: relevant documents present and verified  Test results: test results available and properly labeled  Site marked: the operative site was not marked  Radiology Images displayed and confirmed   If images not available, report reviewed: imaging studies available  Required items: required blood products, implants, devices, and special equipment available  Patient identity confirmed: verbally with patient and provided demographic data        Procedure Details:  Procedure type: cystoscopy    Additional Procedure Details: Office Cystoscopy Procedure Note    Indication:    Hematuria    Informed consent   The risks, benefits, complications, treatment options, and expected outcomes were discussed with the patient  The patient concurred with the proposed plan and provided informed consent  Anesthesia  Lidocaine jelly 2%    Antibiotic prophylaxis   None    Procedure  The patient was placed in the supineposition, was prepped and draped in the usual manner using sterile technique, and 2% lidocaine jelly instilled into the urethra  A 17 F flexible cystoscope was then inserted into the urethra and the urethra and bladder carefully examined  The following findings were noted:    Findings:  Urethra:  Normal  Prostate:  Lateral lobe hypertrophy, no lesions  Bladder:  Normal mucosa, no lesions, tumors, defects, or stones  Ureteral orifices:  Orthotopic  Other findings:  None, retroflexed view confirms    Specimens: None                 Complications:    None; patient tolerated the procedure well           Disposition: To home            Condition: Stable    Plan: Cystoscopy today is negative for malignant findings  Shows only mild lateral lobe hypertrophy

## 2022-10-26 ENCOUNTER — OFFICE VISIT (OUTPATIENT)
Dept: OCCUPATIONAL THERAPY | Facility: CLINIC | Age: 52
End: 2022-10-26
Payer: COMMERCIAL

## 2022-10-26 ENCOUNTER — OFFICE VISIT (OUTPATIENT)
Dept: PHYSICAL THERAPY | Facility: CLINIC | Age: 52
End: 2022-10-26
Payer: COMMERCIAL

## 2022-10-26 DIAGNOSIS — I63.9 CEREBROVASCULAR ACCIDENT (CVA), UNSPECIFIED MECHANISM (HCC): ICD-10-CM

## 2022-10-26 DIAGNOSIS — R09.89 SYMPTOMS OF CEREBROVASCULAR ACCIDENT (CVA): Primary | ICD-10-CM

## 2022-10-26 DIAGNOSIS — R26.9 GAIT ABNORMALITY: ICD-10-CM

## 2022-10-26 DIAGNOSIS — R29.90 STROKE-LIKE SYMPTOMS: ICD-10-CM

## 2022-10-26 DIAGNOSIS — R09.89 SYMPTOMS OF CEREBROVASCULAR ACCIDENT (CVA): ICD-10-CM

## 2022-10-26 DIAGNOSIS — R41.840 COGNITIVE ATTENTION DEFICIT: Primary | ICD-10-CM

## 2022-10-26 PROCEDURE — 97110 THERAPEUTIC EXERCISES: CPT

## 2022-10-26 PROCEDURE — 97112 NEUROMUSCULAR REEDUCATION: CPT

## 2022-10-26 PROCEDURE — 97530 THERAPEUTIC ACTIVITIES: CPT

## 2022-10-26 NOTE — PROGRESS NOTES
Daily Note     Today's date: 10/26/2022  Patient name: Юлия Camargo  : 1970  MRN: [id number]  Referring provider: Zohra Zaragoza DO  Dx:   Encounter Diagnosis     ICD-10-CM    1  Symptoms of cerebrovascular accident (CVA)  R09 89    2  Cerebrovascular accident (CVA), unspecified mechanism ([facility] [address] )  I63 9    3  Gait abnormality  R26 9    4  Stroke-like symptoms  R29 90        Start Time: 0900  Stop Time: 1000  Total time in clinic (min): 60 minutes    Subjective: Patient reports no new complaints  Objective: See treatment diary below      Assessment: Tolerated treatment well  Ramona Screen participated in skilled PT session focused on balance, gait, and endurance  He remains challenged on foam with primary recruitment of ankle strategies  Patient experiences increased burning in B/L calves with increased incline on TM  Patient would continue to benefit from skilled PT interventions to address deficits with balance, gait, and endurance  Patient demonstrated fatigue post treatment      Plan: Continue per plan of care        Short Term Goal Expiration Date:(10/28/22)  Long Term Goal Expiration Date: (22)   POC Expiration Date: (22)     Precautions: Significant BL carotid occlusion, HTN       Manuals 10/24 10/26 10/10 10/12 10/17                                   Neuro Re-Ed         Pt Education        Amb w/HT/HN Hallway   No solo  Fwd 100ft ea  back 100ft ea 2ea            Amb w/Turns        Foam Beams Solo   Sidestepping  W/HT and HN  2 laps each     Stepping over high hurdles  Sideways  Forward with cog tasks    forward walking with cones taps   w/cog tasks  SOLO  Sidestepping w/HT/HN x 3 laps ea    Stepping over High hurdles   Fwd x 2 laps w/cog tasks    Lat x 2 laps w/cog tasks Solo step:    HT/hn - 3 laps ea     perturbations with MTB: 3 laps ea    Tandem with pull mtb - 2 laps - 117 bpm    Side stepping pull mT - 2 labs ea - 118 bpm    wtd ball toss - rb - 3 laps side to side - 115bpm SOLO    Foam beams  Side stepping w/HT/HN  Tandem w/HT/HN  X 3 laps ea  HR 99    No Foam  Walking w/ MTB pull carrying 15# bolster 1/4 length x 3 laps    Side stepping MTB pull carrying 15# bolster 1/4 length x 3laps      Red Mat w/wts underneath  Fwd w/tennis ball toss Hand to Hand x 4 laps    Red Mat w/wts underneath  Sidestepping w/ball toss  HR 99       Solo     foam beams  Side stepping w/ HT/HN x4 laps ea    Tandem with HT/HN x4 laps ea     Treadmill walking solo    B/L UE   2 0mph  @ 3 min  RPE 3/10    2  5mph  3 0% incline   @5 min   Hr: 112  BPM  o2 92%    @ 7 min   RPE 4-5/10  @10 min    BPM    12 min RPE 6/10       SOLO B/L UE  2 0 mph x 3 min  RPE 3/10    2  5mph 3% incline x 5 min    02 99%  RPE 6/10    2 5 mph 5% incline  X 4 min    02 96%  RPE 6/10    2 5 mph 7% incline x 3 min  HR  02  RPE 7/10  Total 15 min 7 min -  - 10% - 2 1 mph forward    7 min direction changes fwd/bkwd turns with obstacles random - 10% grade 1 2 mph     throughout  TM unavailable Solo   1 8 mph x5 min  1 8 mph 8% incline x5 min    /90 mmHg post     Tandem walking Fwd 30ft  back 30ft    W/HT fwd  30ft  X 2    W/hn fwd  30ft x 2       Agility  Solo   two foot jump Fwd/[id number]     M/L  10x      BPM  o2 98%        Ther Ex                                                                        Ther Activity        Climb ladder     Solo with CGA  Up/down small A-frame, reaching overhead at top    No weight x3    5# bolster x3    10# bolster x3           Gait Training    SOLO   Fwd/bwd walking w/change of direction                      Modalities

## 2022-10-26 NOTE — PROGRESS NOTES
Occupational Therapy Daily Note:    Today's date: 10/26/2022  Patient name: Apolinar Espinoza  : 1970  MRN: [id number]   Referring provider: Shmuel Shahdi DO  Dx:   Encounter Diagnoses   Name Primary? • Cognitive attention deficit Yes   • Stroke-like symptoms    • Symptoms of cerebrovascular accident (CVA)      Patient was treated by JOSIANE Nuno under the supervision of Grover Pierson OTR/BALJIT  Subjective: "My shoulder pain started before I had my stroke"     Objective: Pt engaged in skilled OT treatment session with focus on fxnl cognition, short term memory, fxnl attention, UE NMR, UE endurance, FMC/GMC and FMS/GMS to increase engagement, endurance, tolerance, and independence with daily ADL and IADL tasks  CPT Code Minutes                                           Task Details        Therapeutic Activity 20 For improved oculomotor control, visual saccades, visual pursuits, and in hand manipulation pt engaged in table top activity focusing on "Pixy Cubes" game  Pt required to follow visual and locate correct cube to make correct design  Neuro Re-Ed               Therapeutic Exercise 30 Pt tolerated 2 5 min x 2 prograde/retrograde motions B/L and 2 5 min x 2 unilateral RUE at 1 4 resist standing at UBE with focus on increasing proximal UE strength, endurance, act tolerance and ROM to inc indep and safety with ADL/IADL fxn and fxnl reaching tasks  Pt engaged in strengthening exercises with 6 lb DBs to increase UE strength, endurance, ROM, and mobility     Exercises completed:  Bicep curls (supinated, standing) 6 lbs: 2 sets x 1 min  Bicep curls (neutral, standing) 6 lbs: 2 sets x 1 min  FF (neutral, in supine on mat) 6 lbs: 2 sets x 1 min    Pt provided isometric HEP program including isometric flexion, isometric extension, isometric abduction, isometric adduction, isometric external rotation, and isometric internal rotation focusing on UE strengthening, increased flexibility, and decreased pain in L shoulder  Manual          Self Care           Assessment: Tolerated treatment well  Pt demo fair tolerance to strengthening exercises, reporting increased L shoulder pain with FF in pronated hand position and chest press  OTS transitioned to having pt complete FF in neutral hand  due to pt's shoulder pain and shoulder popping and terminated chest press due to significant shoulder pain  Pt responded fairly to isometric exercises for carryover into home environment  Pt reported increased pain in L shoulder (6/10) with massed practice of each exercise  Pt transitioned to visual perceptual activity with reported difficulty with in hand manipulation with L hand  Pt demo ability to recognize mistakes during activity and accurately complete both patterns with minimal verbal cues from OTS  Pt needed verbal cuing to move specific blocks around from previous pattern in order to complete the visual pattern at hand  Pt demo increased frustration with size of blocks and rotation in hand  Patient would benefit from continued skilled OT            Plan: Continued skilled OT per POC Shoulder impingement assessments      INTERVENTION COMMENTS:  Diagnosis: Cognitive attention deficit [R41 863]  Precautions: Currently not driving    FOTO: Completed  Insurance: Payor: Bridget Oneill / Plan: Bridget Oneill / Product Type: Medicaid HMO /   7 of 10 visits, PN due 10/28/2022  POC Expires: 11/28/2022

## 2022-10-28 ENCOUNTER — TELEPHONE (OUTPATIENT)
Dept: VASCULAR SURGERY | Facility: CLINIC | Age: 52
End: 2022-10-28

## 2022-10-28 ENCOUNTER — OFFICE VISIT (OUTPATIENT)
Dept: VASCULAR SURGERY | Facility: CLINIC | Age: 52
End: 2022-10-28
Payer: COMMERCIAL

## 2022-10-28 VITALS
SYSTOLIC BLOOD PRESSURE: 144 MMHG | BODY MASS INDEX: 26.06 KG/M2 | DIASTOLIC BLOOD PRESSURE: 104 MMHG | WEIGHT: 166 LBS | HEART RATE: 64 BPM | TEMPERATURE: 97.6 F | HEIGHT: 67 IN

## 2022-10-28 DIAGNOSIS — I65.23 SYMPTOMATIC CAROTID ARTERY STENOSIS, BILATERAL: ICD-10-CM

## 2022-10-28 DIAGNOSIS — I65.21 CAROTID STENOSIS, RIGHT: Primary | ICD-10-CM

## 2022-10-28 PROCEDURE — 99215 OFFICE O/P EST HI 40 MIN: CPT | Performed by: SURGERY

## 2022-10-28 RX ORDER — CLINDAMYCIN PHOSPHATE 900 MG/50ML
900 INJECTION INTRAVENOUS ONCE
OUTPATIENT
Start: 2022-11-08 | End: 2022-10-28

## 2022-10-28 RX ORDER — CHLORHEXIDINE GLUCONATE 0.12 MG/ML
15 RINSE ORAL ONCE
OUTPATIENT
Start: 2022-11-08 | End: 2022-10-28

## 2022-10-28 NOTE — H&P (VIEW-ONLY)
Assessment/Plan:    Symptomatic carotid artery stenosis, bilateral  Bilateral 90% ICA stenosis    Right occipital and basal ganglia CVA 6 weeks ago with good recovery    Schedule for right TCAR   Will need left TCAR subsequently, has left neck incision from c-spine surgery  Still a left TCAR candidate I believe  Continue asa/plavix/statin    Risks, benefits and alternative therapies were discussed with him and he consented to proceed    Able to perform 4 MET's, normal recent TTE  No cardiology clearance needed       Diagnoses and all orders for this visit:    Carotid stenosis, right  -     Case request operating room: ANGIOPLASTY ARTERY CAROTID W/ STENT  Right TCAR,  Transcarotid artery revascularization; Standing  -     CBC and Platelet; Future  -     Basic metabolic panel; Future  -     Type and screen; Future  -     CBC and Platelet  -     Basic metabolic panel  -     Case request operating room: ANGIOPLASTY ARTERY CAROTID W/ STENT  Right TCAR,  Transcarotid artery revascularization    Symptomatic carotid artery stenosis, bilateral    Other orders  -     Diet NPO; Sips with meds; Standing  -     Void on call to OR; Standing  -     Insert peripheral IV; Standing  -     Nursing Communication CHG bath, have staff wash entire body (neck down) per pre op bathing protocol  Routine, evening prior to, and day of surgery ; Standing  -     Nursing Communication Swab both nares with Povidone-Iodine solution, EXCLUDE if patient has shellfish/Iodine allergy  Routine, day of surgery, on call to OR ; Standing  -     chlorhexidine (PERIDEX) 0 12 % oral rinse 15 mL  -     Place sequential compression device; Standing  -     clindamycin (CLEOCIN) IVPB (premix in dextrose) 900 mg 50 mL          Subjective:      Patient ID: Davon Fleming is a 49 y o  male  Patient presents today for f/u office visit to discuss carotid intervention       HPI    The following portions of the patient's history were reviewed and updated as appropriate: allergies, current medications, past family history, past medical history, past social history, past surgical history and problem list   Continues to improve since last visit, still has left eye visual disturbance  Able to walk better than last visit  Tolerating plavix  Review of Systems   Constitutional: Negative  HENT: Negative  Eyes: Negative  Respiratory: Negative  Cardiovascular: Negative  Gastrointestinal: Negative  Endocrine: Negative  Genitourinary: Negative  Musculoskeletal: Positive for back pain  Skin: Negative  Allergic/Immunologic: Negative  Neurological: Negative  Hematological: Negative  Psychiatric/Behavioral: Negative  I have reviewed the ROS above and made changes as needed  Objective:      BP (!) 144/104 (BP Location: Right arm, Patient Position: Sitting)   Pulse 64   Temp 97 6 °F (36 4 °C) (Tympanic)   Ht 5' 7" (1 702 m)   Wt 75 3 kg (166 lb)   BMI 26 00 kg/m²          Physical Exam      General  Exam: alert, awake, oriented, no distress, consistent with stated age    Integumentary  Exam: warm, dry, no gross lesions, no bruises and normal color    Head and Neck  Exam: supple, no bruits, trachea midline, no JVD, no mass or palpable nodes    Eye  Exam: extraoccular movements intact, no scleral icterus, sclera clear, pupils equal round and reactive to light    ENMT  Exam: oral mucosa pink and moist    Chest and Lung  Exam: chest normal without deformity, bilaterally expansive, clear to auscultation    Cardiovascular  Exam: regular rate, regular rhythm, no murmurs, no rubs or gallops    Adbomen  Exam: soft, non-tender, non-distended, no pulsatile abdominal masses, no abdominal bruit    Peripheral Vascular  Exam: no clubbing of the digits of the upper extremity, no cyanosis, no edema, both feet are warm, radial pulses 2+ bilaterally, skin well perfused, without and no varicosities      No widened popliteal pulse noted bilaterally    Upper Extremity:  Palpation: Radial pulse- Bilateral 2+    Lower Extremity:  Palpation: Femoral pulse- Bilateral 2+         Popliteal pulse - Bilateral 2+        Dorsalis Pedis - Bilateral 2+        Posterior tibial - Bilateral 2+    Neurologic  Exam:alert, oriented x 3, cranial nerves II-XII grossly intact  LUE 4/5, remainder 5/5    Left neck scar from ACDF        Operative Scheduling Information:    Hospital:  [facility]    Physician:  Pedro Pablo November + fellow or PA    Surgery: Right TCAR, transcarotid artery revascularization       Urgency:  Standard    Level:  Level 2: Outpatients to be scheduled for surgery with time dependent medical necessity within 2 weeks    Case Length:  Normal    Post-op Bed:  ICU    OR Table:  Hybrid    Equipment Needs:  Rep: [email or web address] [address]  ( send images to [id number] for sizing and have rep send plan to Dr Chamorro November)    Medication Instructions:  Aspirin:   Continue (do not hold)  Plavix:  Continue (do not hold)    Hydration:  No    Contrast Allergy:  no

## 2022-10-28 NOTE — PROGRESS NOTES
Assessment/Plan:    Symptomatic carotid artery stenosis, bilateral  Bilateral 90% ICA stenosis    Right occipital and basal ganglia CVA 6 weeks ago with good recovery    Schedule for right TCAR   Will need left TCAR subsequently, has left neck incision from c-spine surgery  Still a left TCAR candidate I believe  Continue asa/plavix/statin    Risks, benefits and alternative therapies were discussed with him and he consented to proceed    Able to perform 4 MET's, normal recent TTE  No cardiology clearance needed       Diagnoses and all orders for this visit:    Carotid stenosis, right  -     Case request operating room: ANGIOPLASTY ARTERY CAROTID W/ STENT  Right TCAR,  Transcarotid artery revascularization; Standing  -     CBC and Platelet; Future  -     Basic metabolic panel; Future  -     Type and screen; Future  -     CBC and Platelet  -     Basic metabolic panel  -     Case request operating room: ANGIOPLASTY ARTERY CAROTID W/ STENT  Right TCAR,  Transcarotid artery revascularization    Symptomatic carotid artery stenosis, bilateral    Other orders  -     Diet NPO; Sips with meds; Standing  -     Void on call to OR; Standing  -     Insert peripheral IV; Standing  -     Nursing Communication CHG bath, have staff wash entire body (neck down) per pre op bathing protocol  Routine, evening prior to, and day of surgery ; Standing  -     Nursing Communication Swab both nares with Povidone-Iodine solution, EXCLUDE if patient has shellfish/Iodine allergy  Routine, day of surgery, on call to OR ; Standing  -     chlorhexidine (PERIDEX) 0 12 % oral rinse 15 mL  -     Place sequential compression device; Standing  -     clindamycin (CLEOCIN) IVPB (premix in dextrose) 900 mg 50 mL          Subjective:      Patient ID: London Davis is a 49 y o  male  Patient presents today for f/u office visit to discuss carotid intervention       HPI    The following portions of the patient's history were reviewed and updated as appropriate: allergies, current medications, past family history, past medical history, past social history, past surgical history and problem list   Continues to improve since last visit, still has left eye visual disturbance  Able to walk better than last visit  Tolerating plavix  Review of Systems   Constitutional: Negative  HENT: Negative  Eyes: Negative  Respiratory: Negative  Cardiovascular: Negative  Gastrointestinal: Negative  Endocrine: Negative  Genitourinary: Negative  Musculoskeletal: Positive for back pain  Skin: Negative  Allergic/Immunologic: Negative  Neurological: Negative  Hematological: Negative  Psychiatric/Behavioral: Negative  I have reviewed the ROS above and made changes as needed  Objective:      BP (!) 144/104 (BP Location: Right arm, Patient Position: Sitting)   Pulse 64   Temp 97 6 °F (36 4 °C) (Tympanic)   Ht 5' 7" (1 702 m)   Wt 75 3 kg (166 lb)   BMI 26 00 kg/m²          Physical Exam      General  Exam: alert, awake, oriented, no distress, consistent with stated age    Integumentary  Exam: warm, dry, no gross lesions, no bruises and normal color    Head and Neck  Exam: supple, no bruits, trachea midline, no JVD, no mass or palpable nodes    Eye  Exam: extraoccular movements intact, no scleral icterus, sclera clear, pupils equal round and reactive to light    ENMT  Exam: oral mucosa pink and moist    Chest and Lung  Exam: chest normal without deformity, bilaterally expansive, clear to auscultation    Cardiovascular  Exam: regular rate, regular rhythm, no murmurs, no rubs or gallops    Adbomen  Exam: soft, non-tender, non-distended, no pulsatile abdominal masses, no abdominal bruit    Peripheral Vascular  Exam: no clubbing of the digits of the upper extremity, no cyanosis, no edema, both feet are warm, radial pulses 2+ bilaterally, skin well perfused, without and no varicosities      No widened popliteal pulse noted bilaterally    Upper Extremity:  Palpation: Radial pulse- Bilateral 2+    Lower Extremity:  Palpation: Femoral pulse- Bilateral 2+         Popliteal pulse - Bilateral 2+        Dorsalis Pedis - Bilateral 2+        Posterior tibial - Bilateral 2+    Neurologic  Exam:alert, oriented x 3, cranial nerves II-XII grossly intact  LUE 4/5, remainder 5/5    Left neck scar from ACDF        Operative Scheduling Information:    Hospital:  [facility]    Physician:  Romel Teran + fellow or PA    Surgery: Right TCAR, transcarotid artery revascularization       Urgency:  Standard    Level:  Level 2: Outpatients to be scheduled for surgery with time dependent medical necessity within 2 weeks    Case Length:  Normal    Post-op Bed:  ICU    OR Table:  Hybrid    Equipment Needs:  Rep: [email or web address] [address]  ( send images to [id number] for sizing and have rep send plan to Dr Romel Teran)    Medication Instructions:  Aspirin:   Continue (do not hold)  Plavix:  Continue (do not hold)    Hydration:  No    Contrast Allergy:  no

## 2022-10-28 NOTE — TELEPHONE ENCOUNTER
REMINDER: Under Reason For Call, comments MUST be formatted as:   (Surgeon's Initials) / (Procedure)      Special Instructions / FYI:     Procedure: (TCAR)  Transcarotid Artery Revascularization     Level: 2 - Route clearance(s) to [facility] Clearance Pool     Allergies: Penicillins    Instructions Given: NO Bowel Prep General Instructions     Dialysis: Patient is not on dialysis  Return Visit Required Prior to Procedure: No     Consent: I certify that patient has signed, printed, timed, and dated their surgery consent  I certify that the patient's LEGAL NAME and DATE OF BIRTH are written in the upper left corner on BOTH sides of the consent  I certify that BOTH sides of the completed surgery consent have been scanned into the patient's Epic chart by myself on 10/28/2022  Yes, I have LABELED the consent in Epic as Consent for Vascular Procedure  For Surgical Clearances     Levels   1-3   ROUTE this encounter to [facility] Clearance Pool (AND)   [facility] Surgery Coordinator Pool     Level   4   ROUTE this encounter to [facility] Surgery Coordinator Pool       HYDRATION CLEARANCES   ONLY ROUTE TO  [facility] Clearance Pool     Patient does not require any pre operative clearance  Yes, I have ROUTED this encounter to [facility] Surgery Coordinator and/or [facility] Clearance Pool

## 2022-10-31 ENCOUNTER — PREP FOR PROCEDURE (OUTPATIENT)
Dept: VASCULAR SURGERY | Facility: CLINIC | Age: 52
End: 2022-10-31

## 2022-10-31 NOTE — TELEPHONE ENCOUNTER
Verified patient's insurance   CONFIRMED - Patient's insurance is [facility] for you  Is patient requesting a call when authorization has been obtained? Patient did not request a call  Surgery Date: 11-8-22  Primary Surgeon: ROSA MARIA/ Harlan Sun (NPI: [id number])  Assisting Surgeon: Not Applicable (N/A)  Facility: [facility] (Tax: [id number] / NPI: [id number])  Inpatient / Outpatient: Inpatient  Level: 2    Clearance Received: No clearance ordered  Consent Received: Yes, scanned into Epic on 10-31-22  Medication Hold / Last Dose: No Hold on ASA or Plavix  VQI Spreadsheet: Yes  IR Notified: Not Applicable (N/A)  Rep  Notified: [employer]  Equipment Needs: Not Applicable (N/A)  Vas Lab Requested: Not Applicable (N/A)  Patient Contacted: 10-31-22    Diagnosis: I65 21  Procedure/ CPT Code(s): (TCAR)  Transcarotid Artery Revascularization // CPT: 99007    For varicose vein related procedures:   Last LEVDR: Not Applicable (N/A)  CEAP Classification: Not Applicable (N/A)  VCSS: Not Applicable (N/A)    Post Operative Date/ Time: To Be Determined (TBD)     *Please review medication hold(s), PATs, and check H&P with patient  *  PATIENT WAS MAILED SURGERY/SHOWERING/DISCHARGE/COVID INSTRUCTIONS AFTER REVIEWING WITH THEM VIA PHONE CALL       Spoke to patient and his wife to schedule surgery Patient will have blood work done this week at [facility]

## 2022-11-01 NOTE — TELEPHONE ENCOUNTER
Authorization requirements reviewed  Please refer to Jo5 Silvia Demarco / Hernandez Lemus number [id number] for case updates

## 2022-11-02 ENCOUNTER — EVALUATION (OUTPATIENT)
Dept: PHYSICAL THERAPY | Facility: CLINIC | Age: 52
End: 2022-11-02

## 2022-11-02 ENCOUNTER — EVALUATION (OUTPATIENT)
Dept: OCCUPATIONAL THERAPY | Facility: CLINIC | Age: 52
End: 2022-11-02

## 2022-11-02 DIAGNOSIS — I63.9 CEREBROVASCULAR ACCIDENT (CVA), UNSPECIFIED MECHANISM (HCC): ICD-10-CM

## 2022-11-02 DIAGNOSIS — R26.9 GAIT ABNORMALITY: ICD-10-CM

## 2022-11-02 DIAGNOSIS — R29.90 STROKE-LIKE SYMPTOMS: ICD-10-CM

## 2022-11-02 DIAGNOSIS — R41.840 COGNITIVE ATTENTION DEFICIT: Primary | ICD-10-CM

## 2022-11-02 DIAGNOSIS — R09.89 SYMPTOMS OF CEREBROVASCULAR ACCIDENT (CVA): ICD-10-CM

## 2022-11-02 DIAGNOSIS — R09.89 SYMPTOMS OF CEREBROVASCULAR ACCIDENT (CVA): Primary | ICD-10-CM

## 2022-11-02 NOTE — PROGRESS NOTES
OCCUPATIONAL THERAPY PROGRESS UPDATE #1:    11/2/2022  Pedro Baltazar  1970  [id number]  [facility] A, DO    Diagnosis ICD-10-CM Associated Orders   1  Cognitive attention deficit  R41 840    2  Stroke-like symptoms  R29 90    3  Symptoms of cerebrovascular accident (CVA)  R09 89            Assessment/Plan    Skilled Analysis:  Pedro Baltazar is a 49 y o  male referred to Occupational Therapy s/p Cognitive attention deficit [R41 840]  Pt participated in skilled OT Progress Update #1 today to assess functional progression towards goals and POC  Pt reports since start of OT, he feels like he has been doing well, feeling like he's around 65% back to himself  Says he changed the water faucet in his kitchen, taking him about an hr when normally it would take him 15-20 minutes  Says when in the grocery store, he is not feeling as disoriented as he used too, is able to locate/track objects easier  Pt continues to report difficulties with short term memory, frequently repeating questions with his wife, difficulty remembering conversations with his wife  He reports numbness from his lower back to LUE, is unchanged since he had the stroke  He reports feeling that his L eye has continuous pressure and it worsens as day goes on  He has been helping his wife with house cleaning with leaf blowing, doing some painting at home  Pt scheduled vision apt Nov 14th with Dr Gigi Merrill  Pt is undergoing vascular surgery on 11/8, stent placement on R ICA first and 2nd surgery will be 1 month later, due to 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% stenosis  Clinically, OTR has seen improvements with pt's attention, less frustration with tasks, and improved ability to problem solve    Following formalized testing as well as clinical observation, Pt is continuing to present with the following areas of deficit: LUE numbness and discomfort in posterior/scapula and down to rib area, decreased speed and rate of manipulation with FM demands, slight ataxia with forward reaching although improvement evident, decreased proprioception with vision occluded although improvement evident, decreased concentration for prolonged time periods, short term memory recall, > 2 part direction following demands, higher level executive functioning, decreased visual teaming skills and impaired visual acuity impacting near point sustained focus  Recommending pt to continued with visuion HEP including pencil push-ups, saccades and pursuits for completion at home as well as cognitive worksheets being sent home after sessions  Pt will benefit from skilled Occupational Therapy services 2x/week for additional 4 weeks with focus on HEP, functional cognition, functional vision, endurance and FM coordination demands in order to return to  role, worker role as a contractor, and increased self-confidence for IADL's  Pt would benefit from a Fitness to Drive evaluation post 4 weeks and after vision apt, to continue to address visual deficits and memory for safe driving abilities on the road  Giulia Esquivel is in agreement with POC  Short Term Goals = Long Term Goals (8) WKS  Strength/Endurance  · Pt will demo with G tolerance to in stance exercise x 50+ minutes with minimal rest breaks required for increased engagement in work contractor role   = PARTIALLY MET  · Pt will demo with G carryover of HEP to improve functional progression towards goals in Plan of care and for improved functional use of LUE = PARTIALLY MET, decreased consistency seen with HEP's (reports he gets frustrated with some tasks, fair consistency with carryover)    FMC/GMC/Functional Performance  · Pt will increase LUE rate of manipulation by 15% for all FM tests for improved functional performance with salient tasks = DNT today  · Pt will demo with decreased LUE ataxia with functional reach for normalized movement pattern for hammering nails and for improved hand to target accuracy x 90% when reaching for items in tool belt  = GOAL MET, continues with slight ataxia when reaching target     Modalities  · Pt will tolerate IASTM/TENS for improved motor and sensory performance for overall improved pain and sensation to inc safety and engagement with work fxn tasks  = CONTINUE    Cognition  - Direction Following  Pt will increase verbal and written 2-3 step direction following with G processing time and 90% accuracy for improved work demands/social performance and IADLs = PARTIALLY MET, increased frustration easily         -  Memory  o Pt will demo G recall of 90% of verbal / written information utilizing memory strategy of choice for improved STM/delayed memory = NOT MET  o Pt will demo G carryover of use of internal/external memory strategy aides for improved recall of daily events, improved executive functioning with G accuracy =  NOT MET    - Attention  - Pt will report improved daily focus/attention for longer time periods > 6 hrs without report of neuro fatigue in order to improve performance for work role = PARTIALLY MET, tasks requiring longer time frame to be completed     Vision  · Pt will increase oculomotor control for improved saccades, pursuits, con/divergent tasks for improved reading of blue prints, near point focus x 90% accuracy with no increase in symptoms = PARTIALLY MET, continuing with decreased focus, reports his L eye is "lagging", decreased tolerance with visual demands   · Pt will demo with improved visual perceptual skills (depth perception / visual processing)  x 90% for improved accuracy of visual discrimination and spatial relationship tasks = Continue, progressing, demo decreased visual convergence although improved spatial awareness seen         Subjective    SUBJECTIVE: "I used to do things without having to think about it  So now my brain seems a little bit slower   I have to think about it more "   "My depth perception is off, it's not where it needs to be "       PATIENT GOAL: "I want to be able to get back to hunting, fishing, and working "    Patient-Specific Functional Scale   Task is scored 0 (unable to perform activity) to 10 (ability to perform activity independently)    Activity Date: 9/28/2022 Date:  11/2/22   1  Concentration 4/10 5/10    2  Disorientation 2 5/10 4/10    3  LUE weakness/numbness 5/10 6/10    4  Vision 5/10 5/10  "It's gotten better in the R eye, but still the same in the L eye "   5  Frustration   Wife reports pt looses his patience easily   "I've done things so long, it should just come naturally "    6  [place]   0/10        HISTORY OF PRESENT ILLNESS:   Shena Palacios is a 49 y o  male who was referred to Occupational Therapy s/p  Cognitive attention deficit [R41 840]  Pt presented at hospital on 9/13/2022 and was in the hospital for about 5 days, reporting initial symptoms of disorientation and confusion while driving  Pt reported having to use a GPS to get home and does not remember actually driving home  Pt's wife reported pt dragging his left foot and left hand locking up when leaving for the ED  CT work up revealed acute lacunar infarct in the right thalamus and no acute intracranial hemorrhage or mass effect  MRI revealed focal right thalamic acute to subacute infarct and additional linear areas of acute to subacute ischemia involving the medial right temporal-occipital region compatible with PCA territory infarcts  CTA revealed right PCA occlusion and ischemic changes  Pt has a PMH bilateral ulnar nerve release, cervical stenosis, cervial spine surgery (1998), and reports no disc at L4/L5  Shena Palacios resides with wife and son in a 2 story home, with full set of stairs inside and 3 stairs to enter from outside the home  Pt is independent with most ADL/IADLs; not cooking at this time  Pt reports no dizziness and is having no difficulty with buttoning, zipping, or opening containers   Pt reports blurriness and eye closing by itself over time throughout the day  Additionally pt reports his depth perception is not where he wants it to be and personally decided to stop driving for now  He was not told in the hospital to stop driving  Pt is a contractor and likes hunting, 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  Pt reports the following difficulties post stroke: concentration, disorientation (after 5-6 hrs during day), numbness/weakness in LUE with prolonged activity, L hand "locking," and picking up small items such as his pills            PMH:   Past Medical History:   Diagnosis Date   • Stroke ([facility] [address] )          Pain Levels   Restin/10 L elbow  - Had B/L carpal tunnel release       With Activity:  10 at its worst in LUE   - Has been consistent for about a year        Objective     Impairment Observations:    SKYLA EASLEY Comments           UPPER EXTREMITY FUNCTION   Intact Impaired Dominant Hand: Right     /PINCH STRENGTH             Dynamometer    - Gross Grasp 100 lbs 90 lbs subnormal   R/L: Improved 10 lbs    Pinch Meter     - Pincer 16 lbs 10 lbs subnormal   R: Improved 2 lbs  L:  Remained     - Tripod 20 lbs 15 lbs subnormal   R: Slight decrease  L: Remained     - Lateral 19 lbs  13 5 lbs subnormal   R: Improved 1 lb  L: Improved 1/2 lb      AROM (Seated)         ALL full    Elevation      Shoulder FF        Shoulder Ext        Shoulder Abd        Shoulder Add        Horizontal Abd        Horizontal Add        Elbow Flex        Elbow Ext        Pronation        Supination        Wrist Flex        Wrist Ext        Digit Flex        Digit Ex        Composite Grasp        Hook Grasp        Subluxation  None None      MMT             Shoulder FF 5/5 5/5    Shoulder Ext 5/5 5/5    Shoulder Abduction 5/5 5/5    Shoulder Adduction 5/5 5/5    Elbow Flex 5/5 5/5    Elbow Ext 5/5 5/5    Wrist Flex 5/5 5/5    Wrist Ext 5/5 5/5    Gross Grasp 5/5 5/5      SENSATION      Monofilament Testing  Normal: 2 83 mm    Diminished light touch: 3 61 mm    Diminished protective sensation: 4 31 mm    Loss of protective sensation: 4 56    Complete loss of sensation / deep pressure: 6 65 2 83 mm 2 83 mm ventral/dorsal forearm, upper arm, neck       3 61 mm ventral/dorsal hand  Remains    Sharp / Dull  Intact  Intact     Proprioception  Impaired    Hot/Cold Temp Intact  Intact     Stereognosis   Intact Clearly identified: eraser, paperclip, and marble     COORDINATION      Opposition Intact Impaired opposition to lateral D5, double tapping    Finger to Nose Intact Impaired Slight ataxia reaching target, improves with consistency    Rapid Alternating Movement Intact Impaired  Difficulty coordinating    9 Hole Peg Test 22 seconds 25 85  seconds subnormal Grasped more than one peg at a time, dropped 2 pegs on board, difficulty grasping single peg   Fxnl Dexterity Test 22 36 seconds 31 95 seconds   subnormal, decreased memory recall of board formation, decreased speed   Trung Hand Function Test         - Handwriting         - Card Turning         - Small Item p/u         - Simulated Feeding         - Stacking Checkers         - Moving Light Objects         - Moving Heavy Objects        TONE: MODIFIED JEMAL SCALE   Unable to assess today   No increase in muscle tone (0)      Slight Increase in muscle tone with catch and release or min resist at end range (1)      Slight Increase in muscle tone with catch and release, followed by min resistance through remainder of range (1+)      Increased muscle tone through full range, able to be moved easily (2)      Considerable increase in tone, difficult to move (3)      Rigid in Flexion/Extension (4)                   L eye:  20/70 with 0 errors  20/50 with 1 error     R eye:  20/50 R eye      B/L: + blurriness  20/50          Vision       Comments                                        VISION SCREEN         NONE (wife thinks he needs readers)          Symptoms Include headache and blurred vision     Last Eye Exam Unsure            Visual Acuity (near) R eye  20/70 with 0 errors and 20/50 1 error          L eye 20/50 with 0 errors   20/40 with 1 error       Binocularity (near) exotropia and exophoria    Binocularity (far) orthotropia and exophoria    Convergence  Diplopia reported at 4 inches  Significant improvement evident from unable to 4"     Accommodation Blurriness/loss of focus Reported at 1-2 inches  Significant improvement*   Dima String             Alignment  suppression of near Decreased B/L eye teaming after ~5 sec    Mobility             Pursuits slightly jerky in horizontal and vertical planes Improvement evident            Saccades slightly jerky and inaccurate in horizontal, vertical and diagonal planes (under shooting)  Improvement evident     Visual fatigue seen (resetting eyes)   Reports "head feels numb"           Range of Motion WFL    Visual perceptual midline             Midline             Horizon          SCORE DEFICIT RANGE Comments                                         COGNITIVE FXN                    MOCA (8 1)         Education Level = 12 years     Visuospatial/executive functioning 3/5  Improved, decreased copying of figure, wrong time with hands of clock    Naming 3/3  Improved    Memory: 1st trial: 3/5     Memory: 2nd trial:      Attention/concentration 2/2     List of letters:       Serial Seven Subtraction: 1/3  Improved, still difficult and pt gets frustrated   Language/sentence repetition: 2     Language Fluency:       Abstract/Correlational Thinkin/2     Delayed Recall:   Remained   Orientation:   Decreased, reported wrong day   Memory Index Score 4/15                                        Total Score  mild neurocognitive deficits  Slight improvement* from  to          OTHER PLANNED THERAPY INTERVENTIONS:   In stance neuro re-ed  Task-Oriented Training  TENS for pain (PRN)  FMC (Speed / reaction and pincer grasping)  GMC  Proximal to distal teaming  Timed Trials  Manual tx  IASTM  Hand to target  Sensory re-ed (Cutaneous/Proprioceptive)  Seated functional reach: crossing midline  WBearing strategies   Closed chain activities  Open chain activities  HEP (vision/cog)  Convergence/Visual teaming   Visual pursuits/saccades   Executive Functioning   Delayed recall  Memory strategies  Attention  Working Memory   Higher Level Visual Processing     INTERVENTION COMMENTS:  Diagnosis: Cognitive attention deficit [R41 287]  Precautions: Currently not driving    FOTO: Completed  Insurance: Payor: [employer] FOR YOU / Plan: [id number] / Product Type: Medicaid HMO /   8 of 10 visits, PN due 12/2/2022  POC Expires: 11/28/2022

## 2022-11-02 NOTE — PROGRESS NOTES
Progress Note - 30-Day hold w/goal of subsequent DC    Today's date: 2022  Patient name: Neil Zamora  : 1970  MRN: [id number]  Referring provider: Ton Barragan DO  Dx:   Encounter Diagnosis     ICD-10-CM    1  Symptoms of cerebrovascular accident (CVA)  R09 89    2  Cerebrovascular accident (CVA), unspecified mechanism ([facility] [address] )  I63 9    3  Stroke-like symptoms  R29 90    4  Gait abnormality  R26 9        Start Time: 0815  Stop Time: 0900  Total time in clinic (min): 45 minutes    Subjective: Presents to his session 15 minutes past scheduled start time secondary to traffic accompanied by his wife  "I think I've made progress for sure but it all depends on the days  I'm waiting to see how I am feeling after this procedure next week  I'm definitely walking a lot better I'm not sweeping my foot anymore  I want to be able to hunt during rifle season starting after Thanksgiving "    No new c/o pain, fatigue, or new numbness/tingling  Both him and his wife remain concerned about his return to driving due to his difficulties with vision - seeing eye doctor this upcoming week for further input  Has R TCAR scheduled next week - requesting to hold therapies for entirety of next week due to procedure  Objective: See treatment diary below       Balance Test     6 Minute Walk Test (ft): IE: NA   MCTSIB (s): IE: Firm EO: 30s  Firm EC: 30s  Foam EO: 30s  Foam EC: 30s, increased shakiness  PN 22: 30s, minimal sway, appropriate ankle strategy   FGA: IE: 25/30   Gait Speed (m/s): IE: 1 2 m/s   5x Sit To Stand (s): IE: 13 44s no UE  PN 22: 7 09s no UE   TUG (s): IE: 5 72s no AD  PN 22: 5 47s no AD      Gait Analysis: WFL, slightly higher L LE step amplitude  PN 22: WNL, equivalent step amplitude and rupali      Stair Navigation: No UE, reciprocal ascending/descending  Assessment: Skilled progress update completed s/p 1 month skilled PT care    - Demonstrated significant improvements in mobility, LE power, and balance integration with accompanying improvements in gait quality   - Discussed return to hunting pacing and strategies with supervision of wife  - Per requests to hold therapies next week due to procedure and excellent progress demonstrated today as well as progress made towards personal goals will initiate 30-day hold with opportunities for reassessment made known should he feel his mobilities decline after his procedure but ultimate goal of DC from skilled PT care  Goals  ST weeks  1  Pt will demonstrate independence with initial HEP   - MET  2  Pt will improve FGA score minimally by 3 points  - MET  3  Pt will demonstrate significant improvements in steadiness with MCTSIB testing  - MET    LT-12 weeks  1  Pt will demonstrate independence with finalized HEP  - MET  3  Pt will score above predicted FOTO score at DC  - MET    Plan: 30-day hold with subsequent DC       Short Term Goal Expiration Date:(10/28/22)  Long Term Goal Expiration Date: (22)   POC Expiration Date: (22)     Precautions: Significant BL carotid occlusion, HTN       Manuals 10/24 10/26 10/10 10/12 10/17                                   Neuro Re-Ed         Pt Education        Amb w/HT/HN Hallway   No solo  Fwd 100ft ea  back 100ft ea 2ea            Amb w/Turns        Foam Beams Solo   Sidestepping  W/HT and HN  2 laps each     Stepping over high hurdles  Sideways  Forward with cog tasks    forward walking with cones taps   w/cog tasks  SOLO  Sidestepping w/HT/HN x 3 laps ea    Stepping over High hurdles   Fwd x 2 laps w/cog tasks    Lat x 2 laps w/cog tasks Solo step:    HT/hn - 3 laps ea     perturbations with MTB: 3 laps ea    Tandem with pull mtb - 2 laps - 117 bpm    Side stepping pull mT - 2 labs ea - 118 bpm    wtd ball toss - rb - 3 laps side to side - 115bpm SOLO    Foam beams  Side stepping w/HT/HN  Tandem w/HT/HN  X 3 laps ea  HR 99    No Foam  Walking w/ MTB pull carrying 15# bolster 1/4 length x 3 laps    Side stepping MTB pull carrying 15# bolster 1/4 length x 3laps      Red Mat w/wts underneath  Fwd w/tennis ball toss Hand to Hand x 4 laps    Red Mat w/wts underneath  Sidestepping w/ball toss  HR 99       Solo     foam beams  Side stepping w/ HT/HN x4 laps ea    Tandem with HT/HN x4 laps ea     Treadmill walking solo    B/L UE   2 0mph  @ 3 min  RPE 3/10    2  5mph  3 0% incline   @5 min   Hr: 112  BPM  o2 92%    @ 7 min   RPE 4-5/10  @10 min    BPM    12 min RPE 6/10       SOLO B/L UE  2 0 mph x 3 min  RPE 3/10    2  5mph 3% incline x 5 min    02 99%  RPE 6/10    2 5 mph 5% incline  X 4 min    02 96%  RPE 6/10    2 5 mph 7% incline x 3 min  HR  02  RPE 7/10  Total 15 min 7 min -  - 10% - 2 1 mph forward    7 min direction changes fwd/bkwd turns with obstacles random - 10% grade 1 2 mph     throughout  TM unavailable Solo   1 8 mph x5 min  1 8 mph 8% incline x5 min    /90 mmHg post     Tandem walking Fwd 30ft  back 30ft    W/HT fwd  30ft  X 2    W/hn fwd  30ft x 2       Agility  Solo   two foot jump [id number]     M/L  10x      BPM  o2 98%        Ther Ex                                                                        Ther Activity        Climb ladder     Solo with CGA  Up/down small A-frame, reaching overhead at top    No weight x3    5# bolster x3    10# bolster x3           Gait Training    SOLO   Fwd/bwd walking w/change of direction                      Modalities

## 2022-11-03 ENCOUNTER — TELEPHONE (OUTPATIENT)
Dept: GASTROENTEROLOGY | Facility: AMBULARY SURGERY CENTER | Age: 52
End: 2022-11-03

## 2022-11-03 ENCOUNTER — CONSULT (OUTPATIENT)
Dept: GASTROENTEROLOGY | Facility: AMBULARY SURGERY CENTER | Age: 52
End: 2022-11-03

## 2022-11-03 ENCOUNTER — CONSULT (OUTPATIENT)
Dept: NEPHROLOGY | Facility: CLINIC | Age: 52
End: 2022-11-03

## 2022-11-03 VITALS
DIASTOLIC BLOOD PRESSURE: 80 MMHG | SYSTOLIC BLOOD PRESSURE: 180 MMHG | HEIGHT: 67 IN | BODY MASS INDEX: 26.43 KG/M2 | WEIGHT: 168.4 LBS

## 2022-11-03 VITALS
OXYGEN SATURATION: 97 % | DIASTOLIC BLOOD PRESSURE: 100 MMHG | WEIGHT: 168.8 LBS | SYSTOLIC BLOOD PRESSURE: 150 MMHG | BODY MASS INDEX: 26.49 KG/M2 | HEIGHT: 67 IN | HEART RATE: 82 BPM

## 2022-11-03 DIAGNOSIS — K21.9 GASTROESOPHAGEAL REFLUX DISEASE, UNSPECIFIED WHETHER ESOPHAGITIS PRESENT: Primary | ICD-10-CM

## 2022-11-03 DIAGNOSIS — Z80.0 FAMILY HISTORY OF COLON CANCER IN FATHER: ICD-10-CM

## 2022-11-03 DIAGNOSIS — Z12.11 COLON CANCER SCREENING: Primary | ICD-10-CM

## 2022-11-03 DIAGNOSIS — R14.3 FLATULENCE: ICD-10-CM

## 2022-11-03 DIAGNOSIS — Z12.11 COLON CANCER SCREENING: ICD-10-CM

## 2022-11-03 DIAGNOSIS — N20.0 RENAL CALCULI: ICD-10-CM

## 2022-11-03 DIAGNOSIS — I63.81 THALAMIC INFARCTION (HCC): ICD-10-CM

## 2022-11-03 DIAGNOSIS — R19.5 LOOSE STOOLS: ICD-10-CM

## 2022-11-03 DIAGNOSIS — N28.9 ABNORMAL RENAL FUNCTION: ICD-10-CM

## 2022-11-03 DIAGNOSIS — I65.23 SYMPTOMATIC CAROTID ARTERY STENOSIS, BILATERAL: ICD-10-CM

## 2022-11-03 DIAGNOSIS — Z87.19 HISTORY OF DIVERTICULITIS: ICD-10-CM

## 2022-11-03 DIAGNOSIS — N20.0 BILATERAL NEPHROLITHIASIS: Primary | ICD-10-CM

## 2022-11-03 DIAGNOSIS — I10 PRIMARY HYPERTENSION: ICD-10-CM

## 2022-11-03 DIAGNOSIS — R10.9 ABDOMINAL DISCOMFORT: ICD-10-CM

## 2022-11-03 DIAGNOSIS — Z80.0 FAMILY HISTORY OF COLON CANCER: ICD-10-CM

## 2022-11-03 PROBLEM — I16.0 HYPERTENSIVE URGENCY: Status: RESOLVED | Noted: 2021-10-16 | Resolved: 2022-11-03

## 2022-11-03 LAB
SL AMB  POCT GLUCOSE, UA: NORMAL
SL AMB LEUKOCYTE ESTERASE,UA: NORMAL
SL AMB POCT BILIRUBIN,UA: NORMAL
SL AMB POCT BLOOD,UA: NORMAL
SL AMB POCT KETONES,UA: NORMAL
SL AMB POCT NITRITE,UA: NORMAL
SL AMB POCT PH,UA: 7
SL AMB POCT SPECIFIC GRAVITY,UA: 1.01
SL AMB POCT URINE PROTEIN: NORMAL
SL AMB POCT UROBILINOGEN: 0.2

## 2022-11-03 RX ORDER — MULTIVITAMIN
1 TABLET ORAL DAILY
COMMUNITY

## 2022-11-03 RX ORDER — FAMOTIDINE 40 MG/1
40 TABLET, FILM COATED ORAL DAILY
Qty: 90 TABLET | Refills: 1 | Status: SHIPPED | OUTPATIENT
Start: 2022-11-03

## 2022-11-03 NOTE — PROGRESS NOTES
NEPHROLOGY OUTPATIENT CONSULTATION   Sulma Andrew 49 y o  male MRN: [id number]  Date: 11/3/2022  Reason for consultation:   Chief Complaint   Patient presents with   • Consult       ASSESSMENT and PLAN:    Thank you for the courtesy of this consultation  I had the pleasure of seeing Valentin Kebede today in the renal clinic for the initial management of nephrolithiasis  Nephrolithiasis  --duration for the last 20 years  --has not required procedure/intervention  --former smoker who quit approximately 1 month ago  --no prior stone analysis completed  --no prior stone risk profile completed  --recommended he drink plenty of water to maintain a high urine volume of at least 2 5 L per day  --low 2 g sodium diet  --limit caffeine  --avoid smoking  --check Litholink stone risk profile    Hypertension  --diagnosed in his 35s  --uncontrolled as an outpatient, had a recent thalamic stroke about 1 5 months ago  --patient now on losartan 50 mg daily, amlodipine 10 mg daily  --blood pressure currently high in the office as he has not taken his antihypertensive agents as of yet  --currently allowing blood pressure to run slightly above target with the planned procedure next week for the carotids  --after the procedure I would recommend aiming for blood pressure less than 130/80  --BMI 26 3  --low 2 g sodium diet weight loss and exercise strongly recommended  --if blood pressure not at target, I recommended that he take his blood pressure medications immediately recommend increasing losartan to 100 mg daily    --check renal artery Doppler  --check aldosterone/renin ratio  --check catecholamines and metanephrine    Abnormal renal function test  --creatinine fluctuating between 1-1 2 mg/dL  --history of excessive NSAID use for the past 6 years now, 1 5 pack per day smoking for over 20 years, uncontrolled hypertension for years, suspect him to have some underlying chronic kidney disease, likely stage II  --check Cystatin C with eGFR  --blood pressure control with an angiotensin receptor blocker    CVA  --focal right thalamic acute to subacute infarct with linear areas of acute to subacute ischemia involving the medial right temporal occipital region compatible with PCA territory infarcts  --still having some numbness symptoms  --bilateral ICA stenosis  --blood pressure control  --atorvastatin 80 mg daily  --Plavix 75 mg daily, aspirin 81 mg daily  --former smoker    Bilateral carotid artery stenosis  --plan for right TCAR next week    Gross hematuria--resolved  --status post cystoscopy  --PSA was normal  --likely secondary to nephrolithiasis, can not rule out uncontrolled hypertension      PATIENT INSTRUCTIONS:    Patient Instructions   1 )  Low 2 g sodium diet    2 )  Monitor weights at home    3 )  Avoid NSAIDs (ibuprofen, Motrin, Advil, Aleve, naproxen)    4 )  Monitor blood pressure at home, call if blood pressure greater than 150/90 persistently    5 ) I will plan to discuss all results including blood work, and/or imaging at our next visit, unless there is an urgent indication, in which case I will call you earlier  If you have any questions or concerns about your results, please feel free to call our office  6 ) Drink plenty of water to maintain high urine output    7 ) Litholink for stone risk    8 ) Check blood work            HISTORY OF PRESENT ILLNESS:  Requesting Physician: Didi Brito MD    Garry Mclain is a 49 y o  male who has a history of acute CVA about 1 5 months ago, uncontrolled hypertension, nephrolithiasis who presents for evaluation of nephrolithiasis  I did discuss with them about the renal function about a slightly elevated creatinine and reduced GFR suspecting some underlying chronic kidney disease which is mild  He is had uncontrolled hypertension for at least 20 years  He recently had a thalamic infarction and PTCA infarct    He is still having some residual symptoms currently undergoing physical therapy  He was found to have bilateral carotid artery stenosis and is planned for procedure next week  He was a smoker 1 5 pack per day for over 25 years quit a few weeks before his stroke  He also had been using excessive NSAIDs daily on 6 years but stopped recently  No chest pain or shortness of breath  He was found to have bilateral nephrolithiasis since the age of 36s  Has never required any type of intervention  He did have gross hematuria and underwent a cystoscopy      PAST MEDICAL HISTORY:  Past Medical History:   Diagnosis Date   • Stroke [facility])        PAST SURGICAL HISTORY:  Past Surgical History:   Procedure Laterality Date   • CARPAL TUNNEL RELEASE     • CERVICAL FUSION     • ULNAR COLLATERAL LIGAMENT RECONSTRUCTION         ALLERGIES:  Allergies   Allergen Reactions   • Penicillins Anaphylaxis       SOCIAL HISTORY:  Social History     Substance and Sexual Activity   Alcohol Use Not Currently   • Alcohol/week: 6 0 standard drinks   • Types: 6 Cans of beer per week    Comment: daily, heavy use until      Social History     Substance and Sexual Activity   Drug Use Never     Social History     Tobacco Use   Smoking Status Former Smoker   • Packs/day: 2 00   • Years: 35 00   • Pack years: 70 00   • Types: Cigarettes   • Quit date: 2022   • Years since quittin 2   Smokeless Tobacco Never Used       FAMILY HISTORY:  Family History   Problem Relation Age of Onset   • Heart attack Mother    • Diabetes Mother    • Hypertension Mother    • Colon cancer Father    • No Known Problems Sister    • No Known Problems Sister    • No Known Problems Sister    • No Known Problems Son        MEDICATIONS:    Current Outpatient Medications:   •  amLODIPine (NORVASC) 10 mg tablet, Take 1 tablet (10 mg total) by mouth daily, Disp: 90 tablet, Rfl: 0  •  aspirin 81 mg chewable tablet, Chew 1 tablet (81 mg total) daily, Disp: 30 tablet, Rfl: 0  •  atorvastatin (LIPITOR) 80 mg tablet, Take 1 tablet (80 mg total) by mouth every evening, Disp: 90 tablet, Rfl: 0  •  clopidogrel (Plavix) 75 mg tablet, Take 1 tablet (75 mg total) by mouth daily, Disp: 30 tablet, Rfl: 3  •  famotidine (PEPCID) 40 MG tablet, Take 1 tablet (40 mg total) by mouth daily, Disp: 90 tablet, Rfl: 1  •  ferrous sulfate 325 (65 Fe) mg tablet, Take 325 mg by mouth daily with breakfast, Disp: , Rfl:   •  losartan (COZAAR) 50 mg tablet, Take 1 tablet (50 mg total) by mouth daily, Disp: 90 tablet, Rfl: 0  •  Multiple Vitamin (multivitamin) tablet, Take 1 tablet by mouth daily, Disp: , Rfl:   •  ALPRAZolam (XANAX) 1 mg tablet, Take 1 tablet (1 mg total) by mouth 1 (one) time for 1 dose Take 1 hour prior to cystoscopy, Disp: 1 tablet, Rfl: 0    REVIEW OF SYSTEMS:  Review of Systems   Constitutional: Negative for activity change and fever  Respiratory: Negative for cough, chest tightness, shortness of breath and wheezing  Cardiovascular: Negative for chest pain and leg swelling  Gastrointestinal: Negative for abdominal pain, diarrhea, nausea and vomiting  Endocrine: Negative for polyuria  Genitourinary: Negative for difficulty urinating, dysuria, flank pain, frequency and urgency  Skin: Negative for rash  Neurological: Positive for numbness  Negative for dizziness, syncope, light-headedness and headaches  All the systems were reviewed and were negative except as documented on the HPI  PHYSICAL EXAM:  Current Weight: Body mass index is 26 38 kg/m²  Vitals:    11/03/22 0928 11/03/22 1005   BP:  (!) 180/80   Weight: 76 4 kg (168 lb 6 4 oz)    Height: 5' 7" (1 702 m)        Physical Exam  Vitals and nursing note reviewed  Exam conducted with a chaperone present  Constitutional:       General: He is not in acute distress  Appearance: He is well-developed  HENT:      Head: Normocephalic and atraumatic  Eyes:      General: No scleral icterus       Conjunctiva/sclera: Conjunctivae normal       Pupils: Pupils are equal, round, and reactive to light  Cardiovascular:      Rate and Rhythm: Normal rate and regular rhythm  Heart sounds: S1 normal and S2 normal  No murmur heard  No friction rub  No gallop  Pulmonary:      Effort: Pulmonary effort is normal  No respiratory distress  Breath sounds: Normal breath sounds  No wheezing or rales  Abdominal:      General: Bowel sounds are normal       Palpations: Abdomen is soft  Tenderness: There is no abdominal tenderness  There is no rebound  Musculoskeletal:         General: Normal range of motion  Cervical back: Normal range of motion and neck supple  Skin:     Findings: No rash  Neurological:      Mental Status: He is alert and oriented to person, place, and time     Psychiatric:         Behavior: Behavior normal          Laboratory results:   Results for orders placed or performed in visit on 10/03/22   PSA Total, Diagnostic   Result Value Ref Range    PSA, Diagnostic 1 6 0 0 - 4 0 ng/mL   Basic metabolic panel   Result Value Ref Range    Sodium 139 135 - 147 mmol/L    Potassium 3 8 3 5 - 5 3 mmol/L    Chloride 110 (H) 96 - 108 mmol/L    CO2 23 21 - 32 mmol/L    ANION GAP 6 4 - 13 mmol/L    BUN 13 5 - 25 mg/dL    Creatinine 1 14 0 60 - 1 30 mg/dL    Glucose, Fasting 78 65 - 99 mg/dL    Calcium 9 5 8 3 - 10 1 mg/dL    eGFR 74 ml/min/1 73sq m

## 2022-11-03 NOTE — PROGRESS NOTES
[facility] - Outpatient Consultation  Roberto Carlos Zaragoza 49 y o  male MRN: [id number]  Encounter: [id number]          ASSESSMENT AND PLAN:      #1  Colon cancer screening  -patient should undergo colonoscopy for colon cancer screening  however, this would need to be cleared by vascular surgery due to his recent stroke less than 2 months ago and his upcoming carotid stenting as it is unclear whether he will be able to hold palvix for 5 days safely in the near future  -we will discuss with vascular surgery and schedule based on their recommendations  -discussed colonoscopy in detail with patient  discussed procedure, risks including perforation, infection, and bleeding, and benefits in detail with patient   -dulcolax/golytley bowel prep to be ordered once scheduled    #2  History of diverticulitis: had a admission 1 year ago for diverticulitis, saw colorectal, did not have colonoscopy done at that time due to waiting for insurance coverage  -colonoscopy as above    #3  Family history of colon cancer: reports history of colon cancer in father at age 46  -colonoscopy as above    #4  GERD: reports chronic reflux on daily basis, uses tums as needed  -start pepcid 40 mg daily  -consider pantoprazole daily if no improvement with this  -anti-reflux diet and measures  -decrease alcohol use  -avoid NSAIDs  -recommend EGD at same time as colonoscopy to rule out cobos's esophagus, H pylori due to abdominal pain and gas, celiac disease due to intermittent loose stools and gas    #4   Intermittent loose stools with gas and abdominal discomfort: likely functional and related to food intake, chronic  -EGD and colonoscopy as above, rule out h pylori, celiac disease  -avoid food triggers  -avoid dairy as this is a trigger for him      ______________________________________________________________________    HPI:  This is a 49year old male with a history of hypertension, carotid artery stenosis on plavix and aspirin, kidney stones, chronic back pain, diverticulitis 1 year ago and recent stroke approximately 6 weeks ago who presents for a new patient visit for colon cancer screening  He has never had a colonoscopy  He is being followed by vascular surgery and had a right occipital and basal ganglia CVA and is scheduled for right carotid angioplasty and stenting on 11/8/22 followed by left side a month later per patient  Patient reports family history of colon cancer in his father at age 46  Patient also had diverticulitis last year and was hospitalized, seen by colorectal but did not pursue colonoscopy at that time due to insurance issues  He has had chronic GI symptoms pending what foods he eats with gas, intermittent loose stools and abdominal discomfort  He denies blood in the stool but occasionally will see blood with wiping for last year which he attributes to hemorrhoids  denies constipation  Denies black stools  He has chronic GERD for years on a daily basis and uses tums, he denies being on a daily preventative  Denies dysphagia  Reports he will get nausea with over exertion physically but denies vomiting  Appetite is okay  Reports about 10 pound weight loss last year with diverticulitis but has gained a good portion back  Denies NSAID use, stopped smoking cigarettes a few months ago but still vapes, reports drinking 3 beers daily  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  REVIEW OF SYSTEMS:    CONSTITUTIONAL: Denies any fever, chills, rigors, and weight loss  HEENT: No earache or tinnitus  Denies hearing loss or visual disturbances  CARDIOVASCULAR: No chest pain or palpitations  RESPIRATORY: Denies any cough, hemoptysis, shortness of breath or dyspnea on exertion  GASTROINTESTINAL: As noted in the History of Present Illness  GENITOURINARY: No problems with urination  Denies any dysuria  +hematuria  NEUROLOGIC: No dizziness or vertigo, denies headaches  MUSCULOSKELETAL: Denies any muscle or joint pain  +back pain   SKIN: Denies skin rashes or itching  ENDOCRINE: Denies excessive thirst  Denies intolerance to heat or cold  PSYCHOSOCIAL: Denies depression or anxiety  Denies any recent memory loss  Historical Information   Past Medical History:   Diagnosis Date   • Stroke [facility])      Past Surgical History:   Procedure Laterality Date   • CARPAL TUNNEL RELEASE     • CERVICAL FUSION     • ULNAR COLLATERAL LIGAMENT RECONSTRUCTION       Social History   Social History     Substance and Sexual Activity   Alcohol Use Not Currently   • Alcohol/week: 6 0 standard drinks   • Types: 6 Cans of beer per week    Comment: daily, heavy use until      Social History     Substance and Sexual Activity   Drug Use Never     Social History     Tobacco Use   Smoking Status Former Smoker   • Packs/day: 2 00   • Years: 35 00   • Pack years: 70 00   • Types: Cigarettes   • Quit date: 2022   • Years since quittin 2   Smokeless Tobacco Never Used     Family History   Problem Relation Age of Onset   • Heart attack Mother    • Diabetes Mother    • Hypertension Mother    • Colon cancer Father    • No Known Problems Sister    • No Known Problems Sister    • No Known Problems Sister    • No Known Problems Son        Meds/Allergies       Current Outpatient Medications:   •  ALPRAZolam (XANAX) 1 mg tablet  •  amLODIPine (NORVASC) 10 mg tablet  •  aspirin 81 mg chewable tablet  •  atorvastatin (LIPITOR) 80 mg tablet  •  clopidogrel (Plavix) 75 mg tablet  •  ferrous sulfate 325 (65 Fe) mg tablet  •  gabapentin (NEURONTIN) 100 mg capsule  •  losartan (COZAAR) 50 mg tablet    Allergies   Allergen Reactions   • Penicillins Anaphylaxis           Objective     There were no vitals taken for this visit  There is no height or weight on file to calculate BMI          PHYSICAL EXAM:      General Appearance:   Alert, cooperative, no distress   HEENT:   Normocephalic, atraumatic, anicteric    Neck:  Supple, symmetrical, trachea midline   Lungs:   Clear to auscultation bilaterally; no rales, rhonchi or wheezing; respirations unlabored    Heart[de-identified]   Regular rate and rhythm; no murmur, rub, or gallop  Abdomen:   Soft, left sided skin numbness of abdomen, suprpubic abdominal discomfort to palpation, non-distended; normal bowel sounds; no masses, no organomegaly    Genitalia:   Deferred    Rectal:   Deferred    Extremities:  No cyanosis, clubbing or edema    Pulses:  2+ and symmetric    Skin:  No jaundice, rashes, or lesions    Lymph nodes:  No palpable cervical lymphadenopathy        Lab Results:   No visits with results within 1 Day(s) from this visit  Latest known visit with results is:   Appointment on 10/03/2022   Component Date Value   • PSA, Diagnostic 10/03/2022 1 6    • Sodium 10/03/2022 139    • Potassium 10/03/2022 3 8    • Chloride 10/03/2022 110 (A)   • CO2 10/03/2022 23    • ANION GAP 10/03/2022 6    • BUN 10/03/2022 13    • Creatinine 10/03/2022 1 14    • Glucose, Fasting 10/03/2022 78    • Calcium 10/03/2022 9 5    • eGFR 10/03/2022 74          Radiology Results:   No results found

## 2022-11-03 NOTE — PRE-PROCEDURE INSTRUCTIONS
Pre-Surgery Instructions:   Medication Instructions   • amLODIPine (NORVASC) 10 mg tablet Take day of surgery  • aspirin 81 mg chewable tablet Instructions provided by MD   • atorvastatin (LIPITOR) 80 mg tablet Take night before surgery   • clopidogrel (Plavix) 75 mg tablet Instructions provided by MD   • famotidine (PEPCID) 40 MG tablet Take day of surgery  • ferrous sulfate 325 (65 Fe) mg tablet Hold day of surgery  • losartan (COZAAR) 50 mg tablet Hold day of surgery  • Multiple Vitamin (multivitamin) tablet Stop taking 5 days prior to surgery  Covid screening negative as per patient  Reviewed pre op medicine and showering instructions with patient via phone call, verbalizes understanding  Advised patient to stop taking non prescribed vitamins, herbal meds and NSAID's as of 11/3  Advised may take Tylenol products if needed  Advised to take DOS medicine with a small sip water  Advised NPO after MN prior to surgery and surgical services will call (11/7) with scheduled time of hospital arrival     Pt verbalized understanding of all instructions  Patient not interested in referral to sleep medicine  Pt  not interested in smoking cessation education  Pt stopped smoking 8/2022

## 2022-11-03 NOTE — TELEPHONE ENCOUNTER
Okay great  We can do the procedure on ASA  He told me he will need to have the left carotid done as well, possibly one month after the right  I will have our schedulers plan him for a colonoscopy in February to give extra time, can always push it back further if needed  Thank you!

## 2022-11-03 NOTE — TELEPHONE ENCOUNTER
Hey there, I saw this patient in the office today, new patient, can be with any doc, needs EGD and colonoscopy, orders placed, dulc/margaret prep  Please let patient know that he will need to be on plavix for 1 month after the carotid procedure and then it will be discontinued per vascular Dr Ivana Cantu  Since he is likely to have the left side done as well in December time frame, recommend we plan him for procedure in February after plavix is discontinued  If he ends up not getting the left done right away he should let us know and we can always push EGD/colon back farther if needed in the future but will schedule in February for now  Recommend hospital setting due to hx stroke and can be with any physician

## 2022-11-03 NOTE — PATIENT INSTRUCTIONS
1  )  Low 2 g sodium diet    2 )  Monitor weights at home    3 )  Avoid NSAIDs (ibuprofen, Motrin, Advil, Aleve, naproxen)    4 )  Monitor blood pressure at home, call if blood pressure greater than 150/90 persistently    5 ) I will plan to discuss all results including blood work, and/or imaging at our next visit, unless there is an urgent indication, in which case I will call you earlier  If you have any questions or concerns about your results, please feel free to call our office      6 ) Drink plenty of water to maintain high urine output    7 ) Litholink for stone risk    8 ) Check blood work

## 2022-11-04 ENCOUNTER — LAB REQUISITION (OUTPATIENT)
Dept: LAB | Facility: HOSPITAL | Age: 52
End: 2022-11-04

## 2022-11-04 ENCOUNTER — APPOINTMENT (OUTPATIENT)
Dept: LAB | Facility: AMBULARY SURGERY CENTER | Age: 52
End: 2022-11-04
Attending: SURGERY

## 2022-11-04 DIAGNOSIS — I63.81 THALAMIC INFARCTION (HCC): ICD-10-CM

## 2022-11-04 DIAGNOSIS — I65.21 CAROTID STENOSIS, RIGHT: ICD-10-CM

## 2022-11-04 DIAGNOSIS — I65.23 SYMPTOMATIC CAROTID ARTERY STENOSIS, BILATERAL: ICD-10-CM

## 2022-11-04 DIAGNOSIS — I65.21 OCCLUSION AND STENOSIS OF RIGHT CAROTID ARTERY: ICD-10-CM

## 2022-11-04 DIAGNOSIS — I65.23 OCCLUSION AND STENOSIS OF BILATERAL CAROTID ARTERIES: ICD-10-CM

## 2022-11-04 DIAGNOSIS — I10 ESSENTIAL (PRIMARY) HYPERTENSION: ICD-10-CM

## 2022-11-04 DIAGNOSIS — I63.81 OTHER CEREBRAL INFARCTION DUE TO OCCLUSION OR STENOSIS OF SMALL ARTERY (HCC): ICD-10-CM

## 2022-11-04 DIAGNOSIS — N28.9 ABNORMAL RENAL FUNCTION: ICD-10-CM

## 2022-11-04 DIAGNOSIS — N28.9 DISORDER OF KIDNEY AND URETER, UNSPECIFIED: ICD-10-CM

## 2022-11-04 DIAGNOSIS — I10 PRIMARY HYPERTENSION: ICD-10-CM

## 2022-11-04 LAB
ABO GROUP BLD: NORMAL
BLD GP AB SCN SERPL QL: NEGATIVE
RH BLD: POSITIVE
SPECIMEN EXPIRATION DATE: NORMAL

## 2022-11-06 ENCOUNTER — ANESTHESIA EVENT (OUTPATIENT)
Dept: PERIOP | Facility: HOSPITAL | Age: 52
End: 2022-11-06

## 2022-11-07 ENCOUNTER — APPOINTMENT (OUTPATIENT)
Dept: PHYSICAL THERAPY | Facility: CLINIC | Age: 52
End: 2022-11-07

## 2022-11-07 ENCOUNTER — APPOINTMENT (OUTPATIENT)
Dept: OCCUPATIONAL THERAPY | Facility: CLINIC | Age: 52
End: 2022-11-07

## 2022-11-08 ENCOUNTER — APPOINTMENT (OUTPATIENT)
Dept: RADIOLOGY | Facility: HOSPITAL | Age: 52
End: 2022-11-08

## 2022-11-08 ENCOUNTER — HOSPITAL ENCOUNTER (INPATIENT)
Facility: HOSPITAL | Age: 52
LOS: 1 days | Discharge: HOME | End: 2022-11-09
Attending: SURGERY | Admitting: SURGERY

## 2022-11-08 ENCOUNTER — ANESTHESIA (OUTPATIENT)
Dept: PERIOP | Facility: HOSPITAL | Age: 52
End: 2022-11-08

## 2022-11-08 DIAGNOSIS — I65.21 CAROTID STENOSIS, RIGHT: ICD-10-CM

## 2022-11-08 DIAGNOSIS — I63.9 CEREBROVASCULAR ACCIDENT (CVA), UNSPECIFIED MECHANISM (HCC): Primary | ICD-10-CM

## 2022-11-08 PROBLEM — Z98.890 STATUS POST CAROTID SURGERY: Status: ACTIVE | Noted: 2022-09-13

## 2022-11-08 LAB
ABO GROUP BLD: NORMAL
BLD GP AB SCN SERPL QL: NEGATIVE
KCT BLD-ACNC: 140 SEC (ref 89–137)
KCT BLD-ACNC: 146 SEC (ref 89–137)
KCT BLD-ACNC: 309 SEC (ref 89–137)
RH BLD: POSITIVE
SPECIMEN EXPIRATION DATE: NORMAL
SPECIMEN SOURCE: ABNORMAL

## 2022-11-08 PROCEDURE — X2AH336 CEREBRAL EMBOLIC FILTRATION, EXTRACORPOREAL FLOW REVERSAL CIRCUIT FROM RIGHT COMMON CAROTID ARTERY, PERCUTANEOUS APPROACH, NEW TECHNOLOGY GROUP 6: ICD-10-PCS | Performed by: SURGERY

## 2022-11-08 PROCEDURE — 037H3DZ DILATION OF RIGHT COMMON CAROTID ARTERY WITH INTRALUMINAL DEVICE, PERCUTANEOUS APPROACH: ICD-10-PCS | Performed by: SURGERY

## 2022-11-08 PROCEDURE — B3161ZZ FLUOROSCOPY OF RIGHT INTERNAL CAROTID ARTERY USING LOW OSMOLAR CONTRAST: ICD-10-PCS | Performed by: SURGERY

## 2022-11-08 PROCEDURE — 037K34Z DILATION OF RIGHT INTERNAL CAROTID ARTERY WITH DRUG-ELUTING INTRALUMINAL DEVICE, PERCUTANEOUS APPROACH: ICD-10-PCS | Performed by: SURGERY

## 2022-11-08 DEVICE — 9 MM X 40 MM
Type: IMPLANTABLE DEVICE | Site: CAROTID | Status: FUNCTIONAL
Brand: ENROUTE TRANSCAROTID STENT

## 2022-11-08 RX ORDER — EPHEDRINE SULFATE 50 MG/ML
INJECTION INTRAVENOUS AS NEEDED
Status: DISCONTINUED | OUTPATIENT
Start: 2022-11-08 | End: 2022-11-08

## 2022-11-08 RX ORDER — OXYCODONE HYDROCHLORIDE 5 MG/1
5 TABLET ORAL EVERY 4 HOURS PRN
Status: DISCONTINUED | OUTPATIENT
Start: 2022-11-08 | End: 2022-11-09 | Stop reason: HOSPADM

## 2022-11-08 RX ORDER — ONDANSETRON 2 MG/ML
4 INJECTION INTRAMUSCULAR; INTRAVENOUS ONCE AS NEEDED
Status: DISCONTINUED | OUTPATIENT
Start: 2022-11-08 | End: 2022-11-08 | Stop reason: HOSPADM

## 2022-11-08 RX ORDER — MAGNESIUM HYDROXIDE 1200 MG/15ML
LIQUID ORAL AS NEEDED
Status: DISCONTINUED | OUTPATIENT
Start: 2022-11-08 | End: 2022-11-08 | Stop reason: HOSPADM

## 2022-11-08 RX ORDER — CLOPIDOGREL BISULFATE 75 MG/1
75 TABLET ORAL DAILY
Status: DISCONTINUED | OUTPATIENT
Start: 2022-11-09 | End: 2022-11-09 | Stop reason: HOSPADM

## 2022-11-08 RX ORDER — CHLORHEXIDINE GLUCONATE 0.12 MG/ML
15 RINSE ORAL ONCE
Status: COMPLETED | OUTPATIENT
Start: 2022-11-08 | End: 2022-11-08

## 2022-11-08 RX ORDER — CLINDAMYCIN PHOSPHATE 900 MG/50ML
900 INJECTION INTRAVENOUS ONCE
Status: COMPLETED | OUTPATIENT
Start: 2022-11-08 | End: 2022-11-08

## 2022-11-08 RX ORDER — PROTAMINE SULFATE 10 MG/ML
INJECTION, SOLUTION INTRAVENOUS AS NEEDED
Status: DISCONTINUED | OUTPATIENT
Start: 2022-11-08 | End: 2022-11-08

## 2022-11-08 RX ORDER — NITROGLYCERIN 5 MG/ML
INJECTION, SOLUTION INTRAVENOUS AS NEEDED
Status: DISCONTINUED | OUTPATIENT
Start: 2022-11-08 | End: 2022-11-08 | Stop reason: HOSPADM

## 2022-11-08 RX ORDER — HYDROMORPHONE HCL/PF 1 MG/ML
SYRINGE (ML) INJECTION AS NEEDED
Status: DISCONTINUED | OUTPATIENT
Start: 2022-11-08 | End: 2022-11-08

## 2022-11-08 RX ORDER — HEPARIN SODIUM 1000 [USP'U]/ML
INJECTION, SOLUTION INTRAVENOUS; SUBCUTANEOUS AS NEEDED
Status: DISCONTINUED | OUTPATIENT
Start: 2022-11-08 | End: 2022-11-08

## 2022-11-08 RX ORDER — ONDANSETRON 2 MG/ML
INJECTION INTRAMUSCULAR; INTRAVENOUS AS NEEDED
Status: DISCONTINUED | OUTPATIENT
Start: 2022-11-08 | End: 2022-11-08

## 2022-11-08 RX ORDER — HYDRALAZINE HYDROCHLORIDE 20 MG/ML
15 INJECTION INTRAMUSCULAR; INTRAVENOUS
Status: DISCONTINUED | OUTPATIENT
Start: 2022-11-08 | End: 2022-11-09 | Stop reason: HOSPADM

## 2022-11-08 RX ORDER — MIDAZOLAM HYDROCHLORIDE 2 MG/2ML
INJECTION, SOLUTION INTRAMUSCULAR; INTRAVENOUS AS NEEDED
Status: DISCONTINUED | OUTPATIENT
Start: 2022-11-08 | End: 2022-11-08

## 2022-11-08 RX ORDER — LABETALOL HYDROCHLORIDE 5 MG/ML
5 INJECTION, SOLUTION INTRAVENOUS
Status: DISCONTINUED | OUTPATIENT
Start: 2022-11-08 | End: 2022-11-09 | Stop reason: HOSPADM

## 2022-11-08 RX ORDER — HYDROMORPHONE HCL/PF 1 MG/ML
0.5 SYRINGE (ML) INJECTION EVERY 4 HOURS PRN
Status: DISCONTINUED | OUTPATIENT
Start: 2022-11-08 | End: 2022-11-09 | Stop reason: HOSPADM

## 2022-11-08 RX ORDER — ACETAMINOPHEN 325 MG/1
975 TABLET ORAL EVERY 6 HOURS SCHEDULED
Status: DISCONTINUED | OUTPATIENT
Start: 2022-11-08 | End: 2022-11-08

## 2022-11-08 RX ORDER — IODIXANOL 320 MG/ML
INJECTION, SOLUTION INTRAVASCULAR AS NEEDED
Status: DISCONTINUED | OUTPATIENT
Start: 2022-11-08 | End: 2022-11-08 | Stop reason: HOSPADM

## 2022-11-08 RX ORDER — LABETALOL HYDROCHLORIDE 5 MG/ML
INJECTION, SOLUTION INTRAVENOUS AS NEEDED
Status: DISCONTINUED | OUTPATIENT
Start: 2022-11-08 | End: 2022-11-08

## 2022-11-08 RX ORDER — ATORVASTATIN CALCIUM 80 MG/1
80 TABLET, FILM COATED ORAL EVERY EVENING
Status: DISCONTINUED | OUTPATIENT
Start: 2022-11-08 | End: 2022-11-09 | Stop reason: HOSPADM

## 2022-11-08 RX ORDER — HEPARIN SODIUM 5000 [USP'U]/ML
5000 INJECTION, SOLUTION INTRAVENOUS; SUBCUTANEOUS EVERY 8 HOURS SCHEDULED
Status: DISCONTINUED | OUTPATIENT
Start: 2022-11-08 | End: 2022-11-09 | Stop reason: HOSPADM

## 2022-11-08 RX ORDER — HYDROMORPHONE HCL/PF 1 MG/ML
0.5 SYRINGE (ML) INJECTION
Status: COMPLETED | OUTPATIENT
Start: 2022-11-08 | End: 2022-11-08

## 2022-11-08 RX ORDER — ROCURONIUM BROMIDE 10 MG/ML
INJECTION, SOLUTION INTRAVENOUS AS NEEDED
Status: DISCONTINUED | OUTPATIENT
Start: 2022-11-08 | End: 2022-11-08

## 2022-11-08 RX ORDER — FENTANYL CITRATE 50 UG/ML
INJECTION, SOLUTION INTRAMUSCULAR; INTRAVENOUS AS NEEDED
Status: DISCONTINUED | OUTPATIENT
Start: 2022-11-08 | End: 2022-11-08

## 2022-11-08 RX ORDER — GLYCOPYRROLATE 0.2 MG/ML
INJECTION INTRAMUSCULAR; INTRAVENOUS AS NEEDED
Status: DISCONTINUED | OUTPATIENT
Start: 2022-11-08 | End: 2022-11-08

## 2022-11-08 RX ORDER — PROPOFOL 10 MG/ML
INJECTION, EMULSION INTRAVENOUS AS NEEDED
Status: DISCONTINUED | OUTPATIENT
Start: 2022-11-08 | End: 2022-11-08

## 2022-11-08 RX ORDER — LOSARTAN POTASSIUM 50 MG/1
50 TABLET ORAL DAILY
Status: DISCONTINUED | OUTPATIENT
Start: 2022-11-09 | End: 2022-11-09 | Stop reason: HOSPADM

## 2022-11-08 RX ORDER — HYDROMORPHONE HCL/PF 1 MG/ML
0.5 SYRINGE (ML) INJECTION
Status: DISCONTINUED | OUTPATIENT
Start: 2022-11-08 | End: 2022-11-08

## 2022-11-08 RX ORDER — FERROUS SULFATE 325(65) MG
325 TABLET ORAL
Status: DISCONTINUED | OUTPATIENT
Start: 2022-11-09 | End: 2022-11-09 | Stop reason: HOSPADM

## 2022-11-08 RX ORDER — SODIUM CHLORIDE 9 MG/ML
INJECTION, SOLUTION INTRAVENOUS CONTINUOUS PRN
Status: DISCONTINUED | OUTPATIENT
Start: 2022-11-08 | End: 2022-11-08

## 2022-11-08 RX ORDER — SODIUM CHLORIDE 9 MG/ML
125 INJECTION, SOLUTION INTRAVENOUS CONTINUOUS
Status: DISCONTINUED | OUTPATIENT
Start: 2022-11-08 | End: 2022-11-09

## 2022-11-08 RX ORDER — FENTANYL CITRATE/PF 50 MCG/ML
25 SYRINGE (ML) INJECTION
Status: DISCONTINUED | OUTPATIENT
Start: 2022-11-08 | End: 2022-11-08 | Stop reason: HOSPADM

## 2022-11-08 RX ORDER — LIDOCAINE HYDROCHLORIDE 20 MG/ML
INJECTION, SOLUTION EPIDURAL; INFILTRATION; INTRACAUDAL; PERINEURAL AS NEEDED
Status: DISCONTINUED | OUTPATIENT
Start: 2022-11-08 | End: 2022-11-08

## 2022-11-08 RX ORDER — SODIUM CHLORIDE 9 MG/ML
75 INJECTION, SOLUTION INTRAVENOUS CONTINUOUS
Status: DISPENSED | OUTPATIENT
Start: 2022-11-08 | End: 2022-11-08

## 2022-11-08 RX ORDER — ACETAMINOPHEN 325 MG/1
650 TABLET ORAL EVERY 4 HOURS PRN
Status: DISCONTINUED | OUTPATIENT
Start: 2022-11-08 | End: 2022-11-09 | Stop reason: HOSPADM

## 2022-11-08 RX ORDER — HYDROMORPHONE HCL/PF 1 MG/ML
0.5 SYRINGE (ML) INJECTION ONCE
Status: DISCONTINUED | OUTPATIENT
Start: 2022-11-08 | End: 2022-11-09 | Stop reason: HOSPADM

## 2022-11-08 RX ORDER — NEOSTIGMINE METHYLSULFATE 1 MG/ML
INJECTION INTRAVENOUS AS NEEDED
Status: DISCONTINUED | OUTPATIENT
Start: 2022-11-08 | End: 2022-11-08

## 2022-11-08 RX ORDER — AMLODIPINE BESYLATE 10 MG/1
10 TABLET ORAL DAILY
Status: DISCONTINUED | OUTPATIENT
Start: 2022-11-09 | End: 2022-11-09 | Stop reason: HOSPADM

## 2022-11-08 RX ORDER — FAMOTIDINE 20 MG/1
40 TABLET, FILM COATED ORAL
Status: DISCONTINUED | OUTPATIENT
Start: 2022-11-09 | End: 2022-11-09 | Stop reason: HOSPADM

## 2022-11-08 RX ORDER — ASPIRIN 81 MG/1
81 TABLET, CHEWABLE ORAL DAILY
Status: DISCONTINUED | OUTPATIENT
Start: 2022-11-09 | End: 2022-11-09 | Stop reason: HOSPADM

## 2022-11-08 RX ADMIN — SODIUM CHLORIDE 75 ML/HR: 0.9 INJECTION, SOLUTION INTRAVENOUS at 11:06

## 2022-11-08 RX ADMIN — HYDROMORPHONE HYDROCHLORIDE 0.5 MG: 1 INJECTION, SOLUTION INTRAMUSCULAR; INTRAVENOUS; SUBCUTANEOUS at 10:26

## 2022-11-08 RX ADMIN — CHLORHEXIDINE GLUCONATE 0.12% ORAL RINSE 15 ML: 1.2 LIQUID ORAL at 06:43

## 2022-11-08 RX ADMIN — FENTANYL CITRATE 25 MCG: 50 INJECTION, SOLUTION INTRAMUSCULAR; INTRAVENOUS at 10:55

## 2022-11-08 RX ADMIN — ONDANSETRON 4 MG: 2 INJECTION INTRAMUSCULAR; INTRAVENOUS at 10:16

## 2022-11-08 RX ADMIN — FENTANYL CITRATE 100 MCG: 50 INJECTION INTRAMUSCULAR; INTRAVENOUS at 08:12

## 2022-11-08 RX ADMIN — MIDAZOLAM 2 MG: 1 INJECTION INTRAMUSCULAR; INTRAVENOUS at 07:59

## 2022-11-08 RX ADMIN — HEPARIN SODIUM 8000 UNITS: 1000 INJECTION INTRAVENOUS; SUBCUTANEOUS at 09:13

## 2022-11-08 RX ADMIN — HYDROMORPHONE HYDROCHLORIDE 0.5 MG: 1 INJECTION, SOLUTION INTRAMUSCULAR; INTRAVENOUS; SUBCUTANEOUS at 11:37

## 2022-11-08 RX ADMIN — PROPOFOL 30 MG: 10 INJECTION, EMULSION INTRAVENOUS at 09:38

## 2022-11-08 RX ADMIN — CLINDAMYCIN PHOSPHATE 900 MG: 900 INJECTION, SOLUTION INTRAVENOUS at 07:54

## 2022-11-08 RX ADMIN — SODIUM CHLORIDE: 0.9 INJECTION, SOLUTION INTRAVENOUS at 08:19

## 2022-11-08 RX ADMIN — GLYCOPYRROLATE 0.5 MG: 0.2 INJECTION, SOLUTION INTRAMUSCULAR; INTRAVENOUS at 10:16

## 2022-11-08 RX ADMIN — HYDROMORPHONE HYDROCHLORIDE 0.5 MG: 1 INJECTION, SOLUTION INTRAMUSCULAR; INTRAVENOUS; SUBCUTANEOUS at 11:06

## 2022-11-08 RX ADMIN — SODIUM CHLORIDE 125 ML/HR: 0.9 INJECTION, SOLUTION INTRAVENOUS at 06:44

## 2022-11-08 RX ADMIN — PROPOFOL 170 MG: 10 INJECTION, EMULSION INTRAVENOUS at 08:12

## 2022-11-08 RX ADMIN — LABETALOL HYDROCHLORIDE 5 MG: 5 INJECTION, SOLUTION INTRAVENOUS at 10:12

## 2022-11-08 RX ADMIN — NEOSTIGMINE METHYLSULFATE 4 MG: 1 INJECTION INTRAVENOUS at 10:16

## 2022-11-08 RX ADMIN — EPHEDRINE SULFATE 5 MG: 50 INJECTION, SOLUTION INTRAVENOUS at 09:43

## 2022-11-08 RX ADMIN — OXYCODONE 5 MG: 5 TABLET ORAL at 19:58

## 2022-11-08 RX ADMIN — FENTANYL CITRATE 50 MCG: 50 INJECTION INTRAMUSCULAR; INTRAVENOUS at 08:49

## 2022-11-08 RX ADMIN — PROTAMINE SULFATE 40 MG: 10 INJECTION, SOLUTION INTRAVENOUS at 10:03

## 2022-11-08 RX ADMIN — EPHEDRINE SULFATE 5 MG: 50 INJECTION, SOLUTION INTRAVENOUS at 09:27

## 2022-11-08 RX ADMIN — HYDROMORPHONE HYDROCHLORIDE 0.5 MG: 1 INJECTION, SOLUTION INTRAMUSCULAR; INTRAVENOUS; SUBCUTANEOUS at 11:19

## 2022-11-08 RX ADMIN — LIDOCAINE HYDROCHLORIDE 100 MG: 20 INJECTION, SOLUTION EPIDURAL; INFILTRATION; INTRACAUDAL; PERINEURAL at 08:12

## 2022-11-08 RX ADMIN — FENTANYL CITRATE 50 MCG: 50 INJECTION INTRAMUSCULAR; INTRAVENOUS at 08:53

## 2022-11-08 RX ADMIN — ROCURONIUM BROMIDE 10 MG: 50 INJECTION, SOLUTION INTRAVENOUS at 09:11

## 2022-11-08 RX ADMIN — FENTANYL CITRATE 25 MCG: 50 INJECTION, SOLUTION INTRAMUSCULAR; INTRAVENOUS at 10:45

## 2022-11-08 RX ADMIN — ROCURONIUM BROMIDE 50 MG: 50 INJECTION, SOLUTION INTRAVENOUS at 08:12

## 2022-11-08 RX ADMIN — GLYCOPYRROLATE 0.3 MG: 0.2 INJECTION, SOLUTION INTRAMUSCULAR; INTRAVENOUS at 09:25

## 2022-11-08 RX ADMIN — HYDROMORPHONE HYDROCHLORIDE 0.5 MG: 1 INJECTION, SOLUTION INTRAMUSCULAR; INTRAVENOUS; SUBCUTANEOUS at 13:34

## 2022-11-08 RX ADMIN — OXYCODONE 5 MG: 5 TABLET ORAL at 15:09

## 2022-11-08 NOTE — ANESTHESIA POSTPROCEDURE EVALUATION
Post-Op Assessment Note    CV Status:  Stable    Pain management: adequate     Mental Status:  Alert and awake   Hydration Status:  Euvolemic   PONV Controlled:  Controlled   Airway Patency:  Patent      Post Op Vitals Reviewed: Yes      Staff: Anesthesiologist         No complications documented      /75 (11/08/22 1230)    Temp 98 °F (36 7 °C) (11/08/22 1230)    Pulse 80 (11/08/22 1230)   Resp 12 (11/08/22 1230)    SpO2 94 % (11/08/22 1230)

## 2022-11-08 NOTE — CONSULTS
Tyler 1970, 49 y o  male MRN: [id number]  Unit/Bed#: ICU 09 Encounter: [id number]  Primary Care Provider: Denise Chandler MD   Date and time admitted to hospital: 11/8/2022  6:03 AM    Consults    Status post carotid surgery  Assessment & Plan  · Patient with symptomatic bilateral carotic artery stenosis  · S/P transcarotid artery revascularization  · Intraop finding; Initial imaging demonstrated a patent common carotid with a severe focal stenosis in the proximal right internal carotid artery as well as a patent external carotid artery after intervention there was good positioning of the stent with no significant residual stenosis or flow-limiting dissection the patient woke up neurologically at his baseline  · Plan per vascular surgery  · neurocheks q2h  · Continue DAPT- clopidogrel 75 mg and ASA 81 mg daily, atorvastatin 80  · Pain management  ·        CVA (cerebral vascular accident) [facility])  Assessment & Plan  · History of right occipital and basal ganglia ischemic stroke on 9/13, presented with L sided weakness and visual defects  · CTA of the head/Neck with contrast showed occlusion of the right PCA P2 segment, with distal branch reperfusion  Critical-greater than 90%-stenosis in the proximal cervical segments of the bilateral internal carotid arteries  Distal cervical and intracranial segments are patent and normal in caliber  · MRI brain wo contrast 9/14 Impression: 1  Focal right thalamic acute to subacute infarct  Additional linear areas of acute to subacute ischemia involving the medial right temporal-occipital region compatible with PCA territory infarcts  Right PCA occlusion noted on CTA  2  Mild chronic microtraumatic ischemic changes      · Echo: EF 60% G1DD  · Other CV risk factors- former tobacco- 20 years, Hypertension  · Continue DAPT and BP meds        Primary hypertension  Assessment & Plan  Continue amlodipine 10 mg and losartan 50    History of diverticulitis  Assessment & Plan  Currently not symptomatic- denies abdominal pain    -------------------------------------------------------------------------------------------------------------  Chief Complaint: status post right carotic artery revascularization    History of Present Illness     Guy Johnson is a 49 y o  male with PMH of right occipital and basal ganglia stroke 6 weeks ago, also with symptomatic bilateral  carotid artery  Stenosis, hypertension, former tobacco use,  sent to ICU for close monitoring after Transcarotid artery revascularization  Intraop  ; Initial imaging demonstrated a patent common carotid with a severe focal stenosis in the proximal right internal carotid artery as well as a patent external carotid artery after intervention there was good positioning of the stent with no significant residual stenosis or flow-limiting dissection the patient woke up neurologically at his baseline  A 9 mm x 40 mm enroute self expanding stent was placed   Patient is day 0 post surgery, has pain at the incision site, no neurologic or focal deficits      History obtained from chart review  -------------------------------------------------------------------------------------------------------------  Dispo: Transfer to Critical Care     Code Status: Prior  --------------------------------------------------------------------------------------------------------------  Review of Systems   Constitutional: Negative for chills and fever  HENT: Positive for voice change  Negative for ear pain and sore throat  Eyes: Negative for pain and visual disturbance  Respiratory: Negative for cough and shortness of breath  Cardiovascular: Negative for chest pain and palpitations  Gastrointestinal: Negative for abdominal pain and vomiting  Genitourinary: Negative for dysuria and hematuria  Musculoskeletal: Positive for neck pain  Negative for arthralgias and back pain  Myalgias: at inscision site  Skin: Negative for color change and rash  Neurological: Negative for seizures, syncope and weakness  Psychiatric/Behavioral: Negative for agitation, behavioral problems and confusion  All other systems reviewed and are negative  A 12-point, complete review of systems was reviewed and negative except as stated above     Physical Exam  Vitals and nursing note reviewed  Constitutional:       Appearance: Normal appearance  He is well-developed  HENT:      Head: Normocephalic and atraumatic  Eyes:      Conjunctiva/sclera: Conjunctivae normal    Neck:        Comments: Clean transverse carotid incision- no bleeding  Cardiovascular:      Rate and Rhythm: Normal rate and regular rhythm  Heart sounds: No murmur heard  Pulmonary:      Effort: Pulmonary effort is normal  No respiratory distress  Breath sounds: Normal breath sounds  Abdominal:      Palpations: Abdomen is soft  Tenderness: There is no abdominal tenderness  Musculoskeletal:      Cervical back: Neck supple  Skin:     General: Skin is warm and dry  Capillary Refill: Capillary refill takes less than 2 seconds  Neurological:      Mental Status: He is alert and oriented to person, place, and time  Cranial Nerves: No cranial nerve deficit  Motor: No weakness  --------------------------------------------------------------------------------------------------------------  Vitals:   Vitals:    11/08/22 1137 11/08/22 1152 11/08/22 1207 11/08/22 1230   BP: 146/83 132/79 131/80 131/75   BP Location:    Left arm   Pulse: 74 74 72 80   Resp: 12 18 12 12   Temp: 97 9 °F (36 6 °C)   98 °F (36 7 °C)   TempSrc:    Oral   SpO2: 98% 95% 93% 94%   Weight:    82 2 kg (181 lb 3 5 oz)   Height:    5' 7" (1 702 m)     Temp  Min: 97 3 °F (36 3 °C)  Max: 98 °F (36 7 °C)  IBW (Ideal Body Weight): 66 1 kg  Height: 5' 7" (170 2 cm)  Body mass index is 28 38 kg/m²      Laboratory and Diagnostics: Invalid input(s):  EOSPCT                        ABG:    VBG:          Micro:        EKG:   Imaging: I have personally reviewed pertinent reports     and I have personally reviewed pertinent films in PACS      Historical Information   Past Medical History:   Diagnosis Date   • COVID 2020   • Hyperlipidemia    • Hypertension    • Kidney stone    • Muscle weakness    • Spinal stenosis    • Stroke ([facility] [address] ) 2022    left sided weakness with pins/needles     Past Surgical History:   Procedure Laterality Date   • CARPAL TUNNEL RELEASE Bilateral    • CERVICAL FUSION      with hardware   • CYSTOSCOPY     • HERNIA REPAIR     • ULNAR COLLATERAL LIGAMENT RECONSTRUCTION Bilateral      Social History   Social History     Substance and Sexual Activity   Alcohol Use Not Currently   • Alcohol/week: 21 0 standard drinks   • Types: 21 Cans of beer per week    Comment: stopped hard alcohol use  few beers 11 7 22     Social History     Substance and Sexual Activity   Drug Use Not Currently     Social History     Tobacco Use   Smoking Status Former Smoker   • Packs/day: 2 00   • Years: 35 00   • Pack years: 70 00   • Types: Cigarettes   • Quit date: 2022   • Years since quittin 2   Smokeless Tobacco Never Used     Exercise History :   Family History:   Family History   Problem Relation Age of Onset   • Heart attack Mother    • Diabetes Mother    • Hypertension Mother    • Colon cancer Father    • No Known Problems Sister    • No Known Problems Sister    • No Known Problems Sister    • No Known Problems Son      Family history unknown and I have reviewed this patient's family history and commented on sigificant items within the HPI      Medications:  Current Facility-Administered Medications   Medication Dose Route Frequency   • acetaminophen (TYLENOL) tablet 650 mg  650 mg Oral Q4H PRN   • [START ON 2022] amLODIPine (NORVASC) tablet 10 mg  10 mg Oral Daily   • [START ON 2022] aspirin chewable tablet 81 mg 81 mg Oral Daily   • atorvastatin (LIPITOR) tablet 80 mg  80 mg Oral QPM   • [START ON 11/9/2022] clopidogrel (PLAVIX) tablet 75 mg  75 mg Oral Daily   • [START ON 11/9/2022] famotidine (PEPCID) tablet 40 mg  40 mg Oral Early Morning   • [START ON 11/9/2022] ferrous sulfate tablet 325 mg  325 mg Oral Daily With Breakfast   • heparin (porcine) subcutaneous injection 5,000 Units  5,000 Units Subcutaneous Q8H [facility]   • labetalol (NORMODYNE) injection 5 mg  5 mg Intravenous Q15 Min PRN    And   • hydrALAZINE (APRESOLINE) injection 15 mg  15 mg Intravenous Q15 Min PRN    And   • niCARdipine (CARDENE) 25 mg (STANDARD CONCENTRATION) in sodium chloride 0 9% 250 mL  1-15 mg/hr Intravenous Continuous PRN   • HYDROmorphone (DILAUDID) injection 0 5 mg  0 5 mg Intravenous Once   • HYDROmorphone (DILAUDID) injection 0 5 mg  0 5 mg Intravenous Q4H PRN   • [START ON 11/9/2022] losartan (COZAAR) tablet 50 mg  50 mg Oral Daily   • oxyCODONE (ROXICODONE) IR tablet 5 mg  5 mg Oral Q4H PRN   • sodium chloride 0 9 % bolus 500 mL  500 mL Intravenous Once PRN    Followed by   • [START ON 11/10/2022] phenylephrine (GARCÍA-SYNEPHRINE) 50 mg (STANDARD CONCENTRATION) in sodium chloride 0 9% 250 mL   mcg/min Intravenous Continuous PRN   • sodium chloride 0 9 % infusion  125 mL/hr Intravenous Continuous   • sodium chloride 0 9 % infusion  75 mL/hr Intravenous Continuous     Home medications:  Prior to Admission Medications   Prescriptions Last Dose Informant Patient Reported? Taking?    ALPRAZolam (XANAX) 1 mg tablet  Self No No   Sig: Take 1 tablet (1 mg total) by mouth 1 (one) time for 1 dose Take 1 hour prior to cystoscopy   Multiple Vitamin (multivitamin) tablet 11/4/2022 Self Yes Yes   Sig: Take 1 tablet by mouth daily   amLODIPine (NORVASC) 10 mg tablet 11/8/2022 at 0530 Self No Yes   Sig: Take 1 tablet (10 mg total) by mouth daily   aspirin 81 mg chewable tablet 11/8/2022 at 0530 Self No Yes   Sig: Chew 1 tablet (81 mg total) daily atorvastatin (LIPITOR) 80 mg tablet 11/7/2022 at 2200 Self No Yes   Sig: Take 1 tablet (80 mg total) by mouth every evening   clopidogrel (Plavix) 75 mg tablet 11/8/2022 at 0530 Self No Yes   Sig: Take 1 tablet (75 mg total) by mouth daily   famotidine (PEPCID) 40 MG tablet 11/8/2022 at 0530 Self No Yes   Sig: Take 1 tablet (40 mg total) by mouth daily   ferrous sulfate 325 (65 Fe) mg tablet 11/8/2022 at 0530 Self Yes Yes   Sig: Take 325 mg by mouth daily with breakfast   losartan (COZAAR) 50 mg tablet 11/7/2022 at 0800 Self No Yes   Sig: Take 1 tablet (50 mg total) by mouth daily   polyethylene glycol (GOLYTELY) 4000 mL solution   No No   Sig: Take 4,000 mL by mouth once for 1 dose Per office instructions      Facility-Administered Medications: None     Allergies: Allergies   Allergen Reactions   • Penicillins Anaphylaxis       ------------------------------------------------------------------------------------------------------------  Advance Directive and Living Will:      Power of :    POLST:    ------------------------------------------------------------------------------------------------------------  Anticipated Length of Stay is > 2 midnights    Care Time Delivered: 30 min        Yoli De La Fuente MD        Portions of the record may have been created with voice recognition software  Occasional wrong word or "sound a like" substitutions may have occurred due to the inherent limitations of voice recognition software    Read the chart carefully and recognize, using context, where substitutions have occurred

## 2022-11-08 NOTE — INTERVAL H&P NOTE
H&P reviewed  After examining the patient I find no changes in the patients condition since the H&P had been written      Vitals:    11/08/22 1052   BP: 143/86   Pulse: 70   Resp: 15   Temp:    SpO2: 100%

## 2022-11-08 NOTE — ANESTHESIA PROCEDURE NOTES
Arterial Line Insertion  Performed by: Shady Waterman CRNA  Authorized by: Alto Lennox, MD   Consent: Written consent obtained  Patient understanding: patient states understanding of the procedure being performed  Patient consent: the patient's understanding of the procedure matches consent given  Procedure consent: procedure consent matches procedure scheduled  Patient identity confirmed: provided demographic data, hospital-assigned identification number and arm band  Preparation: Patient was prepped and draped in the usual sterile fashion    Indications: hemodynamic monitoring  Orientation:  Right  Location: radial artery  Procedure Details:  Needle gauge: 20  Seldinger technique: Seldinger technique used  Number of attempts: 1    Post-procedure:  Post-procedure: dressing applied  Waveform: good waveform  Post-procedure CNS: normal  Patient tolerance: Patient tolerated the procedure well with no immediate complications

## 2022-11-08 NOTE — ANESTHESIA PREPROCEDURE EVALUATION
Procedure:  ANGIOPLASTY ARTERY CAROTID W/ STENT TCAR, (Right Neck)    Relevant Problems   CARDIO   (+) Primary hypertension   (+) Symptomatic carotid artery stenosis, bilateral      /RENAL   (+) Bilateral nephrolithiasis      MUSCULOSKELETAL   (+) Chronic back pain      NEURO/PSYCH  left sided numbness  etoh abuse   (+) CVA (cerebral vascular accident) ([facility] [address] )   (+) Chronic back pain   (+) History of diverticulitis      Nervous and Auditory   (+) Thalamic infarction [facility])        Physical Exam    Airway    Mallampati score: II  TM Distance: >3 FB  Neck ROM: limited     Dental   No notable dental hx     Cardiovascular  Cardiovascular exam normal    Pulmonary  Pulmonary exam normal     Other Findings        Anesthesia Plan  ASA Score- 3     Anesthesia Type- general with ASA Monitors  Additional Monitors: arterial line  Airway Plan: ETT  Plan Factors-Exercise tolerance (METS): >4 METS  Chart reviewed  Patient is not a current smoker  Patient instructed to abstain from smoking on day of procedure  Patient did not smoke on day of surgery  Obstructive sleep apnea risk education given perioperatively  Induction- intravenous  Postoperative Plan- Plan for postoperative opioid use  Planned trial extubation    Informed Consent- Anesthetic plan and risks discussed with patient

## 2022-11-08 NOTE — PLAN OF CARE
Problem: CARDIOVASCULAR - ADULT  Goal: Maintains optimal cardiac output and hemodynamic stability  Description: INTERVENTIONS:  - Monitor I/O, vital signs and rhythm  - Monitor for S/S and trends of decreased cardiac output  - Administer and titrate ordered vasoactive medications to optimize hemodynamic stability  - Assess quality of pulses, skin color and temperature  - Assess for signs of decreased coronary artery perfusion  - Instruct patient to report change in severity of symptoms  Outcome: Progressing  Goal: Absence of cardiac dysrhythmias or at baseline rhythm  Description: INTERVENTIONS:  - Continuous cardiac monitoring, vital signs, obtain 12 lead EKG if ordered  - Administer antiarrhythmic and heart rate control medications as ordered  - Monitor electrolytes and administer replacement therapy as ordered  Outcome: Progressing     Problem: DISCHARGE PLANNING  Goal: Discharge to home or other facility with appropriate resources  Description: INTERVENTIONS:  - Identify barriers to discharge w/patient and caregiver  - Arrange for needed discharge resources and transportation as appropriate  - Identify discharge learning needs (meds, wound care, etc )  - Arrange for interpretive services to assist at discharge as needed  - Refer to Case Management Department for coordinating discharge planning if the patient needs post-hospital services based on physician/advanced practitioner order or complex needs related to functional status, cognitive ability, or social support system  Outcome: Progressing

## 2022-11-08 NOTE — OP NOTE
OPERATIVE REPORT  PATIENT NAME: Guy Johnson    :  1970  MRN: [id number]  Pt Location: AL HYBRID 09    SURGERY DATE: 2022    Surgeon(s) and Role:     * Sveta Arias,  - Primary     * Aida Villalobos DO - Assisting    Preop Diagnosis:  Carotid stenosis, right [I65 21]    Asymptomatic 90% right ICA    Post-Op Diagnosis Codes:     * Carotid stenosis, right [I65 21]  same    Procedure(s) (LRB):  ANGIOPLASTY ARTERY CAROTID W/ STENT TCAR, (Right)  US guided percutaneous access right femoral vein  Supervision and interpretation of all radiologic imaging    Specimen(s):  * No specimens in log *    Estimated Blood Loss:   Minimal    Drains:  * No LDAs found *    Anesthesia Type:   General    Implants:  9mm x 40mm Enroute stent right CCA and ICA      Operative Indications:  Carotid stenosis, right [I65 21]  This is a 71-year-old male who had a right occipital and basal ganglia stroke 6 weeks ago he initially had left visual field defects and left lower extremity weakness this subsequently improved significantly during his workup he was found to have 90% internal carotid artery stenosis bilaterally I recommended intervention for the right carotid 1st and I recommended a TCAR due to the distal location of his lesion  I discussed all risks benefits and alternative therapies with him in detail and he consented to proceed the patient was preoperatively on aspirin Plavix and a statin for greater than a week before his operation    Operative Findings:  Initial imaging demonstrated a patent common carotid with a severe focal stenosis in the proximal right internal carotid artery as well as a patent external carotid artery after intervention there was good positioning of the stent with no significant residual stenosis or flow-limiting dissection the patient woke up neurologically at his baseline      Complications:   None    Procedure and Technique:  Informed consent was obtained the appropriate parties the patient was correctly identified in holding he was brought to the operating room placed in supine position appropriate lines monitors were placed after satisfactory induction of general endotracheal anesthesia the right neck and groins were prepped draped in normal sterile fashion a Joeo time-out was performed the patient received appropriate periprocedural antibiotics I began by making a transverse incision about 2 cm superior to the clavicle between the 2 heads of the right sternocleidomastoid dissection was carried through the subcutaneous tissue through the platysma dissection was carried through the clavicular and sternal heads of the sternocleidomastoid retractor was placed the carotid sheath was then entered and the right common carotid artery was circumferentially dissected out sharply and controlled with a a vessel loop the patient had a preprocedural ACT measured which was 140 and then the patient was given 8000 units of IV heparin and ACT was retracted 3 minutes later which was 307 at this point 5 0 pursestring suture was placed in the common carotid artery and percutaneous access was obtained of the left common femoral vein under ultrasound guidance with an image recorded in the medical record using a micropuncture needle and then over a Bentson wire this was exchanged for the venous sheath from the enroute kit    At this point the common carotid was then accessed using a micropuncture needle from the kit with care not to advance the needle too far into the vessel the marked wire was then advanced also to take care to only advanced the wire few cm into the common carotid so not to cross the lesion the needle was then exchanged for a micropuncture catheter which was advanced to have cm into the right common carotid artery and a common sheath via carotid angiogram was obtained with appropriate angulation display the bifurcation with the above-stated findings we were able to engage the IC the external carotid and so this was selected using a short 1 4 wire and the micropuncture dilator and sheath were advanced into the external carotid the wire and dilator were then removed and then a a short Amplatz wire was advanced into the external carotid and the micropuncture sheath was exchanged for the procedure all 8 [place] enroute sheath this was then advanced and hovered to the level of the common carotid and then the Amplatz wire and dilator from the 8 [place] sheath were then removed the sheath was then flushed it was sutured into place using silk suture and the sheath in the femoral vein was also sutured in the place in the circuit was then connected and bled to of de-aired the system and then a TCAR time-out was performed to confirm appropriate blood pressure and heart rate once this was done a working image was taken of the right carotid artery with a roadmap then the lesion was crossed using the AL1 for interventional wire from the enroute kit and over this a 4 mm x 20 balloon was used to pre dilate the right internal carotid artery lesion the balloon was taken nominal pressure for just a few seconds it should be noted the patient was pretreated with glycopyrrolate once this was done a 9 mm x 40 mm enroute self expanding stent was then deployed from the proximal right internal carotid into the distal common carotid spanning the lesion time 2 minutes was allowed to elapse once the stent was deployed to allow adequate flushing in flow reversal from the carotid vessels and once this was done an image was taken with multiple views to look at the intervention which was noted to not have any significant residual stenosis with a good positioning of the stent there was some mild spasm in the internal carotid which was treated with 100 mcg of nitro the wire was then removed as no further intervention was planned and then another minute was allowed to elapse with flow reversal once that minute had elapsed the common carotid was unclamped the circuit was disconnected the blood was returned to the body the sheath was removed the pursestring suture was tied down this was noted to be hemostatic the femoral sheath was also removed the neck incision was irrigated some Surgicel was placed in the wound it was closed with 3-0 Vicryl to close the platysma and 4-0 Monocryl to close the subcutaneous artery ischemia to close the skin Dermabond was applied the patient was given 40 mg of protamine for partial reversal the left groin sheath site was dressed with a 4 x 4 and Tegaderm the patient was extubated uneventfully operating taken recovery in stable condition he was noted to be neurologically intact immediately after extubation  I was present for the entire procedure    Patient Disposition:  PACU         SIGNATURE: Parminder Cain DO  DATE: November 8, 2022  TIME: 11:00 AM      Vascular Quality Initiative - Carotid Artery Stent    Functional Status: Fully active; able to carry on all predisease activities without restriction  High Risk Factors For CEA: Anatomic risk Lession above C2    Refused for Surgery: No    Pre-op Imaging is CT/CTA: Yes      Lesion Calcification: 51-99% circumference    Arch Atherosclerosis: Mild    Urgency: Elective      ASA: III  Anesthesia:  General    Indication: Asymptomatic stenosis    Skin Prep: Chlorhexidene    Arch Type:   Bovine Arch: no     Approach: Carotid open     Medication Loading: Both    Prophylactic Anti-bradyarrhythmic yes    Anticoagulant: Heparin    Antiplatelet IIb/IIIa: yes    Bradyarrhythmia Requiring Tx: No    Fluoro Time:   5 5min    Distinct Lesions Treated: 1      If Distinct Lesions Treated is 1, Second Stenosis (Not Treated): No    Lesion Type:  Atherosclerosis  Lesion Side: right    Lesion Location: ICA     ICA Distal Tortuosity: None/Mild    Lesion Length: 1 6mm    Lesion Stenosis:90%    Protection Device Used: Yes, successful      Protection Device Type: Flow reversal     Flow Reversal Type: [address] ENROUTE      Flow Reversal Time: 10 minutes    Pre-dilate Before Protection Device: no  Angioplasty required in order to cross lesion with protection device  For TCAR procedures answer yes if vessel pre-dilated prior to stent placement, or no if not pre-dilated before stent placement  Pre-dilate Lesion: yes  Pre-dilate Lesion: Angioplasty required in order to cross lesion for stent placement  Required even if there is a technical failure in stent deployment       Technical Failure: No       Number of Stents: 1    Post- dilate: No      Neurologic Change: No    Intra-Cranial Completion Angiogram: No    Total Procedure Time:   Event Time In   Procedure Start 0849   Procedure Closing 1010   Procedure Finish 1016         DAP: 16 21    20cc visipaque

## 2022-11-08 NOTE — ASSESSMENT & PLAN NOTE
· Patient with symptomatic bilateral carotic artery stenosis  · S/P transcarotid artery revascularization  · Intraop finding; Initial imaging demonstrated a patent common carotid with a severe focal stenosis in the proximal right internal carotid artery as well as a patent external carotid artery after intervention there was good positioning of the stent with no significant residual stenosis or flow-limiting dissection the patient woke up neurologically at his baseline    · Plan per vascular surgery  · neurocheks q2h  · Continue DAPT- clopidogrel 75 mg and ASA 81 mg daily, atorvastatin 80  · Pain management  ·

## 2022-11-09 ENCOUNTER — DOCUMENTATION (OUTPATIENT)
Dept: VASCULAR SURGERY | Facility: CLINIC | Age: 52
End: 2022-11-09

## 2022-11-09 ENCOUNTER — APPOINTMENT (OUTPATIENT)
Dept: PHYSICAL THERAPY | Facility: CLINIC | Age: 52
End: 2022-11-09

## 2022-11-09 ENCOUNTER — APPOINTMENT (OUTPATIENT)
Dept: OCCUPATIONAL THERAPY | Facility: CLINIC | Age: 52
End: 2022-11-09

## 2022-11-09 VITALS
RESPIRATION RATE: 29 BRPM | HEIGHT: 67 IN | BODY MASS INDEX: 28.44 KG/M2 | TEMPERATURE: 98 F | WEIGHT: 181.22 LBS | DIASTOLIC BLOOD PRESSURE: 77 MMHG | HEART RATE: 102 BPM | SYSTOLIC BLOOD PRESSURE: 154 MMHG | OXYGEN SATURATION: 96 %

## 2022-11-09 LAB
ANION GAP SERPL CALCULATED.3IONS-SCNC: 9 MMOL/L (ref 4–13)
APTT PPP: 29 SECONDS (ref 23–37)
BUN SERPL-MCNC: 9 MG/DL (ref 5–25)
CALCIUM SERPL-MCNC: 8.3 MG/DL (ref 8.3–10.1)
CHLORIDE SERPL-SCNC: 107 MMOL/L (ref 96–108)
CO2 SERPL-SCNC: 24 MMOL/L (ref 21–32)
CREAT SERPL-MCNC: 1 MG/DL (ref 0.6–1.3)
ERYTHROCYTE [DISTWIDTH] IN BLOOD BY AUTOMATED COUNT: 12.7 % (ref 11.6–15.1)
GFR SERPL CREATININE-BSD FRML MDRD: 87 ML/MIN/1.73SQ M
GLUCOSE SERPL-MCNC: 90 MG/DL (ref 65–140)
HCT VFR BLD AUTO: 32.8 % (ref 36.5–49.3)
HGB BLD-MCNC: 11.4 G/DL (ref 12–17)
INR PPP: 1.03 (ref 0.84–1.19)
MCH RBC QN AUTO: 32.9 PG (ref 26.8–34.3)
MCHC RBC AUTO-ENTMCNC: 34.8 G/DL (ref 31.4–37.4)
MCV RBC AUTO: 95 FL (ref 82–98)
PLATELET # BLD AUTO: 216 THOUSANDS/UL (ref 149–390)
PMV BLD AUTO: 9.5 FL (ref 8.9–12.7)
POTASSIUM SERPL-SCNC: 3.7 MMOL/L (ref 3.5–5.3)
PROTHROMBIN TIME: 13.5 SECONDS (ref 11.6–14.5)
RBC # BLD AUTO: 3.47 MILLION/UL (ref 3.88–5.62)
SODIUM SERPL-SCNC: 140 MMOL/L (ref 135–147)
WBC # BLD AUTO: 7.45 THOUSAND/UL (ref 4.31–10.16)

## 2022-11-09 RX ORDER — OXYCODONE HYDROCHLORIDE 5 MG/1
5 TABLET ORAL EVERY 4 HOURS PRN
Qty: 10 TABLET | Refills: 0 | Status: SHIPPED | OUTPATIENT
Start: 2022-11-09 | End: 2022-11-11 | Stop reason: SDUPTHER

## 2022-11-09 RX ADMIN — AMLODIPINE BESYLATE 10 MG: 10 TABLET ORAL at 08:30

## 2022-11-09 RX ADMIN — FAMOTIDINE 40 MG: 20 TABLET ORAL at 05:07

## 2022-11-09 RX ADMIN — FERROUS SULFATE TAB 325 MG (65 MG ELEMENTAL FE) 325 MG: 325 (65 FE) TAB at 08:29

## 2022-11-09 RX ADMIN — OXYCODONE 5 MG: 5 TABLET ORAL at 00:46

## 2022-11-09 RX ADMIN — LOSARTAN POTASSIUM 50 MG: 50 TABLET, FILM COATED ORAL at 08:29

## 2022-11-09 RX ADMIN — ASPIRIN 81 MG CHEWABLE TABLET 81 MG: 81 TABLET CHEWABLE at 08:29

## 2022-11-09 RX ADMIN — CLOPIDOGREL BISULFATE 75 MG: 75 TABLET ORAL at 08:30

## 2022-11-09 RX ADMIN — SODIUM CHLORIDE 125 ML/HR: 0.9 INJECTION, SOLUTION INTRAVENOUS at 01:01

## 2022-11-09 RX ADMIN — Medication 1 SPRAY: at 00:46

## 2022-11-09 RX ADMIN — OXYCODONE 5 MG: 5 TABLET ORAL at 06:15

## 2022-11-09 RX ADMIN — OXYCODONE 5 MG: 5 TABLET ORAL at 10:58

## 2022-11-09 NOTE — DISCHARGE INSTRUCTIONS
DISCHARGE INSTRUCTIONS  CAROTID ARTERIOGRAM/STENT    ACTIVITY:  Limit your physical activity to walking for the first week and then increase your activity as tolerated  Walking up steps and normal activities may be resumed as you feel ready  Avoid strenuous activity such as vigorous exercise  Avoid heavy lifting (do not lift more than 15 pounds) for the first four weeks after surgery  You should not drive a car for at least one week following discharge from the hospital and you are off all narcotic pain medication  You may ride in a car  If you have had a stroke or mini-stroke, please consult with your neurologist prior to driving  DO NOT START OR RESUME ANY PREVIOUSLY PLANNED THERAPY (PHYSICAL, CARDIAC, REHAB, ETC…) UNTIL YOU DISCUSS WITH YOUR SURGEON AND THEY FEEL IT IS SAFE TO ENGAGE IN THERAPY  DIET:  Resume your normal diet  Good nutrition is important for healing of your incision  Drink more water than usual for the next 24 hours  You can expect to have a sore throat and trouble swallowing after surgery which should improve quickly  If you feel like you are choking, please call your doctor  RECURRENT SYMPTOMS: If you develop any new numbness, weakness, vision changes, drooping of the face, or difficulty with speech after discharge, CALL 911 or go to the nearest Emergency department immediately  INCISION/PROCEDURE SITE: You have an incision site at your neck and a procedure site in your groin  You may have surgical glue at these sites  There are stitches present under the skin which will absorb on their own  The glue is used to cover the access, assist in closure, and prevent contamination  This adhesive will darken and peel away on its own within one to two weeks  Do not pick at it  If you have a bandage over either site, please remove this on the second day after surgery  You should shower daily    Wash incision daily with soap and water, but do not rub or scrub the incision; rinse thoroughly and pat dry  Do not bathe in a tub or swim for the first 4 weeks following surgery or if you have any open wounds  It is normal to have some bruising, swelling or discoloration around the incision  IF increasing redness, pain, bleeding or a bulge develops, call our office immediately  If you notice any active bleeding at the site, apply pressure to the site and call our office ([phone]) or 911  FOLLOW UP STUDIES:  Doppler ultrasound studies are very important to your post-procedure care  Your Physician will arrange for them at your first post-procedure visit  The first study will be 6 weeks after surgery  FOLLOW UP APPOINTMENTS:  Making and keeping follow up appointments and ultrasound tests are important to your recovery  If you have difficulty making it to or keeping your follow up appointments, call the office  If you have increased pain, fever >101 5, increased drainage, redness or a bad smell at your surgery site, new coldness/numbness of your arm or leg, please call us immediately and GO directly to the ER  PLEASE CALL THE OFFICE IF YOU HAVE ANY QUESTIONS  [phone]  [phone]  [address] [facility] , [address], [place], [address]  [address], [address], [place], [address] [facility] [address]  [address], [facility], [address] [facility]  [address] [facility], One [facility], [facility], [address] [facility]  [address], [address]st Floor, Jossy Pratt 34  [facility] 19, [address] [facility], [address] [facility], [facility], 830 [facility]  1307 [facility], 86[address] [facility], 960 [facility]  One [facility], [address] [facility], One [facility],[address], Antoinette Sanchez 6  201 [facility], Cecille Jennifer [place], [address]  [address] [place] [address] [facility] [place] [address]

## 2022-11-09 NOTE — PROGRESS NOTES
[address] [facility]  Progress Note - Reece Endo 1970, 49 y o  male MRN: [id number]  Unit/Bed#: ICU 09 Encounter: [id number]  Primary Care Provider: Talha Bob MD   Date and time admitted to hospital: 11/8/2022  6:03 AM    * Status post carotid surgery  Assessment & Plan  · Patient with symptomatic bilateral carotic artery stenosis  · S/P transcarotid artery revascularization  · Intraop finding; Initial imaging demonstrated a patent common carotid with a severe focal stenosis in the proximal right internal carotid artery as well as a patent external carotid artery after intervention there was good positioning of the stent with no significant residual stenosis or flow-limiting dissection the patient woke up neurologically at his baseline  Plan  · Plan per vascular surgery  · Continue DAPT- clopidogrel 75 mg and ASA 81 mg daily, atorvastatin 80           Primary hypertension  Assessment & Plan  · Continue amlodipine 10 mg and losartan 50    CVA (cerebral vascular accident) ([facility] [address] )  Assessment & Plan  · History of right occipital and basal ganglia ischemic stroke on 9/13, presented with L sided weakness and visual defects  · CTA of the head/Neck with contrast showed occlusion of the right PCA P2 segment, with distal branch reperfusion  Critical-greater than 90%-stenosis in the proximal cervical segments of the bilateral internal carotid arteries  Distal cervical and intracranial segments are patent and normal in caliber  · MRI brain wo contrast 9/14 Impression: 1  Focal right thalamic acute to subacute infarct  Additional linear areas of acute to subacute ischemia involving the medial right temporal-occipital region compatible with PCA territory infarcts  Right PCA occlusion noted on CTA  2  Mild chronic microtraumatic ischemic changes      · Echo: EF 60% G1DD  · Other CV risk factors- former tobacco- 20 years, Hypertension    Plan  · Continue DAPT and BP meds        History of diverticulitis  Assessment & Plan  · Currently not symptomatic- denies abdominal pain      ----------------------------------------------------------------------------------------  HPI/24hr events: No events overnight    Patient appropriate for transfer out of the ICU today?: Patient does not meet criteria for ICU Follow-up Clinic; referral has not been made  Disposition: Discharge to home   Code Status: Prior  ---------------------------------------------------------------------------------------  SUBJECTIVE  Reports some neck pain    Review of Systems  Review of systems was reviewed and negative unless stated above in HPI/24-hour events   ---------------------------------------------------------------------------------------  OBJECTIVE    Vitals   Vitals:    22 0200 22 0300 22 0400 22 0500   BP:   109/61    BP Location:       Pulse: 60 66 60 72   Resp: 14 13 12 13   Temp:       TempSrc:       SpO2: 92% 94% 93% 91%   Weight:       Height:         Temp (24hrs), Av 8 °F (36 6 °C), Min:97 3 °F (36 3 °C), Max:98 1 °F (36 7 °C)  Current: Temperature: 97 7 °F (36 5 °C)  Arterial Line BP: 150/72  Arterial Line MAP (mmHg): 100 mmHg    Respiratory:  SpO2: SpO2: 91 %, SpO2 Activity: SpO2 Activity: At Rest, SpO2 Device: O2 Device: Nasal cannula  Nasal Cannula O2 Flow Rate (L/min): 2 L/min    Invasive/non-invasive ventilation settings   Respiratory  Report   Lab Data (Last 4 hours)    None         O2/Vent Data (Last 4 hours)    None                Physical Exam  Constitutional:       Appearance: Normal appearance  HENT:      Head: Normocephalic  Mouth/Throat:      Mouth: Mucous membranes are moist    Eyes:      Pupils: Pupils are equal, round, and reactive to light  Neck:      Comments: Right neck incision is intact  There is some fullness and tenderness but no expanding hematoma  Cardiovascular:      Rate and Rhythm: Normal rate     Pulmonary:      Effort: Pulmonary effort is normal  Breath sounds: Normal breath sounds  Abdominal:      Palpations: Abdomen is soft  Tenderness: There is no abdominal tenderness  Musculoskeletal:         General: No swelling  Cervical back: Neck supple  Skin:     General: Skin is warm and dry  Neurological:      General: No focal deficit present  Mental Status: Mental status is at baseline  Laboratory and Diagnostics:  Results from last 7 days   Lab Units 11/09/22  0507   WBC Thousand/uL 7 45   HEMOGLOBIN g/dL 11 4*   HEMATOCRIT % 32 8*   PLATELETS Thousands/uL 216     Results from last 7 days   Lab Units 11/09/22  0507   SODIUM mmol/L 140   POTASSIUM mmol/L 3 7   CHLORIDE mmol/L 107   CO2 mmol/L 24   ANION GAP mmol/L 9   BUN mg/dL 9   CREATININE mg/dL 1 00   CALCIUM mg/dL 8 3   GLUCOSE RANDOM mg/dL 90                       ABG:    VBG:          Micro        EKG:   Imaging: I have personally reviewed pertinent reports  and I have personally reviewed pertinent films in PACS    Intake and Output  I/O       11/07 0701 11/08 0700 11/08 0701 11/09 0700    P  O   900    I V  (mL/kg)  4027 1 (49)    Total Intake(mL/kg)  4927 1 (59 9)    Urine (mL/kg/hr)  1800 (0 9)    Blood  50    Total Output  1850    Net  +3077 1                Height and Weights   Height: 5' 7" (170 2 cm)  IBW (Ideal Body Weight): 66 1 kg  Body mass index is 28 38 kg/m²  Weight (last 2 days)     Date/Time Weight    11/08/22 1230 82 2 (181 22)    11/08/22 0628 75 4 (166 23)            Nutrition       Diet Orders   (From admission, onward)             Start     Ordered    11/08/22 1430  Diet Regular; Regular House  Diet effective now        References:    Nutrtion Support Algorithm Enteral vs  Parenteral   Question Answer Comment   Diet Type Regular    Regular Regular House    RD to adjust diet per protocol?  Yes        11/08/22 1035                  Active Medications  Scheduled Meds:  Current Facility-Administered Medications   Medication Dose Route Frequency Provider Last Rate   • acetaminophen  650 mg Oral Q4H PRN Mary Grace Villanueva MD     • amLODIPine  10 mg Oral Daily Summer Otilio, DO     • aspirin  81 mg Oral Daily Summer Otilio, DO     • atorvastatin  80 mg Oral QPM Summer Otilio, DO     • clopidogrel  75 mg Oral Daily Summer Otilio, DO     • famotidine  40 mg Oral Early Morning Summer Otilio, DO     • ferrous sulfate  325 mg Oral Daily With Breakfast Summer Otilio, DO     • heparin (porcine)  5,000 Units Subcutaneous [facility] Summer Otilio, DO     • labetalol  5 mg Intravenous Q15 Min PRN Summer Otilio, DO      And   • hydrALAZINE  15 mg Intravenous Q15 Min PRN Summer Otilio, DO      And   • niCARdipine  1-15 mg/hr Intravenous Continuous PRN Summer Otilio, DO     • HYDROmorphone  0 5 mg Intravenous Once Jaime Hagan MD     • HYDROmorphone  0 5 mg Intravenous Q4H PRN Mary Grace Villanueva MD     • losartan  50 mg Oral Daily Summer Otilio, DO     • oxyCODONE  5 mg Oral Q4H PRN Mary Grace Villanueva MD     • phenol  1 spray Mouth/Throat Q2H PRN JOSEPHINE Lopez     • sodium chloride  500 mL Intravenous Once PRN Summer Otilio, DO      Followed by   • [START ON 11/10/2022] phenylephine   mcg/min Intravenous Continuous PRN Summer Otilio, DO     • sodium chloride  125 mL/hr Intravenous Continuous Summer Otilio,  mL/hr (11/09/22 0101)     Continuous Infusions:  niCARdipine, 1-15 mg/hr  [START ON 11/10/2022] phenylephine,  mcg/min  sodium chloride, 125 mL/hr, Last Rate: 125 mL/hr (11/09/22 0101)      PRN Meds:   acetaminophen, 650 mg, Q4H PRN  labetalol, 5 mg, Q15 Min PRN   And  hydrALAZINE, 15 mg, Q15 Min PRN   And  niCARdipine, 1-15 mg/hr, Continuous PRN  HYDROmorphone, 0 5 mg, Q4H PRN  oxyCODONE, 5 mg, Q4H PRN  phenol, 1 spray, Q2H PRN  sodium chloride, 500 mL, Once PRN   Followed by  [START ON 11/10/2022] phenylephine,  mcg/min, Continuous PRN        Invasive Devices Review  Invasive Devices  Report    Peripheral Intravenous Line  Duration           Peripheral IV 11/08/22 Dorsal (posterior); Left Hand <1 day    Peripheral IV 11/08/22 Right Hand <1 day                Rationale for remaining devices:   ---------------------------------------------------------------------------------------  Advance Directive and Living Will:      Power of :    POLST:    ---------------------------------------------------------------------------------------  Care Time Delivered:         JOSEPHINE Nava      Portions of the record may have been created with voice recognition software  Occasional wrong word or "sound a like" substitutions may have occurred due to the inherent limitations of voice recognition software    Read the chart carefully and recognize, using context, where substitutions have occurred

## 2022-11-09 NOTE — UTILIZATION REVIEW
Initial Clinical Review    Elective inpatient surgical procedure  Age/Sex: 49 y o  male  Surgery Date: 11/8/2022  Procedure: ANGIOPLASTY ARTERY CAROTID W/ STENT TCAR, (Right)  US guided percutaneous access right femoral vein  Supervision and interpretation of all radiologic imaging  Anesthesia: General  Operative Findings:   Implants:  9mm x 40mm Enroute stent right CCA and ICA  Initial imaging demonstrated a patent common carotid with a severe focal stenosis in the proximal right internal carotid artery as well as a patent external carotid artery after intervention there was good positioning of the stent with no significant residual stenosis or flow-limiting dissection the patient woke up neurologically at his baseline  POD#1 Progress Note:   Critical care  S/P transcarotid artery revascularization  Monitored BP via A line; Continue DAPT- clopidogrel 75 mg and ASA 81 mg daily, atorvastatin 80 & BP meds  Reports some neck pain; Right neck incision is Intact  There is some fullness and tenderness but no expanding hematoma  Patient woke after surgery @ baseline neurologically  Vascular Fellow   Cont -160; [place] out this AM; No BP adjuncts required overnight, OOB, Diet as tolerated   VTEppx: SQH   COnt Aspirin, Plavix, Statin  - DC Planning this AM    Admission Orders: Date/Time/Statement:   Admission Orders (From admission, onward)     Ordered        11/08/22 1035  Inpatient Admission  Once                      Orders Placed This Encounter   Procedures   • Inpatient Admission     Standing Status:   Standing     Number of Occurrences:   1     Order Specific Question:   Level of Care     Answer:   Critical Care [15]     Order Specific Question:   Estimated length of stay     Answer:   Inpatient Only Surgery     Vital Signs: /77   Pulse 58   Temp 98 °F (36 7 °C) (Oral)   Resp 12   Ht 5' 7" (1 702 m)   Wt 82 2 kg (181 lb 3 5 oz)   SpO2 92%   BMI 28 38 kg/m²     Pertinent Labs/Diagnostic Test Results:   IR carotid stent    (Results Pending)         Results from last 7 days   Lab Units 11/09/22  0507   WBC Thousand/uL 7 45   HEMOGLOBIN g/dL 11 4*   HEMATOCRIT % 32 8*   PLATELETS Thousands/uL 216         Results from last 7 days   Lab Units 11/09/22  0507   SODIUM mmol/L 140   POTASSIUM mmol/L 3 7   CHLORIDE mmol/L 107   CO2 mmol/L 24   ANION GAP mmol/L 9   BUN mg/dL 9   CREATININE mg/dL 1 00   EGFR ml/min/1 73sq m 87   CALCIUM mg/dL 8 3             Results from last 7 days   Lab Units 11/09/22  0507   GLUCOSE RANDOM mg/dL 90             No results found for: BETA-HYDROXYBUTYRATE                           Results from last 7 days   Lab Units 11/09/22  0507   PROTIME seconds 13 5   INR  1 03   PTT seconds 29               Results from last 7 days   Lab Units 11/03/22  1723   GLUCOSE UA  neg   KETONES UA  neg   BLOOD UA  neg   PROTEIN UA  neg   NITRITE UA  neg   BILIRUBIN UA POC  neg   UROBILINOGEN UA  0 2   LEUKOCYTES UA  neg         Diet: adv to regular diet   Mobility: OOB, ambulate  DVT Prophylaxis: SCD, heparin (porcine), 5,000 Units, Subcutaneous, Q8H [address]    Medications/Pain Control:   Scheduled Medications:  amLODIPine, 10 mg, Oral, Daily  aspirin, 81 mg, Oral, Daily  atorvastatin, 80 mg, Oral, QPM  clopidogrel, 75 mg, Oral, Daily  famotidine, 40 mg, Oral, Early Morning  ferrous sulfate, 325 mg, Oral, Daily With Breakfast  heparin (porcine), 5,000 Units, Subcutaneous, Q8H LEANNE  HYDROmorphone, 0 5 mg, Intravenous, Once  losartan, 50 mg, Oral, Daily      Continuous IV Infusions:  niCARdipine, 1-15 mg/hr, Intravenous, Continuous PRN  [START ON 11/10/2022] phenylephine,  mcg/min, Intravenous, Continuous PRN      PRN Meds:  acetaminophen, 650 mg, Oral, Q4H PRN  labetalol, 5 mg, Intravenous, Q15 Min PRN   And  hydrALAZINE, 15 mg, Intravenous, Q15 Min PRN   And  niCARdipine, 1-15 mg/hr, Intravenous, Continuous PRN  HYDROmorphone, 0 5 mg, Intravenous, Q4H PRN  oxyCODONE, 5 mg, Oral, Q4H PRN  phenol, 1 spray, Mouth/Throat, Q2H PRN  sodium chloride, 500 mL, Intravenous, Once PRN   Followed by  [START ON 11/10/2022] phenylephine,  mcg/min, Intravenous, Continuous PRN    Network Utilization Review Department  ATTENTION: Please call with any questions or concerns to [phone] and carefully listen to the prompts so that you are directed to the right person  All voicemails are confidential   Jayshree Reina all requests for admission clinical reviews, approved or denied determinations and any other requests to dedicated fax number below belonging to the campus where the patient is receiving treatment   List of dedicated fax numbers for the Facilities:  [address] DENIALS (Administrative/Medical Necessity) [phone]   [address] (Maternity/NICU/Pediatrics) [phone]   915 Imelda Pollard [phone]   [facility] [address] [phone]   [address] [address] Marc Conner 28 [phone]   1558 [facility] Margaret Sandra [facility] 134 815 [facility] [phone]

## 2022-11-09 NOTE — PLAN OF CARE
Problem: CARDIOVASCULAR - ADULT  Goal: Maintains optimal cardiac output and hemodynamic stability  Description: INTERVENTIONS:  - Monitor I/O, vital signs and rhythm  - Monitor for S/S and trends of decreased cardiac output  - Administer and titrate ordered vasoactive medications to optimize hemodynamic stability  - Assess quality of pulses, skin color and temperature  - Assess for signs of decreased coronary artery perfusion  - Instruct patient to report change in severity of symptoms  Outcome: Progressing  Goal: Absence of cardiac dysrhythmias or at baseline rhythm  Description: INTERVENTIONS:  - Continuous cardiac monitoring, vital signs, obtain 12 lead EKG if ordered  - Administer antiarrhythmic and heart rate control medications as ordered  - Monitor electrolytes and administer replacement therapy as ordered  Outcome: Progressing     Problem: PAIN - ADULT  Goal: Verbalizes/displays adequate comfort level or baseline comfort level  Description: Interventions:  - Encourage patient to monitor pain and request assistance  - Assess pain using appropriate pain scale  - Administer analgesics based on type and severity of pain and evaluate response  - Implement non-pharmacological measures as appropriate and evaluate response  - Consider cultural and social influences on pain and pain management  - Notify physician/advanced practitioner if interventions unsuccessful or patient reports new pain  Outcome: Progressing

## 2022-11-09 NOTE — UTILIZATION REVIEW
NOTIFICATION OF INPATIENT ADMISSION   AUTHORIZATION REQUEST   SERVICING FACILITY:   [address] [place] [address] [facility]  Tax ID: [id number]  NPI: [id number] ATTENDING PROVIDER:  Attending Name and NPI#: Debby Bhakta [[id number]]  Address: [address] [place] [address] [facility]  Phone: [phone]     ADMISSION INFORMATION:  Place of Service: Inpatient [address] [facility] Code: 21  Inpatient Admission Date/Time: 11/8/22 10:35 AM  Discharge Date/Time: 11/9/2022 11:36 AM  Admitting Diagnosis Code/Description:  Carotid stenosis, right [I65 21]     UTILIZATION REVIEW CONTACT:  Jefferson Padilla Utilization   Network Utilization Review Department  Phone: [phone]  Fax: [phone]  Email: [email or web address]  Contact for approvals/pending authorizations, clinical reviews, and discharge  PHYSICIAN ADVISORY SERVICES:  Medical Necessity Denial & Ozfn-bd-[id number] Review  Phone: [phone]  Fax: [phone]  Email: [email or web address]         Jun Brown PA-C   Physician Assistant   Vascular Surgery   Discharge Summary       Attested   Date of Service:  11/9/2022  9:09 AM                 Attested            Attestation signed by Roxy Fisher MD at 11/9/2022 12:18 PM     Teaching Physician Statement  I performed history and exam of patient  I discussed with the vascular team   I agree with their documented findings and plan of care                    Vascular Surgery  Discharge Summary - [facility] 49 y o  male MRN: [id number]     Unit/Bed#: ICU 09 Encounter: [id number]     Admission Date:       Admission Orders (From admission, onward)              Ordered          11/08/22 1035   Inpatient Admission  Once                             Admitting Diagnosis: Carotid stenosis, right [I65 21]     HPI: 49year old male with bilateral high grade carotid stenosis   Patient was found to have right sided occipital and basal ganglia CVA 6 weeks prior to presentation in the office  He fortunately recovered well from his stroke  During his work up he was found to have bilateral high grade ICA stenosis and was referred to Vascular surgery for potential intervention  After review it was determined that patient was a candidate for R TCAR first with plan to intervene on the L after recovery       Procedures Performed: No orders of the defined types were placed in this encounter         Summary of Hospital Course: Patient presented to the hospital on 11/8/22 to undergo elective right sided TCAR  Patient presented and offered no complaints  He had no change in exam since visit in the office  Denies any new neurologic symptoms consistent with TIA or stroke  Patient was taken to the operating room placed under general anesthesia  Once procedure was complete, patient was awaken successfully without any complication  Stent was placed successfully  Patient awoke moving all 4 extremities  He was taken to the ICU for postoperative management per protocol  In the ICU he was noted to have some bleeding from the venous access site in the left groin however this stopped with manual compression  On postoperative day 1, patient was noted to be stable for discharge  All questions and concerns were addressed    He was discharged on ASA, Plavix and statin medication with outpatient follow-up arranged      Significant Findings, Care, Treatment and Services Provided:  As noted above     Complications:  None     Discharge Diagnosis:  Bilateral carotid stenosis         Medical Problems                  Resolved Problems  Date Reviewed: 11/9/2022   None                      Condition at Discharge: stable         Discharge instructions/Information to patient and family:   See after visit summary for information provided to patient and family        Provisions for Follow-Up Care:  See after visit summary for information related to follow-up care and any pertinent home health orders        PCP: Jhon Puentes MD     Disposition: See After Visit Summary for discharge disposition information      Planned Readmission: No        Discharge Statement   I spent 25 minutes discharging the patient  This time was spent on the day of discharge  I had direct contact with the patient on the day of discharge   Additional documentation is required if more than 30 minutes were spent on discharge       Discharge Medications:  See after visit summary for reconciled discharge medications provided to patient and family                      Cosigned by: Andrew Dan MD at 11/9/2022 12:18 PM

## 2022-11-09 NOTE — DISCHARGE SUMMARY
Vascular Surgery  Discharge Summary - Bijan Medina 49 y o  male MRN: [id number]    Unit/Bed#: ICU 09 Encounter: [id number]    Admission Date:   Admission Orders (From admission, onward)     Ordered        11/08/22 1035  Inpatient Admission  Once                        Admitting Diagnosis: Carotid stenosis, right [I65 21]    HPI: 49year old male with bilateral high grade carotid stenosis  Patient was found to have right sided occipital and basal ganglia CVA 6 weeks prior to presentation in the office  He fortunately recovered well from his stroke  During his work up he was found to have bilateral high grade ICA stenosis and was referred to Vascular surgery for potential intervention  After review it was determined that patient was a candidate for R TCAR first with plan to intervene on the L after recovery  Procedures Performed: No orders of the defined types were placed in this encounter  Summary of Hospital Course: Patient presented to the hospital on 11/8/22 to undergo elective right sided TCAR  Patient presented and offered no complaints  He had no change in exam since visit in the office  Denies any new neurologic symptoms consistent with TIA or stroke  Patient was taken to the operating room placed under general anesthesia  Once procedure was complete, patient was awaken successfully without any complication  Stent was placed successfully  Patient awoke moving all 4 extremities  He was taken to the ICU for postoperative management per protocol  In the ICU he was noted to have some bleeding from the venous access site in the left groin however this stopped with manual compression  On postoperative day 1, patient was noted to be stable for discharge  All questions and concerns were addressed  He was discharged on ASA, Plavix and statin medication with outpatient follow-up arranged      Significant Findings, Care, Treatment and Services Provided:  As noted above    Complications: None    Discharge Diagnosis:  Bilateral carotid stenosis    Medical Problems             Resolved Problems  Date Reviewed: 11/9/2022   None                 Condition at Discharge: stable         Discharge instructions/Information to patient and family:   See after visit summary for information provided to patient and family  Provisions for Follow-Up Care:  See after visit summary for information related to follow-up care and any pertinent home health orders  PCP: Marika Allen MD    Disposition: See After Visit Summary for discharge disposition information  Planned Readmission: No      Discharge Statement   I spent 25 minutes discharging the patient  This time was spent on the day of discharge  I had direct contact with the patient on the day of discharge  Additional documentation is required if more than 30 minutes were spent on discharge  Discharge Medications:  See after visit summary for reconciled discharge medications provided to patient and family

## 2022-11-09 NOTE — ASSESSMENT & PLAN NOTE
· History of right occipital and basal ganglia ischemic stroke on 9/13, presented with L sided weakness and visual defects  · CTA of the head/Neck with contrast showed occlusion of the right PCA P2 segment, with distal branch reperfusion  Critical-greater than 90%-stenosis in the proximal cervical segments of the bilateral internal carotid arteries  Distal cervical and intracranial segments are patent and normal in caliber  · MRI brain wo contrast 9/14 Impression: 1  Focal right thalamic acute to subacute infarct  Additional linear areas of acute to subacute ischemia involving the medial right temporal-occipital region compatible with PCA territory infarcts  Right PCA occlusion noted on CTA  2  Mild chronic microtraumatic ischemic changes      · Echo: EF 60% G1DD  · Other CV risk factors- former tobacco- 20 years, Hypertension    Plan  · Continue DAPT and BP meds

## 2022-11-09 NOTE — CASE MANAGEMENT
Case Management Discharge Planning Note    Patient name Awa Done  Location ICU ICU  MRN [id number]  : 1970 Date 2022       Current Admission Date: 2022  Current Admission Diagnosis:Status post carotid surgery   Patient Active Problem List    Diagnosis Date Noted   • CVA (cerebral vascular accident) ([facility] [address] ) 2022   • Primary hypertension 2022   • Thalamic infarction ([facility] [address] ) 2022   • Abnormal renal function 2022   • Family history of prostate cancer 10/03/2022   • Erectile dysfunction due to arterial insufficiency 10/03/2022   • Encounter for screening colonoscopy 10/03/2022   • Gross hematuria 2022   • Encounter to discuss test results 2022   • Alcohol intake above recommended sensible limits 2022   • Stroke-like symptoms 2022   • Status post carotid surgery 2022   • Chronic back pain 10/18/2021   • History of diverticulitis 10/16/2021   • Bilateral nephrolithiasis 10/16/2021   • Elevated serum creatinine 10/16/2021      LOS (days): 1  Geometric Mean LOS (GMLOS) (days):   Days to GMLOS:     OBJECTIVE:  Risk of Unplanned Readmission Score: 10 64         Current admission status: Inpatient   Preferred Pharmacy:   [facility] [address] [facility]  [address] [address]  Phone: [phone] Fax: [phone]    Primary Care Provider: Claudine Staley MD    Primary Insurance: [facility]  Secondary Insurance:     DISCHARGE DETAILS:                                          Other Referral/Resources/Interventions Provided:  Interventions: None Indicated         Treatment Team Recommendation: Home  Discharge Destination Plan[de-identified] Home  Transport at Discharge : Family           ETA of Transport (Date): 22  ETA of Transport (Time): 1137     Transfer Mode: Self  Accompanied by: Family member (Spouse)              Additional Comments: CM informed that patient is medically cleared for dc; per Vascular team no needs indicated at time of dc   AVS given to patient by RN and instructions provided by Vascular team

## 2022-11-09 NOTE — NURSING NOTE
AVS reveiwed with pt  All questions answered at this time  Pt d/c walking off unit with spouse without incident

## 2022-11-09 NOTE — PROGRESS NOTES
Vascular Nurse Navigator Post Op Education    Met with patient at bedside to introduce myself as Vascular Nurse Navigator and explained my role  Patient is appropriate and accepting to education  Patient was educated with Review of written materials provided, Teachback, Explanation, Demonstration and Question & Answer on expectations of post op care and recovery on Right TCAR  Patient is a former smoker, quit 3 months ago, as such Smoking effects on the lungs, tobacco triggers and Smoking cessation was reviewed  Education provided to patient on infection prevention, activity limitations, when to call the office, importance of follow up, and incisional care  Discharge instruction handout provided to patient to review

## 2022-11-09 NOTE — QUICK NOTE
Post op check - Vascular Surgery  Graciela Denny 49 y o  male MRN: [id number]  Unit/Bed#: ICU 09 Encounter: [id number]    Assessment:  49 y o  male with bilateral ICA stenosis s/p right TCAR 11/8    Plan:  - Diet Regular; Regular House  - Pain and Nausea control PRN  - Incentive spirometry  - OOB, ambulate  - appreciate ICU care    Subjective/Objective     Subjective: The patient's pain is well controlled and the patient denies any nausea  Had a full meal at dinner, tolerated with nausea or vomiting  Has been voiding appropriately  Experiencing some hoarseness and sore throat from intubation, but says it has been improving over time  Has some pain in his right neck  Objective:   Vitals: Blood pressure 149/73, pulse 78, temperature 98 1 °F (36 7 °C), temperature source Oral, resp  rate 14, height 5' 7" (1 702 m), weight 82 2 kg (181 lb 3 5 oz), SpO2 96 %  ,Body mass index is 28 38 kg/m²  I/O       11/07 0701 11/08 0700 11/08 0701 11/09 0700    I V  (mL/kg)  2900 (35 3)    Total Intake(mL/kg)  2900 (35 3)    Urine (mL/kg/hr)  350 (0 3)    Blood  50    Total Output  400    Net  +2500                Physical Exam:  General: No acute distress  Neuro: alert and oriented  HEENT: moist mucous membranes  Neck: right neck incision c/d/i, soft, some edema and bruising, but no evidence of expanding hematoma, appropriately tender  CV: Well perfused, regular rate and rhythm  Lungs: Normal work of breathing, no increased respiratory effort  Abdomen: Soft, non-tender, non-distended     : left groin with palpable femoral pulse, soft, erythematous, no evidence of expanding hematoma  Extremities: No edema, clubbing or cyanosis  Skin: Warm, dry

## 2022-11-09 NOTE — PROGRESS NOTES
Progress Note - Vascular Surgery   Subhash Singleton 49 y o  male [id number]  Unit/Bed#:ICU 09 Encounter: [id number]      Assessment:    49 y o  with PMH HTN, Right Occipital/Basal ganglia CVA, B/L Severe CASEY presenting for an elective Transcervical Carotid Artery Revascularization of Asymptomatic Right CASEY in setting of prior Right Occipital/Basal Ganglia CVA  11/8: R TCAR      Plan:  - Cont -160; Justine out this AM; No BP adjuncts required overnight  - OOB  - Diet as tolerated  - VTEppx: SQH  - Aspirin, Plavix, Statin  - DC Planning this AM      Subjective:    Pt s/e  No acute events overnight  Pt awake, alert  Pt complains of mild pain at incision site  Tolerating diet  OOB  Voiding  No new complaints  Denies n/v, sob, chest pain, f/c  No sensory change, numbness, or weakness of bilateral extremities  States left leg and arm numbness improved from baseline    I/Os:  I/O last 3 completed shifts: In: 1509 2 [I V :1173 4; IV Piggyback:335 8]  Out: 4766 [Urine:2860]  I/O this shift:  In: -   Out: 425 [Urine:425]    Review of Systems   All other systems reviewed and are negative  Vitals:  /66   Pulse 80   Temp 97 6 °F (36 4 °C) (Oral)   Resp 12   Ht 5' 9" (1 753 m)   Wt 74 4 kg (164 lb)   SpO2 96%   BMI 24 22 kg/m²     Physical Exam  Vitals and nursing note reviewed  Constitutional:       General: He is not in acute distress  Appearance: He is well-developed  He is not ill-appearing, toxic-appearing or diaphoretic  HENT:      Head: Normocephalic and atraumatic  Eyes:      General: No scleral icterus  Conjunctiva/sclera: Conjunctivae normal    Neck:      Comments: Right supraclavicular incision c/d/i, mild edema, mild ecchymosis, soft   Cardiovascular:      Rate and Rhythm: Normal rate and regular rhythm  Heart sounds: No murmur heard  Pulmonary:      Effort: Pulmonary effort is normal  No respiratory distress  Breath sounds: Normal breath sounds  No wheezing  Abdominal:      General: There is no distension  Palpations: Abdomen is soft  Tenderness: There is no abdominal tenderness  There is no guarding or rebound  Musculoskeletal:         General: No tenderness  Cervical back: Neck supple  Right lower leg: No edema  Left lower leg: No edema  Comments: Left groin soft, no hematoma, no bleeding, mild ecchymosis    Skin:     General: Skin is warm and dry  Capillary Refill: Capillary refill takes less than 2 seconds  Neurological:      General: No focal deficit present  Mental Status: He is alert and oriented to person, place, and time  Mental status is at baseline  Comments: B/L UE and LE motor 5/5, sensory intact            Imaging:I have personally reviewed pertinent reports        Lab Results and Cultures:   CBC with diff:   Lab Results   Component Value Date    WBC 11 47 (H) 07/23/2022    HGB 10 4 (L) 07/23/2022    HCT 32 0 (L) 07/23/2022     (H) 07/23/2022     07/23/2022    MCH 33 5 07/23/2022    MCHC 32 5 07/23/2022    RDW 13 1 07/23/2022    MPV 9 2 07/23/2022    NRBC 0 07/21/2022   ,   BMP/CMP:  Lab Results   Component Value Date    SODIUM 133 (L) 07/23/2022    K 4 1 07/23/2022     07/23/2022    CO2 21 07/23/2022    CO2 24 05/05/2022    BUN 6 07/23/2022    CREATININE 0 53 (L) 07/23/2022    GLUCOSE 103 05/05/2022    CALCIUM 8 8 07/23/2022     (H) 07/23/2022     (H) 07/23/2022    ALKPHOS 161 (H) 07/23/2022    EGFR 109 07/23/2022   ,   Lipid Panel: No results found for: CHOL,   Coags:   Lab Results   Component Value Date    PTT 54 (H) 07/23/2022    INR 0 94 07/22/2022   ,     Blood Culture:   Lab Results   Component Value Date    BLOODCX No Growth at 24 hrs  07/21/2022   ,   Urinalysis: No results found for: Weaver Pardon, SPECGRAV, PHUR, LEUKOCYTESUR, NITRITE, PROTEINUA, GLUCOSEU, KETONESU, BILIRUBINUR, BLOODU,   Urine Culture: No results found for: URINECX,   Wound Culure: No results found for: WOUNDCULT    Medications:  Current Facility-Administered Medications   Medication Dose Route Frequency   • acetaminophen (TYLENOL) tablet 650 mg  650 mg Oral Q6H PRN   • alteplase (CATHFLO) 10 mg in sodium chloride 0 9 % 250 mL infusion  1 mg/hr Intracatheter Continuous   • amLODIPine (NORVASC) tablet 10 mg  10 mg Oral QPM   • aspirin (ECOTRIN LOW STRENGTH) EC tablet 81 mg  81 mg Oral Daily   • clopidogrel (PLAVIX) tablet 75 mg  75 mg Oral Daily   • ezetimibe (ZETIA) tablet 10 mg  10 mg Oral Daily   • folic acid (FOLVITE) tablet 1 mg  1 mg Oral Daily   • heparin (porcine) 25,000 units in 0 45% NaCl 250 mL infusion (premix)  3-30 Units/kg/hr (Order-Specific) Intravenous Titrated   • heparin (porcine) 25,000 units in 0 45% NaCl 250 mL infusion (premix)  500 Units/hr Intravenous Titrated   • heparin (porcine) injection 2,800 Units  2,800 Units Intravenous Q1H PRN   • heparin (porcine) injection 5,600 Units  5,600 Units Intravenous Q1H PRN   • heparin 1000 units in 500 mL infusion (premix) for sheath patency  20 Units/hr Intravenous Continuous   • HYDROmorphone HCl (DILAUDID) injection 0 2 mg  0 2 mg Intravenous Q4H PRN   • metoprolol succinate (TOPROL-XL) 24 hr tablet 25 mg  25 mg Oral Daily   • multi-electrolyte (PLASMALYTE-A/ISOLYTE-S PH 7 4) IV solution  75 mL/hr Intravenous Continuous   • multivitamin-minerals (CENTRUM) tablet 1 tablet  1 tablet Oral Daily   • oxyCODONE (ROXICODONE) IR tablet 2 5 mg  2 5 mg Oral Q4H PRN   • oxyCODONE (ROXICODONE) IR tablet 5 mg  5 mg Oral Q4H PRN   • pantoprazole (PROTONIX) EC tablet 40 mg  40 mg Oral Early Morning   • pravastatin (PRAVACHOL) tablet 80 mg  80 mg Oral Daily With Dinner   • thiamine tablet 100 mg  100 mg Oral Daily

## 2022-11-09 NOTE — PLAN OF CARE
Problem: MOBILITY - ADULT  Goal: Maintain or return to baseline ADL function  Description: INTERVENTIONS:  -  Assess patient's ability to carry out ADLs; assess patient's baseline for ADL function and identify physical deficits which impact ability to perform ADLs (bathing, care of mouth/teeth, toileting, grooming, dressing, etc )  - Assess/evaluate cause of self-care deficits   - Assess range of motion  - Assess patient's mobility; develop plan if impaired  - Assess patient's need for assistive devices and provide as appropriate  - Encourage maximum independence but intervene and supervise when necessary  - Involve family in performance of ADLs  - Assess for home care needs following discharge   - Consider OT consult to assist with ADL evaluation and planning for discharge  - Provide patient education as appropriate  Outcome: Progressing  Goal: Maintains/Returns to pre admission functional level  Description: INTERVENTIONS:  - Perform BMAT or MOVE assessment daily    - Set and communicate daily mobility goal to care team and patient/family/caregiver     - Collaborate with rehabilitation services on mobility goals if consulted  - Out of bed for toileting  - Record patient progress and toleration of activity level   Outcome: Progressing     Problem: Prexisting or High Potential for Compromised Skin Integrity  Goal: Skin integrity is maintained or improved  Description: INTERVENTIONS:  - Identify patients at risk for skin breakdown  - Assess and monitor skin integrity  - Assess and monitor nutrition and hydration status  - Monitor labs   - Assess for incontinence   - Turn and reposition patient  - Assist with mobility/ambulation  - Relieve pressure over bony prominences  - Avoid friction and shearing  - Provide appropriate hygiene as needed including keeping skin clean and dry  - Evaluate need for skin moisturizer/barrier cream  - Collaborate with interdisciplinary team   - Patient/family teaching  - Consider wound care consult   Outcome: Progressing     Problem: Potential for Falls  Goal: Patient will remain free of falls  Description: INTERVENTIONS:  - Educate patient/family on patient safety including physical limitations  - Instruct patient to call for assistance with activity   - Consult OT/PT to assist with strengthening/mobility   - Keep Call bell within reach  - Keep bed low and locked with side rails adjusted as appropriate  - Keep care items and personal belongings within reach  - Initiate and maintain comfort rounds  - Make Fall Risk Sign visible to staff  - Apply yellow socks and bracelet for high fall risk patients  - Consider moving patient to room near nurses station  Outcome: Progressing     Problem: CARDIOVASCULAR - ADULT  Goal: Maintains optimal cardiac output and hemodynamic stability  Description: INTERVENTIONS:  - Monitor I/O, vital signs and rhythm  - Monitor for S/S and trends of decreased cardiac output  - Administer and titrate ordered vasoactive medications to optimize hemodynamic stability  - Assess quality of pulses, skin color and temperature  - Assess for signs of decreased coronary artery perfusion  - Instruct patient to report change in severity of symptoms  Outcome: Progressing  Goal: Absence of cardiac dysrhythmias or at baseline rhythm  Description: INTERVENTIONS:  - Continuous cardiac monitoring, vital signs, obtain 12 lead EKG if ordered  - Administer antiarrhythmic and heart rate control medications as ordered  - Monitor electrolytes and administer replacement therapy as ordered  Outcome: Progressing     Problem: PAIN - ADULT  Goal: Verbalizes/displays adequate comfort level or baseline comfort level  Description: Interventions:  - Encourage patient to monitor pain and request assistance  - Assess pain using appropriate pain scale  - Administer analgesics based on type and severity of pain and evaluate response  - Implement non-pharmacological measures as appropriate and evaluate response  - Consider cultural and social influences on pain and pain management  - Notify physician/advanced practitioner if interventions unsuccessful or patient reports new pain  Outcome: Progressing     Problem: INFECTION - ADULT  Goal: Absence or prevention of progression during hospitalization  Description: INTERVENTIONS:  - Assess and monitor for signs and symptoms of infection  - Monitor lab/diagnostic results  - Monitor all insertion sites, i e  indwelling lines, tubes, and drains  - Monitor endotracheal if appropriate and nasal secretions for changes in amount and color  - [place] appropriate cooling/warming therapies per order  - Administer medications as ordered  - Instruct and encourage patient and family to use good hand hygiene technique  - Identify and instruct in appropriate isolation precautions for identified infection/condition  Outcome: Progressing  Goal: Absence of fever/infection during neutropenic period  Description: INTERVENTIONS:  - Monitor WBC    Outcome: Progressing     Problem: SAFETY ADULT  Goal: Maintain or return to baseline ADL function  Description: INTERVENTIONS:  -  Assess patient's ability to carry out ADLs; assess patient's baseline for ADL function and identify physical deficits which impact ability to perform ADLs (bathing, care of mouth/teeth, toileting, grooming, dressing, etc )  - Assess/evaluate cause of self-care deficits   - Assess range of motion  - Assess patient's mobility; develop plan if impaired  - Assess patient's need for assistive devices and provide as appropriate  - Encourage maximum independence but intervene and supervise when necessary  - Involve family in performance of ADLs  - Assess for home care needs following discharge   - Consider OT consult to assist with ADL evaluation and planning for discharge  - Provide patient education as appropriate  Outcome: Progressing  Goal: Maintains/Returns to pre admission functional level  Description: INTERVENTIONS:  - Perform BMAT or MOVE assessment daily    - Set and communicate daily mobility goal to care team and patient/family/caregiver  - Collaborate with rehabilitation services on mobility goals if consulted  - Out of bed for toileting  - Record patient progress and toleration of activity level   Outcome: Progressing  Goal: Patient will remain free of falls  Description: INTERVENTIONS:  - Educate patient/family on patient safety including physical limitations  - Instruct patient to call for assistance with activity   - Consult OT/PT to assist with strengthening/mobility   - Keep Call bell within reach  - Keep bed low and locked with side rails adjusted as appropriate  - Keep care items and personal belongings within reach  - Initiate and maintain comfort rounds  - Make Fall Risk Sign visible to staff  - Apply yellow socks and bracelet for high fall risk patients  - Consider moving patient to room near nurses station  Outcome: Progressing     Problem: DISCHARGE PLANNING  Goal: Discharge to home or other facility with appropriate resources  Description: INTERVENTIONS:  - Identify barriers to discharge w/patient and caregiver  - Arrange for needed discharge resources and transportation as appropriate  - Identify discharge learning needs (meds, wound care, etc )  - Arrange for interpretive services to assist at discharge as needed  - Refer to Case Management Department for coordinating discharge planning if the patient needs post-hospital services based on physician/advanced practitioner order or complex needs related to functional status, cognitive ability, or social support system  Outcome: Progressing     Problem: Knowledge Deficit  Goal: Patient/family/caregiver demonstrates understanding of disease process, treatment plan, medications, and discharge instructions  Description: Complete learning assessment and assess knowledge base    Interventions:  - Provide teaching at level of understanding  - Provide teaching via preferred learning methods  Outcome: Progressing

## 2022-11-09 NOTE — ASSESSMENT & PLAN NOTE
· Patient with symptomatic bilateral carotic artery stenosis  · S/P transcarotid artery revascularization  · Intraop finding; Initial imaging demonstrated a patent common carotid with a severe focal stenosis in the proximal right internal carotid artery as well as a patent external carotid artery after intervention there was good positioning of the stent with no significant residual stenosis or flow-limiting dissection the patient woke up neurologically at his baseline      Plan  · Plan per vascular surgery  · Continue DAPT- clopidogrel 75 mg and ASA 81 mg daily, atorvastatin 80

## 2022-11-10 LAB
ALDOST SERPL-MCNC: 3.8 NG/DL (ref 0–30)
ALDOST/RENIN PLAS-RTO: 0.5 {RATIO} (ref 0–30)
MISCELLANEOUS LAB TEST RESULT: NORMAL
RENIN PLAS-CCNC: 7.86 NG/ML/HR (ref 0.17–5.38)

## 2022-11-11 ENCOUNTER — TELEPHONE (OUTPATIENT)
Dept: VASCULAR SURGERY | Facility: CLINIC | Age: 52
End: 2022-11-11

## 2022-11-11 ENCOUNTER — TRANSITIONAL CARE MANAGEMENT (OUTPATIENT)
Dept: FAMILY MEDICINE CLINIC | Facility: CLINIC | Age: 52
End: 2022-11-11

## 2022-11-11 DIAGNOSIS — I65.21 CAROTID STENOSIS, RIGHT: ICD-10-CM

## 2022-11-11 RX ORDER — OXYCODONE HYDROCHLORIDE 5 MG/1
5 TABLET ORAL EVERY 4 HOURS PRN
Qty: 10 TABLET | Refills: 0 | Status: SHIPPED | OUTPATIENT
Start: 2022-11-11 | End: 2022-11-21

## 2022-11-11 NOTE — TELEPHONE ENCOUNTER
I called the patient to discuss pain he is experiencing  He is complaining of muscle spasms in the bilateral shoulder  He is going to physical therapy and did say he was told by therapist he has a rotator cuff issue  He has not previously had muscle spasms  Symptoms started last night  He had minimal relief with Tylenol alone or for oxycodone alone  Taking them together he did have a reduction in symptoms  I voiced my concern to patient that Muscle spasms are atypical after TCAR  Urged that he go to [facility] ED for evaluation  Patient did not feel symptoms were to the extent that he needed to go to emergency  I sent a refill of Oxycodone to pharmacy, 10 tablets  Attempted to review in PDMP and received an error  Spent 15 minutes on the phone with patient   If worsening/uncontrolled pain he will notify the office and/or go to the ED

## 2022-11-11 NOTE — TELEPHONE ENCOUNTER
Vascular Nurse [address] Op Call    Procedure: ANGIOPLASTY ARTERY CAROTID W/ STENT TCAR, (Right)  US guided percutaneous access right femoral vein  Supervision and interpretation of all radiologic imaging    Date of Procedure:  11/8/22    Surgeon:    Robert Mercedes DO - Primary     * Bebe Wagner DO - Assisting    Discharge Date:  11/9/22    Discharge Disposition:  Home    Change in Vision?: No    Change in Speech?: No    Weakness?: No    Uncontrolled Pain?: No    Hoarseness?: No    Trouble Swallowing?: No    Incision Concerns?: see below    Anticoagulation pt was discharged on post op?: Aspirin and Clopidogrel (Plavix)    Statin pt was discharged on post op?: Lipitor (atorvastatin)    Bleeding?: No    Fever/chills?: No      Reviewed discharge instructions and incision care with patient  NEXT OFFICE VISIT SCHEDULED:  11/18/22 at 1:30 pm with Dr Shelbie [facility] Available?: Yes      Any further questions/concerns? Patient stated that he is dong okay since discharge and is having a lot of discomfort  He stated that he is having muscle spasms in the area of bilateral collar bones  He stated that he has bruising and swelling on his right chest that goes down to his nipple line  He stated that it is a little better today  He stated that he has been taking oxycodone along with Tylenol with some relief and he tried just Tylenol and this did not help  He stated he took oxycodone and Tylenol 4 times yesterday and was awoken from sleep in pain as well  He stated that his current pain level is an 8 out of 10 on pain scale  He stated that pain is on his muscle above the left collar bone and goes down his back and shoulder and he has pain his in right muscle and collar bone and goes to under his armpit  He stated that he has 2 oxycodone left  He was questioning if he can use Salonpas roll-on on his muscles    Advised to not put on incision, but will ask triage provider  Patient stated that he is able to move his upper extremities and denies any new weakness  Reviewed incision care with him - wash daily with soap and water  Reviewed discharge medications - Aspirin, Plavix, and Lipitor  Informed him that I will send his concerns about the pain and muscle spasms to triage provider and contact him back with her recommendations  He was agreeable to same  He uses [address] in [place] for prescriptions

## 2022-11-14 ENCOUNTER — APPOINTMENT (OUTPATIENT)
Dept: PHYSICAL THERAPY | Facility: CLINIC | Age: 52
End: 2022-11-14

## 2022-11-14 NOTE — TELEPHONE ENCOUNTER
Contacted patient to follow up with him to see how is is feeling  He stated that he is still sore  He stated that swelling in his chest went away and came back and is going away again  He stated it does not hurt when he presses on it  He stated that he still has the muscle spasms but they are not as persistent  He stated that he gets them 1-2 times a day and mostly when he wakes up in the morning and on the left side from his shoulder down his back  He stated that he has been taking the oxycodone with 1 tylenol, mostly at night and has 5 left  He stated that he has been taking 2 tylenol during the day that helps take the edge off and the discomfort is tolerable  Informed him that I will send this information to our provider and get back to him with further recommendations

## 2022-11-14 NOTE — TELEPHONE ENCOUNTER
JOSEPHINE Kunz  You; [facility] 1 hour ago (8:29 AM)     Please check with patient how he is feeling   If he needs Robaxin I can send it

## 2022-11-14 NOTE — TELEPHONE ENCOUNTER
Contacted patient to inform him of recommendations from Aida Rodrigues PA-C  Informed him to notify office with any changes or worsening symptoms  Verbal understanding received

## 2022-11-14 NOTE — TELEPHONE ENCOUNTER
Would hold off on muscle relaxer for now being that his symptoms are improving   Soreness is normal after surgery and will continue to improve

## 2022-11-14 NOTE — TELEPHONE ENCOUNTER
DO Jarret Walker CRNP 9 hours ago (10:47 PM)       May be related to the [facility] dissection,  give him some robaxin   I don't think he needs to go to the ER    Message text

## 2022-11-16 ENCOUNTER — APPOINTMENT (OUTPATIENT)
Dept: PHYSICAL THERAPY | Facility: CLINIC | Age: 52
End: 2022-11-16

## 2022-11-16 ENCOUNTER — OFFICE VISIT (OUTPATIENT)
Dept: OCCUPATIONAL THERAPY | Facility: CLINIC | Age: 52
End: 2022-11-16

## 2022-11-16 DIAGNOSIS — R41.840 COGNITIVE ATTENTION DEFICIT: Primary | ICD-10-CM

## 2022-11-16 DIAGNOSIS — R29.90 STROKE-LIKE SYMPTOMS: ICD-10-CM

## 2022-11-16 DIAGNOSIS — R09.89 SYMPTOMS OF CEREBROVASCULAR ACCIDENT (CVA): ICD-10-CM

## 2022-11-16 NOTE — PROGRESS NOTES
Occupational Therapy Daily Note:    Today's date: 2022  Patient name: Tabitha Meyers  : 1970  MRN: [id number]   Referring provider: Oxana Duque DO  Dx:   Encounter Diagnoses   Name Primary? • Cognitive attention deficit Yes   • Stroke-like symptoms    • Symptoms of cerebrovascular accident (CVA)      TIME  [phone] [place]   [phone] Group  [phone]      Subjective: Had Ophthalmologist apt  Astigmatism in R eye, 20/20 vision distance, reports 20/25 near distance  Getting glasses soon  Objective: Pt engaged in skilled OT treatment session with focus on fxnl cognition, short term memory, fxnl attention, UE NMR, UE endurance, FMC/GMC and FMS/GMS to increase engagement, endurance, tolerance, and independence with daily ADL and IADL tasks  CPT Code Minutes                                           Task Details        Therapeutic Activity  Pt engaged in sustained focus and near point attention task at table top using multi-matrix and 2 paper worksheets  Pt s/u matrix with white number blocks showing and colored letter blocks on top with demand of manipulating in hand for in hand rotation to stack blocks  - Pt then req to ID underlined letter on worksheet; then find letter in matrix and move to the "open" space to expose a number    - Pt then req to write # on open grid worksheet     Upgraded task to pt sequentially adding numbers for problem solving, math skills, and working memory  Pt then continued with 6 min monocular taping to L and R eye with decreased tolerance with R eye taped and 6/10 HA reported  Pt ended with binocular vision  Educated pt/wife with handout and items for kathia string use at home  Recommended to complete 5-10 reps only, 1-2x/day with focusing on 1 bead at a time for 5-10 sec  Neuro Re-Ed               Therapeutic Exercise               Manual          Self Care           Assessment: Tolerated treatment well   Patient would benefit from continued skilled OT  Pt demo F understanding of verbal multi-matrix directions, pt with decreased understanding of making 5 columns/5 rows requiring cues to fix  Pt did demo some dropping of blocks during task 2* decreased sensation/grasp of blocks on initial attempt of grasping  Pt req increased time to perform 4 part task today although with time and upgrading of task, pt able to complete demands with fading cues       Plan: Continued skilled OT per POC Shoulder impingement assessments      INTERVENTION COMMENTS:  Diagnosis: Cognitive attention deficit [R41 840]  Precautions: Currently not driving    FOTO: Completed  Insurance: Payor: [facility] / Plan: [address] / Product Type: Medicaid HMO /   1 UD 65 EMY PN due 12/2/2022  POC Expires: 11/28/2022

## 2022-11-17 ENCOUNTER — OFFICE VISIT (OUTPATIENT)
Dept: VASCULAR SURGERY | Facility: CLINIC | Age: 52
End: 2022-11-17

## 2022-11-17 ENCOUNTER — TELEPHONE (OUTPATIENT)
Dept: ADMINISTRATIVE | Facility: HOSPITAL | Age: 52
End: 2022-11-17

## 2022-11-17 VITALS
WEIGHT: 168 LBS | HEIGHT: 67 IN | SYSTOLIC BLOOD PRESSURE: 148 MMHG | BODY MASS INDEX: 26.37 KG/M2 | HEART RATE: 78 BPM | DIASTOLIC BLOOD PRESSURE: 80 MMHG

## 2022-11-17 DIAGNOSIS — Z98.890 STATUS POST CAROTID SURGERY: ICD-10-CM

## 2022-11-17 DIAGNOSIS — I63.81 THALAMIC INFARCTION (HCC): ICD-10-CM

## 2022-11-17 DIAGNOSIS — I65.22 LEFT CAROTID STENOSIS: Primary | ICD-10-CM

## 2022-11-17 RX ORDER — CHLORHEXIDINE GLUCONATE 0.12 MG/ML
15 RINSE ORAL ONCE
OUTPATIENT
Start: 2022-11-17 | End: 2022-11-17

## 2022-11-17 RX ORDER — CLINDAMYCIN PHOSPHATE 900 MG/50ML
900 INJECTION INTRAVENOUS ONCE
OUTPATIENT
Start: 2022-11-17 | End: 2022-11-17

## 2022-11-17 NOTE — PROGRESS NOTES
Assessment/Plan:    Left carotid stenosis  90% left ICA stenosis: asymptomatic    Will schedule for left TCAR after New Year's so he has time to recover from right TCAR  Lesion is high which is reason for TCAR    Continue asa/statin/plavix until after left TCAR    Risks, benefits and alternative therapies were discussed with him and he consented to proceed    Status post carotid surgery  Doing well after right TCAR  Continue asa/plavix       Diagnoses and all orders for this visit:    Left carotid stenosis  -     Case request operating room: ANGIOPLASTY ARTERY CAROTID W/ STENT  Left TCAR; Standing  -     Basic metabolic panel; Future  -     Type and screen; Future  -     CBC and Platelet; Future  -     Basic metabolic panel  -     CBC and Platelet  -     Case request operating room: ANGIOPLASTY ARTERY CAROTID W/ STENT  Left TCAR    Thalamic infarction ([address] )    Status post carotid surgery    Other orders  -     Diet NPO; Sips with meds; Standing  -     Void on call to OR; Standing  -     Insert peripheral IV; Standing  -     Nursing Communication CHG bath, have staff wash entire body (neck down) per pre op bathing protocol  Routine, evening prior to, and day of surgery ; Standing  -     Nursing Communication Swab both nares with Povidone-Iodine solution, EXCLUDE if patient has shellfish/Iodine allergy  Routine, day of surgery, on call to OR ; Standing  -     chlorhexidine (PERIDEX) 0 12 % oral rinse 15 mL  -     Place sequential compression device; Standing  -     clindamycin (CLEOCIN) IVPB (premix in dextrose) 900 mg 50 mL          Subjective:      Patient ID: Nathanael Napoles is a 49 y o  male  Pt is p/o R TCAR 11/8/22  Pt c/o sore throat, occ pain and L eye changes but denies TIA stroke symptoms  Pt is taking ASA, Atorvastatin and Plavix  Pt is a former smoker  Had right TCAR 2week ago  Has incisional pain other doing well  No new neurologic symptoms       HPI    The following portions of the patient's history were reviewed and updated as appropriate: allergies, current medications, past medical history, past social history, past surgical history and problem list     Review of Systems   Constitutional: Negative  HENT: Positive for sore throat  Eyes: Negative  Respiratory: Negative  Cardiovascular: Negative  Gastrointestinal: Negative  Endocrine: Negative  Genitourinary: Negative  Musculoskeletal: Negative  Skin: Negative  Allergic/Immunologic: Negative  Neurological: Negative  Hematological: Negative  Psychiatric/Behavioral: Negative  I have reviewed the ROS above and made changes as needed  Objective:      /80 (BP Location: Left arm, Patient Position: Sitting, Cuff Size: Standard)   Pulse 78   Ht 5' 7" (1 702 m)   Wt 76 2 kg (168 lb)   BMI 26 31 kg/m²          Physical Exam      General  Exam: alert, awake, oriented, no distress, consistent with stated age    Integumentary  Exam: warm, dry, no gross lesions, no bruises and normal color    Head and Neck  Exam: supple, no bruits, trachea midline, no JVD, no mass or palpable nodes    Eye  Exam: extraoccular movements intact, no scleral icterus, sclera clear, pupils equal round and reactive to light    ENMT  Exam: oral mucosa pink and moist    Chest and Lung  Exam: chest normal without deformity, bilaterally expansive, clear to auscultation    Cardiovascular  Exam: regular rate, regular rhythm, no murmurs, no rubs or gallops    Adbomen  Exam: soft, non-tender, non-distended, no pulsatile abdominal masses, no abdominal bruit    Peripheral Vascular  Exam: no clubbing of the digits of the upper extremity, no cyanosis, no edema, both feet are warm, radial pulses 2+ bilaterally, skin well perfused, without and no varicosities      No widened popliteal pulse noted bilaterally    Upper Extremity:  Palpation: Radial pulse- Bilateral 2+    Lower Extremity:  Palpation: Femoral pulse- Bilateral 2+         Popliteal pulse - Bilateral 2+        Dorsalis Pedis - Bilateral 2+        Posterior tibial - Bilateral 2+    Neurologic  Exam:alert, non-focal, oriented x 3, cranial nerves II-XII grossly intact      Right neck incision c/d/i  Left groin soft        Operative Scheduling Information:    Hospital:  [facility]    Physician:  Bogdan Silva    Surgery: Left TCAR    Urgency:  Standard    Level:  Level 4: Outpatients to be scheduled for screening procedures and elective surgery that can be delayed for longer than one month without reasonable expectation of detriment to patient       Need to wait until after New Year's to schedule      Case Length:  Normal    Post-op Bed:  ICU    OR Table:  Hybrid    Equipment Needs:  Rep: Yattos    Medication Instructions:  Aspirin:   Continue (do not hold)  Plavix:  Continue (do not hold)    Hydration:  No    Contrast Allergy:  no

## 2022-11-17 NOTE — ASSESSMENT & PLAN NOTE
90% left ICA stenosis: asymptomatic    Will schedule for left TCAR after New Year's so he has time to recover from right TCAR  Lesion is high which is reason for TCAR    Continue asa/statin/plavix until after left TCAR    Risks, benefits and alternative therapies were discussed with him and he consented to proceed

## 2022-11-17 NOTE — TELEPHONE ENCOUNTER
Spoke to patient in office hours and will call patient when January schedule is out  Dr Tatyana Kiran does want surgery to be at [facility]       Operative Scheduling Information:  [facility]:  [facility] Hybrid     Physician:  Tatyana Kiran     Surgery: Left TCAR     Urgency:  Standard     Level:  Level 4: Outpatients to be scheduled for screening procedures and elective surgery that can be delayed for longer than one month without reasonable expectation of detriment to patient       Need to wait until after New Year's to schedule        Case Length:  Normal     Post-op Bed:  ICU     OR Table:  Hybrid     [facility] Needs:  [facility]     Medication Instructions:  Aspirin:   Continue (do not hold)  Plavix:  Continue (do not hold)     Hydration:  No     Contrast Allergy:  no

## 2022-11-21 ENCOUNTER — OFFICE VISIT (OUTPATIENT)
Dept: OCCUPATIONAL THERAPY | Facility: CLINIC | Age: 52
End: 2022-11-21

## 2022-11-21 ENCOUNTER — APPOINTMENT (OUTPATIENT)
Dept: PHYSICAL THERAPY | Facility: CLINIC | Age: 52
End: 2022-11-21

## 2022-11-21 DIAGNOSIS — R41.840 COGNITIVE ATTENTION DEFICIT: Primary | ICD-10-CM

## 2022-11-21 DIAGNOSIS — R29.90 STROKE-LIKE SYMPTOMS: ICD-10-CM

## 2022-11-21 DIAGNOSIS — R09.89 SYMPTOMS OF CEREBROVASCULAR ACCIDENT (CVA): ICD-10-CM

## 2022-11-21 NOTE — PROGRESS NOTES
Occupational Therapy Daily Note:    Today's date: 2022  Patient name: Subhash Singleton  : 1970  MRN: [id number]   Referring provider: Amira Rosario DO  Dx:   Encounter Diagnoses   Name Primary? • Cognitive attention deficit Yes   • Stroke-like symptoms    • Symptoms of cerebrovascular accident (CVA)      TIME  900-1000      Patient was treated by JOSIANE Steel under the supervision of [address] [place] [address], OTR/L  Subjective: "I am hopefully getting my glasses soon" "I can't wait to get them, they will definitely be helpful"    Objective: Pt engaged in skilled OT treatment session with focus on fxnl cognition, short term memory, fxnl attention, UE NMR, UE endurance, FMC/GMC and FMS/GMS to increase engagement, endurance, tolerance, and independence with daily ADL and IADL tasks  CPT Code Minutes                                           Task Details        Therapeutic Activity  Pt engaged in [employer] game with 7 min monocular taping to L and R eye focusing on placing the correct pieces onto the board matching each colored corner with the correct color on the board focusing on near-point focus, visual scanning, and smooth pursuits  Pt ended with binocular vision  Pt then engaged in alphabetizing the states activity focusing on highlighting states into groups based on the first letter then writing them in order on the back of the paper focusing on sustained attention, memory recall, and visual scanning  Neuro Re-Ed               Therapeutic Exercise               Manual          Self Care           Assessment: Tolerated treatment well  Pt demo good tolerance to increased time with monocular taping reporting minimal HA during and after 3/10  Pt demo increased frustration with task due to not being able to find colors on board due to monocular taping  Pt transitioned to binocular vision to finish with increased speed finding each piece   Pt reported some double vision about detention through alphabetizing states activity but able to tolerate and finish  Pt transitioned to states activity with reported increased HA (4/10) after task was completed  Pt demo increased difficulty alphabetizing when there was more than one state starting with the same letter due to increased confusion and decreased sustained attention  Pt able to recognize mistakes and correct them when alphabetizing with no cuing from OTS  Patient would benefit from continued skilled OT          Plan: Continued skilled OT per POC Shoulder impingement assessments      INTERVENTION COMMENTS:  Diagnosis: Cognitive attention deficit [R41 530]  Precautions: Currently not driving    FOTO: Completed  Insurance: Payor: [facility] FOR YOU / Plan: [id number] [place] / Product Type: Medicaid HMO /   2 of 10 visits, PN due 12/2/2022  POC Expires: 11/28/2022

## 2022-11-22 ENCOUNTER — APPOINTMENT (OUTPATIENT)
Dept: OCCUPATIONAL THERAPY | Facility: CLINIC | Age: 52
End: 2022-11-22

## 2022-11-22 DIAGNOSIS — R09.89 SYMPTOMS OF CEREBROVASCULAR ACCIDENT (CVA): ICD-10-CM

## 2022-11-22 DIAGNOSIS — R29.90 STROKE-LIKE SYMPTOMS: ICD-10-CM

## 2022-11-22 DIAGNOSIS — R41.840 COGNITIVE ATTENTION DEFICIT: Primary | ICD-10-CM

## 2022-11-22 NOTE — PROGRESS NOTES
Occupational Therapy Daily Note:    Today's date: 2022  Patient name: Genaro Mccarthy  : 1970  MRN: [id number]  Referring provider: Abdoulaye Chatterjee DO  Dx:   Encounter Diagnoses   Name Primary? • Cognitive attention deficit Yes   • Stroke-like symptoms    • Symptoms of cerebrovascular accident (CVA)        Subjective: ***    Objective: Pt engaged in skilled OT treatment session with focus on UE NMR, UE strengthening, UE endurance, FMC/GMC and FMS/GMS to increase engagement, endurance, tolerance, and independence with daily ADL and IADL tasks  CPT Code Minutes                                           Task Details        Therapeutic Activity               Neuro Re-Ed               Therapeutic Exercise               Manual          Self Care     :  [id number]  [phone]  [phone]      Assessment: Tolerated treatment well  Patient would benefit from continued skilled OT      Plan: Continued skilled OT per POC    INTERVENTION COMMENTS:  Diagnosis: Cognitive attention deficit [R41 840]  Precautions: Currently not driving    FOTO: Completed  Insurance: Payor: [facility] FOR YOU / Plan: [email or web address] [place] / Product Type: Medicaid HMO /   3 of 10 visits, PN due 2022  POC Expires: 2022

## 2022-11-23 ENCOUNTER — OFFICE VISIT (OUTPATIENT)
Dept: NEUROLOGY | Facility: CLINIC | Age: 52
End: 2022-11-23

## 2022-11-23 VITALS
SYSTOLIC BLOOD PRESSURE: 130 MMHG | BODY MASS INDEX: 26.06 KG/M2 | WEIGHT: 166 LBS | HEIGHT: 67 IN | DIASTOLIC BLOOD PRESSURE: 80 MMHG | HEART RATE: 68 BPM

## 2022-11-23 DIAGNOSIS — I65.22 LEFT CAROTID STENOSIS: ICD-10-CM

## 2022-11-23 DIAGNOSIS — I63.9 CVA (CEREBRAL VASCULAR ACCIDENT) (HCC): ICD-10-CM

## 2022-11-23 DIAGNOSIS — I63.81 THALAMIC INFARCTION (HCC): Primary | ICD-10-CM

## 2022-11-23 NOTE — PROGRESS NOTES
Patient ID: Eleno Pettit is a 49 y o  male  Assessment/Plan:    27-year-old male who is here as a hospital follow-up  He was found to have thalamic infarction  Reviewed all imaging  Etiology of the R thalamic stroke 2/2 small vessel disease, he does have RF such as hypertension, hyperlipidemia    PLAN:      Diagnoses and all orders for this visit:    Thalamic infarction [facility])  Etiology of the CVA -   -for stroke prevention continue with combination of aspirin, plavix and atorvastatin  -BP goal < 130/80, BP is at goal in the office  -LDL goal <70, last LDL   -does not smoke at this time   -no snoring  -I advised patient to avoid using NSAIDs for headaches or other pain and to stick to tylenol if needed  -Recommend mediterranean diet & regular exercise regimen atleast 4-5 times a week for 20-30 minutes  -I educated patient/family regarding medication compliance    Left carotid stenosis  -scheduled for left TCAR in January  -s/p right TCAR  -follows vascular  -c/w aspirin, plavix and lipitor     [facility]       Follow up -    I would be happy to see the patient sooner if any new questions/concerns arise  Patient/Guardian was advised to the call the office if they have any questions and concerns in the meantime  Patient/Guardian does understand that if they have any new stroke like symptoms such as facial droop on one side, weakness/paralysis on either side, speech trouble, numbness on one side, balance issues, any vision changes, extreme dizziness or any new headache, to call 9-1-1 immediately or to proceed to the nearest ER immediately  Subjective:    HPI    27-year-old male who is here as a hospital follow-up  Patient was admitted in September 2022 with left-sided weakness and confusion  He had an elevated blood pressure when he arrived of 215/128  He was also found to have bilateral proximal cervical ICA stenosis on CTA head and neck    He had a CT head which was negative for any acute findings I reviewed the images  Patient had an MRI brain which showed a right thalamic stroke  I have elevated blood pressure  Patient was started on aspirin and Plavix and was recommended to be evaluated by vascular surgery outpatient for bilateral cervical ICA stenosis  Patient was also started on atorvastatin 80 mg   Etiology of the thalamic stroke was thought to be small-vessel disease  Patient was seen by vascular surgery and he underwent right TCAR few months ago  Since then he has been doing well denies any new TIA/Cerebrovascular accident like symptoms  Patient is compliant medications  He did have some muscle spasms in his neck after surgery but it is slowly improving  He is scheduled for his left TCAR in January  He does have some balance issues at times especially when he is tired and fatigued  The following portions of the patient's history were reviewed and updated as appropriate:   He  has a past medical history of COVID (05/2020), Hyperlipidemia, Hypertension, Kidney stone, Muscle weakness, Spinal stenosis, and Stroke ([facility] ) (09/13/2022)    He   Patient Active Problem List    Diagnosis Date Noted   • Left carotid stenosis 11/17/2022   • CVA (cerebral vascular accident) ([facility] ) 11/08/2022   • Primary hypertension 11/03/2022   • Thalamic infarction ([facility] ) 11/03/2022   • Abnormal renal function 11/03/2022   • Family history of prostate cancer 10/03/2022   • Erectile dysfunction due to arterial insufficiency 10/03/2022   • Encounter for screening colonoscopy 10/03/2022   • Gross hematuria 09/28/2022   • Encounter to discuss test results 09/28/2022   • Alcohol intake above recommended sensible limits 09/14/2022   • Stroke-like symptoms 09/13/2022   • Status post carotid surgery 09/13/2022   • Chronic back pain 10/18/2021   • History of diverticulitis 10/16/2021   • Bilateral nephrolithiasis 10/16/2021   • Elevated serum creatinine 10/16/2021     He  has a past surgical history that includes Ulnar collateral ligament reconstruction (Bilateral); Carpal tunnel release (Bilateral); Cervical fusion; Hernia repair; Cystoscopy; pr tcat iv stent crv crtd art embolic protecj (Right, 30/3/8237); and IR carotid stent (11/8/2022)  His family history includes Colon cancer in his father; Diabetes in his mother; Heart attack in his mother; Hypertension in his mother; No Known Problems in his sister, sister, sister, and son  He  reports that he quit smoking about 3 months ago  His smoking use included cigarettes  He has a 70 00 pack-year smoking history  He has never used smokeless tobacco  He reports that he does not currently use alcohol after a past usage of about 21 0 standard drinks per week  He reports that he does not currently use drugs    Current Outpatient Medications   Medication Sig Dispense Refill   • amLODIPine (NORVASC) 10 mg tablet Take 1 tablet (10 mg total) by mouth daily 90 tablet 0   • aspirin 81 mg chewable tablet Chew 1 tablet (81 mg total) daily 30 tablet 0   • atorvastatin (LIPITOR) 80 mg tablet Take 1 tablet (80 mg total) by mouth every evening 90 tablet 0   • clopidogrel (Plavix) 75 mg tablet Take 1 tablet (75 mg total) by mouth daily 30 tablet 3   • famotidine (PEPCID) 40 MG tablet Take 1 tablet (40 mg total) by mouth daily 90 tablet 1   • ferrous sulfate 325 (65 Fe) mg tablet Take 325 mg by mouth daily with breakfast     • losartan (COZAAR) 50 mg tablet Take 1 tablet (50 mg total) by mouth daily 90 tablet 0   • Multiple Vitamin (multivitamin) tablet Take 1 tablet by mouth daily     • ALPRAZolam (XANAX) 1 mg tablet Take 1 tablet (1 mg total) by mouth 1 (one) time for 1 dose Take 1 hour prior to cystoscopy (Patient not taking: Reported on 11/17/2022) 1 tablet 0   • polyethylene glycol (GOLYTELY) 4000 mL solution Take 4,000 mL by mouth once for 1 dose Per office instructions (Patient not taking: Reported on 11/17/2022) 4000 mL 0     No current facility-administered medications for this visit  Current Outpatient Medications on File Prior to Visit   Medication Sig   • amLODIPine (NORVASC) 10 mg tablet Take 1 tablet (10 mg total) by mouth daily   • aspirin 81 mg chewable tablet Chew 1 tablet (81 mg total) daily   • atorvastatin (LIPITOR) 80 mg tablet Take 1 tablet (80 mg total) by mouth every evening   • clopidogrel (Plavix) 75 mg tablet Take 1 tablet (75 mg total) by mouth daily   • famotidine (PEPCID) 40 MG tablet Take 1 tablet (40 mg total) by mouth daily   • ferrous sulfate 325 (65 Fe) mg tablet Take 325 mg by mouth daily with breakfast   • losartan (COZAAR) 50 mg tablet Take 1 tablet (50 mg total) by mouth daily   • Multiple Vitamin (multivitamin) tablet Take 1 tablet by mouth daily   • ALPRAZolam (XANAX) 1 mg tablet Take 1 tablet (1 mg total) by mouth 1 (one) time for 1 dose Take 1 hour prior to cystoscopy (Patient not taking: Reported on 11/17/2022)   • polyethylene glycol (GOLYTELY) 4000 mL solution Take 4,000 mL by mouth once for 1 dose Per office instructions (Patient not taking: Reported on 11/17/2022)     No current facility-administered medications on file prior to visit  He is allergic to penicillins            Objective:    Blood pressure 130/80, pulse 68, height 5' 7" (1 702 m), weight 75 3 kg (166 lb)  Physical Exam  General - patient is alert   Speech - no dysarthria noted, no aphasia noted  Neuro:   Cranial nerves: PERRL, EOMI, facial sensation intact to soft touch in V1, V2 and V3, no facial asymmetry noted, uvula/palate midline, tongue midline  Motor: 5/5 throughout, normal tone, no pronator drift noted  Sensory - decreased to soft touch on the L side of her body  Reflexes - 2+ throughout  Coordination - no ataxia/dysmetria noted  Gait - normal   Neurological Exam      ROS:    Review of Systems   Constitutional: Negative  Negative for appetite change and fever  HENT: Positive for tinnitus and trouble swallowing   Negative for hearing loss and voice change  Eyes: Positive for visual disturbance  Negative for photophobia and pain  Respiratory: Negative  Negative for shortness of breath  Cardiovascular: Negative  Negative for palpitations  Gastrointestinal: Negative  Negative for nausea and vomiting  Endocrine: Negative  Negative for cold intolerance  Genitourinary: Negative  Negative for dysuria, frequency and urgency  Musculoskeletal: Positive for myalgias and neck pain  Negative for gait problem  Skin: Negative  Negative for rash  Allergic/Immunologic: Negative  Neurological: Positive for weakness, numbness and headaches  Negative for dizziness, tremors, seizures, syncope, facial asymmetry, speech difficulty and light-headedness  Patient stated that he has left side numbness and tingling and weakness  Hematological: Bruises/bleeds easily  Psychiatric/Behavioral: Positive for sleep disturbance  Negative for confusion and hallucinations

## 2022-11-25 ENCOUNTER — APPOINTMENT (OUTPATIENT)
Dept: OCCUPATIONAL THERAPY | Facility: CLINIC | Age: 52
End: 2022-11-25

## 2022-11-25 ENCOUNTER — APPOINTMENT (OUTPATIENT)
Dept: PHYSICAL THERAPY | Facility: CLINIC | Age: 52
End: 2022-11-25

## 2022-11-28 ENCOUNTER — OFFICE VISIT (OUTPATIENT)
Dept: OCCUPATIONAL THERAPY | Facility: CLINIC | Age: 52
End: 2022-11-28

## 2022-11-28 ENCOUNTER — APPOINTMENT (OUTPATIENT)
Dept: PHYSICAL THERAPY | Facility: CLINIC | Age: 52
End: 2022-11-28

## 2022-11-28 DIAGNOSIS — R29.90 STROKE-LIKE SYMPTOMS: Primary | ICD-10-CM

## 2022-11-28 NOTE — PROGRESS NOTES
Occupational Therapy Daily Note:    Today's date: 2022  Patient name: Chong Holder  : 1970  MRN: [id number]   Referring provider: Radha Rajan, DO  Dx:   Encounter Diagnosis   Name Primary? • Stroke-like symptoms Yes       Subjective: "I am not really sure what to work on but I'm here "    Objective: Pt engaged in skilled OT treatment session with focus on fxnl cognition, fxnl attention, visual perceptual training, visual scanning and deductive reasoning, logical solutions and problem-solving skills to increase engagement, endurance, tolerance, and independence with daily ADL and IADL tasks  CPT Code Minutes                                           Task Details        Therapeutic Activity 30  Pt completed deductive reasoning puzzles to improve logical solutions/problem solving, visual attention, visual scanning and sustained attention  Pt independent in 75% of initial, less complex puzzle  Pt then completed puzzle with increased cog load, requiring problem-solving strategy to increase pt's success  Neuro Re-Ed 30 Pt completed two IQ fit puzzles focusing on near-point fixation, visual scanning, visual-perceptual skills  Pt required to manipulate, align and orient pieces into puzzle based on template  95% independence in task, required one verbal cue to identify mistake with one, pt able to comprehend why correction was made  Therapeutic Exercise               Manual          Self Care           Assessment: Tolerated treatment well  Pt demo improvement in deductive reasoning with massed practice and with use of problem-solving technique to improve organization of information  Patient would benefit from continued skilled OT          Plan: Continued skilled OT per POC       INTERVENTION COMMENTS:  Diagnosis: Cognitive attention deficit [R41 964]  Precautions: Currently not driving    FOTO: Completed  Insurance: Payor: [facility] FOR YOU / Plan: [employer] / Product Type: Medicaid HMO /   3 of 10 visits, PN due 12/2/2022  POC Expires: 11/28/2022

## 2022-11-30 ENCOUNTER — OFFICE VISIT (OUTPATIENT)
Dept: OCCUPATIONAL THERAPY | Facility: CLINIC | Age: 52
End: 2022-11-30

## 2022-11-30 DIAGNOSIS — R41.840 COGNITIVE ATTENTION DEFICIT: ICD-10-CM

## 2022-11-30 DIAGNOSIS — R29.90 STROKE-LIKE SYMPTOMS: Primary | ICD-10-CM

## 2022-11-30 NOTE — PROGRESS NOTES
Occupational Therapy Daily Note:    Today's date: 2022  Patient name: Imtiaz Lee  : 1970  MRN: [id number]  Referring provider: Christiano Pruett, DO  Dx:   Encounter Diagnoses   Name Primary? • Stroke-like symptoms Yes   • Cognitive attention deficit        Subjective: "I'm usually a little more confused at the     Objective: Pt engaged in skilled OT treatment session with focus on fxnl cognition, fxnl attention, visual perceptual training and visual scanning, FMC/FMS to increase engagement, endurance, tolerance, and independence with daily ADL and IADL tasks  CPT Code Minutes                                           Task Details        Therapeutic Activity 45 Pt engaged in dual tasking fxl cog activity to improve functional performance in immediate/delayed recall, working memory, direction follow, VS/ skills and verbal direction follow improving safety and fxl cog abilities for indep in home mngt tasks and successful return to worker role  Auditory processing with 3 min delayed recall engaging repeat/rehearse strategy pt required to repeat short story w/6 trials while at same time pt provided with verbal instructions to follow directional activity shading in grid squares following number/direction combo  Pt noted with 2 errors on worksheet, unable to self correct  Neuro Re-Ed 10 For improved spatial awareness and overall oculomotor neuro re-education, session initiated with taping strategies, monocular taping  R->L eye occluded for 3 min to address individual eye strengthening followed by peripheral field taping x4 min focusing on sustained convergence while engaging in IQ fit w/activity placed tabletop focusing on near point conversion w/accomodation  Pt reported HA in left eye with monocular taping  Therapeutic Exercise               Manual          Self Care           Assessment: Tolerated treatment well   Pt attended to dual task in multi modal environment  Pt provided with prompting questions to sequence short story about 25% of time improving in delayed recall of short story overall with each trial   Pt utilized color coding strategy to hold place on worksheet completing about 1/2 of activity with extended time  Pt did not report increase in HA with time at task  Patient would benefit from continued skilled OT      Plan: Continued skilled OT per POC    INTERVENTION COMMENTS:  Diagnosis: Cognitive attention deficit [R41 573]  Precautions: Currently not driving    FOTO: Completed  Insurance: Payor: [facility] FOR YOU / Plan: [employer] [place] [address] [place] / Product Type: Medicaid HMO /   4 of 10 visits, PN due 12/2/2022  POC Expires: 11/28/2022

## 2022-12-05 ENCOUNTER — TELEPHONE (OUTPATIENT)
Dept: VASCULAR SURGERY | Facility: CLINIC | Age: 52
End: 2022-12-05

## 2022-12-05 ENCOUNTER — APPOINTMENT (OUTPATIENT)
Dept: OCCUPATIONAL THERAPY | Facility: CLINIC | Age: 52
End: 2022-12-05

## 2022-12-06 ENCOUNTER — OFFICE VISIT (OUTPATIENT)
Dept: FAMILY MEDICINE CLINIC | Facility: CLINIC | Age: 52
End: 2022-12-06

## 2022-12-06 VITALS
HEIGHT: 67 IN | HEART RATE: 78 BPM | WEIGHT: 169 LBS | SYSTOLIC BLOOD PRESSURE: 156 MMHG | RESPIRATION RATE: 16 BRPM | DIASTOLIC BLOOD PRESSURE: 100 MMHG | TEMPERATURE: 97.4 F | BODY MASS INDEX: 26.53 KG/M2 | OXYGEN SATURATION: 98 %

## 2022-12-06 DIAGNOSIS — I65.22 LEFT CAROTID STENOSIS: ICD-10-CM

## 2022-12-06 DIAGNOSIS — M54.2 CERVICALGIA: Primary | ICD-10-CM

## 2022-12-06 DIAGNOSIS — R06.09 DYSPNEA ON EXERTION: ICD-10-CM

## 2022-12-06 DIAGNOSIS — R79.89 ELEVATED SERUM CREATININE: ICD-10-CM

## 2022-12-06 DIAGNOSIS — I65.23 SYMPTOMATIC CAROTID ARTERY STENOSIS, BILATERAL: ICD-10-CM

## 2022-12-06 DIAGNOSIS — R09.89 SYMPTOMS OF CEREBROVASCULAR ACCIDENT (CVA): ICD-10-CM

## 2022-12-06 DIAGNOSIS — Z98.890 STATUS POST CAROTID SURGERY: ICD-10-CM

## 2022-12-06 DIAGNOSIS — R29.90 STROKE-LIKE SYMPTOMS: ICD-10-CM

## 2022-12-06 DIAGNOSIS — I10 PRIMARY HYPERTENSION: ICD-10-CM

## 2022-12-06 DIAGNOSIS — K21.9 GASTROESOPHAGEAL REFLUX DISEASE WITHOUT ESOPHAGITIS: ICD-10-CM

## 2022-12-06 DIAGNOSIS — N20.0 BILATERAL NEPHROLITHIASIS: ICD-10-CM

## 2022-12-06 DIAGNOSIS — I63.9 CVA (CEREBRAL VASCULAR ACCIDENT) (HCC): ICD-10-CM

## 2022-12-06 RX ORDER — PANTOPRAZOLE SODIUM 40 MG/1
40 TABLET, DELAYED RELEASE ORAL
Qty: 30 TABLET | Refills: 5 | Status: SHIPPED | OUTPATIENT
Start: 2022-12-06

## 2022-12-06 RX ORDER — TIZANIDINE 2 MG/1
2 TABLET ORAL EVERY 8 HOURS PRN
Qty: 30 TABLET | Refills: 0 | Status: SHIPPED | OUTPATIENT
Start: 2022-12-06

## 2022-12-06 RX ORDER — ALBUTEROL SULFATE 90 UG/1
2 AEROSOL, METERED RESPIRATORY (INHALATION) EVERY 6 HOURS PRN
Qty: 6.7 G | Refills: 1 | Status: SHIPPED | OUTPATIENT
Start: 2022-12-06

## 2022-12-06 RX ORDER — LOSARTAN POTASSIUM 100 MG/1
100 TABLET ORAL DAILY
Qty: 90 TABLET | Refills: 0 | Status: SHIPPED | OUTPATIENT
Start: 2022-12-06 | End: 2023-03-06

## 2022-12-06 NOTE — ASSESSMENT & PLAN NOTE
-scheduled for left TCAR in January  -s/p right TCAR  -follows vascular  -c/w aspirin, plavix and lipitor

## 2022-12-06 NOTE — PROGRESS NOTES
Subjective:      Patient ID: Justin Joyce is a 49 y o  male  Persistent weakness- LLE, dysathria, brain fog-recent memory issues- seeing PT, speech difficulty, feels imbalance and impaired depth perception since his thalamic stroke  Had right shoulder and upper extremity pain since his left carotid surgery and has left neck pain, feels pressure in his neck  States had 3 episodes of gross blood in urine , which has now resolved and bruises easily on plavix and aspirin  He also has abdominal pain- heartburn is not controlled on lower dose of "heartburn pill"  Past Medical History:   Diagnosis Date   • COVID 05/2020   • Hyperlipidemia    • Hypertension    • Kidney stone    • Muscle weakness    • Spinal stenosis    • Stroke ([facility] [address] ) 09/13/2022    left sided weakness with pins/needles       Family History   Problem Relation Age of Onset   • Heart attack Mother    • Diabetes Mother    • Hypertension Mother    • Colon cancer Father    • No Known Problems Sister    • No Known Problems Sister    • No Known Problems Sister    • No Known Problems Son        Past Surgical History:   Procedure Laterality Date   • CARPAL TUNNEL RELEASE Bilateral    • CERVICAL FUSION      with hardware   • CYSTOSCOPY     • HERNIA REPAIR     • IR CAROTID STENT  11/8/2022   • IN TCAT IV STENT CRV CRTD ART EMBOLIC PROTECJ Right 08/1/4689    Procedure: ANGIOPLASTY ARTERY CAROTID W/ STENT TCAR,;  Surgeon: Baudilio Wells DO;  Location: [facility];  Service: Vascular   • ULNAR COLLATERAL LIGAMENT RECONSTRUCTION Bilateral         reports that he quit smoking about 4 months ago  His smoking use included cigarettes  He has a 70 00 pack-year smoking history  He has never used smokeless tobacco  He reports that he does not currently use alcohol after a past usage of about 21 0 standard drinks per week  He reports that he does not currently use drugs        Current Outpatient Medications:   •  albuterol (Proventil HFA) 90 mcg/act inhaler, Inhale 2 puffs every 6 (six) hours as needed for wheezing, Disp: 6 7 g, Rfl: 1  •  amLODIPine (NORVASC) 10 mg tablet, Take 1 tablet (10 mg total) by mouth daily, Disp: 90 tablet, Rfl: 0  •  aspirin 81 mg chewable tablet, Chew 1 tablet (81 mg total) daily, Disp: 30 tablet, Rfl: 0  •  atorvastatin (LIPITOR) 80 mg tablet, Take 1 tablet (80 mg total) by mouth every evening, Disp: 90 tablet, Rfl: 0  •  clopidogrel (Plavix) 75 mg tablet, Take 1 tablet (75 mg total) by mouth daily, Disp: 30 tablet, Rfl: 3  •  famotidine (PEPCID) 40 MG tablet, Take 1 tablet (40 mg total) by mouth daily, Disp: 90 tablet, Rfl: 1  •  ferrous sulfate 325 (65 Fe) mg tablet, Take 325 mg by mouth daily with breakfast, Disp: , Rfl:   •  losartan (COZAAR) 100 MG tablet, Take 1 tablet (100 mg total) by mouth daily, Disp: 90 tablet, Rfl: 0  •  Multiple Vitamin (multivitamin) tablet, Take 1 tablet by mouth daily, Disp: , Rfl:   •  pantoprazole (PROTONIX) 40 mg tablet, Take 1 tablet (40 mg total) by mouth daily before breakfast, Disp: 30 tablet, Rfl: 5  •  tiZANidine (ZANAFLEX) 2 mg tablet, Take 1 tablet (2 mg total) by mouth every 8 (eight) hours as needed for muscle spasms, Disp: 30 tablet, Rfl: 0  •  polyethylene glycol (GOLYTELY) 4000 mL solution, Take 4,000 mL by mouth once for 1 dose Per office instructions (Patient not taking: Reported on 11/17/2022), Disp: 4000 mL, Rfl: 0    The following portions of the patient's history were reviewed and updated as appropriate: allergies, current medications, past family history, past medical history, past social history, past surgical history and problem list     Review of Systems   Constitutional: Negative for chills and fever  HENT: Negative for congestion, rhinorrhea and sore throat  Eyes: Negative for discharge, redness and itching  Respiratory: Negative for chest tightness, shortness of breath and wheezing  Cardiovascular: Negative for chest pain, palpitations and leg swelling     Gastrointestinal: Negative for abdominal pain, constipation and diarrhea  Genitourinary: Positive for hematuria  Negative for dysuria  Musculoskeletal: Positive for arthralgias, neck pain and neck stiffness  Negative for back pain, gait problem and myalgias  Skin: Negative for pallor and rash  Neurological: Positive for facial asymmetry and weakness  Negative for dizziness, numbness and headaches  Memory problem+         PHQ-2/9 Depression Screening    Little interest or pleasure in doing things: 0 - not at all  Feeling down, depressed, or hopeless: 0 - not at all  PHQ-2 Score: 0  PHQ-2 Interpretation: Negative depression screen             Objective:    /100 (BP Location: Left arm, Patient Position: Sitting, Cuff Size: Adult)   Pulse 78   Temp (!) 97 4 °F (36 3 °C) (Temporal)   Resp 16   Ht 5' 7" (1 702 m)   Wt 76 7 kg (169 lb)   SpO2 98%   BMI 26 47 kg/m²      Physical Exam  Vitals and nursing note reviewed  Constitutional:       Appearance: Normal appearance  HENT:      Right Ear: External ear normal       Left Ear: External ear normal    Eyes:      General:         Right eye: No discharge  Left eye: No discharge  Conjunctiva/sclera: Conjunctivae normal    Cardiovascular:      Rate and Rhythm: Normal rate and regular rhythm  Heart sounds: No murmur heard  Pulmonary:      Effort: Pulmonary effort is normal       Breath sounds: Normal breath sounds  No wheezing  Abdominal:      General: There is no distension  Palpations: Abdomen is soft  Tenderness: There is no abdominal tenderness  Musculoskeletal:         General: Tenderness (right trapezius spasm) present  Right lower leg: No edema  Left lower leg: No edema  Skin:     Findings: No lesion or rash  Neurological:      Mental Status: He is alert  Mental status is at baseline  Sensory: Sensory deficit present  Motor: Weakness present        Coordination: Coordination abnormal       Gait: Gait abnormal       Deep Tendon Reflexes: Reflexes abnormal    Psychiatric:         Mood and Affect: Mood normal          Thought Content:  Thought content normal            Recent Results (from the past 8736 hour(s))   Basic metabolic panel    Collection Time: 09/13/22  6:32 PM   Result Value Ref Range    Sodium 136 135 - 147 mmol/L    Potassium 3 8 3 5 - 5 3 mmol/L    Chloride 103 96 - 108 mmol/L    CO2 24 21 - 32 mmol/L    ANION GAP 9 4 - 13 mmol/L    BUN 14 5 - 25 mg/dL    Creatinine 1 06 0 60 - 1 30 mg/dL    Glucose 102 65 - 140 mg/dL    Calcium 10 3 (H) 8 4 - 10 2 mg/dL    eGFR 81 ml/min/1 73sq m   CBC and Platelet    Collection Time: 09/13/22  6:32 PM   Result Value Ref Range    WBC 14 78 (H) 4 31 - 10 16 Thousand/uL    RBC 5 55 3 88 - 5 62 Million/uL    Hemoglobin 18 0 (H) 12 0 - 17 0 g/dL    Hematocrit 51 4 (H) 36 5 - 49 3 %    MCV 93 82 - 98 fL    MCH 32 4 26 8 - 34 3 pg    MCHC 35 0 31 4 - 37 4 g/dL    RDW 12 2 11 6 - 15 1 %    Platelets 288 207 - 258 Thousands/uL    MPV 9 5 8 9 - 12 7 fL   Protime-INR    Collection Time: 09/13/22  6:32 PM   Result Value Ref Range    Protime 12 4 11 6 - 14 5 seconds    INR 0 90 0 84 - 1 19   APTT    Collection Time: 09/13/22  6:32 PM   Result Value Ref Range    PTT 30 23 - 37 seconds   HS Troponin 0hr (reflex protocol)    Collection Time: 09/13/22  6:32 PM   Result Value Ref Range    hs TnI 0hr 9 "Refer to ACS Flowchart"- see link ng/L   FLU/RSV/COVID - if FLU/RSV clinically relevant    Collection Time: 09/13/22  6:32 PM    Specimen: Nose; Nares   Result Value Ref Range    SARS-CoV-2 Negative Negative    INFLUENZA A PCR Negative Negative    INFLUENZA B PCR Negative Negative    RSV PCR Negative Negative   Fingerstick Glucose (POCT)    Collection Time: 09/13/22  6:32 PM   Result Value Ref Range    POC Glucose 94 65 - 140 mg/dl   ECG 12 lead    Collection Time: 09/13/22  6:38 PM   Result Value Ref Range    Ventricular Rate 99 BPM    Atrial Rate 99 BPM    RI Interval 178 ms    QRSD Interval 100 ms    QT Interval 334 ms    QTC Interval 428 ms    P [place] 59 degrees    QRS Axis 34 degrees    T Wave [place] 53 degrees   HS Troponin I 2hr    Collection Time: 09/13/22  8:10 PM   Result Value Ref Range    hs TnI 2hr 13 "Refer to ACS Flowchart"- see link ng/L    Delta 2hr hsTnI 4 <20 ng/L   Sedimentation rate, automated    Collection Time: 09/13/22  8:10 PM   Result Value Ref Range    Sed Rate 13 0 - 19 mm/hour   C-reactive protein    Collection Time: 09/13/22  8:10 PM   Result Value Ref Range    CRP 1 6 <3 0 mg/L   TSH, 3rd generation    Collection Time: 09/13/22  8:10 PM   Result Value Ref Range    TSH 3RD GENERATON 0 730 0 450 - 4 500 uIU/mL   Fingerstick Glucose (POCT)    Collection Time: 09/13/22 11:52 PM   Result Value Ref Range    POC Glucose 89 65 - 140 mg/dl   HS Troponin I 4hr    Collection Time: 09/13/22 11:57 PM   Result Value Ref Range    hs TnI 4hr 8 "Refer to ACS Flowchart"- see link ng/L    Delta 4hr hsTnI -1 <20 ng/L   Platelet count    Collection Time: 09/13/22 11:57 PM   Result Value Ref Range    Platelets 549 194 - 249 Thousands/uL    MPV 9 3 8 9 - 12 7 fL   Lipid Panel with Direct LDL reflex    Collection Time: 09/14/22  5:14 AM   Result Value Ref Range    Cholesterol 255 (H) See Comment mg/dL    Triglycerides 250 (H) See Comment mg/dL    HDL, Direct 53 >=40 mg/dL    LDL Calculated 152 (H) 0 - 100 mg/dL   Comprehensive metabolic panel    Collection Time: 09/14/22  5:14 AM   Result Value Ref Range    Sodium 137 135 - 147 mmol/L    Potassium 3 6 3 5 - 5 3 mmol/L    Chloride 104 96 - 108 mmol/L    CO2 25 21 - 32 mmol/L    ANION GAP 8 4 - 13 mmol/L    BUN 13 5 - 25 mg/dL    Creatinine 1 07 0 60 - 1 30 mg/dL    Glucose 82 65 - 140 mg/dL    Calcium 9 5 8 4 - 10 2 mg/dL    AST 20 13 - 39 U/L    ALT 28 7 - 52 U/L    Alkaline Phosphatase 61 34 - 104 U/L    Total Protein 7 2 6 4 - 8 4 g/dL    Albumin 4 1 3 5 - 5 0 g/dL    Total Bilirubin 0 43 0 20 - 1 00 mg/dL    eGFR 80 ml/min/1 73sq m   CBC and differential    Collection Time: 09/14/22  5:14 AM   Result Value Ref Range    WBC 10 92 (H) 4 31 - 10 16 Thousand/uL    RBC 5 26 3 88 - 5 62 Million/uL    Hemoglobin 16 9 12 0 - 17 0 g/dL    Hematocrit 48 7 36 5 - 49 3 %    MCV 93 82 - 98 fL    MCH 32 1 26 8 - 34 3 pg    MCHC 34 7 31 4 - 37 4 g/dL    RDW 12 4 11 6 - 15 1 %    MPV 9 7 8 9 - 12 7 fL    Platelets 167 561 - 173 Thousands/uL    nRBC 0 /100 WBCs    Neutrophils Relative 66 43 - 75 %    Immat GRANS % 1 0 - 2 %    Lymphocytes Relative 22 14 - 44 %    Monocytes Relative 8 4 - 12 %    Eosinophils Relative 2 0 - 6 %    Basophils Relative 1 0 - 1 %    Neutrophils Absolute 7 24 1 85 - 7 62 Thousands/µL    Immature Grans Absolute 0 07 0 00 - 0 20 Thousand/uL    Lymphocytes Absolute 2 40 0 60 - 4 47 Thousands/µL    Monocytes Absolute 0 92 0 17 - 1 22 Thousand/µL    Eosinophils Absolute 0 19 0 00 - 0 61 Thousand/µL    Basophils Absolute 0 10 0 00 - 0 10 Thousands/µL   Hemoglobin A1c w/EAG Estimation    Collection Time: 09/14/22  5:14 AM   Result Value Ref Range    Hemoglobin A1C 5 6 Normal 3 8-5 6%; PreDiabetic 5 7-6 4%;  Diabetic >=6 5%; Glycemic control for adults with diabetes <7 0% %     mg/dl   Echo complete w/ contrast if indicated    Collection Time: 09/14/22  2:10 PM   Result Value Ref Range    AV area peak ward 1 9 cm2    AV peak gradient 121 mmHg    LA size 3 2 cm    Aortic valve mean velocity 11 90 m/s    LVPWd 1 10 cm    Left Atrium Area-systolic Apical Two Chamber 21 7 cm2    Left Atrium Area-systolic Four Chamber 91 4 cm2    MV E' Tissue Velocity Septal 4 cm/s    IVSd 9 94 cm    LV DIASTOLIC VOLUME (MOD BIPLANE) 2D 78 mL    LEFT VENTRICLE SYSTOLIC VOLUME (MOD BIPLANE) 2D 40 mL    Left ventricular stroke volume (2D) 38 00 mL    AV Deceleration Time 1,946 ms    A4C EF 54 %    LA length (A2C) 5 10 cm    LVIDd 4 20 cm    IVS 1 2 cm    LVIDS 3 20 cm    FS 24 28 - 44 %    Asc Ao 3 4 cm    Ao root 2 90 cm    RVID d 3 2 cm    LVOT mn grad 2 0 mmHg AV area by cont VTI 1 8 cm2    AV regurgitation pressure 1/2 time 564 ms    AV mean gradient 6 mmHg    AV LVOT peak gradient 4 mmHg    MV valve area p 1/2 method 2 53 cm2    E wave deceleration time 298 ms    LVOT diameter 2 1 cm    LVOT peak johnnie 0 98 m/s    LVOT peak VTI 15 13 cm    Aortic valve peak velocity 1 82 m/s    Ao VTI 29 53 cm    LVOT stroke volume 52 38 cm3    AV peak gradient 13 mmHg    MV Peak E Johnnie 62 cm/s    MV Peak A Johnnie 0 95 m/s    RAA A4C 12 cm2    MV stenosis pressure 1/2 time 87 ms    LVOT stroke volume index 26 30 ml/m2    LVSV, 2D 38 mL    LVOT area 3 46 cm2    Dimensionless velociy index 0 54     AV valve area 1 77 cm2    LV EF 60    CBC    Collection Time: 09/15/22  5:25 AM   Result Value Ref Range    WBC 9 25 4 31 - 10 16 Thousand/uL    RBC 5 27 3 88 - 5 62 Million/uL    Hemoglobin 16 9 12 0 - 17 0 g/dL    Hematocrit 49 7 (H) 36 5 - 49 3 %    MCV 94 82 - 98 fL    MCH 32 1 26 8 - 34 3 pg    MCHC 34 0 31 4 - 37 4 g/dL    RDW 12 2 11 6 - 15 1 %    Platelets 735 719 - 163 Thousands/uL    MPV 9 6 8 9 - 12 7 fL   Comprehensive metabolic panel    Collection Time: 09/15/22  5:25 AM   Result Value Ref Range    Sodium 137 135 - 147 mmol/L    Potassium 3 7 3 5 - 5 3 mmol/L    Chloride 107 96 - 108 mmol/L    CO2 21 21 - 32 mmol/L    ANION GAP 9 4 - 13 mmol/L    BUN 16 5 - 25 mg/dL    Creatinine 1 16 0 60 - 1 30 mg/dL    Glucose 94 65 - 140 mg/dL    Calcium 9 5 8 4 - 10 2 mg/dL    AST 21 13 - 39 U/L    ALT 26 7 - 52 U/L    Alkaline Phosphatase 62 34 - 104 U/L    Total Protein 7 1 6 4 - 8 4 g/dL    Albumin 4 0 3 5 - 5 0 g/dL    Total Bilirubin 0 50 0 20 - 1 00 mg/dL    eGFR 73 ml/min/1 73sq m   CBC and differential    Collection Time: 09/16/22  5:39 AM   Result Value Ref Range    WBC 8 78 4 31 - 10 16 Thousand/uL    RBC 4 96 3 88 - 5 62 Million/uL    Hemoglobin 16 1 12 0 - 17 0 g/dL    Hematocrit 46 3 36 5 - 49 3 %    MCV 93 82 - 98 fL    MCH 32 5 26 8 - 34 3 pg    MCHC 34 8 31 4 - 37 4 g/dL    RDW 12 3 11 6 - 15 1 %    MPV 9 8 8 9 - 12 7 fL    Platelets 599 687 - 864 Thousands/uL    nRBC 0 /100 WBCs    Neutrophils Relative 61 43 - 75 %    Immat GRANS % 1 0 - 2 %    Lymphocytes Relative 26 14 - 44 %    Monocytes Relative 8 4 - 12 %    Eosinophils Relative 3 0 - 6 %    Basophils Relative 1 0 - 1 %    Neutrophils Absolute 5 39 1 85 - 7 62 Thousands/µL    Immature Grans Absolute 0 04 0 00 - 0 20 Thousand/uL    Lymphocytes Absolute 2 31 0 60 - 4 47 Thousands/µL    Monocytes Absolute 0 74 0 17 - 1 22 Thousand/µL    Eosinophils Absolute 0 22 0 00 - 0 61 Thousand/µL    Basophils Absolute 0 08 0 00 - 0 10 Thousands/µL   Basic metabolic panel    Collection Time: 09/16/22  5:39 AM   Result Value Ref Range    Sodium 139 135 - 147 mmol/L    Potassium 4 0 3 5 - 5 3 mmol/L    Chloride 109 (H) 96 - 108 mmol/L    CO2 22 21 - 32 mmol/L    ANION GAP 8 4 - 13 mmol/L    BUN 15 5 - 25 mg/dL    Creatinine 1 03 0 60 - 1 30 mg/dL    Glucose 92 65 - 140 mg/dL    Calcium 9 4 8 4 - 10 2 mg/dL    eGFR 84 ml/min/1 73sq m   Urinalysis with microscopic    Collection Time: 09/20/22 11:51 AM   Result Value Ref Range    Color, UA Dark Brown     Clarity, UA Extra Turbid     Specific [place], UA 1 019 1 003 - 1 030    pH, UA 6 0 4 5, 5 0, 5 5, 6 0, 6 5, 7 0, 7 5, 8 0    Leukocytes, UA Trace (A) Negative    Nitrite, UA Negative Negative    Protein, UA 70 (1+) (A) Negative mg/dl    Glucose, UA Negative Negative mg/dl    Ketones, UA Negative Negative mg/dl    Urobilinogen, UA <2 0 <2 0 mg/dl mg/dl    Bilirubin, UA Negative Negative    Occult Blood, UA Large (A) Negative    RBC, UA Innumerable (A) None Seen, 1-2 /hpf    WBC, UA Innumerable (A) None Seen, 1-2 /hpf    Epithelial Cells None Seen None Seen, Occasional /hpf    Bacteria, UA None Seen None Seen, Occasional /hpf   Urine culture    Collection Time: 09/20/22 11:51 AM    Specimen: Urine, Other   Result Value Ref Range    Urine Culture No Growth <1000 cfu/mL    PSA Total, Diagnostic    Collection Time: 10/03/22 10:13 AM   Result Value Ref Range    PSA, Diagnostic 1 6 0 0 - 4 0 ng/mL   Basic metabolic panel    Collection Time: 10/03/22 10:13 AM   Result Value Ref Range    Sodium 139 135 - 147 mmol/L    Potassium 3 8 3 5 - 5 3 mmol/L    Chloride 110 (H) 96 - 108 mmol/L    CO2 23 21 - 32 mmol/L    ANION GAP 6 4 - 13 mmol/L    BUN 13 5 - 25 mg/dL    Creatinine 1 14 0 60 - 1 30 mg/dL    Glucose, Fasting 78 65 - 99 mg/dL    Calcium 9 5 8 3 - 10 1 mg/dL    eGFR 74 ml/min/1 73sq m   Cytology, urine    Collection Time: 10/03/22 10:19 AM   Result Value Ref Range    Case Report       Non-gynecologic Cytology                          Case: [id number]                                  Authorizing Provider:  Charity Posey MD        Collected:           10/03/2022 1019              Ordering Location:     [facility]        Received:            10/03/2022 1019                                     Urology [facility]                                                               Pathologist:           Rodney Coker DO                                                     Specimen:    Urine, Clean Catch                                                                         Final Diagnosis       A  Urine, Clean Catch (ThinPrep):  - Negative for high grade urothelial carcinoma ([address])  See Note  - Abundant red blood cells, rare benign and degenerated urothelial cells, and mixed inflammatory cells present  Note       The above diagnostic category is from the recently published book, [employer] [place] [employer] Urinary Cytology, and is in keeping with the ongoing effort for utilization of standardized diagnostic terminology in urine cytology  *    *[employer] [place] [employer] Urinary Cytology  [employer]; 2016        Gross Description          A   15mL, dark yellow, cloudy         Additional Information       [employer]'s FDA approved ,  and ThinPrep Imaging Duo System are utilized with strict adherence to the 's instruction manual to prepare gynecologic and non-gynecologic cytology specimens for the production of ThinPrep slides as well as for gynecologic ThinPrep imaging  These processes have been validated by our laboratory and/or by the   These tests were developed and their performance characteristics determined by [facility] [address] [place] [address] or [address] [place] [address]  They may not be cleared or approved by the [place]  Food and Drug Administration  The FDA has determined that such clearance or approval is not necessary  These tests are used for clinical purposes  They should not be regarded as investigational or for research  This laboratory has been approved by [facility], designated as a high-complexity laboratory and is qualified to perform these tests       Interpretation performed at [facility], [address] [place] [address]     POCT urine dip    Collection Time: 11/03/22  5:23 PM   Result Value Ref Range    LEUKOCYTE ESTERASE,UA neg     NITRITE,UA neg     SL AMB POCT UROBILINOGEN 0 2     POCT URINE PROTEIN neg      PH,UA 7     BLOOD,UA neg     SPECIFIC GRAVITY,UA 1 010     KETONES,UA neg     BILIRUBIN,UA neg     GLUCOSE, UA neg    Aldosterone/Renin Ratio    Collection Time: 11/04/22  9:15 AM   Result Value Ref Range    Renin 7 864 (H) 0 167 - 5 380 ng/mL/hr    Aldosterone 3 8 0 0 - 30 0 ng/dL    Aldos/Renin Ratio 0 5 0 0 - 30 0   Type and screen    Collection Time: 11/04/22  9:15 AM   Result Value Ref Range    ABO Grouping O     Rh Factor Positive     Antibody Screen Negative     Specimen Expiration Date [id number]    MISCELLANEOUS LAB TEST    Collection Time: 11/04/22  9:28 AM   Result Value Ref Range    Miscellaneous Lab Test Result SEE WRITTEN REPORT    Type and screen    Collection Time: 11/08/22  6:41 AM   Result Value Ref Range    ABO Grouping O     Rh Factor Positive     Antibody Screen Negative     Specimen Expiration Date [id number]    POCT activated clotting time    Collection Time: 11/08/22  8:41 AM   Result Value Ref Range    Activated Clotting Time, i-STAT 140 (H) 89 - 137 sec    Specimen Type ARTERIAL    POCT activated clotting time    Collection Time: 11/08/22  9:20 AM   Result Value Ref Range    Activated Clotting Time, i-STAT 309 (H) 89 - 137 sec    Specimen Type ARTERIAL    POCT activated clotting time    Collection Time: 11/08/22 10:10 AM   Result Value Ref Range    Activated Clotting Time, i-STAT 146 (H) 89 - 137 sec    Specimen Type ARTERIAL    Basic Metabolic Panel    Collection Time: 11/09/22  5:07 AM   Result Value Ref Range    Sodium 140 135 - 147 mmol/L    Potassium 3 7 3 5 - 5 3 mmol/L    Chloride 107 96 - 108 mmol/L    CO2 24 21 - 32 mmol/L    ANION GAP 9 4 - 13 mmol/L    BUN 9 5 - 25 mg/dL    Creatinine 1 00 0 60 - 1 30 mg/dL    Glucose 90 65 - 140 mg/dL    Calcium 8 3 8 3 - 10 1 mg/dL    eGFR 87 ml/min/1 73sq m   CBC and Platelet    Collection Time: 11/09/22  5:07 AM   Result Value Ref Range    WBC 7 45 4 31 - 10 16 Thousand/uL    RBC 3 47 (L) 3 88 - 5 62 Million/uL    Hemoglobin 11 4 (L) 12 0 - 17 0 g/dL    Hematocrit 32 8 (L) 36 5 - 49 3 %    MCV 95 82 - 98 fL    MCH 32 9 26 8 - 34 3 pg    MCHC 34 8 31 4 - 37 4 g/dL    RDW 12 7 11 6 - 15 1 %    Platelets 309 513 - 422 Thousands/uL    MPV 9 5 8 9 - 12 7 fL   APTT    Collection Time: 11/09/22  5:07 AM   Result Value Ref Range    PTT 29 23 - 37 seconds   Protime-INR    Collection Time: 11/09/22  5:07 AM   Result Value Ref Range    Protime 13 5 11 6 - 14 5 seconds    INR 1 03 0 84 - 1 19       Laboratory Results: I have personally reviewed the pertinent laboratory results/reports     Radiology/Other Diagnostic Testing Results: I have personally reviewed pertinent reports  IR carotid stent    Result Date: 11/21/2022  PLEASE SEE REPORT FOR THIS PROCEDURE IN PATIENT'S CHART UNDER OP NOTE         Assessment/Plan:  Problem List Items Addressed This Visit Cardiovascular and Mediastinum    Primary hypertension    Relevant Medications    losartan (COZAAR) 100 MG tablet    CVA (cerebral vascular accident) ([facility] [address] )    Relevant Medications    losartan (COZAAR) 100 MG tablet    Left carotid stenosis     -scheduled for left TCAR in January  -s/p right TCAR  -follows vascular  -c/w aspirin, plavix and lipitor          Symptomatic carotid artery stenosis, bilateral       Genitourinary    Bilateral nephrolithiasis     Saw Dr Fe Flores- was ordered renal artery doppler and saw urology- pending renal CT            Other    Elevated serum creatinine    Status post carotid surgery   Other Visit Diagnoses     Cervicalgia    -  Primary    Relevant Medications    tiZANidine (ZANAFLEX) 2 mg tablet    Symptoms of cerebrovascular accident (CVA)        Relevant Medications    losartan (COZAAR) 100 MG tablet    Stroke-like symptoms        Relevant Medications    losartan (COZAAR) 100 MG tablet    Gastroesophageal reflux disease without esophagitis        Relevant Medications    pantoprazole (PROTONIX) 40 mg tablet    Dyspnea on exertion        Relevant Medications    albuterol (Proventil HFA) 90 mcg/act inhaler        Call central scheduling to schedule CT renal protocol, lung cancer screening and renal artery doppler  He has a kit for urine collection at home-likely for 24 hour urine studies ordered by nephology  Advised to follow the instructions on the kit  Increase losartan to 100 mg daily since BP is not at goal    He has hematuria on dual antiplatelet- surgeon and urologist both told him to continue Plavix  Given ED precautions  I will defer to vascular surgery and urology to manage this-patient to call their office if the hematuria recurs  Advised to take losaratan and amlodipine in    Advised to take aspirin, Plavix aspirin, Plavix after dinner and iron prior after dinner and iron prior to dinner  Atorvastatin at bedtime  This will reduce his gastric irritation    Also advised to start taking pantoprazole empty stomach and do not eat anything for 30 min for 21 days and thereafter can take pantoprazole prn and Famotidine daily  Read package inserts for all medications before starting a new medications, call me if you have any questions  Patient was given opportunity to ask questions and all questions were answered  Disclaimer: Portions of the record may have been created with voice recognition software  Occasional wrong word or "sound a like" substitutions may have occurred due to the inherent limitations of voice recognition software  Read the chart carefully and recognize, using context, where substitutions have occurred  I have used the Epic copy/forward function to compose this note  I have reviewed my current note to ensure it reflects the current patient status, exam, assessment and plan

## 2022-12-07 ENCOUNTER — PREP FOR PROCEDURE (OUTPATIENT)
Dept: VASCULAR SURGERY | Facility: CLINIC | Age: 52
End: 2022-12-07

## 2022-12-07 ENCOUNTER — OFFICE VISIT (OUTPATIENT)
Dept: OCCUPATIONAL THERAPY | Facility: CLINIC | Age: 52
End: 2022-12-07

## 2022-12-07 DIAGNOSIS — R09.89 SYMPTOMS OF CEREBROVASCULAR ACCIDENT (CVA): Primary | ICD-10-CM

## 2022-12-07 NOTE — PROGRESS NOTES
OCCUPATIONAL THERAPY PROGRESS UPDATE #1:    11/2/2022  Anup Carpenter  1970  [id number]  Marcela UQICK DO    Diagnosis ICD-10-CM Associated Orders   1  Symptoms of cerebrovascular accident (CVA)  R09 89               Assessment/Plan    Skilled Analysis:  Anup Carpenter is a 49 y o  male referred to Occupational Therapy s/p Symptoms of cerebrovascular accident (CVA) [R09 89]  Pt participated in skilled OT Progress Update #2 today to assess functional progression towards goals and POC  Pt reports he continues to get a headache when completing taping strategies with doing visual activities in therapy  Pt obtained bifocals last week, and is wearing them periodically but he does get headaches when wearing them for long periods of time  He feels like he has the "brain power" to do most tasks, but has a hard time concentrating on the task  Pt reports he feels not ready to start driving at this point and is not ready to start back to work  He has been helping his wife with house cleaning with leaf blowing, doing some painting at home  Pt undergoing LUE stent placement in January due to 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% stenosis  Following formalized testing as well as clinical observation, Pt presents improvements in visual scanning/saccadic eye movements, smooth pursuits, improved convergence, improved visual acuity with use of bifocals, improved binocularity, with the following areas of deficit: decreased concentration and attention for prolonged time periods, short term memory recall, > 2 part direction following demands, higher level executive functioning, decreased visual teaming skills and impaired visual acuity impacting near point sustained focus  Recommending pt to continued with vision HEP including pencil push-ups, saccades and pursuits for completion at home as well as cognitive worksheets being sent home after sessions   Pt will benefit from skilled Occupational Therapy services 2x/week for additional 4 weeks with focus on HEP, functional cognition, functional vision, endurance and FM coordination demands in order to return to  role, worker role as a contractor, and increased self-confidence for IADL's  OTR recommending pt complete Fitness to Drive evaluation post 4 weeks to continue to address visual deficits and memory for safe driving abilities on the road  At this time, pt reported he will not be driving and does not think he needs to take testing  Gricel Jasso is in agreement with POC, POC to  in 90 days       Short Term Goals = Long Term Goals (8) WKS   Strength/Endurance did not reassess today  · Pt will demo with G tolerance to in stance exercise x 50+ minutes with minimal rest breaks required for increased engagement in work contractor role  = PARTIALLY MET  · Pt will demo with G carryover of HEP to improve functional progression towards goals in Plan of care and for improved functional use of LUE = PARTIALLY MET, decreased consistency seen with HEP's (reports he gets frustrated with some tasks, fair consistency with carryover)    FMC/GMC/Functional Performance  · Pt will increase LUE rate of manipulation by 15% for all FM tests for improved functional performance with salient tasks = DNT today  · Pt will demo with decreased LUE ataxia with functional reach for normalized movement pattern for hammering nails and for improved hand to target accuracy x 90% when reaching for items in tool belt  = GOAL MET, continues with slight ataxia when reaching target     Modalities  · Pt will tolerate IASTM/TENS for improved motor and sensory performance for overall improved pain and sensation to inc safety and engagement with work fxn tasks   = CONTINUE    Cognition  - Direction Following  Pt will increase verbal and written 2-3 step direction following with G processing time and 90% accuracy for improved work demands/social performance and IADLs = PROGRESSING         -  Memory  o Pt will demo G recall of 90% of verbal / written information utilizing memory strategy of choice for improved STM/delayed memory = PROGRESSING  o Pt will demo G carryover of use of internal/external memory strategy aides for improved recall of daily events, improved executive functioning with G accuracy =  PROGRESSING    - Attention  - Pt will report improved daily focus/attention for longer time periods > 6 hrs without report of neuro fatigue in order to improve performance for work role = [employer]jel[address]  · Pt will increase oculomotor control for improved saccades, pursuits, con/divergent tasks for improved reading of blue prints, near point focus x 90% accuracy with no increase in symptoms = PROGRESSING   · Pt will demo with improved visual perceptual skills (depth perception / visual processing)  x 90% for improved accuracy of visual discrimination and spatial relationship tasks = PROGRESSING        Subjective    SUBJECTIVE: "I don't feel comfortable driving yet so I'm not going to " "I used to like to paint, but I don't anymore "      PATIENT GOAL: "I want to be able to get back to hunting, fishing, and working "    Patient-Specific Functional Scale   Task is scored 0 (unable to perform activity) to 10 (ability to perform activity independently)    Activity Date: 2022 Date:  22 Date:   22   1  Concentration 4/10 5/10  7/10   2  Disorientation 2 5/10 4/10  7 5/10   3  LUE weakness/numbness 5/10 6/10  7/10   4  Vision 5/10 5/10  "It's gotten better in the R eye, but still the same in the L eye " 8-9/10 in AM, 7/10 later in day  "decreased pressure behind left eye"   5  Frustration   Wife reports pt looses his patience easily   "I've done things so long, it should just come naturally "     6   [place]   0/10  10/10 (went hunting on opening day)     Pain Levels   Restin/10 L elbow  - Had B/L carpal tunnel release       With Activity:  8/10 at its worst in LUE   - Has been consistent for about a year      Objective     Impairment Observations: not assessed today    SKYLA EASLEY Comments           UPPER EXTREMITY FUNCTION   Intact Impaired Dominant Hand: Right     /PINCH STRENGTH             Dynamometer    - Gross Grasp 100 lbs 90 lbs subnormal   R/L: Improved 10 lbs    Pinch Meter     - Pincer 16 lbs 10 lbs subnormal   R: Improved 2 lbs  L:  Remained     - Tripod 20 lbs 15 lbs subnormal   R: Slight decrease  L: Remained     - Lateral 19 lbs  13 5 lbs subnormal   R: Improved 1 lb  L: Improved 1/2 lb      AROM (Seated)         ALL full    Elevation      Shoulder FF        Shoulder Ext        Shoulder Abd        Shoulder Add        Horizontal Abd        Horizontal Add        Elbow Flex        Elbow Ext        Pronation        Supination        Wrist Flex        Wrist Ext        Digit Flex        Digit Ex        Composite Grasp        Hook Grasp        Subluxation  None None      MMT             Shoulder FF 5/5 5/5    Shoulder Ext 5/5 5/5    Shoulder Abduction 5/5 5/5    Shoulder Adduction 5/5 5/5    Elbow Flex 5/5 5/5    Elbow Ext 5/5 5/5    Wrist Flex 5/5 5/5    Wrist Ext 5/5 5/5    Gross Grasp 5/5 5/5      SENSATION      Monofilament Testing  Normal: 2 83 mm    Diminished light touch: 3 61 mm    Diminished protective sensation: 4 31 mm    Loss of protective sensation: 4 56    Complete loss of sensation / deep pressure: 6 65 2 83 mm 2 83 mm ventral/dorsal forearm, upper arm, neck       3 61 mm ventral/dorsal hand  Remains    Sharp / Dull  Intact  Intact     Proprioception  Impaired    Hot/Cold Temp Intact  Intact     Stereognosis   Intact Clearly identified: eraser, paperclip, and marble     COORDINATION      Opposition Intact Impaired opposition to lateral D5, double tapping    Finger to Nose Intact Impaired Slight ataxia reaching target, improves with consistency    Rapid Alternating Movement Intact Impaired  Difficulty coordinating    9 Hole Peg Test 22 seconds 25 85  seconds subnormal Grasped more than one peg at a time, dropped 2 pegs on board, difficulty grasping single peg   Fxnl Dexterity Test 22 36 seconds 31 95 seconds   subnormal, decreased memory recall of board formation, decreased speed   Trung Hand Function Test         - Handwriting         - Card Turning         - Small Item p/u         - Simulated Feeding         - Stacking Checkers         - Moving Light Objects         - Moving Heavy Objects        TONE: MODIFIED JEMAL SCALE   Unable to assess today   No increase in muscle tone (0)      Slight Increase in muscle tone with catch and release or min resist at end range (1)      Slight Increase in muscle tone with catch and release, followed by min resistance through remainder of range (1+)      Increased muscle tone through full range, able to be moved easily (2)      Considerable increase in tone, difficult to move (3)      Rigid in Flexion/Extension (4)              Vision       Comments                                        VISION SCREEN         BIFOCALS- recently obtained, are causing headaches, wears intermittently           Symptoms Include headache and blurred vision     Last Eye Exam Unsure            Visual Acuity (near) R eye  20/40 with 0 errors and 20/20 1 error          With corrective lenses: B/L IMPROVED   Binocularity (near) exotropia and exophoria SLIGHT improved   Binocularity (far) exotropia and exophoria SLIGHT improved   Convergence  Diplopia reported at 4 inches  remained     Accommodation Blurriness/loss of focus Reported at 1-2 inches  Remained; reported slight HA in WALTOP             Alignment  suppression of far IMPROVED B/L teaming    Mobility             Pursuits slightly jerky in diagonal down planes Decreased jerkiness in vertical/horizontal planes             Saccades slightly jerky and inaccurate in horizontal, vertical and diagonal planes (under shooting)  Improvement evident     Visual fatigue, eye strain in left eye             [facility] Visual perceptual midline             Midline             Horizon          SCORE DEFICIT RANGE Comments                                         COGNITIVE FXN                    MOCA (8 3)         Education Level = 12 years     Visuospatial/executive functioning 5/5  IMPROVED     Naming 3/3  remained   Memory: 1st trial: 5/5  IMPROVED   Memory: 2nd trial: 5/5  remained   Attention/concentration 2/2  remained   List of letters:  0/1  declined   Serial Seven Subtraction: 3/3  IMPROVED   Language/sentence repetition: 2/2  remained   Language Fluency:  /     Abstract/Correlational Thinkin/2     Delayed Recall: 3/5  IMPROVED   Orientation: /  IMPROVED   Memory Index Score 10/15  IMPROVED                                      Total Score  mild neurocognitive deficits (borderline "Normal")  IMPROVEMENT From  to      OTHER PLANNED THERAPY INTERVENTIONS:   In stance neuro re-ed  Task-Oriented Training  TENS for pain (PRN)  IASTM  Hand to target  Sensory re-ed (Cutaneous/Proprioceptive)  Seated functional reach: crossing midline  WBearing strategies   Closed chain activities  Open chain activities  HEP (vision/cog)  Convergence/Visual teaming   Visual pursuits/saccades   Executive Functioning   Delayed recall  Memory strategies  Attention  Working Memory   Higher Level Visual Processing     Tx today:   Self-care 15 min:  Pt educ on completing visual activities at home to increase tolerance for visual tasks  Pt demo fair understanding of recommendation, continues to report "migraines" and blurriness after short period of time  Pt additionally educated on purpose and benefit of taking Fitness to drive assessment to determine pt's safety in driving  Pt reported not feeling reading to drive at this time       INTERVENTION COMMENTS:  Diagnosis: Cognitive attention deficit [R41 734]  Precautions: Currently not driving    FOTO: Completed  Insurance: Payor: [facility] FOR YOU / Plan: [address] [place] / Product Type: Medicaid HMO /   5 of 10 visits, PN due 1/7/23  POC Expires: 1/7/23

## 2022-12-08 ENCOUNTER — TELEPHONE (OUTPATIENT)
Dept: VASCULAR SURGERY | Facility: CLINIC | Age: 52
End: 2022-12-08

## 2022-12-08 NOTE — TELEPHONE ENCOUNTER
This is a reminder; patient is due for 9 mo CV & OV   Please call patient and schedule the following by the dates provided  Surgeon - ROSA MARIA/ Codey Roger (NPI: [id number])  Date of Surgery - 11/08/22  All appointments must be scheduled by, 11/10/23  !!!! Patient must be scheduled for their 9-month office visit before the follow-up window close date !!!!    [facility]/ [facility] VQI Protocol  patients must be scheduled for the following    [] 3-month Doppler - due on or after 02/10/23    [] 9-month Doppler Expected - due on or after 08/12/23    [] 9-month OV after Doppler - due on or after 08/12/23          Scheduling Reminders  1  Insurance requires, 9-month doppler to be scheduled 6-months and 2-days after the 3-month doppler  2  Appointment notes MUST be entered in the following format:  a  VQI SmartPhrase NEEDED - VQI LTFU - (CASEY or CEA) (Date of Surgery) (Surgeon's Initials) - CV Duplex (Date of Doppler)   3  If unable to schedule, a recall MUST be entered  >>Please route this encounter to Earl Gonzales, Seferino Tidwell, and Cruz Javier  <<

## 2022-12-12 ENCOUNTER — OFFICE VISIT (OUTPATIENT)
Dept: OCCUPATIONAL THERAPY | Facility: CLINIC | Age: 52
End: 2022-12-12

## 2022-12-12 DIAGNOSIS — R41.840 COGNITIVE ATTENTION DEFICIT: ICD-10-CM

## 2022-12-12 DIAGNOSIS — R29.90 STROKE-LIKE SYMPTOMS: ICD-10-CM

## 2022-12-12 DIAGNOSIS — R09.89 SYMPTOMS OF CEREBROVASCULAR ACCIDENT (CVA): Primary | ICD-10-CM

## 2022-12-12 NOTE — PROGRESS NOTES
Occupational Therapy Daily Note:    Today's date: 2022  Patient name: Farzana Martinez  : 1970  MRN: [id number]  Referring provider: Eris Russ, DO  Dx:   Encounter Diagnoses   Name Primary? • Symptoms of cerebrovascular accident (CVA) Yes   • Stroke-like symptoms    • Cognitive attention deficit        Subjective: "I am not doing that, no thanks  My whole body hurts " (organizing states)  "It takes a lot to get used to using these bifocals "     Objective: Pt engaged in skilled OT treatment session with focus on fxnl cognition, fxnl attention, visual perceptual training and visual scanning, FMC/FMS to increase engagement, endurance, tolerance, and independence with daily ADL and IADL tasks  CPT Code Minutes                                           Task Details        Therapeutic Activity  Pt engaged in divided/alternating attention task in stance: ball bounce continuously on mirror while alternating in boys and girls names through alphabet  Pt then engaged in a standing basketball bounce  Pt started with 3 categories and upgraded to 4 total (each category associated with a color; yellow-types of fish, orange-animals you hunt,  Purple-cars, and blue-food)  Pt req to bounce ball either in L hand only or B/L (alternating) and go to category called out by OT and name 2 items in each category  Pt req to remember items already said to prevent repeating of words  OT attempted organizing of states task (backwards from Z-A) although pt refused  Pt then engaged in a [employer] requiring to match symbols to numbers, while wearing glasses to improve near point focus/attention  At end of session, OTR provided cues using internal memory strategy of making a list of letters and using this as a tool to improve focus/attention  Pt ended with writing A-Z on worksheet and asked to organize states backwards           Neuro Re-Ed               Therapeutic Exercise Manual          Self Care       Assessment: Tolerated treatment well  Patient would benefit from continued skilled OT  Pt demo maximal difficulty with alternating and dividing attention demands, requiring cues more than half the time during each task 2* difficulty with recalling verbal directions provided initially; difficulty with alternating attention in order to provide next correct sequence  Pt also demo MAX difficulty with bouncing ball in L hand 2* using non-dominant / affected side and decreased [address]  Pt completed table top worksheet well with increased time to locate each letter with overall 2 errors that were corrected by OTR       Plan: Continued skilled OT per POC    INTERVENTION COMMENTS:  Diagnosis: Cognitive attention deficit [R41 840]  Precautions: Currently not driving    FOTO: Completed  Insurance: Payor: [facility] FOR YOU / Plan: [address] [place] / Product Type: Medicaid HMO /   1 GL 81 TODD PN due 1/7/23  POC Expires: 1/7/23

## 2022-12-14 ENCOUNTER — APPOINTMENT (OUTPATIENT)
Dept: OCCUPATIONAL THERAPY | Facility: CLINIC | Age: 52
End: 2022-12-14

## 2022-12-15 ENCOUNTER — OFFICE VISIT (OUTPATIENT)
Dept: OCCUPATIONAL THERAPY | Facility: CLINIC | Age: 52
End: 2022-12-15

## 2022-12-15 DIAGNOSIS — R09.89 SYMPTOMS OF CEREBROVASCULAR ACCIDENT (CVA): Primary | ICD-10-CM

## 2022-12-15 DIAGNOSIS — R29.90 STROKE-LIKE SYMPTOMS: ICD-10-CM

## 2022-12-15 DIAGNOSIS — R41.840 COGNITIVE ATTENTION DEFICIT: ICD-10-CM

## 2022-12-15 NOTE — PROGRESS NOTES
Occupational Therapy Daily Note:    Today's date: 12/15/2022  Patient name: Mary Ritter  : 1970  MRN: [id number]  Referring provider: Yamileth Baugh, DO  Dx:   Encounter Diagnoses   Name Primary? • Symptoms of cerebrovascular accident (CVA) Yes   • Stroke-like symptoms    • Cognitive attention deficit        Subjective: I can see it, I just actually visualize it in my head at the moment  I have a hard time with visualization  Objective: Pt engaged in skilled OT treatment session with focus on fxnl cognition, fxnl attention, visual perceptual training and visual scanning, FMC/FMS to increase engagement, endurance, tolerance, and independence with daily ADL and IADL tasks  CPT Code Minutes                                           Task Details        Therapeutic Activity 60   Pt engaged in Mental Manipulation task this date, with focus on sustained/divided attention, working/short memory, delayed recall, visual scanning, sequencing skills, /VM skills, functional cognition, memory strategy implementation, and visual tolerance  Pt completed activity for 8 rounds recalling all previous rounds each time with 90% accuracy and minimal cueing  Overall at end of session, pt able to recall 7/8 with delayed recall and min A for 100% accuracy  Pt required to memorize 3 items using strategy, locate them on tabletop, and provide them to therapist in alphabetical order  Pt then required to recall most recent to least recent set of 3 on each round  Therapist providing mod A for creating memory strategy techniques, using story telling, visualization, and association  Story creation proving most difficult this date  Neuro Re-Ed               Therapeutic Exercise               Manual          Self Care       Assessment: Tolerated treatment well  Pt demo moderate difficulty with creating memory strategies for use throughout session  Pt also demo good recall this date with minimal cueing  Therapist noting increased accuracy with delayed recall items versus short term memory recall  Patient would benefit from continued skilled OT      INTERVENTION COMMENTS:  Diagnosis: Cognitive attention deficit [R41 913]  Precautions: Currently not driving    FOTO: Completed  Insurance: Payor: [facility] FOR YOU / Plan: [employer] [place] / Product Type: Medicaid HMO /   2 of 10 visits, PN due 1/7/23  POC Expires: 1/7/23

## 2022-12-19 ENCOUNTER — OFFICE VISIT (OUTPATIENT)
Dept: OCCUPATIONAL THERAPY | Facility: CLINIC | Age: 52
End: 2022-12-19

## 2022-12-19 DIAGNOSIS — R09.89 SYMPTOMS OF CEREBROVASCULAR ACCIDENT (CVA): Primary | ICD-10-CM

## 2022-12-19 DIAGNOSIS — R29.90 STROKE-LIKE SYMPTOMS: ICD-10-CM

## 2022-12-19 DIAGNOSIS — R41.840 COGNITIVE ATTENTION DEFICIT: ICD-10-CM

## 2022-12-19 NOTE — PROGRESS NOTES
Occupational Therapy Daily Note:    Today's date: 2022  Patient name: Flaquito Bro  : 1970  MRN: [id number]  Referring provider: Armaan Vasquez DO  Dx:   Encounter Diagnoses   Name Primary? • Symptoms of cerebrovascular accident (CVA) Yes   • Stroke-like symptoms    • Cognitive attention deficit        Subjective:  "Ugh, the dog ate the homework     just kidding, I got busy and its in my truck "  (states)    Objective: Pt engaged in skilled OT treatment session with focus on fxnl cognition, fxnl attention, visual perceptual training and visual scanning, FMC/FMS to increase engagement, endurance, tolerance, and independence with daily ADL and IADL tasks  CPT Code Minutes                                           Task Details        Therapeutic Activity  Pt started with alternating attention task using [employer] Alec  Alec naming months of the year at 40 words per minute  Required downgrading to 30 words per minute with pt throwing a ball to match the month to it's associated number  Pt then engaged in same task using flexbars in b/l hands with L hand being "odd" months and R hand being "even" months requiring alternating/dividing attention  Started at 20 bpm and upgraded to 30 bpm and 40 bpm       Pt then continued with an alternating/divided attention task of placing marbles in power web placing in sequential order from a number to a letter (1-A, 2-B) using a green resistive clip in L hand for FMS in a tripod pinch  During this, pt engaged in alternating attention task of 3 word sequences of immediate recall and then with a 1-2 min delay, required to manipulate words and provide the smallest of the 3  Upgraded to 4 word sequences  Neuro Re-Ed               Therapeutic Exercise               Manual          Self Care       Assessment: Tolerated treatment well  Patient would benefit from continued skilled OT      Pt demo moderate difficulty with 40 words / minute req downgrade with increased success and accuracy to targeting years/numbers together and difficulty with coordinating UE movement to throw the ball  Pt demo overall G attention to alternating a number/letter task with 1 error noted by OTR only  Pt benefit from making categories of words provided during task to improve memory recall of words  Pt also benefit from cues/prompts to utilize repeat strategy during immediate recall of words  As task progressed and continued throughout session, pt able to recall 2/3 or 3/4 words versus all of the words       INTERVENTION COMMENTS:  Diagnosis: Cognitive attention deficit [R41 840]  Precautions: Currently not driving    FOTO: Completed 12/19  Insurance: Payor: [facility] FOR YOU / Plan: [address] [place] / Product Type: Medicaid HMO /   3 of 10 visits, PN due 1/7/23  POC Expires: 1/7/23

## 2022-12-20 ENCOUNTER — APPOINTMENT (OUTPATIENT)
Dept: OCCUPATIONAL THERAPY | Facility: CLINIC | Age: 52
End: 2022-12-20

## 2022-12-22 ENCOUNTER — APPOINTMENT (OUTPATIENT)
Dept: OCCUPATIONAL THERAPY | Facility: CLINIC | Age: 52
End: 2022-12-22

## 2022-12-27 ENCOUNTER — OFFICE VISIT (OUTPATIENT)
Dept: OCCUPATIONAL THERAPY | Facility: CLINIC | Age: 52
End: 2022-12-27

## 2022-12-27 DIAGNOSIS — R29.90 STROKE-LIKE SYMPTOMS: ICD-10-CM

## 2022-12-27 DIAGNOSIS — R41.840 COGNITIVE ATTENTION DEFICIT: ICD-10-CM

## 2022-12-27 DIAGNOSIS — R09.89 SYMPTOMS OF CEREBROVASCULAR ACCIDENT (CVA): Primary | ICD-10-CM

## 2022-12-27 NOTE — PROGRESS NOTES
Occupational Therapy Daily Note:    Today's date: 2022  Patient name: Sherren Chimera  : 1970  MRN: [id number]  Referring provider: Coleen Guzman DO  Dx:   Encounter Diagnoses   Name Primary? • Symptoms of cerebrovascular accident (CVA) Yes   • Stroke-like symptoms    • Cognitive attention deficit      830-9 NOLAN  9-930 OTR    Subjective: "This wasn't too bad, but I have a headache now "    Objective: Pt engaged in skilled OT treatment session with focus on fxnl cognition, fxnl attention, visual perceptual training and visual scanning, FMC/FMS to increase engagement, endurance, tolerance, and independence with daily ADL and IADL tasks  CPT Code Minutes                                           Task Details        Therapeutic Activity 30 Pt completed alternating zip code/city and name/animal name task this date with focus on sustained/divided attention, sequencing ability, functional cognition, place keeping, visual scanning, problem solving, word finding, and activity tolerance/engagement  Pt indep in task though required increased time to complete task successfully  Pt reported he is "still getting used to" his glasses, as he recently began using bifocals  Neuro Re-Ed               Therapeutic Exercise               Manual          Self Care       Assessment: Tolerated treatment well  Pt continues to demonstrate increased headaches with concentrating on task >20 minutes  Pt demonstrates good progress in alternating attention task independently with no external frustration noted  Patient would benefit from continued skilled OT

## 2022-12-27 NOTE — PROGRESS NOTES
Occupational Therapy Daily Note:    Today's date: 2022  Patient name: Dwight Perry  : 1970  MRN: [id number]  Referring provider: An Barlow DO  Dx:   Encounter Diagnoses   Name Primary? • Symptoms of cerebrovascular accident (CVA) Yes   • Stroke-like symptoms    • Cognitive attention deficit      TIME:  8:30 - 8:40 = Group  8:40 - 9:00 =     Subjective:  My back has been bothering me more in the last couple days but my HA right now is a 5/6 out of 10  Objective: Pt engaged in skilled OT treatment session with focus on fxnl cognition, fxnl attention, visual perceptual training and visual scanning, FMC/FMS to increase engagement, endurance, tolerance, and independence with daily ADL and IADL tasks  CPT Code Minutes                                           Task Details        Therapeutic Activity 30 Pt engaged in alternating zip code/city and name/animal name task this date with focus on sustained/divided attention, sequencing ability, functional cognition, place keeping, visual scanning, problem solving, word finding, verbal direction follow, and activity tolerance/engagement  Pt required min repetition at first with directions, however, after first round of repetition of verbal direction, pt able to carryover, however, with increased time and min difficulty with visual organization and place keeping  Neuro Re-Ed               Therapeutic Exercise               Manual          Self Care       Assessment: Tolerated treatment well  Pt presents to session late this ate with therapist adjusting as needed  Pt demo increased time to complete task, however, pt did complete with 100% accuracy first several steps piror to other therapist taking over  Patient would benefit from continued skilled OT      INTERVENTION COMMENTS:  Diagnosis: Cognitive attention deficit [R41 110]  Precautions: Currently not driving    FOTO: Completed   Insurance: Payor: Vilma Minaya / Plan: [facility] YOU / Product Type: Medicaid HMO /   4 of 10 visits, PN due 1/7/23  POC Expires: 1/7/23

## 2022-12-29 ENCOUNTER — APPOINTMENT (OUTPATIENT)
Dept: OCCUPATIONAL THERAPY | Facility: CLINIC | Age: 52
End: 2022-12-29

## 2023-01-03 ENCOUNTER — APPOINTMENT (OUTPATIENT)
Dept: OCCUPATIONAL THERAPY | Facility: CLINIC | Age: 53
End: 2023-01-03

## 2023-01-03 DIAGNOSIS — I10 HYPERTENSION, UNSPECIFIED TYPE: ICD-10-CM

## 2023-01-03 DIAGNOSIS — I10 HYPERTENSION: ICD-10-CM

## 2023-01-03 DIAGNOSIS — M54.2 CERVICALGIA: ICD-10-CM

## 2023-01-03 RX ORDER — AMLODIPINE BESYLATE 10 MG/1
TABLET ORAL
Qty: 90 TABLET | Refills: 0 | Status: SHIPPED | OUTPATIENT
Start: 2023-01-03

## 2023-01-03 RX ORDER — TIZANIDINE 2 MG/1
TABLET ORAL
Qty: 30 TABLET | Refills: 0 | Status: SHIPPED | OUTPATIENT
Start: 2023-01-03

## 2023-01-04 ENCOUNTER — ANESTHESIA EVENT (OUTPATIENT)
Dept: ANESTHESIOLOGY | Facility: HOSPITAL | Age: 53
End: 2023-01-04

## 2023-01-04 NOTE — PRE-PROCEDURE INSTRUCTIONS
Pre-Surgery Instructions:   Medication Instructions   • albuterol (Proventil HFA) 90 mcg/act inhaler Uses PRN- OK to take day of surgery   • amLODIPine (NORVASC) 10 mg tablet Take day of surgery  • aspirin 81 mg chewable tablet Take day of surgery  • atorvastatin (LIPITOR) 80 mg tablet Take night before surgery   • clopidogrel (Plavix) 75 mg tablet Take day of surgery  • ferrous sulfate 325 (65 Fe) mg tablet Stop taking 7 days prior to surgery  • losartan (COZAAR) 100 MG tablet Hold day of surgery  • Multiple Vitamin (multivitamin) tablet Stop taking 7 days prior to surgery  • pantoprazole (PROTONIX) 40 mg tablet Take day of surgery  • tiZANidine (ZANAFLEX) 2 mg tablet Uses PRN- OK to take day of surgery    Med list reviewed as above  Also instructed pt not to start any new vitamins/supplements preoperatively and to avoid NSAID's  7 days prior to surgery  Tylenol is acceptable if needed  Pt has and/or is getting CHG surgical soap and verbalizes understanding of preoperative showering protocol  All information within "My Surgical Experience" pamphlet reviewed and patient verbalizes understanding and compliance  All questions answered  Instructed pt to call surgeon's office with any questions, new illnesses, or hospitalizations prior to sx

## 2023-01-05 ENCOUNTER — EVALUATION (OUTPATIENT)
Dept: OCCUPATIONAL THERAPY | Facility: CLINIC | Age: 53
End: 2023-01-05

## 2023-01-05 DIAGNOSIS — R29.90 STROKE-LIKE SYMPTOMS: Primary | ICD-10-CM

## 2023-01-05 NOTE — PROGRESS NOTES
OCCUPATIONAL THERAPY PROGRESS UPDATE #2:    11/2/2022  Alexandra Gonzáles  1970  [id number]  Mary Ann QUICK DO    Diagnosis ICD-10-CM Associated Orders   1  Stroke-like symptoms  R29 90         Time:  Pt arrived 15 minutes to today's appointment  OT Re-eval:  [id number]  RBANS:   Administration, scoring, interpretation & POC development >91 mins      Assessment/Plan    Skilled Analysis:  Alexandra Gonzáles is a 47 y o  male referred to Occupational Therapy s/p Stroke-like symptoms [R29 90]  Pt participated in skilled OT Progress Update #2 today to assess functional progression towards goals and POC  Pt reports he feels overall improvement with his visual skills and some improvement in memory  In the morning, Hardeep Linares notices his eye will start twitching when his blood pressure raises, but otherwise he is not bothered by visual symptoms  His bifocals give him headaches but hopefully will subside once he has his surgery  When he is stressed, he notices increased frustrations and difficulty with concentration  Pt undergoing LUE stent placement in January 19th due to 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% stenosis  Pt completed RBANS testing today d/t increased focus on higher level cognitive processing in therapy sessions  Following formalized testing as well as clinical observation, Pt presents improvements in self-reported visual skills, good visuospatial/constructional skills, scoring in average range compared to age-ranged norms  Pt completed RBANS cognitive testing today, which pt prior has not completed  Pt continues to demonstrate the following areas of deficit: decreased concentration and attention for prolonged time periods, short term memory recall, > 2 part direction following demands, higher level executive functioning, delayed memory, and increased stress   Pt will continue to benefit from skilled Occupational Therapy services 2x/week for additional 4 weeks with focus on HEP, functional cognition, concentration, functional attention, & stress management to return to  role, worker role as a contractor, and increased self-confidence for IADL's  Pt reports he plans to return to work in the Spring once recovered from stent surgery  Stephanie Harvey is in agreement with POC, POC to  in 90 days       Short Term Goals = Long Term Goals (8) WKS  did not reassess today d/t time constraints  Strength/Endurance  · Pt will demo with G tolerance to in stance exercise x 50+ minutes with minimal rest breaks required for increased engagement in work contractor role  = PARTIALLY MET  · Pt will demo with G carryover of HEP to improve functional progression towards goals in Plan of care and for improved functional use of LUE = PARTIALLY MET, decreased consistency seen with HEP's (reports he gets frustrated with some tasks, fair consistency with carryover)    FMC/GMC/Functional Performance  · Pt will increase LUE rate of manipulation by 15% for all FM tests for improved functional performance with salient tasks = DNT today  · Pt will demo with decreased LUE ataxia with functional reach for normalized movement pattern for hammering nails and for improved hand to target accuracy x 90% when reaching for items in tool belt  = GOAL MET, continues with slight ataxia when reaching target     Modalities  · Pt will tolerate IASTM/TENS for improved motor and sensory performance for overall improved pain and sensation to inc safety and engagement with work fxn tasks   = CONTINUE    Cognition  - Direction Following  Pt will increase verbal and written 2-3 step direction following with G processing time and 90% accuracy for improved work demands/social performance and IADLs = PROGRESSING         -  Memory  o Pt will demo G recall of 90% of verbal / written information utilizing memory strategy of choice for improved STM/delayed memory = PROGRESSING  o Pt will demo G carryover of use of internal/external memory strategy aides for improved recall of daily events, improved executive functioning with G accuracy =  PROGRESSING    - Attention  - Pt will report improved daily focus/attention for longer time periods > 6 hrs without report of neuro fatigue in order to improve performance for work role = PROGRESSING  Vision  · Pt will increase oculomotor control for improved saccades, pursuits, con/divergent tasks for improved reading of blue prints, near point focus x 90% accuracy with no increase in symptoms = PROGRESSING   · Pt will demo with improved visual perceptual skills (depth perception / visual processing)  x 90% for improved accuracy of visual discrimination and spatial relationship tasks = PROGRESSING      Subjective    SUBJECTIVE: "I am stressed a lot because there is a lot going on "      PATIENT GOAL: "I want to be able to get back to hunting, fishing, and working "    Patient-Specific Functional Scale   Task is scored 0 (unable to perform activity) to 10 (ability to perform activity independently)    Activity Date: 2022 Date:  22 Date:   22 Date:   23   1  Concentration 4/10 5/10  7/10 6 -7/10   2  Disorientation 2 5/10 4/10  7 5/10 Rarely occurs; occurs when in times of high stress   3  LUE weakness/numbness 5/10 6/10  7/10 Rotator cuff issues: -7/10   4  Vision 5/10 5/10  "It's gotten better in the R eye, but still the same in the L eye " 8-9/10 in AM, 10 later in day  "decreased pressure behind left eye" 7 -8/10 continues to have left eye "jumping"   5  Frustration   Wife reports pt loses his patience easily   "I've done things so long, it should just come naturally "      6   [place] / [place]   0/10  10/10 (went hunting on opening day)      Pain Levels   Restin/10 L elbow  - Had B/L carpal tunnel release       With Activity:  8/10 at its worst in LUE   - Has been consistent for about a year      Objective     Impairment Observations: not assessed today    SKYLA EASLEY Comments           UPPER EXTREMITY FUNCTION Intact Impaired Dominant Hand: Right     /PINCH STRENGTH             Dynamometer    - Gross Grasp 100 lbs 90 lbs subnormal   R/L: Improved 10 lbs    Pinch Meter     - Pincer 16 lbs 10 lbs subnormal   R: Improved 2 lbs  L:  Remained     - Tripod 20 lbs 15 lbs subnormal   R: Slight decrease  L: Remained     - Lateral 19 lbs  13 5 lbs subnormal   R: Improved 1 lb  L: Improved 1/2 lb      AROM (Seated)         ALL full    Elevation      Shoulder FF        Shoulder Ext        Shoulder Abd        Shoulder Add        Horizontal Abd        Horizontal Add        Elbow Flex        Elbow Ext        Pronation        Supination        Wrist Flex        Wrist Ext        Digit Flex        Digit Ex        Composite Grasp        Hook Grasp        Subluxation  None None      MMT             Shoulder FF 5/5 5/5    Shoulder Ext 5/5 5/5    Shoulder Abduction 5/5 5/5    Shoulder Adduction 5/5 5/5    Elbow Flex 5/5 5/5    Elbow Ext 5/5 5/5    Wrist Flex 5/5 5/5    Wrist Ext 5/5 5/5    Gross Grasp 5/5 5/5      SENSATION      Monofilament Testing  Normal: 2 83 mm    Diminished light touch: 3 61 mm    Diminished protective sensation: 4 31 mm    Loss of protective sensation: 4 56    Complete loss of sensation / deep pressure: 6 65 2 83 mm 2 83 mm ventral/dorsal forearm, upper arm, neck       3 61 mm ventral/dorsal hand  Remains    Sharp / Dull  Intact  Intact     Proprioception  Impaired    Hot/Cold Temp Intact  Intact     Stereognosis   Intact Clearly identified: eraser, paperclip, and marble     COORDINATION      Opposition Intact Impaired opposition to lateral D5, double tapping    Finger to Nose Intact Impaired Slight ataxia reaching target, improves with consistency    Rapid Alternating Movement Intact Impaired  Difficulty coordinating    9 Hole Peg Test 22 seconds 25 85  seconds subnormal Grasped more than one peg at a time, dropped 2 pegs on board, difficulty grasping single peg   Fxnl Dexterity Test 22 36 seconds 31 95 seconds subnormal, decreased memory recall of board formation, decreased speed   Trung Hand Function Test         - Handwriting         - Card Turning         - Small Item p/u         - Simulated Feeding         - Stacking Checkers         - Moving Light Objects         - Moving Heavy Objects        TONE: MODIFIED JEMAL SCALE   Unable to assess today   No increase in muscle tone (0)      Slight Increase in muscle tone with catch and release or min resist at end range (1)      Slight Increase in muscle tone with catch and release, followed by min resistance through remainder of range (1+)      Increased muscle tone through full range, able to be moved easily (2)      Considerable increase in tone, difficult to move (3)      Rigid in Flexion/Extension (4)              Vision       Comments                                        VISION SCREEN; not reassessed today d/t time constraints         BIFOCALS- recently obtained, are causing headaches, wears intermittently           Symptoms Include headache and blurred vision     Last Eye Exam Unsure            Visual Acuity (near) R eye  20/40 with 0 errors and 20/20 1 error          With corrective lenses: B/L IMPROVED   Binocularity (near) exotropia and exophoria SLIGHT improved   Binocularity (far) exotropia and exophoria SLIGHT improved   Convergence  Diplopia reported at 4 inches  remained     Accommodation Blurriness/loss of focus Reported at 1-2 inches  Remained; reported slight HA in [employer]             Alignment  suppression of far IMPROVED B/L teaming    Mobility             Pursuits slightly jerky in diagonal down planes Decreased jerkiness in vertical/horizontal planes             Saccades slightly jerky and inaccurate in horizontal, vertical and diagonal planes (under shooting)  Improvement evident     Visual fatigue, eye strain in left eye             Range of Motion [facility]    Visual perceptual midline             Midline             [employer] SCORE DEFICIT RANGE Comments                                         COGNITIVE FXN, not reassessed, completed RBANS instead                    MOCA (8 3)         Education Level = 12 years     Visuospatial/executive functioning 5/5  IMPROVED     Naming 3/3  remained   Memory: 1st trial: 5/5  IMPROVED   Memory: 2nd trial: 5/5  remained   Attention/concentration 2/2  remained   List of letters:  0/1  declined   Serial Seven Subtraction: 3/3  IMPROVED   Language/sentence repetition: 2/2  remained   Language Fluency:       Abstract/Correlational Thinkin/2     Delayed Recall: 3/5  IMPROVED   Orientation:   IMPROVED   Memory Index Score 10/15  IMPROVED                                      Total Score  mild neurocognitive deficits (borderline "Normal")  IMPROVEMENT From  to      The Repeatable Battery for the Assessment of Neuropsychological Status (RBANS) is a brief, individually-administered assessment which measures attention, language, visuospatial/constructional abilities, and immediate & delayed memory  The RBANS is intended for use with adolescents to adults, ages 15 to 80 years  The following results were obtained during the administration of the assessment  Form: C    Cognitive Domain/Subtest: Index Score: Percentile Rank: Classification:   IMMEDIATE MEMORY 83 13%ile Low Average        1  List Learning ()        2  Story Memory ()       VISUOSPATIAL/  CONSTRUCTIONAL 96 39%ile Average        3  Figure Copy ()        4  Line Orientation (15/20)       LANGUAGE 84 14%ile Low Average        5  Picture Naming (9/10)        6  Semantic Fluency ()       ATTENTION 79 8%ile Borderline        7  Digit Span (10/16)        8  Coding ()       DELAYED MEMORY 84 14%ile Low Average        9  List Recall (4/10)        10  List Recognition ()        11  Story Recall ()        12   Figure Recall ()         Sum of Index Scores:  426   Total Score:  80   Percentile: 9%ile   Classification: Borderline     OTHER PLANNED THERAPY INTERVENTIONS:   In stance neuro re-ed  Task-Oriented Training  TENS for pain (PRN)  IASTM  Hand to target  Sensory re-ed (Cutaneous/Proprioceptive)  Seated functional reach: crossing midline  WBearing strategies   Closed chain activities  Open chain activities  HEP (vision/cog)  Convergence/Visual teaming   Visual pursuits/saccades   Executive Functioning   Delayed recall  Memory strategies  Attention  Working Memory   Higher Level Visual Processing     Tx today:   Self-care 15 min:  Pt educ on completing visual activities at home to increase tolerance for visual tasks  Pt demo fair understanding of recommendation, continues to report "migraines" and blurriness after short period of time  Pt additionally educated on purpose and benefit of taking [employer] to drive assessment to determine pt's safety in driving  Pt reported not feeling reading to drive at this time       INTERVENTION COMMENTS:  Diagnosis: Cognitive attention deficit [R41 420]  Precautions: Currently not driving    FOTO: Completed  Insurance: Payor: [facility] FOR YOU / Plan: [address] [place] / Product Type: Medicaid O /   5 NG 48 ZARIA PN due 1/7/23  POC Expires: 4/5/23

## 2023-01-12 ENCOUNTER — LAB REQUISITION (OUTPATIENT)
Dept: LAB | Facility: HOSPITAL | Age: 53
End: 2023-01-12

## 2023-01-12 ENCOUNTER — APPOINTMENT (OUTPATIENT)
Dept: LAB | Facility: AMBULARY SURGERY CENTER | Age: 53
End: 2023-01-12
Attending: SURGERY

## 2023-01-12 DIAGNOSIS — I65.22 OCCLUSION AND STENOSIS OF LEFT CAROTID ARTERY: ICD-10-CM

## 2023-01-12 DIAGNOSIS — I65.22 OCCLUSION OF LEFT CAROTID ARTERY: Primary | ICD-10-CM

## 2023-01-19 ENCOUNTER — ANESTHESIA (OUTPATIENT)
Dept: PERIOP | Facility: HOSPITAL | Age: 53
End: 2023-01-19

## 2023-01-19 ENCOUNTER — HOSPITAL ENCOUNTER (INPATIENT)
Facility: HOSPITAL | Age: 53
LOS: 1 days | Discharge: HOME | End: 2023-01-20
Attending: SURGERY | Admitting: SURGERY

## 2023-01-19 ENCOUNTER — ANESTHESIA EVENT (OUTPATIENT)
Dept: PERIOP | Facility: HOSPITAL | Age: 53
End: 2023-01-19

## 2023-01-19 ENCOUNTER — ANESTHESIA (OUTPATIENT)
Dept: ANESTHESIOLOGY | Facility: HOSPITAL | Age: 53
End: 2023-01-19

## 2023-01-19 ENCOUNTER — APPOINTMENT (OUTPATIENT)
Dept: RADIOLOGY | Facility: HOSPITAL | Age: 53
End: 2023-01-19

## 2023-01-19 DIAGNOSIS — R31.0 GROSS HEMATURIA: ICD-10-CM

## 2023-01-19 DIAGNOSIS — I65.22 CAROTID ARTERY STENOSIS, UNILATERAL, LEFT: ICD-10-CM

## 2023-01-19 DIAGNOSIS — I65.23 SYMPTOMATIC CAROTID ARTERY STENOSIS, BILATERAL: Primary | ICD-10-CM

## 2023-01-19 PROBLEM — I10 PRIMARY HYPERTENSION: Chronic | Status: ACTIVE | Noted: 2022-11-03

## 2023-01-19 PROBLEM — I63.9 CVA (CEREBRAL VASCULAR ACCIDENT) (HCC): Chronic | Status: ACTIVE | Noted: 2022-11-08

## 2023-01-19 LAB
KCT BLD-ACNC: 144 SEC (ref 89–137)
KCT BLD-ACNC: 348 SEC (ref 89–137)
SPECIMEN SOURCE: ABNORMAL
SPECIMEN SOURCE: ABNORMAL

## 2023-01-19 PROCEDURE — 037J3DZ DILATION OF LEFT COMMON CAROTID ARTERY WITH INTRALUMINAL DEVICE, PERCUTANEOUS APPROACH: ICD-10-PCS | Performed by: SURGERY

## 2023-01-19 PROCEDURE — X2AJ336 CEREBRAL EMBOLIC FILTRATION, EXTRACORPOREAL FLOW REVERSAL CIRCUIT FROM LEFT COMMON CAROTID ARTERY, PERCUTANEOUS APPROACH, NEW TECHNOLOGY GROUP 6: ICD-10-PCS | Performed by: SURGERY

## 2023-01-19 DEVICE — 9 MM X 30 MM
Type: IMPLANTABLE DEVICE | Site: CAROTID | Status: FUNCTIONAL
Brand: ENROUTE TRANSCAROTID STENT

## 2023-01-19 RX ORDER — CLINDAMYCIN PHOSPHATE 900 MG/50ML
900 INJECTION INTRAVENOUS ONCE
Status: COMPLETED | OUTPATIENT
Start: 2023-01-19 | End: 2023-01-19

## 2023-01-19 RX ORDER — IODIXANOL 320 MG/ML
INJECTION, SOLUTION INTRAVASCULAR AS NEEDED
Status: DISCONTINUED | OUTPATIENT
Start: 2023-01-19 | End: 2023-01-19 | Stop reason: HOSPADM

## 2023-01-19 RX ORDER — HEPARIN SODIUM 200 [USP'U]/100ML
INJECTION, SOLUTION INTRAVENOUS
Status: COMPLETED | OUTPATIENT
Start: 2023-01-19 | End: 2023-01-19

## 2023-01-19 RX ORDER — ONDANSETRON 2 MG/ML
INJECTION INTRAMUSCULAR; INTRAVENOUS AS NEEDED
Status: DISCONTINUED | OUTPATIENT
Start: 2023-01-19 | End: 2023-01-19

## 2023-01-19 RX ORDER — ONDANSETRON 2 MG/ML
4 INJECTION INTRAMUSCULAR; INTRAVENOUS ONCE AS NEEDED
Status: DISCONTINUED | OUTPATIENT
Start: 2023-01-19 | End: 2023-01-19 | Stop reason: HOSPADM

## 2023-01-19 RX ORDER — PROPOFOL 10 MG/ML
INJECTION, EMULSION INTRAVENOUS AS NEEDED
Status: DISCONTINUED | OUTPATIENT
Start: 2023-01-19 | End: 2023-01-19

## 2023-01-19 RX ORDER — HYDROMORPHONE HCL/PF 1 MG/ML
0.5 SYRINGE (ML) INJECTION
Status: DISCONTINUED | OUTPATIENT
Start: 2023-01-19 | End: 2023-01-19 | Stop reason: HOSPADM

## 2023-01-19 RX ORDER — HEPARIN SODIUM 1000 [USP'U]/ML
INJECTION, SOLUTION INTRAVENOUS; SUBCUTANEOUS AS NEEDED
Status: DISCONTINUED | OUTPATIENT
Start: 2023-01-19 | End: 2023-01-19

## 2023-01-19 RX ORDER — ATORVASTATIN CALCIUM 80 MG/1
80 TABLET, FILM COATED ORAL EVERY EVENING
Status: DISCONTINUED | OUTPATIENT
Start: 2023-01-19 | End: 2023-01-20 | Stop reason: HOSPADM

## 2023-01-19 RX ORDER — ASPIRIN 81 MG/1
81 TABLET, CHEWABLE ORAL DAILY
Status: DISCONTINUED | OUTPATIENT
Start: 2023-01-20 | End: 2023-01-20 | Stop reason: HOSPADM

## 2023-01-19 RX ORDER — MEPERIDINE HYDROCHLORIDE 25 MG/ML
12.5 INJECTION INTRAMUSCULAR; INTRAVENOUS; SUBCUTANEOUS ONCE AS NEEDED
Status: DISCONTINUED | OUTPATIENT
Start: 2023-01-19 | End: 2023-01-19 | Stop reason: HOSPADM

## 2023-01-19 RX ORDER — ACETAMINOPHEN 325 MG/1
650 TABLET ORAL EVERY 6 HOURS PRN
Status: DISCONTINUED | OUTPATIENT
Start: 2023-01-19 | End: 2023-01-20 | Stop reason: HOSPADM

## 2023-01-19 RX ORDER — LIDOCAINE HYDROCHLORIDE 10 MG/ML
INJECTION, SOLUTION EPIDURAL; INFILTRATION; INTRACAUDAL; PERINEURAL AS NEEDED
Status: DISCONTINUED | OUTPATIENT
Start: 2023-01-19 | End: 2023-01-19

## 2023-01-19 RX ORDER — CLOPIDOGREL BISULFATE 75 MG/1
75 TABLET ORAL DAILY
Status: DISCONTINUED | OUTPATIENT
Start: 2023-01-20 | End: 2023-01-20 | Stop reason: HOSPADM

## 2023-01-19 RX ORDER — SODIUM CHLORIDE 9 MG/ML
125 INJECTION, SOLUTION INTRAVENOUS CONTINUOUS
Status: DISCONTINUED | OUTPATIENT
Start: 2023-01-19 | End: 2023-01-19

## 2023-01-19 RX ORDER — OXYCODONE HYDROCHLORIDE 5 MG/1
5 TABLET ORAL EVERY 4 HOURS PRN
Status: DISCONTINUED | OUTPATIENT
Start: 2023-01-19 | End: 2023-01-20 | Stop reason: HOSPADM

## 2023-01-19 RX ORDER — MIDAZOLAM HYDROCHLORIDE 2 MG/2ML
INJECTION, SOLUTION INTRAMUSCULAR; INTRAVENOUS AS NEEDED
Status: DISCONTINUED | OUTPATIENT
Start: 2023-01-19 | End: 2023-01-19

## 2023-01-19 RX ORDER — HYDRALAZINE HYDROCHLORIDE 20 MG/ML
15 INJECTION INTRAMUSCULAR; INTRAVENOUS
Status: DISCONTINUED | OUTPATIENT
Start: 2023-01-19 | End: 2023-01-20 | Stop reason: HOSPADM

## 2023-01-19 RX ORDER — PROMETHAZINE HYDROCHLORIDE 25 MG/ML
6.25 INJECTION, SOLUTION INTRAMUSCULAR; INTRAVENOUS ONCE AS NEEDED
Status: DISCONTINUED | OUTPATIENT
Start: 2023-01-19 | End: 2023-01-19 | Stop reason: HOSPADM

## 2023-01-19 RX ORDER — HEPARIN SODIUM 5000 [USP'U]/ML
5000 INJECTION, SOLUTION INTRAVENOUS; SUBCUTANEOUS EVERY 8 HOURS SCHEDULED
Status: DISCONTINUED | OUTPATIENT
Start: 2023-01-20 | End: 2023-01-20 | Stop reason: HOSPADM

## 2023-01-19 RX ORDER — AMLODIPINE BESYLATE 10 MG/1
10 TABLET ORAL DAILY
Status: DISCONTINUED | OUTPATIENT
Start: 2023-01-20 | End: 2023-01-20 | Stop reason: HOSPADM

## 2023-01-19 RX ORDER — ROCURONIUM BROMIDE 10 MG/ML
INJECTION, SOLUTION INTRAVENOUS AS NEEDED
Status: DISCONTINUED | OUTPATIENT
Start: 2023-01-19 | End: 2023-01-19

## 2023-01-19 RX ORDER — LOSARTAN POTASSIUM 50 MG/1
100 TABLET ORAL DAILY
Status: DISCONTINUED | OUTPATIENT
Start: 2023-01-20 | End: 2023-01-19

## 2023-01-19 RX ORDER — SODIUM CHLORIDE 9 MG/ML
100 INJECTION, SOLUTION INTRAVENOUS CONTINUOUS
Status: DISPENSED | OUTPATIENT
Start: 2023-01-19 | End: 2023-01-19

## 2023-01-19 RX ORDER — GLYCOPYRROLATE 0.2 MG/ML
INJECTION INTRAMUSCULAR; INTRAVENOUS AS NEEDED
Status: DISCONTINUED | OUTPATIENT
Start: 2023-01-19 | End: 2023-01-19

## 2023-01-19 RX ORDER — FENTANYL CITRATE/PF 50 MCG/ML
50 SYRINGE (ML) INJECTION
Status: DISCONTINUED | OUTPATIENT
Start: 2023-01-19 | End: 2023-01-19 | Stop reason: HOSPADM

## 2023-01-19 RX ORDER — SODIUM CHLORIDE 9 MG/ML
INJECTION, SOLUTION INTRAVENOUS CONTINUOUS PRN
Status: DISCONTINUED | OUTPATIENT
Start: 2023-01-19 | End: 2023-01-19

## 2023-01-19 RX ORDER — OXYCODONE HYDROCHLORIDE 5 MG/1
2.5 TABLET ORAL EVERY 4 HOURS PRN
Status: DISCONTINUED | OUTPATIENT
Start: 2023-01-19 | End: 2023-01-20 | Stop reason: HOSPADM

## 2023-01-19 RX ORDER — CHLORHEXIDINE GLUCONATE 0.12 MG/ML
15 RINSE ORAL ONCE
Status: COMPLETED | OUTPATIENT
Start: 2023-01-19 | End: 2023-01-19

## 2023-01-19 RX ORDER — FENTANYL CITRATE 50 UG/ML
INJECTION, SOLUTION INTRAMUSCULAR; INTRAVENOUS AS NEEDED
Status: DISCONTINUED | OUTPATIENT
Start: 2023-01-19 | End: 2023-01-19

## 2023-01-19 RX ORDER — LABETALOL HYDROCHLORIDE 5 MG/ML
5 INJECTION, SOLUTION INTRAVENOUS
Status: COMPLETED | OUTPATIENT
Start: 2023-01-19 | End: 2023-01-19

## 2023-01-19 RX ORDER — PROTAMINE SULFATE 10 MG/ML
INJECTION, SOLUTION INTRAVENOUS AS NEEDED
Status: DISCONTINUED | OUTPATIENT
Start: 2023-01-19 | End: 2023-01-19

## 2023-01-19 RX ADMIN — NOREPINEPHRINE BITARTRATE 12 MCG/MIN: 1 INJECTION, SOLUTION, CONCENTRATE INTRAVENOUS at 08:43

## 2023-01-19 RX ADMIN — GLYCOPYRROLATE 0.2 MG: 0.2 INJECTION INTRAMUSCULAR; INTRAVENOUS at 09:04

## 2023-01-19 RX ADMIN — ACETAMINOPHEN 650 MG: 325 TABLET, FILM COATED ORAL at 13:52

## 2023-01-19 RX ADMIN — NOREPINEPHRINE BITARTRATE 10 MCG/MIN: 1 INJECTION, SOLUTION, CONCENTRATE INTRAVENOUS at 08:44

## 2023-01-19 RX ADMIN — LABETALOL HYDROCHLORIDE 5 MG: 5 INJECTION, SOLUTION INTRAVENOUS at 22:00

## 2023-01-19 RX ADMIN — GLYCOPYRROLATE 0.2 MG: 0.2 INJECTION INTRAMUSCULAR; INTRAVENOUS at 09:52

## 2023-01-19 RX ADMIN — OXYCODONE 5 MG: 5 TABLET ORAL at 13:52

## 2023-01-19 RX ADMIN — NOREPINEPHRINE BITARTRATE 2 MCG/MIN: 1 INJECTION, SOLUTION, CONCENTRATE INTRAVENOUS at 09:32

## 2023-01-19 RX ADMIN — FENTANYL CITRATE 25 MCG: 50 INJECTION INTRAMUSCULAR; INTRAVENOUS at 08:56

## 2023-01-19 RX ADMIN — FENTANYL CITRATE 50 MCG: 50 INJECTION, SOLUTION INTRAMUSCULAR; INTRAVENOUS at 11:07

## 2023-01-19 RX ADMIN — SUGAMMADEX 200 MG: 100 INJECTION, SOLUTION INTRAVENOUS at 10:36

## 2023-01-19 RX ADMIN — OXYCODONE 5 MG: 5 TABLET ORAL at 23:23

## 2023-01-19 RX ADMIN — PROPOFOL 200 MG: 10 INJECTION, EMULSION INTRAVENOUS at 08:31

## 2023-01-19 RX ADMIN — FENTANYL CITRATE 100 MCG: 50 INJECTION INTRAMUSCULAR; INTRAVENOUS at 09:24

## 2023-01-19 RX ADMIN — FENTANYL CITRATE 50 MCG: 50 INJECTION INTRAMUSCULAR; INTRAVENOUS at 09:20

## 2023-01-19 RX ADMIN — LABETALOL HYDROCHLORIDE 5 MG: 5 INJECTION, SOLUTION INTRAVENOUS at 21:09

## 2023-01-19 RX ADMIN — ATORVASTATIN CALCIUM 80 MG: 80 TABLET, FILM COATED ORAL at 18:07

## 2023-01-19 RX ADMIN — SODIUM CHLORIDE: 0.9 INJECTION, SOLUTION INTRAVENOUS at 10:06

## 2023-01-19 RX ADMIN — ROCURONIUM BROMIDE 50 MG: 10 INJECTION, SOLUTION INTRAVENOUS at 08:31

## 2023-01-19 RX ADMIN — CLINDAMYCIN IN 5 PERCENT DEXTROSE 900 MG: 18 INJECTION, SOLUTION INTRAVENOUS at 08:37

## 2023-01-19 RX ADMIN — FENTANYL CITRATE 50 MCG: 50 INJECTION, SOLUTION INTRAMUSCULAR; INTRAVENOUS at 11:25

## 2023-01-19 RX ADMIN — SODIUM CHLORIDE 125 ML/HR: 0.9 INJECTION, SOLUTION INTRAVENOUS at 07:02

## 2023-01-19 RX ADMIN — CHLORHEXIDINE GLUCONATE 0.12% ORAL RINSE 15 ML: 1.2 LIQUID ORAL at 06:39

## 2023-01-19 RX ADMIN — NICOTINE POLACRILEX 2 MG: 2 GUM, CHEWING ORAL at 21:34

## 2023-01-19 RX ADMIN — SODIUM CHLORIDE: 0.9 INJECTION, SOLUTION INTRAVENOUS at 08:46

## 2023-01-19 RX ADMIN — HYDRALAZINE HYDROCHLORIDE 15 MG: 20 INJECTION, SOLUTION INTRAMUSCULAR; INTRAVENOUS at 23:18

## 2023-01-19 RX ADMIN — MIDAZOLAM 2 MG: 1 INJECTION INTRAMUSCULAR; INTRAVENOUS at 08:23

## 2023-01-19 RX ADMIN — ROCURONIUM BROMIDE 20 MG: 10 INJECTION, SOLUTION INTRAVENOUS at 09:56

## 2023-01-19 RX ADMIN — PROTAMINE SULFATE 40 MG: 10 INJECTION, SOLUTION INTRAVENOUS at 10:23

## 2023-01-19 RX ADMIN — OXYCODONE 5 MG: 5 TABLET ORAL at 18:07

## 2023-01-19 RX ADMIN — FENTANYL CITRATE 50 MCG: 50 INJECTION INTRAMUSCULAR; INTRAVENOUS at 08:59

## 2023-01-19 RX ADMIN — FENTANYL CITRATE 75 MCG: 50 INJECTION INTRAMUSCULAR; INTRAVENOUS at 08:29

## 2023-01-19 RX ADMIN — GLYCOPYRROLATE 0.2 MG: 0.2 INJECTION INTRAMUSCULAR; INTRAVENOUS at 08:42

## 2023-01-19 RX ADMIN — HYDROMORPHONE HYDROCHLORIDE 0.5 MG: 1 INJECTION, SOLUTION INTRAMUSCULAR; INTRAVENOUS; SUBCUTANEOUS at 12:13

## 2023-01-19 RX ADMIN — ONDANSETRON 4 MG: 2 INJECTION INTRAMUSCULAR; INTRAVENOUS at 10:45

## 2023-01-19 RX ADMIN — HEPARIN SODIUM 8000 UNITS: 1000 INJECTION INTRAVENOUS; SUBCUTANEOUS at 09:32

## 2023-01-19 RX ADMIN — LIDOCAINE HYDROCHLORIDE 100 MG: 10 INJECTION, SOLUTION EPIDURAL; INFILTRATION; INTRACAUDAL; PERINEURAL at 08:31

## 2023-01-19 NOTE — PLAN OF CARE
Problem: NEUROSENSORY - ADULT  Goal: Achieves stable or improved neurological status  Description: INTERVENTIONS  - Monitor and report changes in neurological status  - Monitor vital signs such as temperature, blood pressure, glucose, and any other labs ordered   - Initiate measures to prevent increased intracranial pressure  - Monitor for seizure activity and implement precautions if appropriate      Outcome: Progressing  Goal: Achieves maximal functionality and self care  Description: INTERVENTIONS  - Monitor swallowing and airway patency with patient fatigue and changes in neurological status  - Encourage and assist patient to increase activity and self care     - Encourage visually impaired, hearing impaired and aphasic patients to use assistive/communication devices  Outcome: Progressing     Problem: CARDIOVASCULAR - ADULT  Goal: Maintains optimal cardiac output and hemodynamic stability  Description: INTERVENTIONS:  - Monitor I/O, vital signs and rhythm  - Monitor for S/S and trends of decreased cardiac output  - Administer and titrate ordered vasoactive medications to optimize hemodynamic stability  - Assess quality of pulses, skin color and temperature  - Assess for signs of decreased coronary artery perfusion  - Instruct patient to report change in severity of symptoms  Outcome: Progressing  Goal: Absence of cardiac dysrhythmias or at baseline rhythm  Description: INTERVENTIONS:  - Continuous cardiac monitoring, vital signs, obtain 12 lead EKG if ordered  - Administer antiarrhythmic and heart rate control medications as ordered  - Monitor electrolytes and administer replacement therapy as ordered  Outcome: Progressing     Problem: GENITOURINARY - ADULT  Goal: Maintains or returns to baseline urinary function  Description: INTERVENTIONS:  - Assess urinary function  - Encourage oral fluids to ensure adequate hydration if ordered  - Administer IV fluids as ordered to ensure adequate hydration  - Administer ordered medications as needed  - Offer frequent toileting  - Follow urinary retention protocol if ordered  Outcome: Progressing  Goal: Absence of urinary retention  Description: INTERVENTIONS:  - Assess patient’s ability to void and empty bladder  - Monitor I/O  - Bladder scan as needed  - Discuss with physician/AP medications to alleviate retention as needed  - Discuss catheterization for long term situations as appropriate  Outcome: Progressing    Problem: HEMATOLOGIC - ADULT  Goal: Maintains hematologic stability  Description: INTERVENTIONS  - Assess for signs and symptoms of bleeding or hemorrhage  - Monitor labs  - Administer supportive blood products/factors as ordered and appropriate  Outcome: Progressing

## 2023-01-19 NOTE — ANESTHESIA PREPROCEDURE EVALUATION
Procedure:  ANGIOPLASTY ARTERY CAROTID W/ STENT Left TCAR (Left: Neck)  IR CAROTID STENT    Relevant Problems   CARDIO   (+) Primary hypertension      /RENAL   (+) Bilateral nephrolithiasis      MUSCULOSKELETAL   (+) Chronic back pain      NEURO/PSYCH   (+) CVA (cerebral vascular accident) ([address] )   (+) Chronic back pain   (+) History of diverticulitis        Physical Exam    Airway    Mallampati score: II  TM Distance: >3 FB  Neck ROM: full     Dental   No notable dental hx     Cardiovascular  Rhythm: regular, Rate: normal, Cardiovascular exam normal    Pulmonary  Pulmonary exam normal Breath sounds clear to auscultation,     Other Findings        Anesthesia Plan  ASA Score- 3     Anesthesia Type- general with ASA Monitors  Additional Monitors: arterial line  Airway Plan:           Plan Factors-    Chart reviewed  Existing labs reviewed  Patient summary reviewed  Patient is not a current smoker  Patient not instructed to abstain from smoking on day of procedure  Patient did not smoke on day of surgery  There is medical exclusion for perioperative obstructive sleep apnea risk education  Induction- intravenous  Postoperative Plan-     Informed Consent- Anesthetic plan and risks discussed with patient and spouse Solomon Fails)  I personally reviewed this patient with the CRNA  Discussed and agreed on the Anesthesia Plan with the CRNA  Kindra Alonso

## 2023-01-19 NOTE — ASSESSMENT & PLAN NOTE
Home regimen: 100 mg losartan daily  Did not take medication on day of procedure  Per patient, BP is normally elevated with systolic in the 608[address] at home  Restart home losartan tomorrow

## 2023-01-19 NOTE — ANESTHESIA PROCEDURE NOTES
Arterial Line Insertion  Performed by: Navya Cannon CRNA  Authorized by: Arnulfo Morgan MD   Consent: Verbal consent obtained  Risks and benefits: risks, benefits and alternatives were discussed  Preparation: Patient was prepped and draped in the usual sterile fashion    Indications: hemodynamic monitoring  Orientation:  Right  Location: radial artery  Procedure Details:  Needle gauge: 20  Number of attempts: 1    Post-procedure:  Post-procedure: dressing applied  Waveform: good waveform  Post-procedure CNS: normal  Patient tolerance: patient tolerated the procedure well with no immediate complications

## 2023-01-19 NOTE — CONSULTS
Tyler 1970, 47 y o  male MRN: [id number]  Unit/Bed#: ICU 01 Encounter: [id number]  Primary Care Provider: Antonietta Chavez MD   Date and time admitted to hospital: 1/19/2023  6:23 AM    Inpatient consult to Pedro Blood performed by: Sapna Darnell DO  Consult ordered by: Jim Hart PA-C        * Left Carotid Artery Stenosis s/p Left TCAR  Assessment & Plan  S/p left TCAR today  · Good placement of the stent with no significant residual stenosis or flow-limiting dissection intact outflow the patient woke up neurologically grossly intact he had a severe proximal ICA stenosis that was confirmed on the diagnostic imaging initially  Follows with vascular surgery outpatient  History of bilateral carotid artery stenosis  S/p right TCAR 2 months ago  Home regimen: Aspirin, Plavix, and atorvastatin  Plan: Will monitor patient in ICU  Neurochecks every hour  Will continue with ASA, plavix, and atorvastatin  Will monitor BP and maintain systolic more than 949  Other treatment per primary team - Vascular Surgery  CVA (cerebral vascular accident) [facility])  Assessment & Plan  History of right occipital and basal ganglia ischemic stroke in 2013  Residual left-sided weakness and visual defects  Home regimen: Aspirin and atorvastatin  Primary hypertension  Assessment & Plan  Home regimen: 100 mg losartan daily  Did not take medication on day of procedure  Per patient, BP is normally elevated with systolic in the 025H at home  Restart home losartan tomorrow  -------------------------------------------------------------------------------------------------------------  Chief Complaint: S/p left TCAR    History of Present Illness   HX and PE limited by: None  Cindy Randall is a 47 y o  male with PMHx including HTN, history of CVA in 2013, and HLD who presents for an elective left TCAR    He also has bilateral carotid artery stenosis and follows with vascular surgery outpatient  On 11/8/2022, he underwent a right TCAR with no complications  After having undergone the procedure today, his main complaint is of pain in his left neck and minimal tenderness in his right groin  He denies any lightheadedness, dizziness, nor any headaches or SOB  He will remain in the ICU overnight for closer monitoring  History obtained from chart review and the patient   -------------------------------------------------------------------------------------------------------------  Dispo: Per primary team - Vascular Surgery  Code Status: Prior  --------------------------------------------------------------------------------------------------------------  Review of Systems   Constitutional: Negative for chills, fatigue and fever  Eyes: Positive for visual disturbance (Chronic)  Respiratory: Negative for cough and shortness of breath  Cardiovascular: Negative for chest pain, palpitations and leg swelling  Gastrointestinal: Negative for abdominal pain, diarrhea, nausea and vomiting  Musculoskeletal: Positive for back pain (Chronic) and neck pain (Chronic)  Neurological: Negative for dizziness, light-headedness and headaches  A 12-point, complete review of systems was reviewed and negative except as stated above     Physical Exam  Vitals reviewed  Constitutional:       General: He is not in acute distress  Appearance: Normal appearance  He is not ill-appearing  HENT:      Nose: No congestion or rhinorrhea  Eyes:      General: No scleral icterus  Conjunctiva/sclera: Conjunctivae normal    Neck:      Comments: Tenderness and erythema at surgical site  Cardiovascular:      Rate and Rhythm: Normal rate and regular rhythm  Pulses: Normal pulses  Heart sounds: Normal heart sounds  Pulmonary:      Effort: Pulmonary effort is normal       Breath sounds: Normal breath sounds     Abdominal:      General: Bowel sounds are normal       Palpations: Abdomen is soft  Tenderness: There is no abdominal tenderness  There is no guarding or rebound  Musculoskeletal:      Right lower leg: No edema  Left lower leg: No edema  Skin:     General: Skin is warm  Neurological:      Mental Status: He is alert  Psychiatric:         Mood and Affect: Mood normal          Behavior: Behavior normal          Thought Content: Thought content normal          Judgment: Judgment normal        --------------------------------------------------------------------------------------------------------------  Vitals:   Vitals:    01/19/23 1207 01/19/23 1222 01/19/23 1237 01/19/23 1340   BP: 153/97 151/87 135/80    Pulse: 82 76 78    Resp: 12 14 20    Temp:   97 7 °F (36 5 °C) 98 3 °F (36 8 °C)   TempSrc:    Axillary   SpO2: 99% 100% 99%    Weight:       Height:         Temp  Min: 96 6 °F (35 9 °C)  Max: 98 3 °F (36 8 °C)  IBW (Ideal Body Weight): 66 1 kg  Height: 5' 7" (170 2 cm)  Body mass index is 27 52 kg/m²  Laboratory and Diagnostics:        Invalid input(s):  EOSPCT                        ABG:    VBG:          Micro:        EKG: On telemetry  Imaging: I have personally reviewed pertinent reports          Historical Information   Past Medical History:   Diagnosis Date   • Carotid stenosis, left     today  1/19/2023  L carotid angioplasty   • Carotid stenosis, right     Rt angioplasty completed  11/2023   • Cervical disc disease    • COVID 05/2020   • GERD (gastroesophageal reflux disease)    • Hyperlipidemia    • Hypertension    • Kidney stone    • Muscle weakness    • Spinal stenosis    • Stroke ([facility] [address] ) 09/13/2022    left sided weakness with pins/needles   • Weakness of left side of body 09/13/2022    s/p CVA     Past Surgical History:   Procedure Laterality Date   • CARPAL TUNNEL RELEASE Bilateral    • CERVICAL FUSION      with hardware   • CYSTOSCOPY     • HERNIA REPAIR     • IR CAROTID STENT  11/8/2022   • LA TCAT IV STENT CRV CRTD ART EMBOLIC PROTECJ Right     Procedure: ANGIOPLASTY ARTERY CAROTID W/ STENT TCAR,;  Surgeon: Pravin Burks DO;  Location: [facility];  Service: Vascular   • ULNAR COLLATERAL LIGAMENT RECONSTRUCTION Bilateral      Social History   Social History     Substance and Sexual Activity   Alcohol Use Yes   • Alcohol/week: 12 0 standard drinks   • Types: 12 Cans of beer per week    Comment: 12 cans  beer   weekly     Social History     Substance and Sexual Activity   Drug Use Not Currently     Social History     Tobacco Use   Smoking Status Former   • Packs/day: 2 00   • Years: 35 00   • Pack years: 70 00   • Types: Cigarettes   • Quit date: 2022   • Years since quittin 4   Smokeless Tobacco Never     Exercise History: Unknown  Family History:   Family History   Problem Relation Age of Onset   • Heart attack Mother    • Diabetes Mother    • Hypertension Mother    • Colon cancer Father    • No Known Problems Sister    • No Known Problems Sister    • No Known Problems Sister    • No Known Problems Son      I have reviewed this patient's family history and commented on sigificant items within the HPI      Medications:  Current Facility-Administered Medications   Medication Dose Route Frequency   • acetaminophen (TYLENOL) tablet 650 mg  650 mg Oral Q6H PRN   • [START ON 2023] amLODIPine (NORVASC) tablet 10 mg  10 mg Oral Daily   • [START ON 2023] aspirin chewable tablet 81 mg  81 mg Oral Daily   • atorvastatin (LIPITOR) tablet 80 mg  80 mg Oral QPM   • [START ON 2023] clopidogrel (PLAVIX) tablet 75 mg  75 mg Oral Daily   • [START ON 2023] heparin (porcine) subcutaneous injection 5,000 Units  5,000 Units Subcutaneous Q8H [facility]   • labetalol (NORMODYNE) injection 5 mg  5 mg Intravenous Q15 Min PRN    And   • hydrALAZINE (APRESOLINE) injection 15 mg  15 mg Intravenous Q15 Min PRN    And   • niCARdipine (CARDENE) 25 mg (STANDARD CONCENTRATION) in sodium chloride 0 9% 250 mL  1-15 mg/hr Intravenous Continuous PRN   • [START ON 1/20/2023] losartan (COZAAR) tablet 100 mg  100 mg Oral Daily   • oxyCODONE (ROXICODONE) IR tablet 2 5 mg  2 5 mg Oral Q4H PRN   • oxyCODONE (ROXICODONE) IR tablet 5 mg  5 mg Oral Q4H PRN   • sodium chloride 0 9 % bolus 500 mL  500 mL Intravenous Once PRN    Followed by   • [START ON 1/21/2023] phenylephrine (GARCÍA-SYNEPHRINE) 50 mg (STANDARD CONCENTRATION) in sodium chloride 0 9% 250 mL   mcg/min Intravenous Continuous PRN   • sodium chloride 0 9 % infusion  100 mL/hr Intravenous Continuous     Home medications:  Prior to Admission Medications   Prescriptions Last Dose Informant Patient Reported? Taking?    Multiple Vitamin (multivitamin) tablet 1/12/2023  Yes Yes   Sig: Take 1 tablet by mouth daily   albuterol (Proventil HFA) 90 mcg/act inhaler 1/18/2023 at 0800  No Yes   Sig: Inhale 2 puffs every 6 (six) hours as needed for wheezing   amLODIPine (NORVASC) 10 mg tablet   No Yes   Sig: Take 1 tablet by mouth once daily   aspirin 81 mg chewable tablet 1/19/2023 at 0530  No Yes   Sig: Chew 1 tablet (81 mg total) daily   atorvastatin (LIPITOR) 80 mg tablet 1/18/2023 at 2200  No Yes   Sig: Take 1 tablet (80 mg total) by mouth every evening   clopidogrel (Plavix) 75 mg tablet 1/19/2023 at 0530  No Yes   Sig: Take 1 tablet (75 mg total) by mouth daily   ferrous sulfate 325 (65 Fe) mg tablet 1/19/2023 at 0530  Yes Yes   Sig: Take 325 mg by mouth daily with breakfast   losartan (COZAAR) 100 MG tablet 1/18/2023 at 0800  No Yes   Sig: Take 1 tablet (100 mg total) by mouth daily   pantoprazole (PROTONIX) 40 mg tablet 1/19/2023 at 0530  No Yes   Sig: Take 1 tablet (40 mg total) by mouth daily before breakfast   polyethylene glycol (GOLYTELY) 4000 mL solution   No No   Sig: Take 4,000 mL by mouth once for 1 dose Per office instructions   Patient not taking: Reported on 11/17/2022   tiZANidine (ZANAFLEX) 2 mg tablet 1/17/2023  No Yes   Sig: TAKE 1 TABLET BY MOUTH EVERY 8 HOURS AS NEEDED FOR MUSCLE SPASM      Facility-Administered Medications: None     Allergies: Allergies   Allergen Reactions   • Penicillins Anaphylaxis     ------------------------------------------------------------------------------------------------------------  Advance Directive and Living Will:      Power of :    POLST:    ------------------------------------------------------------------------------------------------------------  Care Time Delivered:   Deferred to attending    Vijay Lowry, DO      Portions of the record may have been created with voice recognition software  Occasional wrong word or "sound a like" substitutions may have occurred due to the inherent limitations of voice recognition software    Read the chart carefully and recognize, using context, where substitutions have occurred

## 2023-01-19 NOTE — ASSESSMENT & PLAN NOTE
History of right occipital and basal ganglia ischemic stroke in 2013  Residual left-sided weakness and visual defects  Home regimen: Aspirin and atorvastatin

## 2023-01-19 NOTE — H&P
H&P Exam - Vascular Surgery   Lulú Keith 47 y o  male MRN: [id number]  Unit/Bed#: OR [place] Encounter: [id number]    Assessment/Plan     Assessment:  Left carotid stenosis  Plan:  Left TCAR     History of Present Illness   History, ROS and PFSH unobtainable from any source due to no changes  HPI:  Lulú Keith is a 47 y o  male who presents with for left TCAR      Review of Systems    Historical Information   Past Medical History:   Diagnosis Date   • Carotid stenosis, left     today  2023  L carotid angioplasty   • Carotid stenosis, right     Rt angioplasty completed  2023   • Cervical disc disease    • COVID 2020   • GERD (gastroesophageal reflux disease)    • Hyperlipidemia    • Hypertension    • Kidney stone    • Muscle weakness    • Spinal stenosis    • Stroke ([facility] [address] ) 2022    left sided weakness with pins/needles   • Weakness of left side of body 2022    s/p CVA     Past Surgical History:   Procedure Laterality Date   • CARPAL TUNNEL RELEASE Bilateral    • CERVICAL FUSION      with hardware   • CYSTOSCOPY     • HERNIA REPAIR     • IR CAROTID STENT  2022   • [place] TCAT IV STENT CRV CRTD ART EMBOLIC PROTECJ Right     Procedure: ANGIOPLASTY ARTERY CAROTID W/ STENT TCAR,;  Surgeon: Ck Andrade DO;  Location: [facility];  Service: Vascular   • ULNAR COLLATERAL LIGAMENT RECONSTRUCTION Bilateral      Social History   Social History     Substance and Sexual Activity   Alcohol Use Yes   • Alcohol/week: 12 0 standard drinks   • Types: 12 Cans of beer per week    Comment: 12 cans  beer   weekly     Social History     Substance and Sexual Activity   Drug Use Not Currently     Social History     Tobacco Use   Smoking Status Former   • Packs/day: 2 00   • Years: 35 00   • Pack years: 70 00   • Types: Cigarettes   • Quit date: 2022   • Years since quittin 4   Smokeless Tobacco Never     E-Cigarette/Vaping   • E-Cigarette Use Current Every Day User      E-Cigarette/Vaping Substances   • Nicotine Yes      Family History: non-contributory    Meds/Allergies   all current active meds have been reviewed  Allergies   Allergen Reactions   • Penicillins Anaphylaxis       Objective   Vitals: Blood pressure 147/91, pulse 69, temperature 97 9 °F (36 6 °C), temperature source Temporal, resp  rate 18, height 5' 7" (1 702 m), weight 79 7 kg (175 lb 11 3 oz), SpO2 99 %  ,Body mass index is 27 52 kg/m²  Intake/Output Summary (Last 24 hours) at 1/19/2023 0826  Last data filed at 1/19/2023 0801  Gross per 24 hour   Intake 150 ml   Output --   Net 150 ml     Invasive Devices     Peripheral Intravenous Line  Duration           Peripheral IV 01/19/23 Dorsal (posterior); Right Hand <1 day                Physical Exam    Lab Results: I have personally reviewed pertinent reports  Imaging: I have personally reviewed pertinent reports  EKG, Pathology, and Other Studies: I have personally reviewed pertinent reports        Code Status: Prior  Advance Directive and Living Will:      Power of :    POLST:      Counseling / Coordination of Care  None

## 2023-01-19 NOTE — OP NOTE
OPERATIVE REPORT  PATIENT NAME: Kassidy Shook    :  1970  MRN: [id number]  Pt Location: [facility] 09    SURGERY DATE: 2023    Surgeon(s) and Role:     * Daniel Schneider, DO - Primary     * Stef Schwartz PA-C - Assisting  No qualified [employer] resident was available to assist   She was required for surgical exposure and closure  Vascular Quality Initiative - Carotid Artery Stent    Functional Status: Fully active; able to carry on all predisease activities without restriction  High Risk Factors For CEA: Anatomic risk Lession above C2    Refused for Surgery: No    Pre-op Imaging is CT/CTA: Yes      Lesion Calcification: 51-99% circumference    Arch Atherosclerosis: Mild    Urgency: Elective      ASA: III  Anesthesia:  General    Indication: Asymptomatic stenosis    Skin Prep: Chlorhexidene    Arch Type: Type I    Bovine Arch: no     Approach: Carotid open     Medication Loading: Both    Prophylactic Anti-bradyarrhythmic yes    Anticoagulant: Heparin    Antiplatelet IIb/IIIa: yes    Bradyarrhythmia Requiring Tx: Yes, medication    Fluoro Time:     Distinct Lesions Treated: 1      If Distinct Lesions Treated is 1, Second Stenosis (Not Treated): No    Lesion Type: Atherosclerosis  Lesion Side: left    Lesion Location: Bifurcation     ICA Distal Tortuosity: Severe    Lesion Length: 20mm    Lesion Stenosis:90%    Protection Device Used: Yes, successful      Protection Device Type: Flow reversal     Flow Reversal Type: [address] ENROUTE      Flow Reversal Time: 12 minutes    Pre-dilate Before Protection Device:No  Angioplasty required in order to cross lesion with protection device  For TCAR procedures answer yes if vessel pre-dilated prior to stent placement, or no if not pre-dilated before stent placement  Pre-dilate Lesion: yes  Pre-dilate Lesion: Angioplasty required in order to cross lesion for stent placement  Required even if there is a technical failure in stent deployment       Technical Failure: No       Number of Stents: 1    Post- dilate: No      Neurologic Change: No    Intra-Cranial Completion Angiogram: No    Total Procedure Time:   Event Time In   Procedure Start 2444   Procedure Closing 4366   Procedure Finish 4322       Flouro time: 11 1  DAP 36 55    24cc Visi                          Preop Diagnosis:  Left carotid stenosis [I65 22]  90% left internal carotid artery asymptomatic stenosis    Post-Op Diagnosis Codes:     * Left carotid stenosis [I65 22]  Same        Procedure(s) (LRB):  ANGIOPLASTY ARTERY CAROTID W/ STENT Left TCAR (Left)  Left internal carotid artery transcarotid artery stent revascularization  Ultrasound-guided percutaneous access to the right common femoral vein next number is supervision interpretation of all radiologic imaging      Specimen(s):  * No specimens in log *    Estimated Blood Loss:   Minimal    Drains:  * No LDAs found *    Anesthesia Type:   General    Operative Indications:  Left carotid stenosis [I65 22]  Is a 67-year-old gentleman who had a right-sided basal ganglia stroke several months ago when he was found on his work-up to have 90% bilateral internal carotid artery stenosis he was deemed to be asymptomatic from the carotid lesions and his stroke was from another etiology however due to the severe stenosis I still recommended carotid intervention he underwent a right-sided TCAR for a distal lesion in November 2022 and he now presents for a TCAR on the left side all risks benefits and alternative therapies were discussed with him in detail and he consented to proceed    Operative Findings:   There was good placement of the stent with no significant residual stenosis or flow-limiting dissection intact outflow the patient woke up neurologically grossly intact he had a severe proximal ICA stenosis that was confirmed on the diagnostic imaging initially      Complications:   None    Procedure and Technique:  Informed sent was safe and appropriate party the patient was correct identified in the holding as well to operating placed in supine position appropriate lines monitors placed after satisfactory induction of general tracheal anesthesia the left neck and groins prepped draped in normal sterile fashion a Jako timeout was performed the patient had appropriate periprocedural antibiotics and began making a transverse incision after using ultrasound identify the common carotid artery where he wanted to access transverse incision was made between the heads of the sternocleidomastoid using a 15 blade dissection was carried through the platysma down to the sternocleidomastoid these muscles were then  along orientation of the fibers between the 2 heads dissection was carried down into the carotid sheath and the internal jugular vein was not encountered it was dissected and retracted laterally and then the common carotid artery was encountered and circumferentially dissected out for an appropriate distance for control some Surgicel was packed posterior to the vessel to support it during sheath placement a vessel loop was placed proximally and a pursestring 5-0 Prolene was placed in the common carotid artery prior to placing the suture the patient was given 8000 units of IV heparin a preprocedural ACT was obtained which was approximately 150 and the patient was given 8000 units of heparin with an ACT that came up to 350 keep just to clarify this was all done prior to placing the pursestring stitch once a pursestring stitch was placed a vascular clamp was placed proximally in the common carotid access was then obtained in the right common femoral vein under ultrasound guidance with a micropuncture wire and then a [employer] wire was exchanged and the venous sheath from the in [address] kit was placed in the right common femoral vein this was then sutured into place at this point in the center of the pursestring the micropuncture needle from the [employer] was advanced taking care not to advance the needle into far towards the lesion and then the wire was advanced again taking care not to advance it too far into the ICA lesion and then this was exchanged for the micropuncture catheter which was exchanged which was advanced 2-1/2 cm into the common carotid artery taking care not to advance this past the ICA lesion from this position a diagnostic carotid angiogram was then performed with the above-stated findings I elected to engage the ECA given the patient's anatomy given that the lesion was not in the common carotid a roadmap was obtained and then the micropuncture wire was used to select the external carotid the dilator and the micropuncture catheter were both advanced into the external carotid and then the Amplatz wire was exchanged for the existing wire and the procedural sheath was exchanged for the micropuncture sheath which was hubbed towards the common carotid at this point the wire and dilator were then removed the catheter arterial sheath was then sutured into place to secure it along the chest and neck at 2 points at this point the arterial sheath was then connected to the circuit it was backbled to de-air the system then connected to the venous circuit this was then checked to make sure it was flowing by flushing saline and which confirmed that it was patent at this point a TCAR timeout was then done the blood pressure was maintained between 1 9497 systolic the heart rate was maintained above 70 the ACT was in a therapeutic range at this point we then took a roadmap image with the procedural sheath in place as our working image once this was done and this was roadmap and the common carotid artery was then clamped the lesion was crossed using the through a wire and then using a 4 mm x 3001 4 balloon the ICA lesion was predilated taking the balloon to nominal pressure for just a few seconds balloon was deflated the balloon catheter was removed from the wire and then the in route 9 x 30 stent was then deployed from the proximal ICA into the distal common carotid at this point the patient remained on active reversal of flow for 2 minutes before interruption for completion angiogram it should be noted that we did test the circuit once we went on-on active flow reversal which did confirm that the circuit was flowing after the 2 minutes had elapsed to completion angiogram was performed in 2 views which demonstrated an excellent result with no significant residual stenosis or flow-limiting dissection another minute was allowed to elapse on active reversal the wire was then removed prior to this last minute and the additional minute was done for further flushing of the ICA at this point the vessel was then unclamped the sheaths were removed the pressuring suture was tied down the neck was noted to be hemostatic a partial reversal dose of protamine was given 40 mg the wound was irrigated some Floseal was instilled it was noted to be hemostatic the platysma was closed using a running 3-0 Vicryl and the skin was reapproximated 4 Monocryl in subcuticular fashion pressure was held on the right groin which was noted to be hemostatic and dressing was applied this was well patient tolerated seizure well he was extubated uneventfully in the operating room he was taken to recovery after noted to be neurologically intact thank you       I was present for the entire procedure    Patient Disposition:  PACU         SIGNATURE: Indira Rodriguez DO  DATE: January 19, 2023  TIME: 10:48 AM

## 2023-01-19 NOTE — ANESTHESIA PREPROCEDURE EVALUATION
Procedure:  ANGIOPLASTY ARTERY CAROTID W/ STENT Left TCAR (Left: Neck)    Relevant Problems   CARDIO   (+) Primary hypertension      /RENAL   (+) Bilateral nephrolithiasis      MUSCULOSKELETAL   (+) Chronic back pain      NEURO/PSYCH   (+) CVA (cerebral vascular accident) ([address] )   (+) Chronic back pain   (+) History of diverticulitis      Other   (+) Alcohol intake above recommended sensible limits        Physical Exam    Airway    Mallampati score: II  TM Distance: >3 FB  Neck ROM: full     Dental   No notable dental hx     Cardiovascular  Rhythm: regular, Rate: normal, Cardiovascular exam normal    Pulmonary  Pulmonary exam normal Breath sounds clear to auscultation,     Other Findings        Anesthesia Plan  ASA Score- 3     Anesthesia Type- general with ASA Monitors  Additional Monitors: arterial line  Airway Plan:           Plan Factors-    Chart reviewed  Existing labs reviewed  Patient summary reviewed  Patient is not a current smoker  Patient not instructed to abstain from smoking on day of procedure  Patient did not smoke on day of surgery  There is medical exclusion for perioperative obstructive sleep apnea risk education  Induction- intravenous  Postoperative Plan-     Informed Consent- Anesthetic plan and risks discussed with patient and spouse Rosalino Factor)  I personally reviewed this patient with the CRNA  Discussed and agreed on the Anesthesia Plan with the CRNA  Lydia Richardson

## 2023-01-19 NOTE — ANESTHESIA POSTPROCEDURE EVALUATION
Post-Op Assessment Note    CV Status:  Stable    Pain management: adequate     Mental Status:  Awake   Hydration Status:  Stable   PONV Controlled:  Controlled   Airway Patency:  Patent      Post Op Vitals Reviewed: Yes      Staff: Anesthesiologist         No notable events documented      BP      Temp     Pulse     Resp      SpO2      /100   Pulse 102   Temp (!) 96 6 °F (35 9 °C)   Resp 16   Ht 5' 7" (1 702 m)   Wt 79 7 kg (175 lb 11 3 oz)   SpO2 99%   BMI 27 52 kg/m²

## 2023-01-19 NOTE — ASSESSMENT & PLAN NOTE
S/p left TCAR today  · Good placement of the stent with no significant residual stenosis or flow-limiting dissection intact outflow the patient woke up neurologically grossly intact he had a severe proximal ICA stenosis that was confirmed on the diagnostic imaging initially  Follows with vascular surgery outpatient  History of bilateral carotid artery stenosis  S/p right TCAR 2 months ago  Home regimen: Aspirin, Plavix, and atorvastatin  Plan: Will monitor patient in ICU  Neurochecks every hour  Will continue with ASA, plavix, and atorvastatin  Will monitor BP and maintain systolic more than 578  Other treatment per primary team - Vascular Surgery

## 2023-01-20 ENCOUNTER — TELEPHONE (OUTPATIENT)
Dept: OTHER | Facility: HOSPITAL | Age: 53
End: 2023-01-20

## 2023-01-20 ENCOUNTER — DOCUMENTATION (OUTPATIENT)
Dept: VASCULAR SURGERY | Facility: CLINIC | Age: 53
End: 2023-01-20

## 2023-01-20 VITALS
SYSTOLIC BLOOD PRESSURE: 159 MMHG | OXYGEN SATURATION: 97 % | HEIGHT: 67 IN | TEMPERATURE: 97.9 F | WEIGHT: 175.71 LBS | BODY MASS INDEX: 27.58 KG/M2 | HEART RATE: 72 BPM | RESPIRATION RATE: 19 BRPM | DIASTOLIC BLOOD PRESSURE: 85 MMHG

## 2023-01-20 LAB
ANION GAP SERPL CALCULATED.3IONS-SCNC: 10 MMOL/L (ref 4–13)
APTT PPP: 29 SECONDS (ref 23–37)
BACTERIA UR QL AUTO: ABNORMAL /HPF
BILIRUB UR QL STRIP: NEGATIVE
BUN SERPL-MCNC: 10 MG/DL (ref 5–25)
CALCIUM SERPL-MCNC: 9 MG/DL (ref 8.4–10.2)
CHLORIDE SERPL-SCNC: 106 MMOL/L (ref 96–108)
CLARITY UR: ABNORMAL
CO2 SERPL-SCNC: 19 MMOL/L (ref 21–32)
COLOR UR: ABNORMAL
CREAT SERPL-MCNC: 1.06 MG/DL (ref 0.6–1.3)
ERYTHROCYTE [DISTWIDTH] IN BLOOD BY AUTOMATED COUNT: 12.3 % (ref 11.6–15.1)
GFR SERPL CREATININE-BSD FRML MDRD: 80 ML/MIN/1.73SQ M
GLUCOSE SERPL-MCNC: 116 MG/DL (ref 65–140)
GLUCOSE UR STRIP-MCNC: NEGATIVE MG/DL
HCT VFR BLD AUTO: 40.8 % (ref 36.5–49.3)
HGB BLD-MCNC: 14 G/DL (ref 12–17)
HGB UR QL STRIP.AUTO: ABNORMAL
INR PPP: 0.95 (ref 0.84–1.19)
KETONES UR STRIP-MCNC: NEGATIVE MG/DL
LEUKOCYTE ESTERASE UR QL STRIP: NEGATIVE
MCH RBC QN AUTO: 32.3 PG (ref 26.8–34.3)
MCHC RBC AUTO-ENTMCNC: 34.3 G/DL (ref 31.4–37.4)
MCV RBC AUTO: 94 FL (ref 82–98)
NITRITE UR QL STRIP: NEGATIVE
NON-SQ EPI CELLS URNS QL MICRO: ABNORMAL /HPF
PH UR STRIP.AUTO: 6 [PH]
PLATELET # BLD AUTO: 281 THOUSANDS/UL (ref 149–390)
PMV BLD AUTO: 10 FL (ref 8.9–12.7)
POTASSIUM SERPL-SCNC: 3.7 MMOL/L (ref 3.5–5.3)
PROT UR STRIP-MCNC: NEGATIVE MG/DL
PROTHROMBIN TIME: 12.7 SECONDS (ref 11.6–14.5)
RBC # BLD AUTO: 4.33 MILLION/UL (ref 3.88–5.62)
RBC #/AREA URNS AUTO: ABNORMAL /HPF
SODIUM SERPL-SCNC: 135 MMOL/L (ref 135–147)
SP GR UR STRIP.AUTO: 1.01 (ref 1–1.03)
UROBILINOGEN UR QL STRIP.AUTO: 0.2 E.U./DL
WBC # BLD AUTO: 15.6 THOUSAND/UL (ref 4.31–10.16)
WBC #/AREA URNS AUTO: ABNORMAL /HPF

## 2023-01-20 RX ORDER — TAMSULOSIN HYDROCHLORIDE 0.4 MG/1
0.4 CAPSULE ORAL
Qty: 30 CAPSULE | Refills: 0 | Status: SHIPPED | OUTPATIENT
Start: 2023-01-20

## 2023-01-20 RX ORDER — OXYCODONE HYDROCHLORIDE 5 MG/1
5 TABLET ORAL EVERY 4 HOURS PRN
Qty: 10 TABLET | Refills: 0 | Status: SHIPPED | OUTPATIENT
Start: 2023-01-20 | End: 2023-01-23 | Stop reason: SDUPTHER

## 2023-01-20 RX ADMIN — ACETAMINOPHEN 650 MG: 325 TABLET, FILM COATED ORAL at 14:36

## 2023-01-20 RX ADMIN — ATORVASTATIN CALCIUM 80 MG: 80 TABLET, FILM COATED ORAL at 17:28

## 2023-01-20 RX ADMIN — CLOPIDOGREL BISULFATE 75 MG: 75 TABLET ORAL at 09:03

## 2023-01-20 RX ADMIN — OXYCODONE 2.5 MG: 5 TABLET ORAL at 12:46

## 2023-01-20 RX ADMIN — ASPIRIN 81 MG: 81 TABLET, CHEWABLE ORAL at 09:03

## 2023-01-20 RX ADMIN — OXYCODONE 5 MG: 5 TABLET ORAL at 06:03

## 2023-01-20 RX ADMIN — AMLODIPINE BESYLATE 10 MG: 10 TABLET ORAL at 09:03

## 2023-01-20 NOTE — DISCHARGE INSTR - AVS FIRST PAGE
DISCHARGE INSTRUCTIONS  CAROTID ARTERIOGRAM/STENT    ACTIVITY:  Limit your physical activity to walking for the first week and then increase your activity as tolerated  Walking up steps and normal activities may be resumed as you feel ready  Avoid strenuous activity such as vigorous exercise  Avoid heavy lifting (do not lift more than 15 pounds) for the first four weeks after surgery  You should not drive a car for at least one week following discharge from the hospital and you are off all narcotic pain medication  You may ride in a car  If you have had a stroke or mini-stroke, please consult with your neurologist prior to driving  DO NOT START OR RESUME ANY PREVIOUSLY PLANNED THERAPY (PHYSICAL, CARDIAC, REHAB, ETC…) UNTIL YOU DISCUSS WITH YOUR SURGEON AND THEY FEEL IT IS SAFE TO ENGAGE IN THERAPY  DIET:  Resume your normal diet  Good nutrition is important for healing of your incision  Drink more water than usual for the next 24 hours  You can expect to have a sore throat and trouble swallowing after surgery which should improve quickly  If you feel like you are choking, please call your doctor  RECURRENT SYMPTOMS: If you develop any new numbness, weakness, vision changes, drooping of the face, or difficulty with speech after discharge, CALL 911 or go to the nearest Emergency department immediately  INCISION/PROCEDURE SITE: You have an incision site at your neck and a procedure site in your groin  You may have surgical glue at these sites  There are stitches present under the skin which will absorb on their own  The glue is used to cover the access, assist in closure, and prevent contamination  This adhesive will darken and peel away on its own within one to two weeks  Do not pick at it  If you have a bandage over either site, please remove this on the second day after surgery  You should shower daily    Wash incision daily with soap and water, but do not rub or scrub the incision; rinse thoroughly and pat dry  Do not bathe in a tub or swim for the first 4 weeks following surgery or if you have any open wounds  It is normal to have some bruising, swelling or discoloration around the incision  IF increasing redness, pain, bleeding or a bulge develops, call our office immediately  If you notice any active bleeding at the site, apply pressure to the site and call our office ([phone]) or 911  FOLLOW UP STUDIES:  Doppler ultrasound studies are very important to your post-procedure care  Your Physician will arrange for them at your first post-procedure visit  The first study will be 6 weeks after surgery  FOLLOW UP APPOINTMENTS:  Making and keeping follow up appointments and ultrasound tests are important to your recovery  If you have difficulty making it to or keeping your follow up appointments, call the office  If you have increased pain, fever >101 5, increased drainage, redness or a bad smell at your surgery site, new coldness/numbness of your arm or leg, please call us immediately and GO directly to the ER  PLEASE CALL THE OFFICE IF YOU HAVE ANY QUESTIONS  [phone]  [phone] [address] [facility] , [address], [place], [address]  [address], [address], [place], [address] [facility] [address]  [address]   [address] [facility], [facility], [address] [facility]  [address] [facility], [facility], [facility], [address] [facility]  [address], 1st Floor, Jossy Pratt [address]  [facility], [address] [facility], [id number] [facility]  1307 [facility], 8614 [facility], [address] [facility]  One [facility], [address] [facility], [facility], Antoinette Sanchez 6  201 [facility], [address]  [address] [place] [address] [facility] [place] [address]

## 2023-01-20 NOTE — NURSING NOTE
Pt discharged to home with all belongings  Discharge instructions given to pt and wife  All questions answered   Pt discharge home without incident

## 2023-01-20 NOTE — CASE MANAGEMENT
Case Management Assessment & Discharge Planning Note    Patient name Janice Cornell  Location ICU /ICU  MRN [id number]  : 1970 Date 2023       Current Admission Date: 2023  Current Admission Diagnosis:Left Carotid Artery Stenosis s/p Left TCAR   Patient Active Problem List    Diagnosis Date Noted   • Symptomatic carotid artery stenosis, bilateral 2022   • Left Carotid Artery Stenosis s/p Left TCAR 2022   • CVA (cerebral vascular accident) ([address] ) 2022   • Primary hypertension 2022   • Thalamic infarction ([address] ) 2022   • Abnormal renal function 2022   • Family history of prostate cancer 10/03/2022   • Erectile dysfunction due to arterial insufficiency 10/03/2022   • Encounter for screening colonoscopy 10/03/2022   • Gross hematuria 2022   • Encounter to discuss test results 2022   • Alcohol intake above recommended sensible limits 2022   • Status post carotid surgery 2022   • Chronic back pain 10/18/2021   • History of diverticulitis 10/16/2021   • Bilateral nephrolithiasis 10/16/2021   • Elevated serum creatinine 10/16/2021      LOS (days): 1  Geometric Mean LOS (GMLOS) (days): 2 00  Days to GMLOS:0 9     OBJECTIVE:    Risk of Unplanned Readmission Score: 9 91         Current admission status: Inpatient       Preferred Pharmacy:   [facility] [address] OTONIEL GALINDO [address]  [address] [address]  Phone: [phone] Fax: [phone]    Primary Care Provider: Charu Marques MD    Primary Insurance: [facility]  Secondary Insurance:     ASSESSMENT:  [address], [address] Jer[address]ie Rob[address] Representative - Spouse   Primary Phone: [phone] (Mobile)  Home Phone: [phone]               Advance Directives  Does patient have a [address] [facility] [address]?: No  Was patient offered paperwork?: Yes (declined)  Does patient currently have a Health Care decision maker?: Yes, please see Health Care Proxy section  Does patient have Advance Directives?: No  Was patient offered paperwork?: Yes (declined)  Primary Contact: Yusuf Goodman (wife) [phone]              Patient Information  Admitted from[de-identified] Home  Mental Status: Alert  During Assessment patient was accompanied by: Spouse  Assessment information provided by[de-identified] Patient  Primary Caregiver: Self  Support Systems: Spouse/significant other  [place] of Residence: [address] [place] [address] do you live in?: [place] entry access options   Select all that apply : Stairs  Number of steps to enter home : 1  Type of Current Residence: 2 story home  Upon entering residence, is there a bedroom on the main floor (no further steps)?: No  A bedroom is located on the following floor levels of residence (select all that apply):: 2nd Floor  Upon entering residence, is there a bathroom on the main floor (no further steps)?: Yes  Number of steps to 2nd floor from main floor: One Flight  In the last 12 months, was there a time when you were not able to pay the mortgage or rent on time?: No  In the last 12 months, how many places have you lived?: 1  In the last 12 months, was there a time when you did not have a steady place to sleep or slept in a shelter (including now)?: No  Homeless/housing insecurity resource given?: N/A  Living Arrangements: Lives w/ Spouse/significant other  Is patient a ?: No    Activities of Daily Living Prior to Admission  Functional Status: Independent  Completes ADLs independently?: Yes  Ambulates independently?: Yes  Does patient use assisted devices?: No  Does patient currently own DME?: No  Does patient have a history of Outpatient Therapy (PT/OT)?: Yes  Does the patient have a history of Short-Term Rehab?: No  Does patient currently have [facility]?: No         Patient Information Continued  Income Source: Unemployed  Does patient have prescription coverage?: Yes  Within the past 12 months, you worried that your food would run out before you got the money to buy more : Never true  Within the past 12 months, the food you bought just didn't last and you didn't have money to get more : Never true  Food insecurity resource given?: N/A  Does patient receive dialysis treatments?: No  Does patient have a history of substance abuse?: No  Does patient have a history of Mental Health Diagnosis?: No         Means of Transportation  Means of Transport to Appts[de-identified] Drives Self  In the past 12 months, has lack of transportation kept you from medical appointments or from getting medications?: No  In the past 12 months, has lack of transportation kept you from meetings, work, or from getting things needed for daily living?: No  Was application for public transport provided?: N/A        DISCHARGE DETAILS:    Discharge planning discussed with[de-identified] Pt and spouse  Freedom of Choice: Yes     CM contacted family/caregiver?: Yes (spouse at bedside)             Contacts  Patient Contacts: Сергей Mccray  Relationship to Patient[de-identified] Family  Contact Method:  In Person  Reason/Outcome: Emergency Contact, Discharge [address]         Is the patient interested in [facility] at discharge?: No    DME Referral Provided  Referral made for DME?: No    Other Referral/Resources/Interventions Provided:  Interventions: None Indicated         Treatment Team Recommendation: Home  Discharge Destination Plan[de-identified] Home  Transport at Discharge : Family

## 2023-01-20 NOTE — PROGRESS NOTES
Progress Note - Erlin Melton 47 y o  male MRN: [id number]    Unit/Bed#: ICU 01 Encounter: [id number]      Assessment:  46 y/o M w L carotid stenosis, s/p L TCAR on 1/19  Vss  Afebrile  Cn 2-12 intact  No s/m deficits of upper or lower ext b/l  R groin hematoma stable  L neck incision cdi  Plan:  Regular diet  BP > 120 and < 160  Serial neurovascular exams  Pain control  Continue asa, plavix, statin  dvt ppx sqh    Subjective:   Denied fever, chills, chest pain, shortness of breath, nausea, vomiting, or abdominal pain this morning  Objective:     Vitals: Blood pressure 125/70, pulse 76, temperature 98 1 °F (36 7 °C), temperature source Oral, resp  rate 20, height 5' 7" (1 702 m), weight 79 7 kg (175 lb 11 3 oz), SpO2 94 %  ,Body mass index is 27 52 kg/m²  Intake/Output Summary (Last 24 hours) at 1/20/2023 0708  Last data filed at 1/20/2023 0542  Gross per 24 hour   Intake 2531 25 ml   Output 2700 ml   Net -168 75 ml       Physical Exam  General: NAD  HEENT: NC/AT  MMM  Cv: RRR  Lungs: normal effort  Ab: Soft, NT/ND  Ex: no CCE  Neuro: AAOx3      Invasive Devices     Peripheral Intravenous Line  Duration           Peripheral IV 01/19/23 Dorsal (posterior); Right Hand 1 day    Peripheral IV 01/19/23 Left Hand <1 day                Lab, Imaging and other studies: I have personally reviewed pertinent reports      VTE Pharmacologic Prophylaxis: Heparin  VTE Mechanical Prophylaxis: sequential compression device

## 2023-01-20 NOTE — PROGRESS NOTES
[address]  Progress Note - Shawn Salazar 1970, 47 y o  male MRN: [id number]  Unit/Bed#: ICU 01 Encounter: [id number]  Primary Care Provider: Dany Vallejo MD   Date and time admitted to hospital: 1/19/2023  6:23 AM    * Left Carotid Artery Stenosis s/p Left TCAR  Assessment & Plan  S/p left TCAR today  · Good placement of the stent with no significant residual stenosis or flow-limiting dissection intact outflow the patient woke up neurologically grossly intact he had a severe proximal ICA stenosis that was confirmed on the diagnostic imaging initially  Follows with vascular surgery outpatient  History of bilateral carotid artery stenosis  S/p right TCAR 2 months ago  Home regimen: Aspirin, Plavix, and atorvastatin  Plan: Will monitor patient in ICU  Neurochecks every hour  Will continue with ASA, plavix, and atorvastatin  Will monitor BP and maintain systolic more than 133  Other treatment per primary team - Vascular Surgery    CVA (cerebral vascular accident) [facility])  Assessment & Plan  History of right occipital and basal ganglia ischemic stroke in 2013  Residual left-sided weakness and visual defects  Home regimen: Aspirin and atorvastatin    Primary hypertension  Assessment & Plan  Home regimen: 100 mg losartan daily  Did not take medication on day of procedure  Per patient, BP is normally elevated with systolic in the 569T at home  Restart home losartan tomorrow        ----------------------------------------------------------------------------------------  HPI/24hr events: no acute issues o/n  PMHx including HTN, history of CVA in 2013, and HLD who presents for an elective left TCAR  He also has bilateral carotid artery stenosis and follows with vascular surgery outpatient  On 11/8/2022, he underwent a right TCAR with no complications         Patient appropriate for transfer out of the ICU today?: Patient does not meet criteria for ICU Follow-up Clinic; referral has not been made  Disposition: Transfer to [facility] with Telemetry   Code Status: Prior  ---------------------------------------------------------------------------------------  SUBJECTIVE  Left flank pain since November but states it is worse o/n  Review of Systems   Constitutional: Negative for chills and fever  HENT: Negative for ear pain and sore throat  Eyes: Negative for pain and visual disturbance  Respiratory: Negative for cough and shortness of breath  Cardiovascular: Negative for chest pain and palpitations  Gastrointestinal: Positive for abdominal pain  Negative for vomiting  Left flank pain  No ecchymosis  Genitourinary: Negative for dysuria and hematuria  Musculoskeletal: Negative for arthralgias and back pain  Skin: Negative for color change and rash  Neurological: Positive for weakness  Negative for seizures and syncope  Chronic left weakness per pt   All other systems reviewed and are negative  Review of systems was reviewed and negative unless stated above in HPI/24-hour events   ---------------------------------------------------------------------------------------  OBJECTIVE    Vitals   Vitals:    23 2215 23 2257 23 2330 23 0000   BP: 155/85  158/83 131/71   BP Location:    Right arm   Pulse:   86 88   Resp:   (!)  (!) 24   Temp:  99 3 °F (37 4 °C)  98 1 °F (36 7 °C)   TempSrc:  Oral  Oral   SpO2:   98% 98%   Weight:       Height:         Temp (24hrs), Av 8 °F (36 6 °C), Min:96 6 °F (35 9 °C), Max:99 3 °F (37 4 °C)  Current: Temperature: 98 1 °F (36 7 °C)  Arterial Line BP: 148/86  Arterial Line MAP (mmHg): 134 mmHg not accurate    Respiratory:  SpO2: SpO2: 98 %  Nasal Cannula O2 Flow Rate (L/min): 3 L/min    Invasive/non-invasive ventilation settings   Respiratory    Lab Data (Last 4 hours)    None         O2/Vent Data (Last 4 hours)    None                Physical Exam  Vitals and nursing note reviewed  Constitutional:       General: He is not in acute distress  Appearance: He is well-developed  HENT:      Head: Normocephalic and atraumatic  Nose: Nose normal       Mouth/Throat:      Mouth: Mucous membranes are moist    Eyes:      Conjunctiva/sclera: Conjunctivae normal       Pupils: Pupils are equal, round, and reactive to light  Cardiovascular:      Rate and Rhythm: Normal rate and regular rhythm  Heart sounds: No murmur heard  Pulmonary:      Effort: Pulmonary effort is normal  No respiratory distress  Breath sounds: Normal breath sounds  Abdominal:      Palpations: Abdomen is soft  Tenderness: There is abdominal tenderness  There is left CVA tenderness  Musculoskeletal:         General: No swelling  Cervical back: Neck supple  Skin:     General: Skin is warm and dry  Capillary Refill: Capillary refill takes less than 2 seconds  Comments: Incision c/d   Neurological:      Mental Status: He is alert  Psychiatric:         Mood and Affect: Mood normal              Laboratory and Diagnostics:        Invalid input(s):  EOSPCT                        ABG:    VBG:          Micro        EKG:   Imaging: I have personally reviewed pertinent reports  and I have personally reviewed pertinent films in PACS    Intake and Output  I/O       01/18 0701  01/19 0700 01/19 0701  01/20 0700    I V  (mL/kg)  2531 3 (31 8)    Total Intake(mL/kg)  2531 3 (31 8)    Urine (mL/kg/hr)  1900 (1)    Total Output  1900    Net  +631 3                Height and Weights   Height: 5' 7" (170 2 cm)  IBW (Ideal Body Weight): 66 1 kg  Body mass index is 27 52 kg/m²    Weight (last 2 days)     Date/Time Weight    01/19/23 0654 79 7 (175 71)            Nutrition       Diet Orders   (From admission, onward)             Start     Ordered    01/19/23 1446  Diet Regular; Regular House  Diet effective now        References:    Nutrtion Support Algorithm Enteral vs  Parenteral   Question Answer Comment Diet Type Regular    Regular Regular House    RD to adjust diet per protocol? Yes        01/19/23 1446                  Active Medications  Scheduled Meds:  Current Facility-Administered Medications   Medication Dose Route Frequency Provider Last Rate   • acetaminophen  650 mg Oral Q6H PRN Valencia Dahl PA-C     • amLODIPine  10 mg Oral Daily [place] Nabila, PA-C     • aspirin  81 mg Oral Daily [place] Nabila, PA-C     • atorvastatin  80 mg Oral QPM [place] Nabila PA-C     • clopidogrel  75 mg Oral Daily [place] Nabila, PA-C     • heparin (porcine)  5,000 Units Subcutaneous [facility] [place] Nabila, PA-C     • hydrALAZINE  15 mg Intravenous Q15 Min PRN Viola LUCIE Dahl      And   • niCARdipine  1-15 mg/hr Intravenous Continuous PRN Violthelma Dahl PA-C     • nicotine polacrilex  2 mg Oral Q2H PRN Nasim Bloom DO     • oxyCODONE  2 5 mg Oral Q4H PRN [place] Nabila PA-C     • oxyCODONE  5 mg Oral Q4H PRN [place] Nabila PA-C     • sodium chloride  500 mL Intravenous Once PRN NANCY GaviriaC      Followed by   [place] [START ON 1/21/2023] phenylephine   mcg/min Intravenous Continuous PRN Valencia Dahl PA-C       Continuous Infusions:  niCARdipine, 1-15 mg/hr  [START ON 1/21/2023] phenylephine,  mcg/min      PRN Meds:   acetaminophen, 650 mg, Q6H PRN  hydrALAZINE, 15 mg, Q15 Min PRN   And  niCARdipine, 1-15 mg/hr, Continuous PRN  nicotine polacrilex, 2 mg, Q2H PRN  oxyCODONE, 2 5 mg, Q4H PRN  oxyCODONE, 5 mg, Q4H PRN  sodium chloride, 500 mL, Once PRN   Followed by  Shaun Leo ON 1/21/2023] phenylephine,  mcg/min, Continuous PRN        Invasive Devices Review  Invasive Devices     Peripheral Intravenous Line  Duration           Peripheral IV 01/19/23 Dorsal (posterior); Right Hand <1 day    Peripheral IV 01/19/23 Left Hand <1 day                Rationale for remaining devices: cont piv  ---------------------------------------------------------------------------------------  Advance Directive and Living Will:      Power of :    POLST:    ---------------------------------------------------------------------------------------  Care Time Delivered:   No Critical Care time spent       Le Babinski, PA-C      Portions of the record may have been created with voice recognition software  Occasional wrong word or "sound a like" substitutions may have occurred due to the inherent limitations of voice recognition software    Read the chart carefully and recognize, using context, where substitutions have occurred

## 2023-01-20 NOTE — ASSESSMENT & PLAN NOTE
S/p left TCAR today  · Good placement of the stent with no significant residual stenosis or flow-limiting dissection intact outflow the patient woke up neurologically grossly intact he had a severe proximal ICA stenosis that was confirmed on the diagnostic imaging initially  Follows with vascular surgery outpatient  History of bilateral carotid artery stenosis  S/p right TCAR 2 months ago  Home regimen: Aspirin, Plavix, and atorvastatin  Plan:   Will monitor patient in ICU  Neurochecks every hour  Will continue with ASA, plavix, and atorvastatin  Will monitor BP and maintain systolic more than 865  Other treatment per primary team - Vascular Surgery

## 2023-01-20 NOTE — PROGRESS NOTES
Post op Note - Rebekah Mancuso 47 y o  male MRN: [id number]    Unit/Bed#: ICU 01 Encounter: [id number]      Assessment:  46 y/o M w L carotid stenosis, s/p L TCAR on 1/19  Doing well  Vss  Afebrile  Left neck incision c/d/i  r groin access site w hematoma, but soft and nontender  Cn 2-12 intact  No strength or sensory deficits of upper or lower ext b/l     Plan:  Regular diet  BP > 120 and < 160  Serial neurovascular exams  Pain control  Continue asa, plavix, statin  dvt ppx sqh    Subjective:   Feels well except some left neck discomfort over his incision  Denied vision changes or weakness or numbness of upper or lower ext  Denied fever, chills, chest pain, shortness of breath, nausea, vomiting, or abdominal pain this evening  Objective:     Vitals: Blood pressure 155/85, pulse 82, temperature (!) 96 7 °F (35 9 °C), temperature source Oral, resp  rate 21, height 5' 7" (1 702 m), weight 79 7 kg (175 lb 11 3 oz), SpO2 94 %  ,Body mass index is 27 52 kg/m²  Intake/Output Summary (Last 24 hours) at 1/19/2023 2240  Last data filed at 1/19/2023 1959  Gross per 24 hour   Intake 2531 25 ml   Output 1500 ml   Net 1031 25 ml       Physical Exam  General: NAD  HEENT: NC/AT  MMM  Cv: RRR     Lungs: normal effort  Ab: Soft, NT/ND  Ex: no CCE  Neuro: AAOx3    Scheduled Meds:  Current Facility-Administered Medications   Medication Dose Route Frequency Provider Last Rate   • acetaminophen  650 mg Oral Q6H PRN Luis Lang PA-C     • [START ON 1/20/2023] amLODIPine  10 mg Oral Daily Luis Lang PA-C     • [START ON 1/20/2023] aspirin  81 mg Oral Daily Luis Lang PA-C     • atorvastatin  80 mg Oral QPM Luis Lang PA-C     • [START ON 1/20/2023] clopidogrel  75 mg Oral Daily Luis Lang PA-C     • [START ON 1/20/2023] heparin (porcine)  5,000 Units Subcutaneous [facility] Glendell Knows, PA-C     • hydrALAZINE  15 mg Intravenous Q15 Min PRN Luis Lang PA-C And   • niCARdipine  1-15 mg/hr Intravenous Continuous PRN Darryl Frankel, PA-C     • nicotine polacrilex  2 mg Oral Q2H PRN Leobardo Auguste DO     • oxyCODONE  2 5 mg Oral Q4H PRN Darryl Frankel, PA-C     • oxyCODONE  5 mg Oral Q4H PRN Darryl Frankel, PA-C     • sodium chloride  500 mL Intravenous Once PRN Darryl Frankel, PA-C      Followed by   • [START ON 1/21/2023] phenylephine   mcg/min Intravenous Continuous PRN Darryl Frankel, PA-C       Continuous Infusions:niCARdipine, 1-15 mg/hr  [START ON 1/21/2023] phenylephine,  mcg/min      PRN Meds: •  acetaminophen  •  [COMPLETED] labetalol **AND** hydrALAZINE **AND** niCARdipine  •  nicotine polacrilex  •  oxyCODONE  •  oxyCODONE  •  sodium chloride **FOLLOWED BY** [START ON 1/21/2023] phenylephine      Invasive Devices     Peripheral Intravenous Line  Duration           Peripheral IV 01/19/23 Dorsal (posterior); Right Hand <1 day    Peripheral IV 01/19/23 Left Hand <1 day                Lab, Imaging and other studies: I have personally reviewed pertinent reports      VTE Pharmacologic Prophylaxis: Heparin  VTE Mechanical Prophylaxis: sequential compression device

## 2023-01-20 NOTE — PROGRESS NOTES
Vascular Nurse Navigator Post Op Education    Met with patient at bedside to introduce myself as Vascular Nurse Navigator and explained my role  Patient is appropriate and accepting to education  Patient was educated with Review of written materials provided, Teachback, Explanation, Demonstration and Question & Answer on expectations of post op care and recovery on Left TCAR  Patient is a former smoker, quit 5 months ago, as such Smoking effects on the lungs, tobacco triggers and Smoking cessation was reviewed  Education provided to patient on infection prevention, activity limitations, when to call the office, importance of follow up, and incisional care  Discharge instruction handout provided to patient to review

## 2023-01-20 NOTE — TELEPHONE ENCOUNTER
Patient is status post TCAR 1/19  for carotid artery stenosis  Was contacted by [facility] that patient had complained of left flank and groin pain with history of kidney stones  Patient was adamant about wanting to be discharged home  Please contact patient for follow-up appointment in 1 week  Recommended Flomax and CT imaging if patient was agreeable

## 2023-01-20 NOTE — PLAN OF CARE
Problem: NEUROSENSORY - ADULT  Goal: Achieves stable or improved neurological status  Description: INTERVENTIONS  - Monitor and report changes in neurological status  - Monitor vital signs such as temperature, blood pressure, glucose, and any other labs ordered   - Initiate measures to prevent increased intracranial pressure  - Monitor for seizure activity and implement precautions if appropriate      Outcome: Progressing  Goal: Achieves maximal functionality and self care  Description: INTERVENTIONS  - Monitor swallowing and airway patency with patient fatigue and changes in neurological status  - Encourage and assist patient to increase activity and self care     - Encourage visually impaired, hearing impaired and aphasic patients to use assistive/communication devices  Outcome: Progressing     Problem: CARDIOVASCULAR - ADULT  Goal: Maintains optimal cardiac output and hemodynamic stability  Description: INTERVENTIONS:  - Monitor I/O, vital signs and rhythm  - Monitor for S/S and trends of decreased cardiac output  - Administer and titrate ordered vasoactive medications to optimize hemodynamic stability  - Assess quality of pulses, skin color and temperature  - Assess for signs of decreased coronary artery perfusion  - Instruct patient to report change in severity of symptoms  Outcome: Progressing  Goal: Absence of cardiac dysrhythmias or at baseline rhythm  Description: INTERVENTIONS:  - Continuous cardiac monitoring, vital signs, obtain 12 lead EKG if ordered  - Administer antiarrhythmic and heart rate control medications as ordered  - Monitor electrolytes and administer replacement therapy as ordered  Outcome: Progressing     Problem: GENITOURINARY - ADULT  Goal: Maintains or returns to baseline urinary function  Description: INTERVENTIONS:  - Assess urinary function  - Encourage oral fluids to ensure adequate hydration if ordered  - Administer IV fluids as ordered to ensure adequate hydration  - Administer ordered medications as needed  - Offer frequent toileting  - Follow urinary retention protocol if ordered  Outcome: Progressing  Goal: Absence of urinary retention  Description: INTERVENTIONS:  - Assess patient’s ability to void and empty bladder  - Monitor I/O  - Bladder scan as needed  - Discuss with physician/AP medications to alleviate retention as needed  - Discuss catheterization for long term situations as appropriate  Outcome: Progressing     Problem: SKIN/TISSUE INTEGRITY - ADULT  Goal: Incision(s), wounds(s) or drain site(s) healing without S/S of infection  Description: INTERVENTIONS  - Assess and document dressing, incision, wound bed, drain sites and surrounding tissue  - Provide patient and family education  - Perform skin care/dressing changes every   Outcome: Progressing     Problem: HEMATOLOGIC - ADULT  Goal: Maintains hematologic stability  Description: INTERVENTIONS  - Assess for signs and symptoms of bleeding or hemorrhage  - Monitor labs  - Administer supportive blood products/factors as ordered and appropriate  Outcome: Progressing     Problem: Prexisting or High Potential for Compromised Skin Integrity  Goal: Skin integrity is maintained or improved  Description: INTERVENTIONS:  - Identify patients at risk for skin breakdown  - Assess and monitor skin integrity  - Assess and monitor nutrition and hydration status  - Monitor labs   - Assess for incontinence   - Turn and reposition patient  - Assist with mobility/ambulation  - Relieve pressure over bony prominences  - Avoid friction and shearing  - Provide appropriate hygiene as needed including keeping skin clean and dry  - Evaluate need for skin moisturizer/barrier cream  - Collaborate with interdisciplinary team   - Patient/family teaching  - Consider wound care consult   Outcome: Progressing

## 2023-01-20 NOTE — ASSESSMENT & PLAN NOTE
Home regimen: 100 mg losartan daily  Did not take medication on day of procedure  Per patient, BP is normally elevated with systolic in the 724U at home    Restart home losartan tomorrow

## 2023-01-20 NOTE — UTILIZATION REVIEW
Initial Clinical Review    Elective inpatient surgical procedure  Age/Sex: 47 y o  male  Surgery Date: 1/19/2023  Procedure:   ANGIOPLASTY ARTERY CAROTID W/ STENT Left TCAR (Left)  Left internal carotid artery transcarotid artery stent revascularization  Anesthesia: general   Operative Findings: There was good placement of the stent with no significant residual stenosis or flow-limiting dissection intact outflow the patient woke up neurologically grossly intact he had a severe proximal ICA stenosis that was confirmed on the diagnostic imaging initially    POD#1 Progress Note:   Gen Surgery:  L carotid stenosis, s/p L TCAR on 1/19  Vss  Afebrile  Cn 2-12 intact  No s/m deficits of upper or lower ext b/l  R groin hematoma stable  L neck incision cdi  BP > 120 and < 160  Serial neurovascular exams  Pain control  Continue asa, plavix, statin  Critical care  Cont Home regimen: Aspirin, Plavix, and atorvastatin   Monitor BP & maintain SBP> 110 per patient BP baseline is w elevated SBP in 150s, restart home  Losartan tomorrow  Admission Orders: Date/Time/Statement:   Admission Orders (From admission, onward)     Ordered        01/19/23 1046  Inpatient Admission  Once                      Orders Placed This Encounter   Procedures   • Inpatient Admission     Standing Status:   Standing     Number of Occurrences:   1     Order Specific Question:   Level of Care     Answer:   Critical Care [15]     Order Specific Question:   Estimated length of stay     Answer:   Inpatient Only Surgery     Vital Signs: /75   Pulse 76   Temp 98 1 °F (36 7 °C)   Resp 17   Ht 5' 7" (1 702 m)   Wt 79 7 kg (175 lb 11 3 oz)   SpO2 96%   BMI 27 52 kg/m²     Pertinent Labs/Diagnostic Test Results:   IR carotid stent    (Results Pending)         Results from last 7 days   Lab Units 01/20/23  0546   WBC Thousand/uL 15 60*   HEMOGLOBIN g/dL 14 0   HEMATOCRIT % 40 8   PLATELETS Thousands/uL 281         Results from last 7 days   Lab Units 01/20/23  0546   SODIUM mmol/L 135   POTASSIUM mmol/L 3 7   CHLORIDE mmol/L 106   CO2 mmol/L 19*   ANION GAP mmol/L 10   BUN mg/dL 10   CREATININE mg/dL 1 06   EGFR ml/min/1 73sq m 80   CALCIUM mg/dL 9 0             Results from last 7 days   Lab Units 01/20/23  0546   GLUCOSE RANDOM mg/dL 116           Results from last 7 days   Lab Units 01/20/23  0546   PROTIME seconds 12 7   INR  0 95   PTT seconds 29       Results from last 7 days   Lab Units 01/20/23  0944   CLARITY UA  Cloudy   COLOR UA  Light Yellow   SPEC GRAV UA  1 010   PH UA  6 0   GLUCOSE UA mg/dl Negative   KETONES UA mg/dl Negative   BLOOD UA  Large*   PROTEIN UA mg/dl Negative   NITRITE UA  Negative   BILIRUBIN UA  Negative   UROBILINOGEN UA E U /dl 0 2   LEUKOCYTES UA  Negative   WBC UA /hpf 1-2*   RBC UA /hpf Innumerable*   BACTERIA UA /hpf Occasional   EPITHELIAL CELLS WET PREP /hpf Occasional     Arterial line monitoring  freq neuro checks  Diet: regular diet  HOB> 30 degrees while on bedrest s/p CEA/TCAR  Mobility: Up as tolerated  DVT Prophylaxis: SCD, heparin (porcine), 5,000 Units, Subcutaneous, Q8H [facility]    Medications/Pain Control:   Scheduled Medications:  amLODIPine, 10 mg, Oral, Daily  aspirin, 81 mg, Oral, Daily  atorvastatin, 80 mg, Oral, QPM  clopidogrel, 75 mg, Oral, Daily  heparin (porcine), 5,000 Units, Subcutaneous, Q8H LEANNE      Continuous IV Infusions:  niCARdipine, 1-15 mg/hr, Intravenous, Continuous PRN  [START ON 1/21/2023] phenylephine,  mcg/min, Intravenous, Continuous PRN      PRN Meds:  acetaminophen, 650 mg, Oral, Q6H PRN  hydrALAZINE, 15 mg, Intravenous, Q15 Min PRN   And  niCARdipine, 1-15 mg/hr, Intravenous, Continuous PRN  nicotine polacrilex, 2 mg, Oral, Q2H PRN  oxyCODONE, 2 5 mg, Oral, Q4H PRN  oxyCODONE, 5 mg, Oral, Q4H PRN  sodium chloride, 500 mL, Intravenous, Once PRN   Followed by  [START ON 1/21/2023] phenylephine,  mcg/min, Intravenous, Continuous PRN      Network Utilization Review Department  ATTENTION: Please call with any questions or concerns to [phone] and carefully listen to the prompts so that you are directed to the right person  All voicemails are confidential   [facility] all requests for admission clinical reviews, approved or denied determinations and any other requests to dedicated fax number below belonging to the campus where the patient is receiving treatment   List of dedicated fax numbers for the Facilities:  [address] DENIALS (Administrative/Medical Necessity) [phone]   [address] (Maternity/NICU/Pediatrics) [phone]   1 Imelda Pollard [phone]   [facility] [address] 77 [phone]   [address] [place] [address] [facility] Ram[facility] [phone]   [facility] 134 815 [facility] [phone]

## 2023-01-23 ENCOUNTER — TRANSITIONAL CARE MANAGEMENT (OUTPATIENT)
Dept: FAMILY MEDICINE CLINIC | Facility: CLINIC | Age: 53
End: 2023-01-23

## 2023-01-23 ENCOUNTER — TELEPHONE (OUTPATIENT)
Dept: VASCULAR SURGERY | Facility: CLINIC | Age: 53
End: 2023-01-23

## 2023-01-23 DIAGNOSIS — I65.23 SYMPTOMATIC CAROTID ARTERY STENOSIS, BILATERAL: ICD-10-CM

## 2023-01-23 RX ORDER — OXYCODONE HYDROCHLORIDE 5 MG/1
5 TABLET ORAL EVERY 4 HOURS PRN
Qty: 18 TABLET | Refills: 0 | Status: SHIPPED | OUTPATIENT
Start: 2023-01-23 | End: 2023-01-26

## 2023-01-23 NOTE — TELEPHONE ENCOUNTER
Vascular Nurse Navigator Post Op Call    Procedure: ANGIOPLASTY ARTERY CAROTID W/ STENT Left TCAR (Left)  Left internal carotid artery transcarotid artery stent revascularization  Ultrasound-guided percutaneous access to the right common femoral vein next number is supervision interpretation of all radiologic imaging    Date of Procedure:  1/19/23    Surgeon:    Jeet Leggett DO - Primary     * Eduardo Hagan PA-C - Assisting    Discharge Date:  1/20/23    Discharge Disposition:  Home    Change in Vision?: No    Change in Speech?: No    Weakness?: No    Uncontrolled Pain?: No    Hoarseness?: No    Trouble Swallowing?: No    Incision Concerns?: No    Anticoagulation pt was discharged on post op?: Aspirin and Clopidogrel (Plavix)    Statin pt was discharged on post op?: Lipitor (atorvastatin)    Bleeding?: No    Fever/chills?: No      Reviewed discharge instructions and incision care with patient  NEXT OFFICE VISIT SCHEDULED:  1/27/23 a 10:30 am with Dr Andria Potter at [facility] Available?: Yes        Any further questions/concerns? Patient stated that he is doing okay since discharge  He stated that he is sore  He stated that he has pain in his neck incision and his rt groin puncture site  He rated this pain at an 8 out of 10  He stated that his neck incision is swollen, but soft  He stated that he still has a lump in his groin that has not gotten any larger since discharge  He stated that he is having trouble getting up from a laying position to a seated position because it puts a lot of stress on his neck and "it hurts like hell"  He stated that he is taking the oxycodone along with a 500 mg Tylenol about 4 times a day  He stated that the Tylenol alone did not work for his pain  He stated that when he picked up the oxycodone from [facility] in [place], they only had 8 pills available instead of the 10 pills that were prescribed    He stated that he had to sign off that he was agreeable to the 8 pills, otherwise they would have to order the medication for him in order to fill the prescribed amount  He is requesting a refill of the oxycodone to be sent to [facility] in [place] for his pain (he stated they may have to order the medication to fill the prescribed amount)  Reviewed incision care with him - wash daily with soap and water  He denied any signs of infection - no redness, drainage, or open areas  Reviewed discharge medications - Aspirin, Plavix, and Lipitor  All questions answered  Informed him that I would send his request for a refill on his oxycodone to triage provider and contact him with recommendations  He was agreeable to same

## 2023-01-23 NOTE — UTILIZATION REVIEW
NOTIFICATION OF ADMISSION DISCHARGE   This is a Notification of Discharge from [address]  Please be advised that this patient has been discharge from our facility  Below you will find the admission and discharge date and time including the patient’s disposition  UTILIZATION REVIEW CONTACT:  Rolanda Del Rosario  Utilization   Network Utilization Review Department  Phone: [phone] x carefully listen to the prompts  All voicemails are confidential   Email: [email or web address]     ADMISSION INFORMATION  PRESENTATION DATE: 1/19/2023  6:23 AM    INPATIENT ADMISSION DATE: 1/19/23 10:46 AM   DISCHARGE DATE: 1/20/2023  6:01 PM   DISPOSITION:Home/Self Care    IMPORTANT INFORMATION:  Send all requests for admission clinical reviews, approved or denied determinations and any other requests to dedicated fax number below belonging to the campus where the patient is receiving treatment   List of dedicated fax numbers:  [address] DENIALS (Administrative/Medical Necessity) [phone]   [address] (Maternity/NICU/Pediatrics) [phone]   [facility] [phone]   [address] [phone]   [address] [phone]   220 [facility] [phone]   [address] [place] [address] [phone]   [address] [place] [address] 119 [phone]   [facility]  [phone] 405[address] [facility] [phone]   412 [facility] [address] [facility] [phone]

## 2023-01-23 NOTE — TELEPHONE ENCOUNTER
Reviewed chart and PDMP  Script appropriate  Script for oxycodone 5 mg IR q4h x3 days sent to preferred pharmacy  Please advise patient to take prescription narcotics sparingly and utilize tylenol when able  If his pain is not controlled with prescription narcotics, he should go to the ED

## 2023-01-23 NOTE — TELEPHONE ENCOUNTER
Contacted patient to inform of information and recommendation from Mike Murguia PA-C  Verbal understanding received

## 2023-01-23 NOTE — TELEPHONE ENCOUNTER
I called and spoke to patient  He would like to wait until his appointment on Friday with another doctor before he schedules with us  He will let us know what that doctor says after he sees them on Friday

## 2023-01-24 DIAGNOSIS — R09.89 SYMPTOMS OF CEREBROVASCULAR ACCIDENT (CVA): ICD-10-CM

## 2023-01-24 DIAGNOSIS — I63.9 CVA (CEREBRAL VASCULAR ACCIDENT) (HCC): ICD-10-CM

## 2023-01-24 DIAGNOSIS — R29.90 STROKE-LIKE SYMPTOMS: ICD-10-CM

## 2023-01-24 RX ORDER — ATORVASTATIN CALCIUM 80 MG/1
TABLET, FILM COATED ORAL
Qty: 90 TABLET | Refills: 0 | Status: SHIPPED | OUTPATIENT
Start: 2023-01-24 | End: 2023-01-25

## 2023-01-25 ENCOUNTER — TELEPHONE (OUTPATIENT)
Dept: VASCULAR SURGERY | Facility: CLINIC | Age: 53
End: 2023-01-25

## 2023-01-25 DIAGNOSIS — I65.22 LEFT CAROTID ARTERY STENOSIS: Primary | ICD-10-CM

## 2023-01-25 RX ORDER — ATORVASTATIN CALCIUM 80 MG/1
TABLET, FILM COATED ORAL
Qty: 90 TABLET | Refills: 0 | Status: SHIPPED | OUTPATIENT
Start: 2023-01-25

## 2023-01-25 RX ORDER — HYDROCODONE BITARTRATE AND ACETAMINOPHEN 5; 325 MG/1; MG/1
1 TABLET ORAL EVERY 6 HOURS PRN
Qty: 12 TABLET | Refills: 0 | Status: SHIPPED | OUTPATIENT
Start: 2023-01-25 | End: 2023-01-28

## 2023-01-25 NOTE — TELEPHONE ENCOUNTER
Spoke with pt  He does not have MyChart, and stated that he tried to download MyChart on his phone before, and it did not work  Made him aware of provider's recommendations  Pt verbalized understanding of all

## 2023-01-25 NOTE — TELEPHONE ENCOUNTER
Reviewed PDMP  Script appropriate  Will send new script for hydrocodone-acetaminophen 5-325 mg q6h for 3 days to preferred pharmacy  Please advise patient to use tylenol sparingly as there is tylenol already in the medication  If his pain is not controlled with pain medication, he should notify the office or go to the ED for evaluation

## 2023-01-25 NOTE — TELEPHONE ENCOUNTER
Received fax from [facility] DR OTONIEL GALINDO  Hydrocodone Acetaminophen requires prior authorization  Phone #[phone]  Member ID #[id number]  Called prior auth number  Spoke with Andrews Villalta  Prior auth approved  Reference #[id number]  [facility]adis Te[facility] and made them aware  Called pt and also made him aware

## 2023-01-25 NOTE — TELEPHONE ENCOUNTER
Received fax from [facility]  requesting us to complete a prior-auth for oxycodone  Called the pharmacy- they state the oxycodone 5 mg is actually back ordered and they do not know when they will get it in so pt was not able to pick it up  Called pt- he states he still has 8/10 incisional pain in his neck  He has been taking tylenol, which "only takes the edge off"  He states he has been trying not to move out of his bed due to the pain  He does report some neck swelling, which is unchanged  He denies any open areas  States he had a very small amount of serosanguinous drainage from the incision  Informed him I would send a message to our triage provider to see if they have any other recommendations

## 2023-01-25 NOTE — TELEPHONE ENCOUNTER
Pt s/p left TCAR on 1/19/23, by Dr Aldo Perez  Spoke with pt  He noted some bloody drainage from his incision this morning  Later in the day, he lay down in bed, and noted that blood was dripping down his chest, and it was coming from the incsion  The bloody drainage has stopped, but now it is oozing clear drainage  Stated that wound edges are closely approximated  Area around incision is slightly more swollen than when he was discharged from the hospital  Has numbness on the left side of his face and neck  Denies issues with speech, swallowing, or weakness  No changes in vision  No fever/chills  Post op visit scheduled for 1/27/23  Routed to triage to advise

## 2023-01-25 NOTE — TELEPHONE ENCOUNTER
Reviewed  Is he able to send a picture of the incision through MyChart? Would recommend continuing to cleanse incision daily with soap and water  Pat dry  As well as keeping a clean, dry dressing over the incision  If he starts to have fever, chills, wound dehiscence or purulent drainage, he should notify the office

## 2023-01-25 NOTE — TELEPHONE ENCOUNTER
Pt was notified and verbalized understanding  Patient is a 34y old  Female who presents with a chief complaint of back pain (2020 14:53)      SUBJECTIVE / OVERNIGHT EVENTS: reports pain is better but pt has trouble walking, lives at home alone has no support, no cp, sob, abd pain     MEDICATIONS  (STANDING):  acetaminophen   Tablet .. 975 milliGRAM(s) Oral every 6 hours  celecoxib 200 milliGRAM(s) Oral daily  enoxaparin Injectable 40 milliGRAM(s) SubCutaneous daily  gabapentin 100 milliGRAM(s) Oral three times a day  influenza   Vaccine 0.5 milliLiter(s) IntraMuscular once  levothyroxine 137 MICROGram(s) Oral daily  lidocaine   Patch 1 Patch Transdermal daily  methocarbamol 1000 milliGRAM(s) Oral every 6 hours  pantoprazole    Tablet 40 milliGRAM(s) Oral before breakfast  polyethylene glycol 3350 17 Gram(s) Oral two times a day  senna 2 Tablet(s) Oral at bedtime    MEDICATIONS  (PRN):  ondansetron Injectable 4 milliGRAM(s) IV Push every 6 hours PRN Nausea and/or Vomiting  oxyCODONE    IR 15 milliGRAM(s) Oral every 6 hours PRN Severe Pain (7 - 10)        CAPILLARY BLOOD GLUCOSE        I&O's Summary    2020 07:01  -  2020 07:00  --------------------------------------------------------  IN: 1600 mL / OUT: 300 mL / NET: 1300 mL        PHYSICAL EXAM:  GENERAL: NAD, well-developed  HEAD:  Atraumatic, Normocephalic  EYES: conjunctiva and sclera clear  NECK: No JVD  CHEST/LUNG: Clear to auscultation bilaterally; No wheeze  HEART: Regular rate and rhythm; S1S2  ABDOMEN: Soft, Nontender, Nondistended; Bowel sounds present  EXTREMITIES:  2+ Peripheral Pulses, trace edema le bl  PSYCH: AAOx3  NEUROLOGY: non-focal  SKIN: No rashes or lesions    LABS:                        12.0   8.60  )-----------( 205      ( 2020 06:19 )             38.7     11-19    141  |  106  |  13  ----------------------------<  91  4.7   |  26  |  0.67    Ca    8.1<L>      2020 06:19            Urinalysis Basic - ( 2020 05:02 )    Color: Light Yellow / Appearance: Clear / S.017 / pH: x  Gluc: x / Ketone: Negative  / Bili: Negative / Urobili: <2 mg/dL   Blood: x / Protein: Negative / Nitrite: Negative   Leuk Esterase: Negative / RBC: x / WBC x   Sq Epi: x / Non Sq Epi: x / Bacteria: x        RADIOLOGY & ADDITIONAL TESTS:    Imaging Personally Reviewed:    Consultant(s) Notes Reviewed:      Care Discussed with Consultants/Other Providers:

## 2023-01-27 ENCOUNTER — OFFICE VISIT (OUTPATIENT)
Dept: VASCULAR SURGERY | Facility: CLINIC | Age: 53
End: 2023-01-27

## 2023-01-27 VITALS
BODY MASS INDEX: 26.84 KG/M2 | SYSTOLIC BLOOD PRESSURE: 148 MMHG | HEIGHT: 67 IN | TEMPERATURE: 97.6 F | HEART RATE: 76 BPM | DIASTOLIC BLOOD PRESSURE: 76 MMHG | WEIGHT: 171 LBS

## 2023-01-27 DIAGNOSIS — I65.23 SYMPTOMATIC CAROTID ARTERY STENOSIS, BILATERAL: ICD-10-CM

## 2023-01-27 DIAGNOSIS — I65.22 LEFT CAROTID ARTERY STENOSIS: Primary | ICD-10-CM

## 2023-01-27 NOTE — PATIENT INSTRUCTIONS
Continue both aspirin and plavix for 1 month after most recent TCAR  At 1 month may discontinue plavix   Continue aspirin lifelong    Will schedule for carotid ultrasound in 3 months and visit with Vascular BALAJI in 1 year

## 2023-01-27 NOTE — PROGRESS NOTES
Assessment/Plan:    Left Carotid Artery Stenosis s/p Left TCAR  S/p bilateral TCAR 11/2022 and 1/2023    Continue DAP for 1 month post op  Discontinue plavix after 1 month post op  Continue asa indefinitely    Carotid duplex 3 months post op  RTC Vascular [facility] 1 year         Diagnoses and all orders for this visit:    Left Carotid Artery Stenosis s/p Left TCAR  -     VAS carotid complete study; Future    Symptomatic carotid artery stenosis, bilateral          Subjective:      Patient ID: Erlin Melton is a 47 y o  male  Patient is s/p L TCAR on 1/19/23 and presents today for post-op visit  Patient reports some bloody/clear drainage at time  He also reports "pressure" in R side of head down to neck  Denies headaches, sore throat and swallowing difficulty  Denies any s/s of CVA  Endorses left neck incisional pain  No new neuro deficits  HPI    The following portions of the patient's history were reviewed and updated as appropriate: allergies, current medications, past family history, past medical history, past social history, past surgical history and problem list     Review of Systems   Constitutional: Negative  HENT: Negative  Eyes: Negative  Respiratory: Negative  Cardiovascular: Negative  Gastrointestinal: Negative  Endocrine: Negative  Genitourinary: Negative  Musculoskeletal: Negative  Skin: Negative  Allergic/Immunologic: Negative  Neurological: Negative  Hematological: Negative  Psychiatric/Behavioral: Negative  I have reviewed the ROS above and made changes as needed        Objective:      /76 (BP Location: Right arm, Patient Position: Sitting)   Pulse 76   Temp 97 6 °F (36 4 °C) (Tympanic)   Ht 5' 7" (1 702 m)   Wt 77 6 kg (171 lb)   BMI 26 78 kg/m²          Physical Exam      General  Exam: alert, awake, oriented, no distress, consistent with stated age    Integumentary  Exam: warm, dry, no gross lesions, no bruises and normal color    Head and Neck  Exam: supple, no bruits, trachea midline, no JVD, no mass or palpable nodes    Left neck incision c/d/i  Soft, tender       Eye  Exam: extraoccular movements intact, no scleral icterus, sclera clear, pupils equal round and reactive to light    ENMT  Exam: oral mucosa pink and moist    Chest and Lung  Exam: chest normal without deformity, bilaterally expansive, clear to auscultation    Cardiovascular  Exam: regular rate, regular rhythm, no murmurs, no rubs or gallops    Adbomen  Exam: soft, non-tender, non-distended, no pulsatile abdominal masses, no abdominal bruit    Peripheral Vascular  Exam: no clubbing of the digits of the upper extremity, no cyanosis, no edema, both feet are warm, radial pulses 2+ bilaterally, skin well perfused, without and no varicosities      No widened popliteal pulse noted bilaterally    Upper Extremity:  Palpation: Radial pulse- Bilateral 2+    Lower Extremity:  Palpation: Femoral pulse- Bilateral 2+         Popliteal pulse - Bilateral 2+        Dorsalis Pedis - Bilateral 2+        Posterior tibial - Bilateral 2+    Neurologic  Exam:alert, non-focal, oriented x 3, cranial nerves II-XII grossly intact

## 2023-01-27 NOTE — ASSESSMENT & PLAN NOTE
S/p bilateral TCAR 11/2022 and 1/2023    Continue DAP for 1 month post op  Discontinue plavix after 1 month post op  Continue asa indefinitely    Carotid duplex 3 months post op  RTC Vascular [facility] 1 year

## 2023-01-30 DIAGNOSIS — I65.23 CAROTID STENOSIS, BILATERAL: ICD-10-CM

## 2023-01-30 RX ORDER — CLOPIDOGREL BISULFATE 75 MG/1
75 TABLET ORAL DAILY
Qty: 30 TABLET | Refills: 0 | Status: SHIPPED | OUTPATIENT
Start: 2023-01-30

## 2023-01-30 NOTE — TELEPHONE ENCOUNTER
Per Dr Salvatore Pedraza office note from 1/27, "Continue DAP for 1 month post op  Discontinue plavix after 1 month post op"  Pt's wife called for refill- he is completely out of plavix

## 2023-02-07 ENCOUNTER — TELEPHONE (OUTPATIENT)
Dept: VASCULAR SURGERY | Facility: CLINIC | Age: 53
End: 2023-02-07

## 2023-02-07 NOTE — TELEPHONE ENCOUNTER
This is a reminder; patient is due for 9 mo CV & OV    Please call patient and schedule the following by the dates provided  Surgeon - Julianna Rankin // Helen Naidu (NPI: [id number])  Date of Surgery - 01/19/23   All appointments must be scheduled by, 01/21/23  ! !!! Patient must be scheduled for their 9-month office visit before the follow-up window close date !!!!    [facility]/ [facility] VQI Protocol  patients must be scheduled for the following    [] 3-month Doppler - scheduled 05/02/23    [] 9-month Doppler Expected - due on or after 11/04/23    [] 9-month OV after Doppler - due on or after 11/04/23          Scheduling Reminders  1  Insurance requires, 9-month doppler to be scheduled 6-months and 2-days after the 3-month doppler  2  Appointment notes MUST be entered in the following format:  a  VQI SmartPhrase NEEDED - VQI LTFU - ([facility] or [facility]) (Date of Surgery) (Surgeon's Initials) - CV Duplex (Date of Doppler)   3  If unable to schedule, a recall MUST be entered  >>Please route this encounter to Russel Contreras, Zana Menjivar, and Marino Mackey  <<

## 2023-02-28 ENCOUNTER — TELEPHONE (OUTPATIENT)
Dept: NEUROLOGY | Facility: CLINIC | Age: 53
End: 2023-02-28

## 2023-03-01 ENCOUNTER — OFFICE VISIT (OUTPATIENT)
Dept: NEUROLOGY | Facility: CLINIC | Age: 53
End: 2023-03-01

## 2023-03-01 ENCOUNTER — APPOINTMENT (OUTPATIENT)
Dept: LAB | Facility: CLINIC | Age: 53
End: 2023-03-01

## 2023-03-01 ENCOUNTER — TELEPHONE (OUTPATIENT)
Dept: FAMILY MEDICINE CLINIC | Facility: CLINIC | Age: 53
End: 2023-03-01

## 2023-03-01 ENCOUNTER — TELEPHONE (OUTPATIENT)
Dept: NEUROLOGY | Facility: CLINIC | Age: 53
End: 2023-03-01

## 2023-03-01 VITALS
SYSTOLIC BLOOD PRESSURE: 140 MMHG | WEIGHT: 171 LBS | HEIGHT: 67 IN | DIASTOLIC BLOOD PRESSURE: 80 MMHG | BODY MASS INDEX: 26.84 KG/M2 | HEART RATE: 90 BPM

## 2023-03-01 DIAGNOSIS — M54.2 CERVICALGIA: ICD-10-CM

## 2023-03-01 DIAGNOSIS — I16.0 HYPERTENSIVE URGENCY: ICD-10-CM

## 2023-03-01 DIAGNOSIS — I63.81 BASAL GANGLIA INFARCTION (HCC): ICD-10-CM

## 2023-03-01 DIAGNOSIS — I63.81 THALAMIC INFARCTION (HCC): ICD-10-CM

## 2023-03-01 DIAGNOSIS — I63.531 ACUTE ISCHEMIC RIGHT POSTERIOR CEREBRAL ARTERY (PCA) STROKE (HCC): ICD-10-CM

## 2023-03-01 DIAGNOSIS — I65.23 SYMPTOMATIC CAROTID ARTERY STENOSIS, BILATERAL: ICD-10-CM

## 2023-03-01 DIAGNOSIS — R31.0 GROSS HEMATURIA: ICD-10-CM

## 2023-03-01 DIAGNOSIS — I65.22 LEFT CAROTID ARTERY STENOSIS: Primary | ICD-10-CM

## 2023-03-01 LAB
CHOLEST SERPL-MCNC: 183 MG/DL
HDLC SERPL-MCNC: 61 MG/DL
LDLC SERPL CALC-MCNC: 76 MG/DL (ref 0–100)
NONHDLC SERPL-MCNC: 122 MG/DL
TRIGL SERPL-MCNC: 229 MG/DL

## 2023-03-01 RX ORDER — TAMSULOSIN HYDROCHLORIDE 0.4 MG/1
0.4 CAPSULE ORAL
Qty: 30 CAPSULE | Refills: 0 | Status: CANCELLED | OUTPATIENT
Start: 2023-03-01

## 2023-03-01 RX ORDER — TIZANIDINE 2 MG/1
TABLET ORAL
Qty: 30 TABLET | Refills: 0 | Status: CANCELLED | OUTPATIENT
Start: 2023-03-01

## 2023-03-01 NOTE — TELEPHONE ENCOUNTER
Leonard Fenton (wife) is calling for   He was seen by neuro and she wants his Lipitor "cut back" to 40mg (not 80mg), so he'll need a refill of this    He also needs Tizanidine 2mg, & Flomax 0 4mg  [facility] [address]

## 2023-03-01 NOTE — TELEPHONE ENCOUNTER
Phone call to pt  Advised of Dr Keke Bailey note that his cholesterol is much better and that he can reduce his Atorvastatin to 40 mg PO daily  Pt voiced understanding

## 2023-03-01 NOTE — TELEPHONE ENCOUNTER
----- Message from Therese Bruner MD sent at 3/1/2023  1:25 PM EST -----  Can you let patient know that his cholesterol is much better and that he can reduce atorvastatin to 40mg PO qdaily, thanks

## 2023-03-01 NOTE — PROGRESS NOTES
Patient ID: Dante Parrish is a 47 y o  male  Assessment/Plan: This is a 46 y/o M who is here as a follow up for history of thalamic stroke, left TCAR  PLAN:      Diagnoses and all orders for this visit:    Left Carotid Artery Stenosis s/p Left TCAR    Symptomatic carotid artery stenosis, bilateral  -     Ambulatory Referral to Neurology    Acute ischemic right posterior cerebral artery (PCA) stroke (HCC)  Etiology of the CVA -   -for stroke prevention continue with combination of aspirin and Lipitor, plavix is discontinued  -BP goal < 130/80, BP is at goal in the office  -LDL goal <70, last LDL is elevated, will check lipid panel    -does not smoke at this time   -no snoring  -I advised patient to avoid using NSAIDs for headaches or other pain and to stick to tylenol if needed  -Recommend mediterranean diet & regular exercise regimen atleast 4-5 times a week for 20-30 minutes  -I educated patient/family regarding medication compliance  -     Ambulatory Referral to Neurology    Hypertensive urgency  -     Ambulatory Referral to Neurology    Gross hematuria    Cervicalgia    Thalamic infarction ([facility] [address] )  -     Lipid panel; Future  -     Lipid panel       Follow up - 6 months  I would be happy to see the patient sooner if any new questions/concerns arise  Patient/Guardian was advised to the call the office if they have any questions and concerns in the meantime  Patient/Guardian does understand that if they have any new stroke like symptoms such as facial droop on one side, weakness/paralysis on either side, speech trouble, numbness on one side, balance issues, any vision changes, extreme dizziness or any new headache, to call 9-1-1 immediately or to proceed to the nearest ER immediately  Subjective:    HPI    This is a 60-year-old  Male who is here as a follow-up with history of stroke    He did undergo left TCAR and was seeing vascular surgery, and patient has been on aspirin and Plavix, and Lipitor for stroke prevention  Plavix was discontinued and patient thinks that since then has been having more fatigue, and tingling on her left side of the neck and he thinks Plavix seems to be helping him and is nervous about stopping it altogether because he does not want to have another stroke  Patient states that overall he is compliant with medication and is tolerating them well without any issues  Denies any other issues at this time  The following portions of the patient's history were reviewed and updated as appropriate:   He  has a past medical history of Carotid stenosis, left, Carotid stenosis, right, Cervical disc disease, COVID (05/2020), GERD (gastroesophageal reflux disease), Hyperlipidemia, Hypertension, Kidney stone, Muscle weakness, Spinal stenosis, Stroke ([facility] [address] ) (09/13/2022), and Weakness of left side of body (09/13/2022)  He   Patient Active Problem List    Diagnosis Date Noted   • Symptomatic carotid artery stenosis, bilateral 12/06/2022   • Left Carotid Artery Stenosis s/p Left TCAR 11/17/2022   • CVA (cerebral vascular accident) ([facility] [address] ) 11/08/2022   • Primary hypertension 11/03/2022   • Thalamic infarction ([facility] [address] ) 11/03/2022   • Abnormal renal function 11/03/2022   • Family history of prostate cancer 10/03/2022   • Erectile dysfunction due to arterial insufficiency 10/03/2022   • Encounter for screening colonoscopy 10/03/2022   • Gross hematuria 09/28/2022   • Encounter to discuss test results 09/28/2022   • Alcohol intake above recommended sensible limits 09/14/2022   • Status post carotid surgery 09/13/2022   • Chronic back pain 10/18/2021   • History of diverticulitis 10/16/2021   • Bilateral nephrolithiasis 10/16/2021   • Elevated serum creatinine 10/16/2021     He  has a past surgical history that includes Ulnar collateral ligament reconstruction (Bilateral); Carpal tunnel release (Bilateral); Cervical fusion;  Hernia repair; Cystoscopy; pr tcat iv stent crv crtd art embolic protecj (Right, 11/8/2022); IR carotid stent (11/8/2022); pr tcat iv stent crv crtd art embolic protecj (Left, 6/29/9359); and IR carotid stent (1/19/2023)  His family history includes Colon cancer in his father; Diabetes in his mother; Heart attack in his mother; Hypertension in his mother; No Known Problems in his sister, sister, sister, and son  He  reports that he quit smoking about 6 months ago  His smoking use included cigarettes  He has a 70 00 pack-year smoking history  He has never used smokeless tobacco  He reports current alcohol use of about 12 0 standard drinks per week  He reports that he does not currently use drugs  Current Outpatient Medications   Medication Sig Dispense Refill   • albuterol (Proventil HFA) 90 mcg/act inhaler Inhale 2 puffs every 6 (six) hours as needed for wheezing 6 7 g 1   • amLODIPine (NORVASC) 10 mg tablet Take 1 tablet by mouth once daily 90 tablet 0   • atorvastatin (LIPITOR) 80 mg tablet Take 1 tablet by mouth in the evening 90 tablet 0   • ferrous sulfate 325 (65 Fe) mg tablet Take 325 mg by mouth daily with breakfast     • losartan (COZAAR) 100 MG tablet Take 1 tablet (100 mg total) by mouth daily 90 tablet 0   • Multiple Vitamin (multivitamin) tablet Take 1 tablet by mouth daily     • pantoprazole (PROTONIX) 40 mg tablet Take 1 tablet (40 mg total) by mouth daily before breakfast 30 tablet 5   • tamsulosin (FLOMAX) 0 4 mg Take 1 capsule (0 4 mg total) by mouth daily with dinner 30 capsule 0   • tiZANidine (ZANAFLEX) 2 mg tablet TAKE 1 TABLET BY MOUTH EVERY 8 HOURS AS NEEDED FOR MUSCLE SPASM 30 tablet 0   • aspirin 81 mg chewable tablet Chew 1 tablet (81 mg total) daily 30 tablet 0   • clopidogrel (Plavix) 75 mg tablet Take 1 tablet (75 mg total) by mouth daily (Patient not taking: Reported on 3/1/2023) 30 tablet 0     No current facility-administered medications for this visit       Current Outpatient Medications on File Prior to Visit   Medication Sig   • albuterol (Proventil HFA) 90 mcg/act inhaler Inhale 2 puffs every 6 (six) hours as needed for wheezing   • amLODIPine (NORVASC) 10 mg tablet Take 1 tablet by mouth once daily   • atorvastatin (LIPITOR) 80 mg tablet Take 1 tablet by mouth in the evening   • ferrous sulfate 325 (65 Fe) mg tablet Take 325 mg by mouth daily with breakfast   • losartan (COZAAR) 100 MG tablet Take 1 tablet (100 mg total) by mouth daily   • Multiple Vitamin (multivitamin) tablet Take 1 tablet by mouth daily   • pantoprazole (PROTONIX) 40 mg tablet Take 1 tablet (40 mg total) by mouth daily before breakfast   • tamsulosin (FLOMAX) 0 4 mg Take 1 capsule (0 4 mg total) by mouth daily with dinner   • tiZANidine (ZANAFLEX) 2 mg tablet TAKE 1 TABLET BY MOUTH EVERY 8 HOURS AS NEEDED FOR MUSCLE SPASM   • aspirin 81 mg chewable tablet Chew 1 tablet (81 mg total) daily   • clopidogrel (Plavix) 75 mg tablet Take 1 tablet (75 mg total) by mouth daily (Patient not taking: Reported on 3/1/2023)     No current facility-administered medications on file prior to visit  He is allergic to penicillins            Objective:    Blood pressure 140/80, pulse 90, height 5' 7" (1 702 m), weight 77 6 kg (171 lb)  Physical Exam  General - patient is alert   Speech - no dysarthria noted, no aphasia noted  Neuro:   Cranial nerves: PERRL, EOMI, facial sensation intact to soft touch in V1, V2 and V3, no facial asymmetry noted, uvula/palate midline, tongue midline  Motor: 5/5 throughout, normal tone, no pronator drift noted  Sensory - intact to soft touch throughout  Reflexes - 2+ throughout  Coordination - no ataxia/dysmetria noted  Gait - normal   Neurological Exam      ROS:    Review of Systems   Constitutional: Negative  Negative for appetite change and fever  HENT: Negative  Negative for hearing loss, tinnitus, trouble swallowing and voice change  Eyes: Positive for pain  Negative for photophobia and visual disturbance          Patient states pressure behind L eye   Respiratory: Negative  Negative for shortness of breath  Cardiovascular: Negative  Negative for palpitations  Gastrointestinal: Negative  Negative for nausea and vomiting  Endocrine: Negative  Negative for cold intolerance  Genitourinary: Negative  Negative for dysuria, frequency and urgency  Musculoskeletal: Negative  Negative for gait problem, myalgias and neck pain  Skin: Negative  Negative for rash  Allergic/Immunologic: Negative  Neurological: Positive for dizziness and numbness  Negative for tremors, seizures, syncope, facial asymmetry, speech difficulty, weakness, light-headedness and headaches  Patient states L side jaw numbness  L side of the body also feeling numbness  HA located in the forehead on and off, as per patient  Hematological: Negative  Does not bruise/bleed easily  Psychiatric/Behavioral: Negative  Negative for confusion, hallucinations and sleep disturbance

## 2023-03-02 DIAGNOSIS — M54.2 CERVICALGIA: ICD-10-CM

## 2023-03-02 DIAGNOSIS — R09.89 SYMPTOMS OF CEREBROVASCULAR ACCIDENT (CVA): ICD-10-CM

## 2023-03-02 DIAGNOSIS — R31.0 GROSS HEMATURIA: ICD-10-CM

## 2023-03-02 DIAGNOSIS — I10 HYPERTENSION: ICD-10-CM

## 2023-03-02 DIAGNOSIS — I10 HYPERTENSION, UNSPECIFIED TYPE: ICD-10-CM

## 2023-03-02 DIAGNOSIS — I63.9 CVA (CEREBRAL VASCULAR ACCIDENT) (HCC): ICD-10-CM

## 2023-03-02 DIAGNOSIS — R29.90 STROKE-LIKE SYMPTOMS: ICD-10-CM

## 2023-03-02 RX ORDER — TIZANIDINE 2 MG/1
2 TABLET ORAL EVERY 8 HOURS PRN
Qty: 30 TABLET | Refills: 1 | Status: SHIPPED | OUTPATIENT
Start: 2023-03-02

## 2023-03-02 RX ORDER — TAMSULOSIN HYDROCHLORIDE 0.4 MG/1
0.4 CAPSULE ORAL
Qty: 90 CAPSULE | Refills: 1 | Status: SHIPPED | OUTPATIENT
Start: 2023-03-02

## 2023-03-02 RX ORDER — AMLODIPINE BESYLATE 10 MG/1
TABLET ORAL
Qty: 90 TABLET | Refills: 0 | Status: SHIPPED | OUTPATIENT
Start: 2023-03-02

## 2023-03-02 RX ORDER — LOSARTAN POTASSIUM 100 MG/1
TABLET ORAL
Qty: 90 TABLET | Refills: 0 | Status: SHIPPED | OUTPATIENT
Start: 2023-03-02

## 2023-03-02 RX ORDER — ATORVASTATIN CALCIUM 40 MG/1
40 TABLET, FILM COATED ORAL EVERY EVENING
Qty: 90 TABLET | Refills: 1 | Status: SHIPPED | OUTPATIENT
Start: 2023-03-02

## 2023-03-20 ENCOUNTER — OFFICE VISIT (OUTPATIENT)
Dept: FAMILY MEDICINE CLINIC | Facility: CLINIC | Age: 53
End: 2023-03-20

## 2023-03-20 VITALS
BODY MASS INDEX: 27.94 KG/M2 | HEIGHT: 67 IN | OXYGEN SATURATION: 96 % | DIASTOLIC BLOOD PRESSURE: 102 MMHG | WEIGHT: 178 LBS | HEART RATE: 92 BPM | RESPIRATION RATE: 18 BRPM | SYSTOLIC BLOOD PRESSURE: 157 MMHG | TEMPERATURE: 98.1 F

## 2023-03-20 DIAGNOSIS — Z98.890 STATUS POST CAROTID SURGERY: ICD-10-CM

## 2023-03-20 DIAGNOSIS — I10 PRIMARY HYPERTENSION: ICD-10-CM

## 2023-03-20 DIAGNOSIS — I63.531 ACUTE RIGHT ARTERIAL ISCHEMIC STROKE, PCA (POSTERIOR CEREBRAL ARTERY) (HCC): Primary | ICD-10-CM

## 2023-03-20 DIAGNOSIS — I63.81 THALAMIC INFARCTION (HCC): ICD-10-CM

## 2023-03-20 DIAGNOSIS — R29.90 STROKE-LIKE SYMPTOMS: ICD-10-CM

## 2023-03-20 DIAGNOSIS — F10.10 ALCOHOL ABUSE, DAILY USE: ICD-10-CM

## 2023-03-20 DIAGNOSIS — I65.22 LEFT CAROTID ARTERY STENOSIS: ICD-10-CM

## 2023-03-20 RX ORDER — ATORVASTATIN CALCIUM 80 MG/1
80 TABLET, FILM COATED ORAL
Qty: 90 TABLET | Refills: 1 | Status: SHIPPED | OUTPATIENT
Start: 2023-03-20

## 2023-03-20 RX ORDER — METOPROLOL SUCCINATE 25 MG/1
25 TABLET, EXTENDED RELEASE ORAL DAILY
Qty: 30 TABLET | Refills: 2 | Status: SHIPPED | OUTPATIENT
Start: 2023-03-20

## 2023-03-21 PROBLEM — I63.531: Status: ACTIVE | Noted: 2022-11-08

## 2023-03-21 NOTE — PROGRESS NOTES
Subjective:      Patient ID: Vitaly Quigley is a 47 y o  male  Persistent but improved weakness- LLE, dysathria -imporved, brain fog-recent memory issues- completed PT, speech difficulty, feels imbalance-dizziness and impaired depth perception since his thalamic stroke  Left leg numbness and pressure on left side of neck- feels stiff-since his CEA  States had 3 episodes of gross blood in urine on dual antiplatelet and now only on aspirin  Upon further history- has been drinking 6 beers pretty much every day or atleast 4-5 times a week, he does not feel he has alcohol dependence, states would never go to AA, feels that most people who drink daily alcohol do fine similar to who do not  Dizziness  Associated symptoms include numbness and weakness  Pertinent negatives include no abdominal pain, arthralgias, chest pain, chills, congestion, fever, headaches, rash or sore throat         Past Medical History:   Diagnosis Date   • Carotid stenosis, left     today  1/19/2023  L carotid angioplasty   • Carotid stenosis, right     Rt angioplasty completed  11/2023   • Cervical disc disease    • COVID 05/2020   • GERD (gastroesophageal reflux disease)    • Hyperlipidemia    • Hypertension    • Kidney stone    • Muscle weakness    • Spinal stenosis    • Stroke ([facility] [address] ) 09/13/2022    left sided weakness with pins/needles   • Weakness of left side of body 09/13/2022    s/p CVA       Family History   Problem Relation Age of Onset   • Heart attack Mother    • Diabetes Mother    • Hypertension Mother    • Colon cancer Father    • No Known Problems Sister    • No Known Problems Sister    • No Known Problems Sister    • No Known Problems Son        Past Surgical History:   Procedure Laterality Date   • CARPAL TUNNEL RELEASE Bilateral    • CERVICAL FUSION      with hardware   • CYSTOSCOPY     • HERNIA REPAIR     • IR CAROTID STENT  11/8/2022   • IR CAROTID STENT  1/19/2023   • CT TCAT IV STENT CRV CRTD ART EMBOLIC PROTECJ Right 11/8/2022    Procedure: ANGIOPLASTY ARTERY CAROTID W/ STENT TCAR,;  Surgeon: Parminder Vicente DO;  Location: [facility];  Service: Vascular   • VT TCAT IV STENT CRV CRTD ART EMBOLIC PROTECJ Left 8/39/6039    Procedure: ANGIOPLASTY ARTERY CAROTID W/ STENT Left TCAR;  Surgeon: Parminder Vicente DO;  Location: [facility];  Service: Vascular   • ULNAR COLLATERAL LIGAMENT RECONSTRUCTION Bilateral         reports that he quit smoking about 7 months ago  His smoking use included cigarettes  He has a 70 00 pack-year smoking history  He has never used smokeless tobacco  He reports current alcohol use of about 12 0 standard drinks per week  He reports that he does not currently use drugs  Current Outpatient Medications:   •  albuterol (Proventil HFA) 90 mcg/act inhaler, Inhale 2 puffs every 6 (six) hours as needed for wheezing, Disp: 6 7 g, Rfl: 1  •  amLODIPine (NORVASC) 10 mg tablet, Take 1 tablet by mouth once daily, Disp: 90 tablet, Rfl: 0  •  aspirin 81 mg chewable tablet, Chew 1 tablet (81 mg total) daily, Disp: 30 tablet, Rfl: 0  •  atorvastatin (LIPITOR) 80 mg tablet, Take 1 tablet (80 mg total) by mouth in the evening   Take before meals, Disp: 90 tablet, Rfl: 1  •  ferrous sulfate 325 (65 Fe) mg tablet, Take 325 mg by mouth daily with breakfast, Disp: , Rfl:   •  losartan (COZAAR) 100 MG tablet, Take 1 tablet by mouth once daily, Disp: 90 tablet, Rfl: 0  •  metoprolol succinate (Toprol XL) 25 mg 24 hr tablet, Take 1 tablet (25 mg total) by mouth daily, Disp: 30 tablet, Rfl: 2  •  Multiple Vitamin (multivitamin) tablet, Take 1 tablet by mouth if needed, Disp: , Rfl:   •  pantoprazole (PROTONIX) 40 mg tablet, Take 1 tablet (40 mg total) by mouth daily before breakfast, Disp: 30 tablet, Rfl: 5  •  tamsulosin (FLOMAX) 0 4 mg, Take 1 capsule (0 4 mg total) by mouth daily with dinner, Disp: 90 capsule, Rfl: 1  •  tiZANidine (ZANAFLEX) 2 mg tablet, Take 1 tablet (2 mg total) by mouth every 8 (eight) hours as needed for muscle spasms, Disp: 30 tablet, Rfl: 1    The following portions of the patient's history were reviewed and updated as appropriate: allergies, current medications, past family history, past medical history, past social history, past surgical history and problem list     Review of Systems   Constitutional: Negative for chills and fever  HENT: Negative for congestion, rhinorrhea and sore throat  Eyes: Negative for discharge, redness and itching  Respiratory: Negative for chest tightness, shortness of breath and wheezing  Cardiovascular: Negative for chest pain and palpitations  Gastrointestinal: Negative for abdominal pain, constipation and diarrhea  Genitourinary: Negative for dysuria  Musculoskeletal: Positive for neck stiffness  Negative for arthralgias  Skin: Negative for pallor and rash  Neurological: Positive for dizziness, speech difficulty, weakness and numbness  Negative for headaches  PHQ-2/9 Depression Screening    Little interest or pleasure in doing things: 0 - not at all  Feeling down, depressed, or hopeless: 0 - not at all  PHQ-2 Score: 0  PHQ-2 Interpretation: Negative depression screen             Objective:    BP (!) 157/102 (BP Location: Left arm, Patient Position: Sitting, Cuff Size: Adult)   Pulse 92   Temp 98 1 °F (36 7 °C) (Tympanic)   Resp 18   Ht 5' 7" (1 702 m)   Wt 80 7 kg (178 lb)   SpO2 96%   BMI 27 88 kg/m²      Physical Exam  Vitals and nursing note reviewed  Constitutional:       Appearance: Normal appearance  HENT:      Right Ear: External ear normal       Left Ear: External ear normal    Eyes:      General:         Right eye: No discharge  Left eye: No discharge  Conjunctiva/sclera: Conjunctivae normal    Cardiovascular:      Rate and Rhythm: Normal rate and regular rhythm  Heart sounds: No murmur heard  Pulmonary:      Effort: Pulmonary effort is normal       Breath sounds: Normal breath sounds  No wheezing     Abdominal: General: There is no distension  Palpations: Abdomen is soft  Tenderness: There is no abdominal tenderness  Musculoskeletal:      Right lower leg: No edema  Left lower leg: No edema  Skin:     Findings: No lesion or rash  Neurological:      Mental Status: He is alert and oriented to person, place, and time  Mental status is at baseline  Sensory: Sensory deficit present  Motor: Weakness present  Coordination: Coordination abnormal       Gait: Gait abnormal    Psychiatric:         Mood and Affect: Mood normal          Thought Content:  Thought content normal            Recent Results (from the past 8736 hour(s))   Basic metabolic panel    Collection Time: 09/13/22  6:32 PM   Result Value Ref Range    Sodium 136 135 - 147 mmol/L    Potassium 3 8 3 5 - 5 3 mmol/L    Chloride 103 96 - 108 mmol/L    CO2 24 21 - 32 mmol/L    ANION GAP 9 4 - 13 mmol/L    BUN 14 5 - 25 mg/dL    Creatinine 1 06 0 60 - 1 30 mg/dL    Glucose 102 65 - 140 mg/dL    Calcium 10 3 (H) 8 4 - 10 2 mg/dL    eGFR 81 ml/min/1 73sq m   CBC and Platelet    Collection Time: 09/13/22  6:32 PM   Result Value Ref Range    WBC 14 78 (H) 4 31 - 10 16 Thousand/uL    RBC 5 55 3 88 - 5 62 Million/uL    Hemoglobin 18 0 (H) 12 0 - 17 0 g/dL    Hematocrit 51 4 (H) 36 5 - 49 3 %    MCV 93 82 - 98 fL    MCH 32 4 26 8 - 34 3 pg    MCHC 35 0 31 4 - 37 4 g/dL    RDW 12 2 11 6 - 15 1 %    Platelets 534 931 - 224 Thousands/uL    MPV 9 5 8 9 - 12 7 fL   Protime-INR    Collection Time: 09/13/22  6:32 PM   Result Value Ref Range    Protime 12 4 11 6 - 14 5 seconds    INR 0 90 0 84 - 1 19   APTT    Collection Time: 09/13/22  6:32 PM   Result Value Ref Range    PTT 30 23 - 37 seconds   HS Troponin 0hr (reflex protocol)    Collection Time: 09/13/22  6:32 PM   Result Value Ref Range    hs TnI 0hr 9 "Refer to ACS Flowchart"- see link ng/L   FLU/RSV/COVID - if FLU/RSV clinically relevant    Collection Time: 09/13/22  6:32 PM    Specimen: Nose; Nares Result Value Ref Range    SARS-CoV-2 Negative Negative    INFLUENZA A PCR Negative Negative    INFLUENZA B PCR Negative Negative    RSV PCR Negative Negative   Fingerstick Glucose (POCT)    Collection Time: 09/13/22  6:32 PM   Result Value Ref Range    POC Glucose 94 65 - 140 mg/dl   ECG 12 lead    Collection Time: 09/13/22  6:38 PM   Result Value Ref Range    Ventricular Rate 99 BPM    Atrial Rate 99 BPM    SD Interval 178 ms    QRSD Interval 100 ms    QT Interval 334 ms    QTC Interval 428 ms    P Mauldin 59 degrees    QRS Axis 34 degrees    T Wave Mauldin 53 degrees   HS Troponin I 2hr    Collection Time: 09/13/22  8:10 PM   Result Value Ref Range    hs TnI 2hr 13 "Refer to ACS Flowchart"- see link ng/L    Delta 2hr hsTnI 4 <20 ng/L   Sedimentation rate, automated    Collection Time: 09/13/22  8:10 PM   Result Value Ref Range    Sed Rate 13 0 - 19 mm/hour   C-reactive protein    Collection Time: 09/13/22  8:10 PM   Result Value Ref Range    CRP 1 6 <3 0 mg/L   TSH, 3rd generation    Collection Time: 09/13/22  8:10 PM   Result Value Ref Range    TSH 3RD GENERATON 0 730 0 450 - 4 500 uIU/mL   Fingerstick Glucose (POCT)    Collection Time: 09/13/22 11:52 PM   Result Value Ref Range    POC Glucose 89 65 - 140 mg/dl   HS Troponin I 4hr    Collection Time: 09/13/22 11:57 PM   Result Value Ref Range    hs TnI 4hr 8 "Refer to ACS Flowchart"- see link ng/L    Delta 4hr hsTnI -1 <20 ng/L   Platelet count    Collection Time: 09/13/22 11:57 PM   Result Value Ref Range    Platelets 259 209 - 949 Thousands/uL    MPV 9 3 8 9 - 12 7 fL   Lipid Panel with Direct LDL reflex    Collection Time: 09/14/22  5:14 AM   Result Value Ref Range    Cholesterol 255 (H) See Comment mg/dL    Triglycerides 250 (H) See Comment mg/dL    HDL, Direct 53 >=40 mg/dL    LDL Calculated 152 (H) 0 - 100 mg/dL   Comprehensive metabolic panel    Collection Time: 09/14/22  5:14 AM   Result Value Ref Range    Sodium 137 135 - 147 mmol/L    Potassium 3 6 3 5 - 5 3 mmol/L    Chloride 104 96 - 108 mmol/L    CO2 25 21 - 32 mmol/L    ANION GAP 8 4 - 13 mmol/L    BUN 13 5 - 25 mg/dL    Creatinine 1 07 0 60 - 1 30 mg/dL    Glucose 82 65 - 140 mg/dL    Calcium 9 5 8 4 - 10 2 mg/dL    AST 20 13 - 39 U/L    ALT 28 7 - 52 U/L    Alkaline Phosphatase 61 34 - 104 U/L    Total Protein 7 2 6 4 - 8 4 g/dL    Albumin 4 1 3 5 - 5 0 g/dL    Total Bilirubin 0 43 0 20 - 1 00 mg/dL    eGFR 80 ml/min/1 73sq m   CBC and differential    Collection Time: 09/14/22  5:14 AM   Result Value Ref Range    WBC 10 92 (H) 4 31 - 10 16 Thousand/uL    RBC 5 26 3 88 - 5 62 Million/uL    Hemoglobin 16 9 12 0 - 17 0 g/dL    Hematocrit 48 7 36 5 - 49 3 %    MCV 93 82 - 98 fL    MCH 32 1 26 8 - 34 3 pg    MCHC 34 7 31 4 - 37 4 g/dL    RDW 12 4 11 6 - 15 1 %    MPV 9 7 8 9 - 12 7 fL    Platelets 734 062 - 814 Thousands/uL    nRBC 0 /100 WBCs    Neutrophils Relative 66 43 - 75 %    Immat GRANS % 1 0 - 2 %    Lymphocytes Relative 22 14 - 44 %    Monocytes Relative 8 4 - 12 %    Eosinophils Relative 2 0 - 6 %    Basophils Relative 1 0 - 1 %    Neutrophils Absolute 7 24 1 85 - 7 62 Thousands/µL    Immature Grans Absolute 0 07 0 00 - 0 20 Thousand/uL    Lymphocytes Absolute 2 40 0 60 - 4 47 Thousands/µL    Monocytes Absolute 0 92 0 17 - 1 22 Thousand/µL    Eosinophils Absolute 0 19 0 00 - 0 61 Thousand/µL    Basophils Absolute 0 10 0 00 - 0 10 Thousands/µL   Hemoglobin A1c w/EAG Estimation    Collection Time: 09/14/22  5:14 AM   Result Value Ref Range    Hemoglobin A1C 5 6 Normal 3 8-5 6%; PreDiabetic 5 7-6 4%;  Diabetic >=6 5%; Glycemic control for adults with diabetes <7 0% %     mg/dl   Echo complete w/ contrast if indicated    Collection Time: 09/14/22  2:10 PM   Result Value Ref Range    AV area peak ward 1 9 cm2    AV peak gradient 121 mmHg    LA size 3 2 cm    Aortic valve mean velocity 11 90 m/s    LVPWd 1 10 cm    Left Atrium Area-systolic Apical Two Chamber 21 7 cm2    Left Atrium Area-systolic Four Chamber 16 6 cm2    MV E' Tissue Velocity Septal 4 cm/s    IVSd 4 94 cm    LV DIASTOLIC VOLUME (MOD BIPLANE) 2D 78 mL    LEFT VENTRICLE SYSTOLIC VOLUME (MOD BIPLANE) 2D 40 mL    Left ventricular stroke volume (2D) 38 00 mL    AV Deceleration Time 1,946 ms    A4C EF 54 %    LA length (A2C) 5 10 cm    LVIDd 4 20 cm    IVS 1 2 cm    LVIDS 3 20 cm    FS 24 28 - 44 %    Asc Ao 3 4 cm    Ao root 2 90 cm    RVID d 3 2 cm    LVOT mn grad 2 0 mmHg    AV area by cont VTI 1 8 cm2    AV regurgitation pressure 1/2 time 564 ms    AV mean gradient 6 mmHg    AV LVOT peak gradient 4 mmHg    MV valve area p 1/2 method 2 53 cm2    E wave deceleration time 298 ms    LVOT diameter 2 1 cm    LVOT peak johnnie 0 98 m/s    LVOT peak VTI 15 13 cm    Aortic valve peak velocity 1 82 m/s    Ao VTI 29 53 cm    LVOT stroke volume 52 38 cm3    AV peak gradient 13 mmHg    MV Peak E Johnnie 62 cm/s    MV Peak A Johnnie 0 95 m/s    RAA A4C 12 cm2    MV stenosis pressure 1/2 time 87 ms    LVOT stroke volume index 26 30 ml/m2    LVSV, 2D 38 mL    LVOT area 3 46 cm2    DVI 0 54     AV valve area 1 77 cm2    LV EF 60    CBC    Collection Time: 09/15/22  5:25 AM   Result Value Ref Range    WBC 9 25 4 31 - 10 16 Thousand/uL    RBC 5 27 3 88 - 5 62 Million/uL    Hemoglobin 16 9 12 0 - 17 0 g/dL    Hematocrit 49 7 (H) 36 5 - 49 3 %    MCV 94 82 - 98 fL    MCH 32 1 26 8 - 34 3 pg    MCHC 34 0 31 4 - 37 4 g/dL    RDW 12 2 11 6 - 15 1 %    Platelets 489 796 - 412 Thousands/uL    MPV 9 6 8 9 - 12 7 fL   Comprehensive metabolic panel    Collection Time: 09/15/22  5:25 AM   Result Value Ref Range    Sodium 137 135 - 147 mmol/L    Potassium 3 7 3 5 - 5 3 mmol/L    Chloride 107 96 - 108 mmol/L    CO2 21 21 - 32 mmol/L    ANION GAP 9 4 - 13 mmol/L    BUN 16 5 - 25 mg/dL    Creatinine 1 16 0 60 - 1 30 mg/dL    Glucose 94 65 - 140 mg/dL    Calcium 9 5 8 4 - 10 2 mg/dL    AST 21 13 - 39 U/L    ALT 26 7 - 52 U/L    Alkaline Phosphatase 62 34 - 104 U/L    Total Protein 7 1 6 4 - 8 4 g/dL    Albumin 4 0 3 5 - 5 0 g/dL    Total Bilirubin 0 50 0 20 - 1 00 mg/dL    eGFR 73 ml/min/1 73sq m   CBC and differential    Collection Time: 09/16/22  5:39 AM   Result Value Ref Range    WBC 8 78 4 31 - 10 16 Thousand/uL    RBC 4 96 3 88 - 5 62 Million/uL    Hemoglobin 16 1 12 0 - 17 0 g/dL    Hematocrit 46 3 36 5 - 49 3 %    MCV 93 82 - 98 fL    MCH 32 5 26 8 - 34 3 pg    MCHC 34 8 31 4 - 37 4 g/dL    RDW 12 3 11 6 - 15 1 %    MPV 9 8 8 9 - 12 7 fL    Platelets 962 139 - 153 Thousands/uL    nRBC 0 /100 WBCs    Neutrophils Relative 61 43 - 75 %    Immat GRANS % 1 0 - 2 %    Lymphocytes Relative 26 14 - 44 %    Monocytes Relative 8 4 - 12 %    Eosinophils Relative 3 0 - 6 %    Basophils Relative 1 0 - 1 %    Neutrophils Absolute 5 39 1 85 - 7 62 Thousands/µL    Immature Grans Absolute 0 04 0 00 - 0 20 Thousand/uL    Lymphocytes Absolute 2 31 0 60 - 4 47 Thousands/µL    Monocytes Absolute 0 74 0 17 - 1 22 Thousand/µL    Eosinophils Absolute 0 22 0 00 - 0 61 Thousand/µL    Basophils Absolute 0 08 0 00 - 0 10 Thousands/µL   Basic metabolic panel    Collection Time: 09/16/22  5:39 AM   Result Value Ref Range    Sodium 139 135 - 147 mmol/L    Potassium 4 0 3 5 - 5 3 mmol/L    Chloride 109 (H) 96 - 108 mmol/L    CO2 22 21 - 32 mmol/L    ANION GAP 8 4 - 13 mmol/L    BUN 15 5 - 25 mg/dL    Creatinine 1 03 0 60 - 1 30 mg/dL    Glucose 92 65 - 140 mg/dL    Calcium 9 4 8 4 - 10 2 mg/dL    eGFR 84 ml/min/1 73sq m   Urinalysis with microscopic    Collection Time: 09/20/22 11:51 AM   Result Value Ref Range    Color, UA Dark Brown     Clarity, UA Extra Turbid     Specific [place], UA 1 019 1 003 - 1 030    pH, UA 6 0 4 5, 5 0, 5 5, 6 0, 6 5, 7 0, 7 5, 8 0    Leukocytes, UA Trace (A) Negative    Nitrite, UA Negative Negative    Protein, UA 70 (1+) (A) Negative mg/dl    Glucose, UA Negative Negative mg/dl    Ketones, UA Negative Negative mg/dl    Urobilinogen, UA <2 0 <2 0 mg/dl mg/dl    Bilirubin, UA Negative Negative Occult Blood, UA Large (A) Negative    RBC, UA Innumerable (A) None Seen, 1-2 /hpf    WBC, UA Innumerable (A) None Seen, 1-2 /hpf    Epithelial Cells None Seen None Seen, Occasional /hpf    Bacteria, UA None Seen None Seen, Occasional /hpf   Urine culture    Collection Time: 09/20/22 11:51 AM    Specimen: Urine, Other   Result Value Ref Range    Urine Culture No Growth <1000 cfu/mL    PSA Total, Diagnostic    Collection Time: 10/03/22 10:13 AM   Result Value Ref Range    PSA, Diagnostic 1 6 0 0 - 4 0 ng/mL   Basic metabolic panel    Collection Time: 10/03/22 10:13 AM   Result Value Ref Range    Sodium 139 135 - 147 mmol/L    Potassium 3 8 3 5 - 5 3 mmol/L    Chloride 110 (H) 96 - 108 mmol/L    CO2 23 21 - 32 mmol/L    ANION GAP 6 4 - 13 mmol/L    BUN 13 5 - 25 mg/dL    Creatinine 1 14 0 60 - 1 30 mg/dL    Glucose, Fasting 78 65 - 99 mg/dL    Calcium 9 5 8 3 - 10 1 mg/dL    eGFR 74 ml/min/1 73sq m   Cytology, urine    Collection Time: 10/03/22 10:19 AM   Result Value Ref Range    Case Report       Non-gynecologic Cytology                          Case: [id number]                                  Authorizing Provider:  Steffany Llanes MD        Collected:           10/03/2022 1019              Ordering Location:     [address] [place] [address] For        Received:            10/03/2022 1019                                     [facility]                                                               Pathologist:           Bam Bocanegra DO                                                     Specimen:    Urine, Clean Catch                                                                         Final Diagnosis       A  Urine, Clean Catch (ThinPrep):  - Negative for high grade urothelial carcinoma ([address])  See Note  - Abundant red blood cells, rare benign and degenerated urothelial cells, and mixed inflammatory cells present        Note       The above diagnostic category is from the recently published book, The [facility] System for Reporting Urinary Cytology, and is in keeping with the ongoing effort for utilization of standardized diagnostic terminology in urine cytology  *    *The [facility] for Reporting Urinary Cytology  Jenifer Stapleton; 2016  Gross Description          A   15mL, dark yellow, cloudy         Additional Information       Hologic's FDA approved ,  and ThinPrep Imaging Duo System are utilized with strict adherence to the 's instruction manual to prepare gynecologic and non-gynecologic cytology specimens for the production of ThinPrep slides as well as for gynecologic ThinPrep imaging  These processes have been validated by our laboratory and/or by the   These tests were developed and their performance characteristics determined by [facility] or [address] [place] [address]  They may not be cleared or approved by the [place]  Food and Drug Administration  The FDA has determined that such clearance or approval is not necessary  These tests are used for clinical purposes  They should not be regarded as investigational or for research  This laboratory has been approved by [facility], designated as a high-complexity laboratory and is qualified to perform these tests       Interpretation performed at [place] [id number]     POCT urine dip    Collection Time: 11/03/22  5:23 PM   Result Value Ref Range    LEUKOCYTE ESTERASE,UA neg     NITRITE,UA neg     SL AMB POCT UROBILINOGEN 0 2     POCT URINE PROTEIN neg      PH,UA 7     BLOOD,UA neg     SPECIFIC GRAVITY,UA 1 010     KETONES,UA neg     BILIRUBIN,UA neg     GLUCOSE, UA neg    Aldosterone/Renin Ratio    Collection Time: 11/04/22  9:15 AM   Result Value Ref Range    Renin 7 864 (H) 0 167 - 5 380 ng/mL/hr    Aldosterone 3 8 0 0 - 30 0 ng/dL    Aldos/Renin Ratio 0 5 0 0 - 30 0   Type and screen    Collection Time: 11/04/22  9:15 AM   Result Value Ref Range    ABO Grouping O     Rh Factor Positive     Antibody Screen Negative     Specimen Expiration Date [id number]    MISCELLANEOUS LAB TEST    Collection Time: 11/04/22  9:28 AM   Result Value Ref Range    Miscellaneous Lab Test Result SEE WRITTEN REPORT    Type and screen    Collection Time: 11/08/22  6:41 AM   Result Value Ref Range    ABO Grouping O     Rh Factor Positive     Antibody Screen Negative     Specimen Expiration Date [id number]    POCT activated clotting time    Collection Time: 11/08/22  8:41 AM   Result Value Ref Range    Activated Clotting Time, i-STAT 140 (H) 89 - 137 sec    Specimen Type ARTERIAL    POCT activated clotting time    Collection Time: 11/08/22  9:20 AM   Result Value Ref Range    Activated Clotting Time, i-STAT 309 (H) 89 - 137 sec    Specimen Type ARTERIAL    POCT activated clotting time    Collection Time: 11/08/22 10:10 AM   Result Value Ref Range    Activated Clotting Time, i-STAT 146 (H) 89 - 137 sec    Specimen Type ARTERIAL    Basic Metabolic Panel    Collection Time: 11/09/22  5:07 AM   Result Value Ref Range    Sodium 140 135 - 147 mmol/L    Potassium 3 7 3 5 - 5 3 mmol/L    Chloride 107 96 - 108 mmol/L    CO2 24 21 - 32 mmol/L    ANION GAP 9 4 - 13 mmol/L    BUN 9 5 - 25 mg/dL    Creatinine 1 00 0 60 - 1 30 mg/dL    Glucose 90 65 - 140 mg/dL    Calcium 8 3 8 3 - 10 1 mg/dL    eGFR 87 ml/min/1 73sq m   CBC and Platelet    Collection Time: 11/09/22  5:07 AM   Result Value Ref Range    WBC 7 45 4 31 - 10 16 Thousand/uL    RBC 3 47 (L) 3 88 - 5 62 Million/uL    Hemoglobin 11 4 (L) 12 0 - 17 0 g/dL    Hematocrit 32 8 (L) 36 5 - 49 3 %    MCV 95 82 - 98 fL    MCH 32 9 26 8 - 34 3 pg    MCHC 34 8 31 4 - 37 4 g/dL    RDW 12 7 11 6 - 15 1 %    Platelets 131 016 - 273 Thousands/uL    MPV 9 5 8 9 - 12 7 fL   APTT    Collection Time: 11/09/22  5:07 AM   Result Value Ref Range    PTT 29 23 - 37 seconds   Protime-INR    Collection Time: 11/09/22  5:07 AM   Result Value Ref Range    Protime 13 5 11 6 - 14 5 seconds    INR 1 03 0 84 - 1 19   CBC and Platelet    Collection Time: 01/12/23 10:14 AM   Result Value Ref Range    WBC 8 20 4  31 - 10 16 Thousand/uL    RBC 4 96 3 88 - 5 62 Million/uL    Hemoglobin 15 6 12 0 - 17 0 g/dL    Hematocrit 47 2 36 5 - 49 3 %    MCV 95 82 - 98 fL    MCH 31 5 26 8 - 34 3 pg    MCHC 33 1 31 4 - 37 4 g/dL    RDW 12 4 11 6 - 15 1 %    Platelets 654 306 - 584 Thousands/uL    MPV 9 8 8 9 - 12 7 fL   Basic metabolic panel    Collection Time: 01/12/23 10:14 AM   Result Value Ref Range    Sodium 141 135 - 147 mmol/L    Potassium 4 8 3 5 - 5 3 mmol/L    Chloride 111 (H) 96 - 108 mmol/L    CO2 25 21 - 32 mmol/L    ANION GAP 5 4 - 13 mmol/L    BUN 17 5 - 25 mg/dL    Creatinine 1 25 0 60 - 1 30 mg/dL    Glucose, Fasting 142 (H) 65 - 99 mg/dL    Calcium 9 8 8 3 - 10 1 mg/dL    eGFR 66 ml/min/1 73sq m   Type and screen    Collection Time: 01/12/23 10:14 AM   Result Value Ref Range    ABO Grouping O     Rh Factor Positive     Antibody Screen Negative     Specimen Expiration Date [id number]    POCT activated clotting time    Collection Time: 01/19/23  9:05 AM   Result Value Ref Range    Activated Clotting Time, i-STAT 144 (H) 89 - 137 sec    Specimen Type ARTERIAL    POCT activated clotting time    Collection Time: 01/19/23  9:38 AM   Result Value Ref Range    Activated Clotting Time, i-STAT 348 (H) 89 - 137 sec    Specimen Type ARTERIAL    Basic Metabolic Panel    Collection Time: 01/20/23  5:46 AM   Result Value Ref Range    Sodium 135 135 - 147 mmol/L    Potassium 3 7 3 5 - 5 3 mmol/L    Chloride 106 96 - 108 mmol/L    CO2 19 (L) 21 - 32 mmol/L    ANION GAP 10 4 - 13 mmol/L    BUN 10 5 - 25 mg/dL    Creatinine 1 06 0 60 - 1 30 mg/dL    Glucose 116 65 - 140 mg/dL    Calcium 9 0 8 4 - 10 2 mg/dL    eGFR 80 ml/min/1 73sq m   CBC and Platelet    Collection Time: 01/20/23  5:46 AM   Result Value Ref Range    WBC 15 60 (H) 4 31 - 10 16 Thousand/uL    RBC 4 33 3 88 - 5 62 Million/uL Hemoglobin 14 0 12 0 - 17 0 g/dL    Hematocrit 40 8 36 5 - 49 3 %    MCV 94 82 - 98 fL    MCH 32 3 26 8 - 34 3 pg    MCHC 34 3 31 4 - 37 4 g/dL    RDW 12 3 11 6 - 15 1 %    Platelets 883 482 - 218 Thousands/uL    MPV 10 0 8 9 - 12 7 fL   APTT    Collection Time: 01/20/23  5:46 AM   Result Value Ref Range    PTT 29 23 - 37 seconds   Protime-INR    Collection Time: 01/20/23  5:46 AM   Result Value Ref Range    Protime 12 7 11 6 - 14 5 seconds    INR 0 95 0 84 - 1 19   Urinalysis with microscopic    Collection Time: 01/20/23  9:44 AM   Result Value Ref Range    Color, UA Light Yellow     Clarity, UA Cloudy     Specific [place], UA 1 010 1 003 - 1 030    pH, UA 6 0 4 5, 5 0, 5 5, 6 0, 6 5, 7 0, 7 5, 8 0    Leukocytes, UA Negative Negative    Nitrite, UA Negative Negative    Protein, UA Negative Negative mg/dl    Glucose, UA Negative Negative mg/dl    Ketones, UA Negative Negative mg/dl    Urobilinogen, UA 0 2 0 2, 1 0 E U /dl E U /dl    Bilirubin, UA Negative Negative    Occult Blood, UA Large (A) Negative    RBC, UA Innumerable (A) None Seen, 2-4 /hpf    WBC, UA 1-2 (A) None Seen, 2-4, 5-60 /hpf    Epithelial Cells Occasional None Seen, Occasional /hpf    Bacteria, UA Occasional None Seen, Occasional /hpf   Lipid panel    Collection Time: 03/01/23 11:15 AM   Result Value Ref Range    Cholesterol 183 See Comment mg/dL    Triglycerides 229 (H) See Comment mg/dL    HDL, Direct 61 >=40 mg/dL    LDL Calculated 76 0 - 100 mg/dL    Non-HDL-Chol (CHOL-HDL) 122 mg/dl       Laboratory Results: I have personally reviewed the pertinent laboratory results/reports     Radiology/Other Diagnostic Testing Results: I have personally reviewed pertinent reports  IR carotid stent    Result Date: 2/16/2023  PLEASE SEE REPORT FOR THIS PROCEDURE IN PATIENT'S CHART UNDER OP NOTE         Assessment/Plan:  Problem List Items Addressed This Visit        Cardiovascular and Mediastinum    Primary hypertension (Chronic)    Relevant Medications metoprolol succinate (Toprol XL) 25 mg 24 hr tablet    Acute right arterial ischemic stroke, PCA (posterior cerebral artery) (HCC) - Primary    Relevant Medications    atorvastatin (LIPITOR) 80 mg tablet    Left Carotid Artery Stenosis s/p Left TCAR       Nervous and Auditory    Thalamic infarction [facility])       Other    Status post carotid surgery   Other Visit Diagnoses     Stroke-like symptoms        Relevant Medications    atorvastatin (LIPITOR) 80 mg tablet    Alcohol abuse, daily use        Relevant Orders    Comprehensive metabolic panel    Vitamin D 25 hydroxy    Vitamin B12    Vitamin B1, whole blood        Patient was tolerating 80 mg atorvastatin-states that this was cut to 40 mg daily by neurology- I did not see an explanation for this in the neurology note, will continue 80 mg atorvastatin, aspirin daily for stroke prevention  His BP is uncontrolled, he is also tachycardic I will start him on metoprolol 25 mg daily for tighter blood pressure control which would also help him lower the heart rate  Blood pressure goal is less than 1/30/1980  LDL goal less than 70  Advised to avoid NSAIDs and low-sodium diet  Recommend PT for disequilibrium for stroke, he feels that did not want to add anything else and has refused to do so at present  Advised to cut back on alcohol to no more than one 6 to 8 ounce beers per day  Ideally should not be drinking any or alcohol and is aware that it increases the risk of strokes and coronary artery disease along with liver disease  Refused alcohol Anonymous referral     He is off Plavix and remain off Plavix  I have reviewed neurology and vascular surgery notes consults and procedures  Reviewed most recent labs and imaging  Read package inserts for all medications before starting a new medications, call me if you have any questions  Patient was given opportunity to ask questions and all questions were answered      Disclaimer: Portions of the record may have been created with voice recognition software  Occasional wrong word or "sound a like" substitutions may have occurred due to the inherent limitations of voice recognition software  Read the chart carefully and recognize, using context, where substitutions have occurred  I have used the Epic copy/forward function to compose this note  I have reviewed my current note to ensure it reflects the current patient status, exam, assessment and plan

## 2023-04-11 PROBLEM — I10 ESSENTIAL HYPERTENSION: Status: ACTIVE | Noted: 2023-04-11

## 2023-05-12 ENCOUNTER — HOSPITAL ENCOUNTER (OUTPATIENT)
Dept: NON INVASIVE DIAGNOSTICS | Facility: CLINIC | Age: 53
Discharge: HOME | End: 2023-05-12

## 2023-05-12 DIAGNOSIS — I65.22 LEFT CAROTID ARTERY STENOSIS: ICD-10-CM

## 2023-05-15 ENCOUNTER — TRANSCRIBE ORDERS (OUTPATIENT)
Dept: VASCULAR SURGERY | Facility: CLINIC | Age: 53
End: 2023-05-15

## 2023-05-15 DIAGNOSIS — I65.22 LEFT CAROTID ARTERY STENOSIS: Primary | ICD-10-CM

## 2023-05-22 ENCOUNTER — TELEPHONE (OUTPATIENT)
Dept: NEUROLOGY | Facility: CLINIC | Age: 53
End: 2023-05-22

## 2023-05-22 NOTE — TELEPHONE ENCOUNTER
I called patient and rescheduled patient appointment do to provider will be out of office  Patient agreed to new appointment, I sent new appointment card to patient in mail

## 2023-06-26 ENCOUNTER — OFFICE VISIT (OUTPATIENT)
Dept: FAMILY MEDICINE CLINIC | Facility: CLINIC | Age: 53
End: 2023-06-26
Payer: COMMERCIAL

## 2023-06-26 VITALS
HEIGHT: 67 IN | WEIGHT: 183 LBS | OXYGEN SATURATION: 97 % | TEMPERATURE: 97.2 F | BODY MASS INDEX: 28.72 KG/M2 | DIASTOLIC BLOOD PRESSURE: 82 MMHG | SYSTOLIC BLOOD PRESSURE: 130 MMHG | HEART RATE: 86 BPM | RESPIRATION RATE: 18 BRPM

## 2023-06-26 DIAGNOSIS — I10 HYPERTENSION, UNSPECIFIED TYPE: ICD-10-CM

## 2023-06-26 DIAGNOSIS — I10 HYPERTENSION: ICD-10-CM

## 2023-06-26 DIAGNOSIS — K21.9 GASTROESOPHAGEAL REFLUX DISEASE WITHOUT ESOPHAGITIS: ICD-10-CM

## 2023-06-26 DIAGNOSIS — Z12.11 COLON CANCER SCREENING: ICD-10-CM

## 2023-06-26 DIAGNOSIS — I63.9 CVA (CEREBRAL VASCULAR ACCIDENT) (HCC): ICD-10-CM

## 2023-06-26 DIAGNOSIS — I10 PRIMARY HYPERTENSION: ICD-10-CM

## 2023-06-26 DIAGNOSIS — G89.29 CHRONIC LEFT-SIDED LOW BACK PAIN WITHOUT SCIATICA: Primary | ICD-10-CM

## 2023-06-26 DIAGNOSIS — R29.90 STROKE-LIKE SYMPTOMS: ICD-10-CM

## 2023-06-26 DIAGNOSIS — M54.2 CERVICALGIA: ICD-10-CM

## 2023-06-26 DIAGNOSIS — R09.89 SYMPTOMS OF CEREBROVASCULAR ACCIDENT (CVA): ICD-10-CM

## 2023-06-26 DIAGNOSIS — M54.50 CHRONIC LEFT-SIDED LOW BACK PAIN WITHOUT SCIATICA: Primary | ICD-10-CM

## 2023-06-26 PROCEDURE — 99214 OFFICE O/P EST MOD 30 MIN: CPT | Performed by: FAMILY MEDICINE

## 2023-06-26 RX ORDER — PANTOPRAZOLE SODIUM 40 MG/1
40 TABLET, DELAYED RELEASE ORAL DAILY
Qty: 90 TABLET | Refills: 1 | Status: SHIPPED | OUTPATIENT
Start: 2023-06-26

## 2023-06-26 RX ORDER — TIZANIDINE 2 MG/1
2 TABLET ORAL EVERY 8 HOURS PRN
Qty: 30 TABLET | Refills: 0 | Status: SHIPPED | OUTPATIENT
Start: 2023-06-26

## 2023-06-26 RX ORDER — METOPROLOL SUCCINATE 25 MG/1
25 TABLET, EXTENDED RELEASE ORAL DAILY
Qty: 90 TABLET | Refills: 1 | Status: SHIPPED | OUTPATIENT
Start: 2023-06-26

## 2023-06-26 RX ORDER — GABAPENTIN 100 MG/1
100 CAPSULE ORAL 3 TIMES DAILY
Qty: 180 CAPSULE | Refills: 0 | Status: SHIPPED | OUTPATIENT
Start: 2023-06-26

## 2023-06-26 RX ORDER — LOSARTAN POTASSIUM 100 MG/1
100 TABLET ORAL DAILY
Qty: 90 TABLET | Refills: 1 | Status: SHIPPED | OUTPATIENT
Start: 2023-06-26

## 2023-06-26 NOTE — PROGRESS NOTES
Subjective:      Patient ID: Ivon Ramirez is a 47 y o  male  Persistent but improved weakness- LLE, dysathria -imporved, brain fog-recent memory issues- completed PT, speech difficulty, feels imbalance-dizziness and impaired depth perception since his thalamic stroke   Left leg numbness and pressure on left side of neck- feels stiff-since his CEA  Feels flushing of his frontal head area and sometimes headache  Complains of Lower back pain and left sided back stiffness  Orthostatic dizziness  Hematuria-States had 3 episodes of gross blood in urine on dual antiplatelet and now only on aspirin, seen by Urologist      Past Medical History:   Diagnosis Date   • Carotid stenosis, left     today  1/19/2023  L carotid angioplasty   • Carotid stenosis, right     Rt angioplasty completed  11/2023   • Cervical disc disease    • COVID 05/2020   • GERD (gastroesophageal reflux disease)    • Hyperlipidemia    • Hypertension    • Kidney stone    • Muscle weakness    • Spinal stenosis    • Stroke ([facility] [address] ) 09/13/2022    left sided weakness with pins/needles   • Weakness of left side of body 09/13/2022    s/p CVA       Family History   Problem Relation Age of Onset   • Heart attack Mother    • Diabetes Mother    • Hypertension Mother    • Colon cancer Father    • No Known Problems Sister    • No Known Problems Sister    • No Known Problems Sister    • No Known Problems Son        Past Surgical History:   Procedure Laterality Date   • CARPAL TUNNEL RELEASE Bilateral    • CERVICAL FUSION      with hardware   • CYSTOSCOPY     • HERNIA REPAIR     • IR CAROTID STENT  11/8/2022   • IR CAROTID STENT  1/19/2023   • DC TCAT IV STENT CRV CRTD ART EMBOLIC PROTECJ Right 37/5/0958    Procedure: ANGIOPLASTY ARTERY CAROTID W/ STENT TCAR,;  Surgeon: Darien Pérez DO;  Location: [facility];  Service: Vascular   • DC TCAT IV STENT CRV CRTD ART EMBOLIC PROTECJ Left 6/51/2280    Procedure: ANGIOPLASTY ARTERY CAROTID W/ STENT Left TCAR;  Surgeon: Lauren Cardona, ;  Location: [facility];  Service: Vascular   • ULNAR COLLATERAL LIGAMENT RECONSTRUCTION Bilateral         reports that he quit smoking about 10 months ago  His smoking use included cigarettes  He has a 70 00 pack-year smoking history  He has never used smokeless tobacco  He reports current alcohol use of about 12 0 standard drinks of alcohol per week  He reports that he does not currently use drugs  Current Outpatient Medications:   •  albuterol (PROVENTIL HFA,VENTOLIN HFA) 90 mcg/act inhaler, INHALE 2 PUFFS BY MOUTH EVERY 6 HOURS AS NEEDED FOR WHEEZING, Disp: 6 7 g, Rfl: 3  •  aspirin 81 mg chewable tablet, Chew 1 tablet (81 mg total) daily, Disp: 30 tablet, Rfl: 0  •  atorvastatin (LIPITOR) 80 mg tablet, Take 1 tablet (80 mg total) by mouth in the evening   Take before meals, Disp: 90 tablet, Rfl: 1  •  ferrous sulfate 325 (65 Fe) mg tablet, Take 325 mg by mouth daily with breakfast, Disp: , Rfl:   •  gabapentin (Neurontin) 100 mg capsule, Take 1 capsule (100 mg total) by mouth 3 (three) times a day, Disp: 180 capsule, Rfl: 0  •  losartan (COZAAR) 100 MG tablet, Take 1 tablet (100 mg total) by mouth daily, Disp: 90 tablet, Rfl: 1  •  metoprolol succinate (TOPROL-XL) 25 mg 24 hr tablet, Take 1 tablet (25 mg total) by mouth daily, Disp: 90 tablet, Rfl: 1  •  pantoprazole (PROTONIX) 40 mg tablet, Take 1 tablet (40 mg total) by mouth daily, Disp: 90 tablet, Rfl: 1  •  tamsulosin (FLOMAX) 0 4 mg, Take 1 capsule (0 4 mg total) by mouth daily with dinner, Disp: 90 capsule, Rfl: 1  •  tiZANidine (ZANAFLEX) 2 mg tablet, Take 1 tablet (2 mg total) by mouth every 8 (eight) hours as needed for muscle spasms, Disp: 30 tablet, Rfl: 0  •  Multiple Vitamin (multivitamin) tablet, Take 1 tablet by mouth if needed (Patient not taking: Reported on 6/26/2023), Disp: , Rfl:     The following portions of the patient's history were reviewed and updated as appropriate: allergies, current medications, past family "history, past medical history, past social history, past surgical history and problem list     Review of Systems   Constitutional: Positive for fatigue  Negative for chills and fever  HENT: Negative for congestion, rhinorrhea and sore throat  Eyes: Negative for discharge, redness and itching  Respiratory: Negative for chest tightness, shortness of breath and wheezing  Cardiovascular: Negative for chest pain and palpitations  Gastrointestinal: Negative for abdominal pain, constipation and diarrhea  Genitourinary: Negative for dysuria and hematuria  Skin: Negative for pallor and rash  Neurological: Positive for weakness and headaches  Negative for dizziness and numbness  PHQ-2/9 Depression Screening    Little interest or pleasure in doing things: 0 - not at all  Feeling down, depressed, or hopeless: 0 - not at all  PHQ-2 Score: 0  PHQ-2 Interpretation: Negative depression screen             Objective:    /82 (BP Location: Left arm, Patient Position: Sitting, Cuff Size: Adult)   Pulse 86   Temp (!) 97 2 °F (36 2 °C) (Tympanic)   Resp 18   Ht 5' 7\" (1 702 m)   Wt 83 kg (183 lb)   SpO2 97%   BMI 28 66 kg/m²      Physical Exam  Vitals and nursing note reviewed  Constitutional:       Appearance: Normal appearance  HENT:      Right Ear: Tympanic membrane, ear canal and external ear normal       Left Ear: Tympanic membrane, ear canal and external ear normal       Nose: No congestion or rhinorrhea  Mouth/Throat:      Mouth: Mucous membranes are moist       Pharynx: No posterior oropharyngeal erythema  Eyes:      General:         Right eye: No discharge  Left eye: No discharge  Conjunctiva/sclera: Conjunctivae normal    Cardiovascular:      Rate and Rhythm: Normal rate and regular rhythm  Heart sounds: No murmur heard  Pulmonary:      Effort: Pulmonary effort is normal       Breath sounds: Normal breath sounds  No wheezing     Abdominal:      General: There is " "no distension  Palpations: Abdomen is soft  Tenderness: There is no abdominal tenderness  Musculoskeletal:         General: Tenderness present  Right lower leg: No edema  Left lower leg: No edema  Skin:     Findings: No lesion or rash  Neurological:      Mental Status: He is alert  Mental status is at baseline  Motor: Weakness present  Psychiatric:         Mood and Affect: Mood normal          Thought Content:  Thought content normal            Recent Results (from the past 8736 hour(s))   Basic metabolic panel    Collection Time: 09/13/22  6:32 PM   Result Value Ref Range    Sodium 136 135 - 147 mmol/L    Potassium 3 8 3 5 - 5 3 mmol/L    Chloride 103 96 - 108 mmol/L    CO2 24 21 - 32 mmol/L    ANION GAP 9 4 - 13 mmol/L    BUN 14 5 - 25 mg/dL    Creatinine 1 06 0 60 - 1 30 mg/dL    Glucose 102 65 - 140 mg/dL    Calcium 10 3 (H) 8 4 - 10 2 mg/dL    eGFR 81 ml/min/1 73sq m   CBC and Platelet    Collection Time: 09/13/22  6:32 PM   Result Value Ref Range    WBC 14 78 (H) 4 31 - 10 16 Thousand/uL    RBC 5 55 3 88 - 5 62 Million/uL    Hemoglobin 18 0 (H) 12 0 - 17 0 g/dL    Hematocrit 51 4 (H) 36 5 - 49 3 %    MCV 93 82 - 98 fL    MCH 32 4 26 8 - 34 3 pg    MCHC 35 0 31 4 - 37 4 g/dL    RDW 12 2 11 6 - 15 1 %    Platelets 811 025 - 999 Thousands/uL    MPV 9 5 8 9 - 12 7 fL   Protime-INR    Collection Time: 09/13/22  6:32 PM   Result Value Ref Range    Protime 12 4 11 6 - 14 5 seconds    INR 0 90 0 84 - 1 19   APTT    Collection Time: 09/13/22  6:32 PM   Result Value Ref Range    PTT 30 23 - 37 seconds   HS Troponin 0hr (reflex protocol)    Collection Time: 09/13/22  6:32 PM   Result Value Ref Range    hs TnI 0hr 9 \"Refer to ACS Flowchart\"- see link ng/L   FLU/RSV/COVID - if FLU/RSV clinically relevant    Collection Time: 09/13/22  6:32 PM    Specimen: Nose; Nares   Result Value Ref Range    SARS-CoV-2 Negative Negative    INFLUENZA A PCR Negative Negative    INFLUENZA B PCR Negative " "Negative    RSV PCR Negative Negative   Fingerstick Glucose (POCT)    Collection Time: 09/13/22  6:32 PM   Result Value Ref Range    POC Glucose 94 65 - 140 mg/dl   ECG 12 lead    Collection Time: 09/13/22  6:38 PM   Result Value Ref Range    Ventricular Rate 99 BPM    Atrial Rate 99 BPM    MI Interval 178 ms    QRSD Interval 100 ms    QT Interval 334 ms    QTC Interval 428 ms    P [place] 59 degrees    QRS Axis 34 degrees    T Wave [place] 53 degrees   HS Troponin I 2hr    Collection Time: 09/13/22  8:10 PM   Result Value Ref Range    hs TnI 2hr 13 \"Refer to ACS Flowchart\"- see link ng/L    Delta 2hr hsTnI 4 <20 ng/L   Sedimentation rate, automated    Collection Time: 09/13/22  8:10 PM   Result Value Ref Range    Sed Rate 13 0 - 19 mm/hour   C-reactive protein    Collection Time: 09/13/22  8:10 PM   Result Value Ref Range    CRP 1 6 <3 0 mg/L   TSH, 3rd generation    Collection Time: 09/13/22  8:10 PM   Result Value Ref Range    TSH 3RD GENERATON 0 730 0 450 - 4 500 uIU/mL   Fingerstick Glucose (POCT)    Collection Time: 09/13/22 11:52 PM   Result Value Ref Range    POC Glucose 89 65 - 140 mg/dl   HS Troponin I 4hr    Collection Time: 09/13/22 11:57 PM   Result Value Ref Range    hs TnI 4hr 8 \"Refer to ACS Flowchart\"- see link ng/L    Delta 4hr hsTnI -1 <20 ng/L   Platelet count    Collection Time: 09/13/22 11:57 PM   Result Value Ref Range    Platelets 979 891 - 724 Thousands/uL    MPV 9 3 8 9 - 12 7 fL   Lipid Panel with Direct LDL reflex    Collection Time: 09/14/22  5:14 AM   Result Value Ref Range    Cholesterol 255 (H) See Comment mg/dL    Triglycerides 250 (H) See Comment mg/dL    HDL, Direct 53 >=40 mg/dL    LDL Calculated 152 (H) 0 - 100 mg/dL   Comprehensive metabolic panel    Collection Time: 09/14/22  5:14 AM   Result Value Ref Range    Sodium 137 135 - 147 mmol/L    Potassium 3 6 3 5 - 5 3 mmol/L    Chloride 104 96 - 108 mmol/L    CO2 25 21 - 32 mmol/L    ANION GAP 8 4 - 13 mmol/L    BUN 13 5 - 25 mg/dL    " Creatinine 1 07 0 60 - 1 30 mg/dL    Glucose 82 65 - 140 mg/dL    Calcium 9 5 8 4 - 10 2 mg/dL    AST 20 13 - 39 U/L    ALT 28 7 - 52 U/L    Alkaline Phosphatase 61 34 - 104 U/L    Total Protein 7 2 6 4 - 8 4 g/dL    Albumin 4 1 3 5 - 5 0 g/dL    Total Bilirubin 0 43 0 20 - 1 00 mg/dL    eGFR 80 ml/min/1 73sq m   CBC and differential    Collection Time: 09/14/22  5:14 AM   Result Value Ref Range    WBC 10 92 (H) 4 31 - 10 16 Thousand/uL    RBC 5 26 3 88 - 5 62 Million/uL    Hemoglobin 16 9 12 0 - 17 0 g/dL    Hematocrit 48 7 36 5 - 49 3 %    MCV 93 82 - 98 fL    MCH 32 1 26 8 - 34 3 pg    MCHC 34 7 31 4 - 37 4 g/dL    RDW 12 4 11 6 - 15 1 %    MPV 9 7 8 9 - 12 7 fL    Platelets 247 297 - 077 Thousands/uL    nRBC 0 /100 WBCs    Neutrophils Relative 66 43 - 75 %    Immat GRANS % 1 0 - 2 %    Lymphocytes Relative 22 14 - 44 %    Monocytes Relative 8 4 - 12 %    Eosinophils Relative 2 0 - 6 %    Basophils Relative 1 0 - 1 %    Neutrophils Absolute 7 24 1 85 - 7 62 Thousands/µL    Immature Grans Absolute 0 07 0 00 - 0 20 Thousand/uL    Lymphocytes Absolute 2 40 0 60 - 4 47 Thousands/µL    Monocytes Absolute 0 92 0 17 - 1 22 Thousand/µL    Eosinophils Absolute 0 19 0 00 - 0 61 Thousand/µL    Basophils Absolute 0 10 0 00 - 0 10 Thousands/µL   Hemoglobin A1c w/EAG Estimation    Collection Time: 09/14/22  5:14 AM   Result Value Ref Range    Hemoglobin A1C 5 6 Normal 3 8-5 6%; PreDiabetic 5 7-6 4%;  Diabetic >=6 5%; Glycemic control for adults with diabetes <7 0% %     mg/dl   Echo complete w/ contrast if indicated    Collection Time: 09/14/22  2:10 PM   Result Value Ref Range    AV area peak ward 1 9 cm2    AV peak gradient 121 mmHg    LA size 3 2 cm    Aortic valve mean velocity 11 90 m/s    LVPWd 1 10 cm    Left Atrium Area-systolic Apical Two Chamber 21 7 cm2    Left Atrium Area-systolic Four Chamber 80 1 cm2    MV E' Tissue Velocity Septal 4 cm/s    IVSd 1 87 cm    LV DIASTOLIC VOLUME (MOD BIPLANE) 2D 78 mL    LEFT VENTRICLE SYSTOLIC VOLUME (MOD BIPLANE) 2D 40 mL    Left ventricular stroke volume (2D) 38 00 mL    AV Deceleration Time 1,946 ms    A4C EF 54 %    LA length (A2C) 5 10 cm    LVIDd 4 20 cm    IVS 1 2 cm    LVIDS 3 20 cm    FS 24 28 - 44 %    Asc Ao 3 4 cm    Ao root 2 90 cm    RVID d 3 2 cm    LVOT mn grad 2 0 mmHg    AV area by cont VTI 1 8 cm2    AV regurgitation pressure 1/2 time 564 ms    AV mean gradient 6 mmHg    AV LVOT peak gradient 4 mmHg    MV valve area p 1/2 method 2 53 cm2    E wave deceleration time 298 ms    LVOT diameter 2 1 cm    LVOT peak johnnie 0 98 m/s    LVOT peak VTI 15 13 cm    Aortic valve peak velocity 1 82 m/s    Ao VTI 29 53 cm    LVOT stroke volume 52 38 cm3    AV peak gradient 13 mmHg    MV Peak E Johnnie 62 cm/s    MV Peak A Johnnie 0 95 m/s    RAA A4C 12 cm2    MV stenosis pressure 1/2 time 87 ms    LVOT stroke volume index 26 30 ml/m2    LVSV, 2D 38 mL    LVOT area 3 46 cm2    DVI 0 54     AV valve area 1 77 cm2    LV EF 60    CBC    Collection Time: 09/15/22  5:25 AM   Result Value Ref Range    WBC 9 25 4 31 - 10 16 Thousand/uL    RBC 5 27 3 88 - 5 62 Million/uL    Hemoglobin 16 9 12 0 - 17 0 g/dL    Hematocrit 49 7 (H) 36 5 - 49 3 %    MCV 94 82 - 98 fL    MCH 32 1 26 8 - 34 3 pg    MCHC 34 0 31 4 - 37 4 g/dL    RDW 12 2 11 6 - 15 1 %    Platelets 792 140 - 772 Thousands/uL    MPV 9 6 8 9 - 12 7 fL   Comprehensive metabolic panel    Collection Time: 09/15/22  5:25 AM   Result Value Ref Range    Sodium 137 135 - 147 mmol/L    Potassium 3 7 3 5 - 5 3 mmol/L    Chloride 107 96 - 108 mmol/L    CO2 21 21 - 32 mmol/L    ANION GAP 9 4 - 13 mmol/L    BUN 16 5 - 25 mg/dL    Creatinine 1 16 0 60 - 1 30 mg/dL    Glucose 94 65 - 140 mg/dL    Calcium 9 5 8 4 - 10 2 mg/dL    AST 21 13 - 39 U/L    ALT 26 7 - 52 U/L    Alkaline Phosphatase 62 34 - 104 U/L    Total Protein 7 1 6 4 - 8 4 g/dL    Albumin 4 0 3 5 - 5 0 g/dL    Total Bilirubin 0 50 0 20 - 1 00 mg/dL    eGFR 73 ml/min/1 73sq m   CBC and differential    Collection Time: 09/16/22  5:39 AM   Result Value Ref Range    WBC 8 78 4 31 - 10 16 Thousand/uL    RBC 4 96 3 88 - 5 62 Million/uL    Hemoglobin 16 1 12 0 - 17 0 g/dL    Hematocrit 46 3 36 5 - 49 3 %    MCV 93 82 - 98 fL    MCH 32 5 26 8 - 34 3 pg    MCHC 34 8 31 4 - 37 4 g/dL    RDW 12 3 11 6 - 15 1 %    MPV 9 8 8 9 - 12 7 fL    Platelets 038 624 - 247 Thousands/uL    nRBC 0 /100 WBCs    Neutrophils Relative 61 43 - 75 %    Immat GRANS % 1 0 - 2 %    Lymphocytes Relative 26 14 - 44 %    Monocytes Relative 8 4 - 12 %    Eosinophils Relative 3 0 - 6 %    Basophils Relative 1 0 - 1 %    Neutrophils Absolute 5 39 1 85 - 7 62 Thousands/µL    Immature Grans Absolute 0 04 0 00 - 0 20 Thousand/uL    Lymphocytes Absolute 2 31 0 60 - 4 47 Thousands/µL    Monocytes Absolute 0 74 0 17 - 1 22 Thousand/µL    Eosinophils Absolute 0 22 0 00 - 0 61 Thousand/µL    Basophils Absolute 0 08 0 00 - 0 10 Thousands/µL   Basic metabolic panel    Collection Time: 09/16/22  5:39 AM   Result Value Ref Range    Sodium 139 135 - 147 mmol/L    Potassium 4 0 3 5 - 5 3 mmol/L    Chloride 109 (H) 96 - 108 mmol/L    CO2 22 21 - 32 mmol/L    ANION GAP 8 4 - 13 mmol/L    BUN 15 5 - 25 mg/dL    Creatinine 1 03 0 60 - 1 30 mg/dL    Glucose 92 65 - 140 mg/dL    Calcium 9 4 8 4 - 10 2 mg/dL    eGFR 84 ml/min/1 73sq m   Urinalysis with microscopic    Collection Time: 09/20/22 11:51 AM   Result Value Ref Range    Color, UA Dark Brown     Clarity, UA Extra Turbid     Specific [place], UA 1 019 1 003 - 1 030    pH, UA 6 0 4 5, 5 0, 5 5, 6 0, 6 5, 7 0, 7 5, 8 0    Leukocytes, UA Trace (A) Negative    Nitrite, UA Negative Negative    Protein, UA 70 (1+) (A) Negative mg/dl    Glucose, UA Negative Negative mg/dl    Ketones, UA Negative Negative mg/dl    Urobilinogen, UA <2 0 <2 0 mg/dl mg/dl    Bilirubin, UA Negative Negative    Occult Blood, UA Large (A) Negative    RBC, UA Innumerable (A) None Seen, 1-2 /hpf    WBC, UA Innumerable (A) None Seen, 1-2 /hpf    Epithelial Cells None Seen None Seen, Occasional /hpf    Bacteria, UA None Seen None Seen, Occasional /hpf   Urine culture    Collection Time: 09/20/22 11:51 AM    Specimen: Urine, Other   Result Value Ref Range    Urine Culture No Growth <1000 cfu/mL    PSA Total, Diagnostic    Collection Time: 10/03/22 10:13 AM   Result Value Ref Range    PSA, Diagnostic 1 6 0 0 - 4 0 ng/mL   Basic metabolic panel    Collection Time: 10/03/22 10:13 AM   Result Value Ref Range    Sodium 139 135 - 147 mmol/L    Potassium 3 8 3 5 - 5 3 mmol/L    Chloride 110 (H) 96 - 108 mmol/L    CO2 23 21 - 32 mmol/L    ANION GAP 6 4 - 13 mmol/L    BUN 13 5 - 25 mg/dL    Creatinine 1 14 0 60 - 1 30 mg/dL    Glucose, Fasting 78 65 - 99 mg/dL    Calcium 9 5 8 3 - 10 1 mg/dL    eGFR 74 ml/min/1 73sq m   Cytology, urine    Collection Time: 10/03/22 10:19 AM   Result Value Ref Range    Case Report       Non-gynecologic Cytology                          Case: [id number]                                  Authorizing Provider:  Kalyani Pratt MD        Collected:           10/03/2022 1019              Ordering Location:     [address] [place] [address] For        Received:            10/03/2022 1019                                     Urology Mildred Riggs                                                               Pathologist:           Casa Singleton DO                                                     Specimen:    Urine, Clean Catch                                                                         Final Diagnosis       A  Urine, Clean Catch (ThinPrep):  - Negative for high grade urothelial carcinoma ([address])  See Note  - Abundant red blood cells, rare benign and degenerated urothelial cells, and mixed inflammatory cells present        Note       The above diagnostic category is from the recently published book, [facility] [place] [facility] Reporting Urinary Cytology, and is in keeping with the ongoing effort for utilization of standardized diagnostic terminology in urine cytology  *    *[facility] Reporting Urinary Cytology  Jenifer Antonio [facility] Marian Arellano; 2016  Gross Description          A   15mL, dark yellow, cloudy     Additional Information       Hologic's FDA approved ,  and ThinPrep Imaging Duo System are utilized with strict adherence to the 's instruction manual to prepare gynecologic and non-gynecologic cytology specimens for the production of ThinPrep slides as well as for gynecologic ThinPrep imaging  These processes have been validated by our laboratory and/or by the   These tests were developed and their performance characteristics determined by [facility] [address] [place] [address] or [address] [place] [address]  They may not be cleared or approved by the [place]  Food and Drug Administration  The FDA has determined that such clearance or approval is not necessary  These tests are used for clinical purposes  They should not be regarded as investigational or for research  This laboratory has been approved by [place], designated as a high-complexity laboratory and is qualified to perform these tests       Interpretation performed at [address] [address]     POCT urine dip    Collection Time: 11/03/22  5:23 PM   Result Value Ref Range    LEUKOCYTE ESTERASE,UA neg     NITRITE,UA neg     SL AMB POCT UROBILINOGEN 0 2     POCT URINE PROTEIN neg      PH,UA 7     BLOOD,UA neg     SPECIFIC GRAVITY,UA 1 010     KETONES,UA neg     BILIRUBIN,UA neg     GLUCOSE, UA neg    Aldosterone/Renin Ratio    Collection Time: 11/04/22  9:15 AM   Result Value Ref Range    Renin 7 864 (H) 0 167 - 5 380 ng/mL/hr    Aldosterone 3 8 0 0 - 30 0 ng/dL    Aldos/Renin Ratio 0 5 0 0 - 30 0   Type and screen    Collection Time: 11/04/22  9:15 AM   Result Value Ref Range    ABO Grouping O     Rh Factor Positive     Antibody Screen Negative     Specimen Expiration Date [id number] MISCELLANEOUS LAB TEST    Collection Time: 11/04/22  9:28 AM   Result Value Ref Range    Miscellaneous Lab Test Result SEE WRITTEN REPORT    Type and screen    Collection Time: 11/08/22  6:41 AM   Result Value Ref Range    ABO Grouping O     Rh Factor Positive     Antibody Screen Negative     Specimen Expiration Date [id number]    POCT activated clotting time    Collection Time: 11/08/22  8:41 AM   Result Value Ref Range    Activated Clotting Time, i-STAT 140 (H) 89 - 137 sec    Specimen Type ARTERIAL    POCT activated clotting time    Collection Time: 11/08/22  9:20 AM   Result Value Ref Range    Activated Clotting Time, i-STAT 309 (H) 89 - 137 sec    Specimen Type ARTERIAL    POCT activated clotting time    Collection Time: 11/08/22 10:10 AM   Result Value Ref Range    Activated Clotting Time, i-STAT 146 (H) 89 - 137 sec    Specimen Type ARTERIAL    Basic Metabolic Panel    Collection Time: 11/09/22  5:07 AM   Result Value Ref Range    Sodium 140 135 - 147 mmol/L    Potassium 3 7 3 5 - 5 3 mmol/L    Chloride 107 96 - 108 mmol/L    CO2 24 21 - 32 mmol/L    ANION GAP 9 4 - 13 mmol/L    BUN 9 5 - 25 mg/dL    Creatinine 1 00 0 60 - 1 30 mg/dL    Glucose 90 65 - 140 mg/dL    Calcium 8 3 8 3 - 10 1 mg/dL    eGFR 87 ml/min/1 73sq m   CBC and Platelet    Collection Time: 11/09/22  5:07 AM   Result Value Ref Range    WBC 7 45 4 31 - 10 16 Thousand/uL    RBC 3 47 (L) 3 88 - 5 62 Million/uL    Hemoglobin 11 4 (L) 12 0 - 17 0 g/dL    Hematocrit 32 8 (L) 36 5 - 49 3 %    MCV 95 82 - 98 fL    MCH 32 9 26 8 - 34 3 pg    MCHC 34 8 31 4 - 37 4 g/dL    RDW 12 7 11 6 - 15 1 %    Platelets 784 145 - 390 Thousands/uL    MPV 9 5 8 9 - 12 7 fL   APTT    Collection Time: 11/09/22  5:07 AM   Result Value Ref Range    PTT 29 23 - 37 seconds   Protime-INR    Collection Time: 11/09/22  5:07 AM   Result Value Ref Range    Protime 13 5 11 6 - 14 5 seconds    INR 1 03 0 84 - 1 19   CBC and Platelet    Collection Time: 01/12/23 10:14 AM   Result Value Ref Range    WBC 8 20 4  31 - 10 16 Thousand/uL    RBC 4 96 3 88 - 5 62 Million/uL    Hemoglobin 15 6 12 0 - 17 0 g/dL    Hematocrit 47 2 36 5 - 49 3 %    MCV 95 82 - 98 fL    MCH 31 5 26 8 - 34 3 pg    MCHC 33 1 31 4 - 37 4 g/dL    RDW 12 4 11 6 - 15 1 %    Platelets 369 782 - 018 Thousands/uL    MPV 9 8 8 9 - 12 7 fL   Basic metabolic panel    Collection Time: 01/12/23 10:14 AM   Result Value Ref Range    Sodium 141 135 - 147 mmol/L    Potassium 4 8 3 5 - 5 3 mmol/L    Chloride 111 (H) 96 - 108 mmol/L    CO2 25 21 - 32 mmol/L    ANION GAP 5 4 - 13 mmol/L    BUN 17 5 - 25 mg/dL    Creatinine 1 25 0 60 - 1 30 mg/dL    Glucose, Fasting 142 (H) 65 - 99 mg/dL    Calcium 9 8 8 3 - 10 1 mg/dL    eGFR 66 ml/min/1 73sq m   Type and screen    Collection Time: 01/12/23 10:14 AM   Result Value Ref Range    ABO Grouping O     Rh Factor Positive     Antibody Screen Negative     Specimen Expiration Date [id number]    POCT activated clotting time    Collection Time: 01/19/23  9:05 AM   Result Value Ref Range    Activated Clotting Time, i-STAT 144 (H) 89 - 137 sec    Specimen Type ARTERIAL    POCT activated clotting time    Collection Time: 01/19/23  9:38 AM   Result Value Ref Range    Activated Clotting Time, i-STAT 348 (H) 89 - 137 sec    Specimen Type ARTERIAL    Basic Metabolic Panel    Collection Time: 01/20/23  5:46 AM   Result Value Ref Range    Sodium 135 135 - 147 mmol/L    Potassium 3 7 3 5 - 5 3 mmol/L    Chloride 106 96 - 108 mmol/L    CO2 19 (L) 21 - 32 mmol/L    ANION GAP 10 4 - 13 mmol/L    BUN 10 5 - 25 mg/dL    Creatinine 1 06 0 60 - 1 30 mg/dL    Glucose 116 65 - 140 mg/dL    Calcium 9 0 8 4 - 10 2 mg/dL    eGFR 80 ml/min/1 73sq m   CBC and Platelet    Collection Time: 01/20/23  5:46 AM   Result Value Ref Range    WBC 15 60 (H) 4 31 - 10 16 Thousand/uL    RBC 4 33 3 88 - 5 62 Million/uL    Hemoglobin 14 0 12 0 - 17 0 g/dL    Hematocrit 40 8 36 5 - 49 3 %    MCV 94 82 - 98 fL    MCH 32 3 26 8 - 34 3 pg    MCHC 34 3 31 4 - 37 4 g/dL    RDW 12 3 11 6 - 15 1 %    Platelets 271 406 - 682 Thousands/uL    MPV 10 0 8 9 - 12 7 fL   APTT    Collection Time: 01/20/23  5:46 AM   Result Value Ref Range    PTT 29 23 - 37 seconds   Protime-INR    Collection Time: 01/20/23  5:46 AM   Result Value Ref Range    Protime 12 7 11 6 - 14 5 seconds    INR 0 95 0 84 - 1 19   Urinalysis with microscopic    Collection Time: 01/20/23  9:44 AM   Result Value Ref Range    Color, UA Light Yellow     Clarity, UA Cloudy     Specific We[place], UA 1 010 1 003 - 1 030    pH, UA 6 0 4 5, 5 0, 5 5, 6 0, 6 5, 7 0, 7 5, 8 0    Leukocytes, UA Negative Negative    Nitrite, UA Negative Negative    Protein, UA Negative Negative mg/dl    Glucose, UA Negative Negative mg/dl    Ketones, UA Negative Negative mg/dl    Urobilinogen, UA 0 2 0 2, 1 0 E U /dl E U /dl    Bilirubin, UA Negative Negative    Occult Blood, UA Large (A) Negative    RBC, UA Innumerable (A) None Seen, 2-4 /hpf    WBC, UA 1-2 (A) None Seen, 2-4, 5-60 /hpf    Epithelial Cells Occasional None Seen, Occasional /hpf    Bacteria, UA Occasional None Seen, Occasional /hpf   Lipid panel    Collection Time: 03/01/23 11:15 AM   Result Value Ref Range    Cholesterol 183 See Comment mg/dL    Triglycerides 229 (H) See Comment mg/dL    HDL, Direct 61 >=40 mg/dL    LDL Calculated 76 0 - 100 mg/dL    Non-HDL-Chol (CHOL-HDL) 122 mg/dl       Laboratory Results: I have personally reviewed the pertinent laboratory results/reports     Radiology/Other Diagnostic Testing Results: I have personally reviewed pertinent reports  VAS carotid complete study    Result Date: 5/12/2023   [facility] REPORT CLINICAL: Indications: Initial Post-op evaluation s/p revascularization  Patient states he has pressure on the left side of his neck and that his face and head feel numb  Operative History: 2023-01-19 Left TCAR 2022-11-08 Right TCAR Risk Factors The patient has history of HTN and previous smoking (quit <1yr ago)  Clinical Right Pressure:  138/68 mm Hg, Left Pressure:  144/72 mm Hg  FINDINGS:  Right              Impression     PSV  EDV (cm/s)  Direction of Flow  Ratio  Dist  ICA                          72          21                      0 76  Mid  ICA                           58          24                      0 61  Prox  ICA - Stent  Widely Patent   61          22                      0 64  Dist CCA                           67          25                            Mid CCA                            95          27                      1 01  Prox CCA                           94          22                            Ext Carotid        Severe         567         142                      4 32  Prox Vert                          89          24  Antegrade                 Subclavian                        206          11                             Left               Impression     PSV  EDV (cm/s)  Direction of Flow  Ratio  Dist  ICA                          72          24                      0 77  Mid  ICA                           95          37                      1 01  Prox  ICA - Stent  Widely Patent   63          20                      0 67  Dist CCA                           60          24                            Mid CCA                            95          32                      0 70  Prox CCA                          136          34                            Ext Carotid        Severe         556         148                      5 87  Prox Vert                          66          17  Antegrade                 Subclavian                        183           7                               CONCLUSION: Impression RIGHT: Widely patent internal carotid artery and stent  There is a severe stenosis noted in the external carotid artery, as expected given coverage by the ICA stent  Vertebral artery flow is antegrade  There is no significant subclavian artery disease   LEFT: Widely patent internal carotid artery and stent  There is a severe stenosis noted in the external carotid artery, as expected given coverage by the ICA stent  Vertebral artery flow is antegrade  There is no significant subclavian artery disease  Compared to previous study on 9/14/2022, there has been revascularization bilaterally during the interim  The bilateral severe stenosis in the ECA is a new finding  SIGNATURE: Electronically Signed by: Velia Sanchez MD on 2023-05-12 04:38:03 PM       Assessment/Plan:  Problem List Items Addressed This Visit        Cardiovascular and Mediastinum    Primary hypertension (Chronic)    Relevant Medications    losartan (COZAAR) 100 MG tablet    metoprolol succinate (TOPROL-XL) 25 mg 24 hr tablet       Other    Chronic back pain - Primary    Relevant Medications    gabapentin (Neurontin) 100 mg capsule   Other Visit Diagnoses     Gastroesophageal reflux disease without esophagitis        Relevant Medications    pantoprazole (PROTONIX) 40 mg tablet    Symptoms of cerebrovascular accident (CVA)        Relevant Medications    losartan (COZAAR) 100 MG tablet    CVA (cerebral vascular accident) ([address] )        Relevant Medications    losartan (COZAAR) 100 MG tablet    Stroke-like symptoms        Relevant Medications    losartan (COZAAR) 100 MG tablet    Cervicalgia        Relevant Medications    tiZANidine (ZANAFLEX) 2 mg tablet    Hypertension        Relevant Medications    losartan (COZAAR) 100 MG tablet    metoprolol succinate (TOPROL-XL) 25 mg 24 hr tablet    Hypertension, unspecified type        Relevant Medications    losartan (COZAAR) 100 MG tablet    metoprolol succinate (TOPROL-XL) 25 mg 24 hr tablet    Colon cancer screening        Relevant Orders    Cologjessica          Recommend titrating gabapentin 100 mg tid for back pain and stiffness, stop amlodipine as this may be causing orthostatic dizziness and also BP will be lowered from Gabapentin    Continue other medications  Dizziness is partly due to imbalance post "stroke  Patient has refused colonoscopy now and also in the past and has never had colonoscopy screening for colon cancer  Patient is agreeable to getting [place] guard testing every 3 years and understands the risk that smaller lesions may be missed as it is not as comprehensive as colonoscopy  Patient is agreeable to doing the colonoscopy if colo-guard was abnormal  Risks and benefits discussed at length including finding the cancer at a later stage when not age appropriately screened with colonoscopy  Patient is accepting of the risks and verbalizes understanding of increased risk of death of finding colon Cancer at later stage      BMI Counseling: Body mass index is 28 66 kg/m²  The BMI is above normal  Nutrition recommendations include decreasing portion sizes, encouraging healthy choices of fruits and vegetables, decreasing fast food intake, consuming healthier snacks, limiting drinks that contain sugar, moderation in carbohydrate intake and reducing intake of cholesterol  Exercise recommendations include moderate physical activity 150 minutes/week  No pharmacotherapy was ordered  Rationale for BMI follow-up plan is due to patient being overweight or obese  Depression Screening and Follow-up Plan: Patient was screened for depression during today's encounter  They screened negative with a PHQ-2 score of 0  Read package inserts for all medications before starting a new medications, call me if you have any questions  Patient was given opportunity to ask questions and all questions were answered  Disclaimer: Portions of the record may have been created with voice recognition software  Occasional wrong word or \"sound a like\" substitutions may have occurred due to the inherent limitations of voice recognition software  Read the chart carefully and recognize, using context, where substitutions have occurred  I have used the Epic copy/forward function to compose this note   I have reviewed my current " note to ensure it reflects the current patient status, exam, assessment and plan

## 2023-07-10 ENCOUNTER — VBI (OUTPATIENT)
Dept: ADMINISTRATIVE | Facility: OTHER | Age: 53
End: 2023-07-10

## 2023-07-26 ENCOUNTER — TELEPHONE (OUTPATIENT)
Dept: NEUROLOGY | Facility: CLINIC | Age: 53
End: 2023-07-26

## 2023-07-26 NOTE — TELEPHONE ENCOUNTER
Spoke with patient in regards to confirm upcoming appointment with Dr. Hairston Speaker 8/3. Patient asked for office number, he'd call back to confirm he had to look at his schedule.

## 2023-07-27 DIAGNOSIS — M54.2 CERVICALGIA: ICD-10-CM

## 2023-07-27 RX ORDER — TIZANIDINE 2 MG/1
TABLET ORAL
Qty: 30 TABLET | Refills: 0 | Status: SHIPPED | OUTPATIENT
Start: 2023-07-27

## 2023-07-27 NOTE — TELEPHONE ENCOUNTER
Spoke with patient in regards to confirm upcoming appointment with Dr. Ivonne Brito 8/3. Patient will call back after he spoken to wife about appt time.

## 2023-07-28 NOTE — TELEPHONE ENCOUNTER
Patient called back to confirm upcoming appointment with Dr. Parker Plata at [facility].

## 2023-08-03 ENCOUNTER — OFFICE VISIT (OUTPATIENT)
Dept: NEUROLOGY | Facility: CLINIC | Age: 53
End: 2023-08-03
Payer: COMMERCIAL

## 2023-08-03 VITALS
SYSTOLIC BLOOD PRESSURE: 160 MMHG | DIASTOLIC BLOOD PRESSURE: 90 MMHG | WEIGHT: 183 LBS | HEIGHT: 67 IN | BODY MASS INDEX: 28.72 KG/M2 | HEART RATE: 66 BPM

## 2023-08-03 DIAGNOSIS — M54.50 CHRONIC LEFT-SIDED LOW BACK PAIN WITHOUT SCIATICA: ICD-10-CM

## 2023-08-03 DIAGNOSIS — G89.29 CHRONIC LEFT-SIDED LOW BACK PAIN WITHOUT SCIATICA: ICD-10-CM

## 2023-08-03 DIAGNOSIS — Z98.890 STATUS POST CAROTID SURGERY: ICD-10-CM

## 2023-08-03 DIAGNOSIS — I63.81 THALAMIC INFARCTION (HCC): Primary | ICD-10-CM

## 2023-08-03 DIAGNOSIS — R20.0 NUMBNESS: ICD-10-CM

## 2023-08-03 DIAGNOSIS — R53.83 FATIGUE: ICD-10-CM

## 2023-08-03 PROCEDURE — 99215 OFFICE O/P EST HI 40 MIN: CPT | Performed by: PSYCHIATRY & NEUROLOGY

## 2023-08-03 RX ORDER — GABAPENTIN 300 MG/1
300 CAPSULE ORAL 2 TIMES DAILY
Qty: 60 CAPSULE | Refills: 3 | Status: SHIPPED | OUTPATIENT
Start: 2023-08-03

## 2023-08-03 RX ORDER — GABAPENTIN 300 MG/1
300 CAPSULE ORAL
Qty: 30 CAPSULE | Refills: 3 | Status: SHIPPED | OUTPATIENT
Start: 2023-08-03 | End: 2023-08-03 | Stop reason: SDUPTHER

## 2023-08-03 RX ORDER — VITAMIN B COMPLEX
1 CAPSULE ORAL DAILY
Qty: 30 CAPSULE | Refills: 3
Start: 2023-08-03

## 2023-08-03 RX ORDER — UBIDECARENONE 100 MG
100 CAPSULE ORAL DAILY
Qty: 30 CAPSULE | Refills: 3
Start: 2023-08-03

## 2023-08-03 NOTE — PROGRESS NOTES
Patient ID: Marquis Dunaway is a 47 y.o. male. Assessment/Plan: This is a 46 y/o Male who is here as a follow up for right thalamic infarct, etiology is small vessel disease. Patient has risk factors such as HTN, HLD, possible SUDHIR. He is s/p TCAR     PLAN:      Diagnoses and all orders for this visit:    Thalamic infarction [facility])  -for stroke prevention continue with combination of aspirin and atorvastatin  -BP goal < 130/80, BP is at goal in the office. -LDL goal <70  -I advised patient to avoid using NSAIDs for headaches or other pain and to stick to tylenol if needed  -Recommend mediterranean diet & regular exercise regimen atleast 4-5 times a week for 20-30 minutes. -I educated patient/family regarding medication compliance  -     gabapentin (Neurontin) 300 mg capsule; Take 1 capsule (300 mg total) by mouth 2 (two) times a day    Chronic left-sided low back pain without sciatica  -     Discontinue: gabapentin (Neurontin) 300 mg capsule; Take 1 capsule (300 mg total) by mouth daily at bedtime  -     gabapentin (Neurontin) 300 mg capsule; Take 1 capsule (300 mg total) by mouth 2 (two) times a day    Status post carotid surgery    Numbness/  -post stroke pain syndrome  -increase dose of gabapentin to 300mg PO BID. Fatigue  -start patient on b complex vitamins daily, and b complex vitamins daily  -refer patient to sleep medicine to look for SUDHIR  -     co-enzyme Q-10 100 mg capsule; Take 1 capsule (100 mg total) by mouth daily  -     b complex vitamins capsule; Take 1 capsule by mouth daily  -     Ambulatory Referral to Sleep Medicine; Future       Follow up - 3 months     I would be happy to see the patient sooner if any new questions/concerns arise. Patient/Guardian was advised to the call the office if they have any questions and concerns in the meantime.      Patient/Guardian does understand that if they have any new stroke like symptoms such as facial droop on one side, weakness/paralysis on either side, speech trouble, numbness on one side, balance issues, any vision changes, extreme dizziness or any new headache, to call 9-1-1 immediately or to proceed to the nearest ER immediately. I have spent a total time of 40 minutes on 08/03/23 in caring for this patient including Risks and benefits of tx options, Instructions for management, Patient and family education, Importance of tx compliance, Counseling / Coordination of care, Documenting in the medical record and Reviewing / ordering tests, medicine, procedures  . Subjective:    HPI    61-year-old male who is here as a follow-up of history of thalamic stroke. Patient states that he does not have any associated symptoms patient compliant medications he is tolerating medications well without any issues but he states that he still having pain on the right side of his body and the gabapentin 100 mg twice daily does not seem to be relieving his pain. The following portions of the patient's history were reviewed and updated as appropriate:   He  has a past medical history of Carotid stenosis, left, Carotid stenosis, right, Cervical disc disease, COVID (05/2020), GERD (gastroesophageal reflux disease), Hyperlipidemia, Hypertension, Kidney stone, Muscle weakness, Spinal stenosis, Stroke ([address]) (09/13/2022), and Weakness of left side of body (09/13/2022).   He   Patient Active Problem List    Diagnosis Date Noted   • Numbness 08/03/2023   • Essential hypertension 04/11/2023   • Symptomatic carotid artery stenosis, bilateral 12/06/2022   • Left Carotid Artery Stenosis s/p Left TCAR 11/17/2022   • Acute right arterial ischemic stroke, PCA (posterior cerebral artery) ([address]) 11/08/2022   • Primary hypertension 11/03/2022   • Thalamic infarction ([address]) 11/03/2022   • Abnormal renal function 11/03/2022   • Family history of prostate cancer 10/03/2022   • Erectile dysfunction due to arterial insufficiency 10/03/2022   • Encounter for screening colonoscopy 10/03/2022 • Gross hematuria 09/28/2022   • Encounter to discuss test results 09/28/2022   • Alcohol intake above recommended sensible limits 09/14/2022   • Status post carotid surgery 09/13/2022   • Chronic back pain 10/18/2021   • History of diverticulitis 10/16/2021   • Bilateral nephrolithiasis 10/16/2021   • Elevated serum creatinine 10/16/2021     He  has a past surgical history that includes Ulnar collateral ligament reconstruction (Bilateral); Carpal tunnel release (Bilateral); Cervical fusion; Hernia repair; Cystoscopy; pr tcat iv stent crv crtd art embolic protecj (Right, 31/9/4014); IR carotid stent (11/8/2022); pr tcat iv stent crv crtd art embolic protecj (Left, 6/95/0767); and IR carotid stent (1/19/2023). His family history includes Colon cancer in his father; Diabetes in his mother; Heart attack in his mother; Hypertension in his mother; No Known Problems in his sister, sister, sister, and son. He  reports that he quit smoking about a year ago. His smoking use included cigarettes. He has a 70.00 pack-year smoking history. He has never used smokeless tobacco. He reports current alcohol use of about 12.0 standard drinks of alcohol per week. He reports that he does not currently use drugs. Current Outpatient Medications   Medication Sig Dispense Refill   • albuterol (PROVENTIL HFA,VENTOLIN HFA) 90 mcg/act inhaler INHALE 2 PUFFS BY MOUTH EVERY 6 HOURS AS NEEDED FOR WHEEZING 6.7 g 3   • aspirin 81 mg chewable tablet Chew 1 tablet (81 mg total) daily 30 tablet 0   • atorvastatin (LIPITOR) 80 mg tablet Take 1 tablet (80 mg total) by mouth in the evening.  Take before meals 90 tablet 1   • b complex vitamins capsule Take 1 capsule by mouth daily 30 capsule 3   • co-enzyme Q-10 100 mg capsule Take 1 capsule (100 mg total) by mouth daily 30 capsule 3   • ferrous sulfate 325 (65 Fe) mg tablet Take 325 mg by mouth daily with breakfast     • gabapentin (Neurontin) 300 mg capsule Take 1 capsule (300 mg total) by mouth 2 (two) times a day 60 capsule 3   • losartan (COZAAR) 100 MG tablet Take 1 tablet (100 mg total) by mouth daily 90 tablet 1   • metoprolol succinate (TOPROL-XL) 25 mg 24 hr tablet Take 1 tablet (25 mg total) by mouth daily 90 tablet 1   • pantoprazole (PROTONIX) 40 mg tablet Take 1 tablet (40 mg total) by mouth daily 90 tablet 1   • tamsulosin (FLOMAX) 0.4 mg Take 1 capsule (0.4 mg total) by mouth daily with dinner 90 capsule 1   • tiZANidine (ZANAFLEX) 2 mg tablet TAKE 1 TABLET BY MOUTH EVERY 8 HOURS AS NEEDED FOR MUSCLE SPASM 30 tablet 0   • Multiple Vitamin (multivitamin) tablet Take 1 tablet by mouth if needed (Patient not taking: Reported on 6/26/2023)       No current facility-administered medications for this visit. Current Outpatient Medications on File Prior to Visit   Medication Sig   • albuterol (PROVENTIL HFA,VENTOLIN HFA) 90 mcg/act inhaler INHALE 2 PUFFS BY MOUTH EVERY 6 HOURS AS NEEDED FOR WHEEZING   • aspirin 81 mg chewable tablet Chew 1 tablet (81 mg total) daily   • atorvastatin (LIPITOR) 80 mg tablet Take 1 tablet (80 mg total) by mouth in the evening.  Take before meals   • ferrous sulfate 325 (65 Fe) mg tablet Take 325 mg by mouth daily with breakfast   • losartan (COZAAR) 100 MG tablet Take 1 tablet (100 mg total) by mouth daily   • metoprolol succinate (TOPROL-XL) 25 mg 24 hr tablet Take 1 tablet (25 mg total) by mouth daily   • pantoprazole (PROTONIX) 40 mg tablet Take 1 tablet (40 mg total) by mouth daily   • tamsulosin (FLOMAX) 0.4 mg Take 1 capsule (0.4 mg total) by mouth daily with dinner   • tiZANidine (ZANAFLEX) 2 mg tablet TAKE 1 TABLET BY MOUTH EVERY 8 HOURS AS NEEDED FOR MUSCLE SPASM   • [DISCONTINUED] gabapentin (Neurontin) 100 mg capsule Take 1 capsule (100 mg total) by mouth 3 (three) times a day   • Multiple Vitamin (multivitamin) tablet Take 1 tablet by mouth if needed (Patient not taking: Reported on 6/26/2023)     No current facility-administered medications on file prior to visit. He is allergic to penicillins. .         Objective:    Blood pressure 160/90, pulse 66, height 5' 7" (1.702 m), weight 83 kg (183 lb). Physical Exam  General - patient is alert   Speech - no dysarthria noted, no aphasia noted. Neuro:   Cranial nerves: PERRL, EOMI, facial sensation intact to soft touch in V1, V2 and V3, no facial asymmetry noted, uvula/palate midline, tongue midline. Motor: 5/5 throughout, normal tone, no pronator drift noted. Sensory - decreased sensation on the L side of the body to soft touch  Reflexes - 2+ throughout  Coordination - no ataxia/dysmetria noted  Gait - normal   Neurological Exam      ROS:    Review of Systems   Constitutional: Negative for appetite change, fatigue and fever. HENT: Negative. Negative for hearing loss, tinnitus, trouble swallowing and voice change. Eyes: Negative. Negative for photophobia, pain and visual disturbance. Respiratory: Negative. Negative for shortness of breath. Cardiovascular: Negative. Negative for palpitations. Gastrointestinal: Negative. Negative for nausea and vomiting. Endocrine: Negative. Negative for cold intolerance. Genitourinary: Negative. Negative for dysuria, frequency and urgency. Musculoskeletal: Positive for gait problem. Negative for back pain, myalgias and neck pain. Patient and wife states having a limp and off balance when walking.,    Skin: Negative. Negative for rash. Allergic/Immunologic: Negative. Neurological: Positive for headaches. Negative for dizziness, tremors, seizures, syncope, facial asymmetry, speech difficulty, weakness, light-headedness and numbness (numbness on the L leg. ). Patient states having pressure on L side of the neck. Patient states he has been feeling numbness from the L side of hip and radiates up L side by the shoulder blade and spine.      Patient states having HA that occur on the R side of the forehead and radiate to the L side of the head Hematological: Negative. Does not bruise/bleed easily. Psychiatric/Behavioral: Negative. Negative for confusion, hallucinations and sleep disturbance.

## 2023-08-15 ENCOUNTER — HOSPITAL ENCOUNTER (OUTPATIENT)
Dept: NON INVASIVE DIAGNOSTICS | Facility: CLINIC | Age: 53
Discharge: HOME | End: 2023-08-15
Payer: COMMERCIAL

## 2023-08-15 DIAGNOSIS — I65.22 LEFT CAROTID ARTERY STENOSIS: ICD-10-CM

## 2023-08-15 PROCEDURE — 93880 EXTRACRANIAL BILAT STUDY: CPT | Performed by: SURGERY

## 2023-08-15 PROCEDURE — 93880 EXTRACRANIAL BILAT STUDY: CPT

## 2023-08-22 DIAGNOSIS — R06.09 DYSPNEA ON EXERTION: ICD-10-CM

## 2023-08-22 DIAGNOSIS — M54.2 CERVICALGIA: ICD-10-CM

## 2023-08-22 RX ORDER — ALBUTEROL SULFATE 90 UG/1
AEROSOL, METERED RESPIRATORY (INHALATION)
Qty: 8.5 G | Refills: 0 | Status: SHIPPED | OUTPATIENT
Start: 2023-08-22

## 2023-08-22 RX ORDER — TIZANIDINE 2 MG/1
TABLET ORAL
Qty: 30 TABLET | Refills: 2 | Status: SHIPPED | OUTPATIENT
Start: 2023-08-22

## 2023-08-26 DIAGNOSIS — R31.0 GROSS HEMATURIA: ICD-10-CM

## 2023-08-29 RX ORDER — TAMSULOSIN HYDROCHLORIDE 0.4 MG/1
CAPSULE ORAL
Qty: 90 CAPSULE | Refills: 0 | Status: SHIPPED | OUTPATIENT
Start: 2023-08-29

## 2023-09-13 NOTE — ASSESSMENT & PLAN NOTE
28-year-old male in the office for review of carotid ultrasoundt s/p L TCAR by  1/19/23, R TCAR 11/2022 by . Denies symptoms of numbness/ tingling/ weakness on one side of the body, facial droop, slurred speech or blindness in one eye. He c/o L jaw and chin numbness at times. We discussed that his ultrasound exhibits stenosis of the external carotid artery and that this is treated with medical management. -Reviewed carotid ultrasound in detail with pt and discussed indications for vascular intervention. No vascular intervention at this time. Discussed continued surveillance every 3 months for the first year. Pt and family verbalized understanding. All questions answered. Recommendations  -Complete carotid ultrasound in 3 months with return visit for review.  -continue to take aspirin 81 mg as per   -continue to take atorvastatin daily as per PCP.  -F/U with PCP for any blood pressure medication adjustments.   -Go to the ED with any S/Sx of TIA/CVA.

## 2023-09-13 NOTE — PATIENT INSTRUCTIONS
-Continue to take aspirin 81 mg, atorvastatin daily.    -Go to the ED with any signs or symptoms of stroke such as numbness/ tingling on one side of your body, blindness in one eye, slurred speech, facial droop.    -Increase physical activity. Try to exercise 3 times a week for 30 min.     -Call the office with any incision site swelling, pain, discharge or fever.    -Complete carotid ultrasound in 3 months and return to the office for review.    -Please call your family doctor to adjust your blood pressure medications. Take your blood pressure medications when you get home and do a blood pressure re-check. Ask your family doctor about going on a medication to help control your Triglycerides.

## 2023-09-14 ENCOUNTER — OFFICE VISIT (OUTPATIENT)
Dept: VASCULAR SURGERY | Facility: CLINIC | Age: 53
End: 2023-09-14
Payer: COMMERCIAL

## 2023-09-14 VITALS
DIASTOLIC BLOOD PRESSURE: 100 MMHG | HEART RATE: 78 BPM | WEIGHT: 192 LBS | RESPIRATION RATE: 18 BRPM | OXYGEN SATURATION: 97 % | SYSTOLIC BLOOD PRESSURE: 184 MMHG | BODY MASS INDEX: 30.13 KG/M2 | HEIGHT: 67 IN

## 2023-09-14 DIAGNOSIS — Z98.890 STATUS POST CAROTID SURGERY: ICD-10-CM

## 2023-09-14 DIAGNOSIS — I65.22 LEFT CAROTID ARTERY STENOSIS: ICD-10-CM

## 2023-09-14 DIAGNOSIS — I65.23 SYMPTOMATIC CAROTID ARTERY STENOSIS, BILATERAL: Primary | ICD-10-CM

## 2023-09-14 PROBLEM — E78.1 HIGH TRIGLYCERIDES: Status: ACTIVE | Noted: 2023-09-14

## 2023-09-14 PROCEDURE — 99214 OFFICE O/P EST MOD 30 MIN: CPT | Performed by: NURSE PRACTITIONER

## 2023-09-14 NOTE — ASSESSMENT & PLAN NOTE
-Increased triglycerides on lipid panel from 3/1/23. Reviewed with pt and family.  -Continue statin medication.  Discussed f/u with PCP for possible additional medications for high triglyceride management.   -continue routine follow-up with family doctor

## 2023-09-14 NOTE — ASSESSMENT & PLAN NOTE
-Pt reports that he did not take his blood pressure medications today. Discussed the importance of regular adherence to prescribed medication. Pt verbalized understanding and will take his medications when he arrives home and will reach out to his PCP.   -continue current medical regimen   -management per PCP

## 2023-09-14 NOTE — PROGRESS NOTES
Assessment/Plan:    Left Carotid Artery Stenosis s/p Left TCAR  58-year-old male in the office for review of carotid ultrasoundt s/p L TCAR by  1/19/23, R TCAR 11/2022 by . Denies symptoms of numbness/ tingling/ weakness on one side of the body, facial droop, slurred speech or blindness in one eye. He c/o L jaw and chin numbness at times. We discussed that his ultrasound exhibits stenosis of the external carotid artery and that this is treated with medical management. -Reviewed carotid ultrasound in detail with pt and discussed indications for vascular intervention. No vascular intervention at this time. Discussed continued surveillance every 3 months for the first year. Pt and family verbalized understanding. All questions answered. Recommendations  -Complete carotid ultrasound in 3 months with return visit for review.  -continue to take aspirin 81 mg as per   -continue to take atorvastatin daily as per PCP.  -F/U with PCP for any blood pressure medication adjustments.   -Go to the ED with any S/Sx of TIA/CVA. Primary hypertension  -Pt reports that he did not take his blood pressure medications today. Discussed the importance of regular adherence to prescribed medication. Pt verbalized understanding and will take his medications when he arrives home and will reach out to his PCP. -continue current medical regimen   -management per PCP      High triglycerides  -Increased triglycerides on lipid panel from 3/1/23. Reviewed with pt and family.  -Continue statin medication. Discussed f/u with PCP for possible additional medications for high triglyceride management.   -continue routine follow-up with family doctor         Diagnoses and all orders for this visit:    Symptomatic carotid artery stenosis, bilateral  -     VAS carotid complete study; Future    Left carotid artery stenosis  -     VAS carotid complete study;  Future    Status post carotid surgery  -     VAS carotid complete study; Future          Subjective:      Patient ID: Jerel Rosales is a 47 y.o. male. Patient is s/p Lt TCAR on 1/19/23. Pt c/o pressure in the Lt neck that gets worse by the end of the day. Pt c/o Lt neck numbness and trouble swallowing. Pt denies TIA or CVA symptoms. 52-year-old male with PMHx HTN, hx of R ischemic stroke, HTN, Thalamic infarction, b/l nephrolithiasis, back pain, b/l carotid stenosis he is s/p L TCAR 1/19/23 and R TCAR 11/2022 both done by . Reports that he is experiencing "pressure" in his L buttocks up to his back and radiating to his shoulder blade to under his arm near his arm pit. Feels like his muscles are going to sleep. L neck area he reports tingling/ pins and needles through his jaw. Denies symptoms of numbness/ tingling/ weakness on one side of the body, facial droop, slurred speech or blindness in one eye. He is was taking 300 mg of gabapentin for his Left hip pain; however, he had to decrease the amount d/t dizziness.     -Reports Left leg pain with 25-30 yards. He will stop for a minute, recover and then can continue. L anterior thigh pain, posterior and L calf pain. Denies rest pain, denies wounds/ tissue loss. Discussed with pt and family that he has b/l 2+ palpable DP and PT pulses with no concerns of vascular etiology for the L thigh pain and discussed that he should return to his PCP for evaluation of his symptoms and continue to f/u with neurology.     -He states he currently vapes, quit smoking cigarettes 2 weeks before his stroke, he was smoking 2 ppd for 30 plus years.   -He did not take his blood pressure medications yet today. He reports that normally his blood pressure is 140's-150's/ 100. He had recent adjustment to his blood pressure medication 2 months ago.  We discussed the importance of him taking his blood pressure medication regularly, that he should take his blood pressure medications when he gets home and re-check his blood pressure after taking his medication. Furthermore we discussed he should f/u with his PCP for evaluation and adjustment of his blood pressure medication if necessary. Pt and family verbalized understanding. The following portions of the patient's history were reviewed and updated as appropriate: allergies, current medications, past family history, past medical history, past social history, past surgical history and problem list.    Review of Systems   Constitutional: Negative. HENT: Positive for trouble swallowing. Eyes: Positive for visual disturbance. Respiratory: Negative. Negative for shortness of breath. Cardiovascular: Negative for chest pain and leg swelling. Gastrointestinal: Negative. Endocrine: Negative. Genitourinary: Negative. Musculoskeletal: Negative. Skin: Negative. Allergic/Immunologic: Negative. Neurological: Positive for dizziness (upon standing), numbness and headaches. Negative for speech difficulty, weakness and light-headedness. Hematological: Negative. Psychiatric/Behavioral: Negative. Objective:      BP (!) 184/100 (BP Location: Right arm, Patient Position: Sitting)   Pulse 78   Resp 18   Ht 5' 7" (1.702 m)   Wt 87.1 kg (192 lb)   SpO2 97%   BMI 30.07 kg/m²   . Physical Exam  Vitals and nursing note reviewed. Constitutional:       Appearance: Normal appearance. He is obese. HENT:      Head: Normocephalic and atraumatic. Neck:      Vascular: Carotid bruit (R bruit) present. Cardiovascular:      Rate and Rhythm: Normal rate and regular rhythm. Pulses: Normal pulses. Radial pulses are 2+ on the right side and 2+ on the left side. Femoral pulses are 2+ on the right side and 2+ on the left side. Dorsalis pedis pulses are 2+ on the right side and 2+ on the left side. Posterior tibial pulses are 2+ on the right side and 2+ on the left side. Heart sounds: No murmur heard.   Pulmonary: Effort: Pulmonary effort is normal.      Breath sounds: Normal breath sounds. Musculoskeletal:         General: No tenderness or signs of injury. Right lower leg: No edema. Left lower leg: No edema. Skin:     General: Skin is warm. Capillary Refill: Capillary refill takes less than 2 seconds. Neurological:      General: No focal deficit present. Mental Status: He is alert and oriented to person, place, and time. Gait: Gait abnormal.   Psychiatric:         Mood and Affect: Mood normal.         Behavior: Behavior normal.           I have reviewed and made appropriate changes to the review of systems input by the medical assistant.     Vitals:    09/14/23 0913 09/14/23 0915   BP: (!) 182/98 (!) 184/100   BP Location: Left arm Right arm   Patient Position: Sitting Sitting   Pulse: 78    Resp: 18    SpO2: 97%    Weight: 87.1 kg (192 lb)    Height: 5' 7" (1.702 m)        Patient Active Problem List   Diagnosis   • History of diverticulitis   • Bilateral nephrolithiasis   • Elevated serum creatinine   • Chronic back pain   • Status post carotid surgery   • Alcohol intake above recommended sensible limits   • Gross hematuria   • Encounter to discuss test results   • Family history of prostate cancer   • Erectile dysfunction due to arterial insufficiency   • Encounter for screening colonoscopy   • Primary hypertension   • Thalamic infarction ([address])   • Abnormal renal function   • Acute right arterial ischemic stroke, PCA (posterior cerebral artery) (HCC)   • Left Carotid Artery Stenosis s/p Left TCAR   • Symptomatic carotid artery stenosis, bilateral   • Essential hypertension   • Numbness   • High triglycerides       Past Surgical History:   Procedure Laterality Date   • CARPAL TUNNEL RELEASE Bilateral    • CERVICAL FUSION      with hardware   • CYSTOSCOPY     • HERNIA REPAIR     • IR CAROTID STENT  11/8/2022   • IR CAROTID STENT  1/19/2023   • SD TCAT IV STENT CRV CRTD ART EMBOLIC PROTECJ Right 2022    Procedure: ANGIOPLASTY ARTERY CAROTID W/ STENT TCAR,;  Surgeon: Kamlesh Rodriguez DO;  Location: [facility];  Service: Vascular   • CA TCAT IV STENT CRV CRTD ART EMBOLIC PROTECJ Left     Procedure: ANGIOPLASTY ARTERY CAROTID W/ STENT Left TCAR;  Surgeon: Kamlesh Rodriguez DO;  Location: [facility];  Service: Vascular   • ULNAR COLLATERAL LIGAMENT RECONSTRUCTION Bilateral        Family History   Problem Relation Age of Onset   • Heart attack Mother    • Diabetes Mother    • Hypertension Mother    • Colon cancer Father    • No Known Problems Sister    • No Known Problems Sister    • No Known Problems Sister    • No Known Problems Son        Social History     Socioeconomic History   • Marital status: /Civil Union     Spouse name: Not on file   • Number of children: Not on file   • Years of education: Not on file   • Highest education level: Not on file   Occupational History   • Not on file   Tobacco Use   • Smoking status: Former     Packs/day: 2.00     Years: 35.00     Total pack years: 70.00     Types: Cigarettes     Quit date: 2022     Years since quittin.1   • Smokeless tobacco: Never   Vaping Use   • Vaping Use: Every day   • Substances: Nicotine   Substance and Sexual Activity   • Alcohol use: Yes     Alcohol/week: 12.0 standard drinks of alcohol     Types: 12 Cans of beer per week     Comment: 12 cans  beer   weekly   • Drug use: Not Currently   • Sexual activity: Yes   Other Topics Concern   • Not on file   Social History Narrative   • Not on file     Social Determinants of Health     Financial Resource Strain: Not on file   Food Insecurity: No Food Insecurity (2023)    Hunger Vital Sign    • Worried About Running Out of Food in the Last Year: Never true    • Ran Out of Food in the Last Year: Never true   Transportation Needs: No Transportation Needs (2023)    PRAPARE - Transportation    • Lack of Transportation (Medical):  No    • Lack of Transportation (Non-Medical): No   Physical Activity: Not on file   Stress: Not on file   Social Connections: Not on file   Intimate Partner Violence: Not on file   Housing Stability: Low Risk  (1/20/2023)    Housing Stability Vital Sign    • Unable to Pay for Housing in the Last Year: No    • Number of Places Lived in the Last Year: 1    • Unstable Housing in the Last Year: No       Allergies   Allergen Reactions   • Penicillins Anaphylaxis         Current Outpatient Medications:   •  albuterol (PROVENTIL HFA,VENTOLIN HFA) 90 mcg/act inhaler, INHALE 2 PUFFS BY MOUTH EVERY 6 HOURS AS NEEDED FOR WHEEZING, Disp: 8.5 g, Rfl: 0  •  aspirin 81 mg chewable tablet, Chew 1 tablet (81 mg total) daily, Disp: 30 tablet, Rfl: 0  •  atorvastatin (LIPITOR) 80 mg tablet, Take 1 tablet (80 mg total) by mouth in the evening.  Take before meals, Disp: 90 tablet, Rfl: 1  •  b complex vitamins capsule, Take 1 capsule by mouth daily, Disp: 30 capsule, Rfl: 3  •  co-enzyme Q-10 100 mg capsule, Take 1 capsule (100 mg total) by mouth daily, Disp: 30 capsule, Rfl: 3  •  ferrous sulfate 325 (65 Fe) mg tablet, Take 325 mg by mouth daily with breakfast, Disp: , Rfl:   •  gabapentin (Neurontin) 300 mg capsule, Take 1 capsule (300 mg total) by mouth 2 (two) times a day, Disp: 60 capsule, Rfl: 3  •  losartan (COZAAR) 100 MG tablet, Take 1 tablet (100 mg total) by mouth daily, Disp: 90 tablet, Rfl: 1  •  metoprolol succinate (TOPROL-XL) 25 mg 24 hr tablet, Take 1 tablet (25 mg total) by mouth daily, Disp: 90 tablet, Rfl: 1  •  pantoprazole (PROTONIX) 40 mg tablet, Take 1 tablet (40 mg total) by mouth daily, Disp: 90 tablet, Rfl: 1  •  tamsulosin (FLOMAX) 0.4 mg, TAKE 1 CAPSULE BY MOUTH ONCE DAILY WITH SUPPER, Disp: 90 capsule, Rfl: 0  •  tiZANidine (ZANAFLEX) 2 mg tablet, TAKE 1 TABLET BY MOUTH EVERY 8 HOURS AS NEEDED FOR MUSCLE SPASM, Disp: 30 tablet, Rfl: 2  •  Multiple Vitamin (multivitamin) tablet, Take 1 tablet by mouth if needed (Patient not taking: Reported on 6/26/2023), Disp: , Rfl:   I have spent a total time of 30 minutes on 09/14/23 in caring for this patient including Diagnostic results, Risks and benefits of tx options, Instructions for management, Patient and family education, Importance of tx compliance, Risk factor reductions, Documenting in the medical record, Reviewing / ordering tests, medicine, procedures   and Obtaining or reviewing history  .

## 2023-09-28 ENCOUNTER — OFFICE VISIT (OUTPATIENT)
Dept: FAMILY MEDICINE CLINIC | Facility: CLINIC | Age: 53
End: 2023-09-28
Payer: COMMERCIAL

## 2023-09-28 VITALS
WEIGHT: 190 LBS | SYSTOLIC BLOOD PRESSURE: 180 MMHG | HEART RATE: 80 BPM | OXYGEN SATURATION: 98 % | BODY MASS INDEX: 29.82 KG/M2 | DIASTOLIC BLOOD PRESSURE: 108 MMHG | RESPIRATION RATE: 18 BRPM | TEMPERATURE: 97.1 F | HEIGHT: 67 IN

## 2023-09-28 DIAGNOSIS — K21.9 GASTROESOPHAGEAL REFLUX DISEASE WITHOUT ESOPHAGITIS: ICD-10-CM

## 2023-09-28 DIAGNOSIS — R29.90 STROKE-LIKE SYMPTOMS: ICD-10-CM

## 2023-09-28 DIAGNOSIS — M54.50 CHRONIC LEFT-SIDED LOW BACK PAIN WITHOUT SCIATICA: ICD-10-CM

## 2023-09-28 DIAGNOSIS — R31.0 GROSS HEMATURIA: ICD-10-CM

## 2023-09-28 DIAGNOSIS — R09.89 SYMPTOMS OF CEREBROVASCULAR ACCIDENT (CVA): ICD-10-CM

## 2023-09-28 DIAGNOSIS — M54.2 CERVICALGIA: ICD-10-CM

## 2023-09-28 DIAGNOSIS — I10 PRIMARY HYPERTENSION: ICD-10-CM

## 2023-09-28 DIAGNOSIS — I63.531 ACUTE RIGHT ARTERIAL ISCHEMIC STROKE, PCA (POSTERIOR CEREBRAL ARTERY) (HCC): ICD-10-CM

## 2023-09-28 DIAGNOSIS — I63.9 CVA (CEREBRAL VASCULAR ACCIDENT) (HCC): ICD-10-CM

## 2023-09-28 DIAGNOSIS — I63.81 THALAMIC INFARCTION (HCC): ICD-10-CM

## 2023-09-28 DIAGNOSIS — G89.29 CHRONIC LEFT-SIDED LOW BACK PAIN WITHOUT SCIATICA: ICD-10-CM

## 2023-09-28 PROCEDURE — 99214 OFFICE O/P EST MOD 30 MIN: CPT | Performed by: FAMILY MEDICINE

## 2023-09-28 RX ORDER — GABAPENTIN 100 MG/1
200 CAPSULE ORAL 3 TIMES DAILY
Qty: 540 CAPSULE | Refills: 0 | Status: SHIPPED | OUTPATIENT
Start: 2023-09-28 | End: 2023-12-27

## 2023-09-28 RX ORDER — TIZANIDINE 2 MG/1
2 TABLET ORAL
Qty: 90 TABLET | Refills: 1 | Status: SHIPPED | OUTPATIENT
Start: 2023-09-28 | End: 2023-10-02

## 2023-09-28 RX ORDER — AMLODIPINE BESYLATE 10 MG/1
10 TABLET ORAL DAILY
Qty: 90 TABLET | Refills: 1 | Status: SHIPPED | OUTPATIENT
Start: 2023-09-28

## 2023-09-28 RX ORDER — METOPROLOL SUCCINATE 25 MG/1
25 TABLET, EXTENDED RELEASE ORAL DAILY
Qty: 90 TABLET | Refills: 1 | Status: SHIPPED | OUTPATIENT
Start: 2023-09-28

## 2023-09-28 RX ORDER — LOSARTAN POTASSIUM 100 MG/1
100 TABLET ORAL DAILY
Qty: 90 TABLET | Refills: 1 | Status: SHIPPED | OUTPATIENT
Start: 2023-09-28

## 2023-09-28 RX ORDER — TAMSULOSIN HYDROCHLORIDE 0.4 MG/1
0.4 CAPSULE ORAL
Qty: 90 CAPSULE | Refills: 0 | Status: SHIPPED | OUTPATIENT
Start: 2023-09-28

## 2023-09-28 RX ORDER — ATORVASTATIN CALCIUM 80 MG/1
80 TABLET, FILM COATED ORAL
Qty: 90 TABLET | Refills: 1 | Status: SHIPPED | OUTPATIENT
Start: 2023-09-28

## 2023-09-28 RX ORDER — PANTOPRAZOLE SODIUM 40 MG/1
40 TABLET, DELAYED RELEASE ORAL DAILY
Qty: 90 TABLET | Refills: 1 | Status: SHIPPED | OUTPATIENT
Start: 2023-09-28

## 2023-09-28 NOTE — PROGRESS NOTES
Subjective:      Patient ID: Hue Rodriguez is a 47 y.o. male. With Thalamic stroke, with residual LLE weakness, dysathria -improved, brain fog-recent memory issues- completed PT, imbalance-dizziness and impaired depth perception since his thalamic stroke. Left leg numbness and pressure on left side of neck    Complains of Lower back pain and left sided back stiffness  Orthostatic dizziness  Hematuria-resolved  He has been feeling sedated on gabapentin 300 mg bid and hence stopped it completely  Does report back pain and right sided stiffness  He is unable to work, walks much slower, does have occasional imbalance, unable to use left upper extremity completely  He worked in construction previously, applied for disability now. He was seen by vascular surgery PA ,BP was elevated in their office 180s/100s was told to follow up with PCP  Current denies headaches, does have rhinorrhea-did not use flonase as advised.       Past Medical History:   Diagnosis Date   • Carotid stenosis, left     today  1/19/2023  L carotid angioplasty   • Carotid stenosis, right     Rt angioplasty completed  11/2023   • Cervical disc disease    • COVID 05/2020   • GERD (gastroesophageal reflux disease)    • Hyperlipidemia    • Hypertension    • Kidney stone    • Muscle weakness    • Spinal stenosis    • Stroke ([address]) 09/13/2022    left sided weakness with pins/needles   • Weakness of left side of body 09/13/2022    s/p CVA       Family History   Problem Relation Age of Onset   • Heart attack Mother    • Diabetes Mother    • Hypertension Mother    • Colon cancer Father    • No Known Problems Sister    • No Known Problems Sister    • No Known Problems Sister    • No Known Problems Son        Past Surgical History:   Procedure Laterality Date   • CARPAL TUNNEL RELEASE Bilateral    • CERVICAL FUSION      with hardware   • CYSTOSCOPY     • HERNIA REPAIR     • IR CAROTID STENT  11/8/2022   • IR CAROTID STENT  1/19/2023   • NM TCAT IV STENT CRV CRTD ART EMBOLIC PROTECJ Right 46/0/5999    Procedure: ANGIOPLASTY ARTERY CAROTID W/ STENT TCAR,;  Surgeon: Ruel Shaw DO;  Location: [facility];  Service: Vascular   • [place] TCAT IV STENT CRV CRTD ART EMBOLIC PROTECJ Left 6/53/8116    Procedure: ANGIOPLASTY ARTERY CAROTID W/ STENT Left TCAR;  Surgeon: Ruel Shaw DO;  Location: [facility];  Service: Vascular   • ULNAR COLLATERAL LIGAMENT RECONSTRUCTION Bilateral         reports that he quit smoking about 13 months ago. His smoking use included cigarettes. He has a 70.00 pack-year smoking history. He has never used smokeless tobacco. He reports current alcohol use of about 12.0 standard drinks of alcohol per week. He reports that he does not currently use drugs. Current Outpatient Medications:   •  albuterol (PROVENTIL HFA,VENTOLIN HFA) 90 mcg/act inhaler, INHALE 2 PUFFS BY MOUTH EVERY 6 HOURS AS NEEDED FOR WHEEZING, Disp: 8.5 g, Rfl: 0  •  amLODIPine (NORVASC) 10 mg tablet, Take 1 tablet (10 mg total) by mouth daily, Disp: 90 tablet, Rfl: 1  •  aspirin 81 mg chewable tablet, Chew 1 tablet (81 mg total) daily, Disp: 30 tablet, Rfl: 0  •  atorvastatin (LIPITOR) 80 mg tablet, Take 1 tablet (80 mg total) by mouth in the evening.  Take before meals, Disp: 90 tablet, Rfl: 1  •  b complex vitamins capsule, Take 1 capsule by mouth daily, Disp: 30 capsule, Rfl: 3  •  co-enzyme Q-10 100 mg capsule, Take 1 capsule (100 mg total) by mouth daily, Disp: 30 capsule, Rfl: 3  •  Cyanocobalamin (VITAMIN B12 PO), Take by mouth, Disp: , Rfl:   •  ferrous sulfate 325 (65 Fe) mg tablet, Take 325 mg by mouth daily with breakfast, Disp: , Rfl:   •  gabapentin (NEURONTIN) 100 mg capsule, Take 2 capsules (200 mg total) by mouth 3 (three) times a day, Disp: 540 capsule, Rfl: 0  •  losartan (COZAAR) 100 MG tablet, Take 1 tablet (100 mg total) by mouth daily, Disp: 90 tablet, Rfl: 1  •  metoprolol succinate (TOPROL-XL) 25 mg 24 hr tablet, Take 1 tablet (25 mg total) by mouth daily, Disp: 90 tablet, Rfl: 1  •  pantoprazole (PROTONIX) 40 mg tablet, Take 1 tablet (40 mg total) by mouth daily, Disp: 90 tablet, Rfl: 1  •  tamsulosin (FLOMAX) 0.4 mg, Take 1 capsule (0.4 mg total) by mouth daily with dinner, Disp: 90 capsule, Rfl: 0  •  tiZANidine (ZANAFLEX) 2 mg tablet, Take 1 tablet (2 mg total) by mouth daily at bedtime, Disp: 90 tablet, Rfl: 1  •  Multiple Vitamin (multivitamin) tablet, Take 1 tablet by mouth if needed (Patient not taking: Reported on 6/26/2023), Disp: , Rfl:     The following portions of the patient's history were reviewed and updated as appropriate: allergies, current medications, past family history, past medical history, past social history, past surgical history and problem list.    Review of Systems   Constitutional: Negative for chills and fever. HENT: Positive for rhinorrhea and trouble swallowing. Negative for congestion and sore throat. Eyes: Positive for visual disturbance. Negative for discharge, redness and itching. Respiratory: Negative for chest tightness, shortness of breath and wheezing. Cardiovascular: Negative for chest pain and palpitations. Gastrointestinal: Negative for abdominal pain, constipation and diarrhea. Genitourinary: Negative for dysuria. Musculoskeletal: Positive for arthralgias, back pain and gait problem. Negative for myalgias. Skin: Negative for pallor and rash. Neurological: Positive for dizziness (upon standing). Negative for weakness, numbness and headaches.          PHQ-2/9 Depression Screening    Little interest or pleasure in doing things: 0 - not at all  Feeling down, depressed, or hopeless: 0 - not at all  PHQ-2 Score: 0  PHQ-2 Interpretation: Negative depression screen             Objective:    BP (!) 180/108 (BP Location: Left arm, Patient Position: Sitting, Cuff Size: Adult)   Pulse 80   Temp (!) 97.1 °F (36.2 °C) (Tympanic)   Resp 18   Ht 5' 7" (1.702 m)   Wt 86.2 kg (190 lb)   SpO2 98%   BMI 29.76 kg/m² Physical Exam  Vitals and nursing note reviewed. Constitutional:       Appearance: Normal appearance. HENT:      Right Ear: External ear normal.      Left Ear: External ear normal.   Eyes:      General:         Right eye: No discharge. Left eye: No discharge. Conjunctiva/sclera: Conjunctivae normal.   Cardiovascular:      Rate and Rhythm: Normal rate and regular rhythm. Heart sounds: No murmur heard. Pulmonary:      Effort: Pulmonary effort is normal.      Breath sounds: Normal breath sounds. No wheezing. Abdominal:      General: There is no distension. Palpations: Abdomen is soft. Tenderness: There is no abdominal tenderness. Musculoskeletal:      Right lower leg: No edema. Left lower leg: No edema. Skin:     Findings: No lesion or rash. Neurological:      Mental Status: He is alert. Mental status is at baseline. Cranial Nerves: No cranial nerve deficit. Motor: Weakness (4/5  LLE) present. Coordination: Coordination abnormal.      Gait: Gait abnormal.      Deep Tendon Reflexes: Reflexes abnormal.      Reflex Scores:       Patellar reflexes are 2+ on the right side and 1+ on the left side. Psychiatric:         Mood and Affect: Mood normal.         Thought Content:  Thought content normal.           Recent Results (from the past 8736 hour(s))   PSA Total, Diagnostic    Collection Time: 10/03/22 10:13 AM   Result Value Ref Range    PSA, Diagnostic 1.6 0.0 - 4.0 ng/mL   Basic metabolic panel    Collection Time: 10/03/22 10:13 AM   Result Value Ref Range    Sodium 139 135 - 147 mmol/L    Potassium 3.8 3.5 - 5.3 mmol/L    Chloride 110 (H) 96 - 108 mmol/L    CO2 23 21 - 32 mmol/L    ANION GAP 6 4 - 13 mmol/L    BUN 13 5 - 25 mg/dL    Creatinine 1.14 0.60 - 1.30 mg/dL    Glucose, Fasting 78 65 - 99 mg/dL    Calcium 9.5 8.3 - 10.1 mg/dL    eGFR 74 ml/min/1.73sq m   Cytology, urine    Collection Time: 10/03/22 10:19 AM   Result Value Ref Range    Case Report Non-gynecologic Cytology                          Case: [id number]                                  Authorizing Provider:  Emilie Calvo MD        Collected:           10/03/2022 1019              Ordering Location:     [address] [place] [address] Drive For        Received:            10/03/2022 [facility] [place]                                     Urology [facility]                                                               Pathologist:           Brenda Lloyd DO                                                     Specimen:    Urine, Clean Catch                                                                         Final Diagnosis       A. Urine, Clean Catch (ThinPrep):  - Negative for high grade urothelial carcinoma ([address]). See Note. - Abundant red blood cells, rare benign and degenerated urothelial cells, and mixed inflammatory cells present. Note       The above diagnostic category is from the recently published book, The [address] [place] [address] Street for Reporting Urinary Cytology, and is in keeping with the ongoing effort for utilization of standardized diagnostic terminology in urine cytology. *    *The [address] [place] [address] Street for Reporting Urinary Cytology. Jenifer King; 2016. Gross Description          A.  15mL, dark yellow, cloudy     Additional Information       [email or web address]'s FDA approved ,  and ThinPrep Imaging Duo System are utilized with strict adherence to the 's instruction manual to prepare gynecologic and non-gynecologic cytology specimens for the production of ThinPrep slides as well as for gynecologic ThinPrep imaging. These processes have been validated by our laboratory and/or by the . These tests were developed and their performance characteristics determined by [facility] or [address] [place] [address] Ave [address]. They may not be cleared or approved by the U.S. Food and Drug Administration.  The FDA has determined that such clearance or approval is not necessary. These tests are used for clinical purposes. They should not be regarded as investigational or for research. This laboratory has been approved by CLIA , designated as a high-complexity laboratory and is qualified to perform these tests.      Interpretation performed at [facility], [address] [place] [address]     POCT urine dip    Collection Time: 11/03/22  5:23 PM   Result Value Ref Range    LEUKOCYTE ESTERASE,UA neg     NITRITE,UA neg     SL AMB POCT UROBILINOGEN 0.2     POCT URINE PROTEIN neg      PH,UA 7     BLOOD,UA neg     SPECIFIC GRAVITY,UA 1.010     KETONES,UA neg     BILIRUBIN,UA neg     GLUCOSE, UA neg    Aldosterone/Renin Ratio    Collection Time: 11/04/22  9:15 AM   Result Value Ref Range    Renin 7.864 (H) 0.167 - 5.380 ng/mL/hr    Aldosterone 3.8 0.0 - 30.0 ng/dL    Aldos/Renin Ratio 0.5 0.0 - 30.0   Type and screen    Collection Time: 11/04/22  9:15 AM   Result Value Ref Range    ABO Grouping O     Rh Factor Positive     Antibody Screen Negative     Specimen Expiration Date [id number]    MISCELLANEOUS LAB TEST    Collection Time: 11/04/22  9:28 AM   Result Value Ref Range    Miscellaneous Lab Test Result SEE WRITTEN REPORT    Type and screen    Collection Time: 11/08/22  6:41 AM   Result Value Ref Range    ABO Grouping O     Rh Factor Positive     Antibody Screen Negative     Specimen Expiration Date [id number]    POCT activated clotting time    Collection Time: 11/08/22  8:41 AM   Result Value Ref Range    Activated Clotting Time, i-STAT 140 (H) 89 - 137 sec    Specimen Type ARTERIAL    POCT activated clotting time    Collection Time: 11/08/22  9:20 AM   Result Value Ref Range    Activated Clotting Time, i-STAT 309 (H) 89 - 137 sec    Specimen Type ARTERIAL    POCT activated clotting time    Collection Time: 11/08/22 10:10 AM   Result Value Ref Range    Activated Clotting Time, i-STAT 146 (H) 89 - 137 sec    Specimen Type ARTERIAL    Basic Metabolic Panel Collection Time: 11/09/22  5:07 AM   Result Value Ref Range    Sodium 140 135 - 147 mmol/L    Potassium 3.7 3.5 - 5.3 mmol/L    Chloride 107 96 - 108 mmol/L    CO2 24 21 - 32 mmol/L    ANION GAP 9 4 - 13 mmol/L    BUN 9 5 - 25 mg/dL    Creatinine 1.00 0.60 - 1.30 mg/dL    Glucose 90 65 - 140 mg/dL    Calcium 8.3 8.3 - 10.1 mg/dL    eGFR 87 ml/min/1.73sq m   CBC and Platelet    Collection Time: 11/09/22  5:07 AM   Result Value Ref Range    WBC 7.45 4.31 - 10.16 Thousand/uL    RBC 3.47 (L) 3.88 - 5.62 Million/uL    Hemoglobin 11.4 (L) 12.0 - 17.0 g/dL    Hematocrit 32.8 (L) 36.5 - 49.3 %    MCV 95 82 - 98 fL    MCH 32.9 26.8 - 34.3 pg    MCHC 34.8 31.4 - 37.4 g/dL    RDW 12.7 11.6 - 15.1 %    Platelets 736 747 - 677 Thousands/uL    MPV 9.5 8.9 - 12.7 fL   APTT    Collection Time: 11/09/22  5:07 AM   Result Value Ref Range    PTT 29 23 - 37 seconds   Protime-INR    Collection Time: 11/09/22  5:07 AM   Result Value Ref Range    Protime 13.5 11.6 - 14.5 seconds    INR 1.03 0.84 - 1.19   CBC and Platelet    Collection Time: 01/12/23 10:14 AM   Result Value Ref Range    WBC 8.20 4. 31 - 10.16 Thousand/uL    RBC 4.96 3.88 - 5.62 Million/uL    Hemoglobin 15.6 12.0 - 17.0 g/dL    Hematocrit 47.2 36.5 - 49.3 %    MCV 95 82 - 98 fL    MCH 31.5 26.8 - 34.3 pg    MCHC 33.1 31.4 - 37.4 g/dL    RDW 12.4 11.6 - 15.1 %    Platelets 299 638 - 784 Thousands/uL    MPV 9.8 8.9 - 12.7 fL   Basic metabolic panel    Collection Time: 01/12/23 10:14 AM   Result Value Ref Range    Sodium 141 135 - 147 mmol/L    Potassium 4.8 3.5 - 5.3 mmol/L    Chloride 111 (H) 96 - 108 mmol/L    CO2 25 21 - 32 mmol/L    ANION GAP 5 4 - 13 mmol/L    BUN 17 5 - 25 mg/dL    Creatinine 1.25 0.60 - 1.30 mg/dL    Glucose, Fasting 142 (H) 65 - 99 mg/dL    Calcium 9.8 8.3 - 10.1 mg/dL    eGFR 66 ml/min/1.73sq m   Type and screen    Collection Time: 01/12/23 10:14 AM   Result Value Ref Range    ABO Grouping O     Rh Factor Positive     Antibody Screen Negative Specimen Expiration Date 14404009    POCT activated clotting time    Collection Time: 01/19/23  9:05 AM   Result Value Ref Range    Activated Clotting Time, i-STAT 144 (H) 89 - 137 sec    Specimen Type ARTERIAL    POCT activated clotting time    Collection Time: 01/19/23  9:38 AM   Result Value Ref Range    Activated Clotting Time, i-STAT 348 (H) 89 - 137 sec    Specimen Type ARTERIAL    Basic Metabolic Panel    Collection Time: 01/20/23  5:46 AM   Result Value Ref Range    Sodium 135 135 - 147 mmol/L    Potassium 3.7 3.5 - 5.3 mmol/L    Chloride 106 96 - 108 mmol/L    CO2 19 (L) 21 - 32 mmol/L    ANION GAP 10 4 - 13 mmol/L    BUN 10 5 - 25 mg/dL    Creatinine 1.06 0.60 - 1.30 mg/dL    Glucose 116 65 - 140 mg/dL    Calcium 9.0 8.4 - 10.2 mg/dL    eGFR 80 ml/min/1.73sq m   CBC and Platelet    Collection Time: 01/20/23  5:46 AM   Result Value Ref Range    WBC 15.60 (H) 4.31 - 10.16 Thousand/uL    RBC 4.33 3.88 - 5.62 Million/uL    Hemoglobin 14.0 12.0 - 17.0 g/dL    Hematocrit 40.8 36.5 - 49.3 %    MCV 94 82 - 98 fL    MCH 32.3 26.8 - 34.3 pg    MCHC 34.3 31.4 - 37.4 g/dL    RDW 12.3 11.6 - 15.1 %    Platelets 928 397 - 039 Thousands/uL    MPV 10.0 8.9 - 12.7 fL   APTT    Collection Time: 01/20/23  5:46 AM   Result Value Ref Range    PTT 29 23 - 37 seconds   Protime-INR    Collection Time: 01/20/23  5:46 AM   Result Value Ref Range    Protime 12.7 11.6 - 14.5 seconds    INR 0.95 0.84 - 1.19   Urinalysis with microscopic    Collection Time: 01/20/23  9:44 AM   Result Value Ref Range    Color, UA Light Yellow     Clarity, UA Cloudy     Specific Gravity, UA 1.010 1.003 - 1.030    pH, UA 6.0 4.5, 5.0, 5.5, 6.0, 6.5, 7.0, 7.5, 8.0    Leukocytes, UA Negative Negative    Nitrite, UA Negative Negative    Protein, UA Negative Negative mg/dl    Glucose, UA Negative Negative mg/dl    Ketones, UA Negative Negative mg/dl    Urobilinogen, UA 0.2 0.2, 1.0 E.U./dl E.U./dl    Bilirubin, UA Negative Negative    Occult Blood, UA Large (A) Negative    RBC, UA Innumerable (A) None Seen, 2-4 /hpf    WBC, UA 1-2 (A) None Seen, 2-4, 5-60 /hpf    Epithelial Cells Occasional None Seen, Occasional /hpf    Bacteria, UA Occasional None Seen, Occasional /hpf   Lipid panel    Collection Time: 03/01/23 11:15 AM   Result Value Ref Range    Cholesterol 183 See Comment mg/dL    Triglycerides 229 (H) See Comment mg/dL    HDL, Direct 61 >=40 mg/dL    LDL Calculated 76 0 - 100 mg/dL    Non-HDL-Chol (CHOL-HDL) 122 mg/dl       Laboratory Results: I have personally reviewed the pertinent laboratory results/reports     Radiology/Other Diagnostic Testing Results: I have personally reviewed pertinent reports. VAS carotid complete study    Result Date: 8/15/2023   [facility] REPORT CLINICAL: Indications: 3 month surveillance of carotid artery disease s/p bilateral TCAR. Patient reports left sided neck pain and tightness radiating to his jaw and forehead s/p left TCAR procedure. Operative History: 2023-01-19 Left TCAR 2022-11-08 Right TCAR Risk Factors The patient has history of HTN, Hyperlipidemia, CASEY, CVA and previous smoking (quit 1-5yrs ago). Clinical Right Pressure: 148/98 mm Hg, Left Pressure: 152/98 mm Hg. FINDINGS:  Right              Impression       PSV  EDV (cm/s)  Ratio  Dist. ICA                            87          34   1.21  Mid. ICA                             58          23   0.82  Prox.  ICA - Stent  Widely Patent     69          23   0.96  Dist CCA                             67          23         Mid CCA                              72          23   0.94  Prox CCA                             76          20         Ext Carotid        Severe stenosis  418          91   5.83  Prox Vert                            81          26         Subclavian                          249           0          Left               Impression       PSV  EDV (cm/s)  Direction of Flow  Ratio  Dist. ICA                            89          37 1.23  Mid. ICA                            100          36                      1.39  Prox. ICA - Stent  Widely Patent     71          24                      0.99  Dist CCA                             49          18                            Mid CCA                              72          20                      0.60  Prox CCA                            121          28                            Ext Carotid        Severe stenosis  325          61                      4.51  Prox Vert                            82          18  Antegrade                 Subclavian                          239           0                               CONCLUSION:  Impression  RIGHT: Widely patent internal carotid artery and stent. Evidence of severe stenosis of the proximal external carotid artery. Vertebral artery flow is antegrade. There is no significant subclavian artery disease. LEFT: Widely patent internal carotid artery and stent. Evidence of severe stenosis of the proximal external carotid artery. Vertebral artery flow is antegrade. There is no significant subclavian artery disease. Compared to previous study on 5/12/2023, there is no significant progression of disease.   SIGNATURE: Electronically Signed by: Zach Todd DO on 2023-08-15 06:20:39 PM       Assessment/Plan:  Problem List Items Addressed This Visit        Cardiovascular and Mediastinum    Primary hypertension (Chronic)    Relevant Medications    amLODIPine (NORVASC) 10 mg tablet    metoprolol succinate (TOPROL-XL) 25 mg 24 hr tablet    losartan (COZAAR) 100 MG tablet    Acute right arterial ischemic stroke, PCA (posterior cerebral artery) (HCC)    Relevant Medications    atorvastatin (LIPITOR) 80 mg tablet    losartan (COZAAR) 100 MG tablet       Nervous and Auditory    Thalamic infarction (HCC)    Relevant Medications    gabapentin (NEURONTIN) 100 mg capsule       Genitourinary    Gross hematuria    Relevant Medications    tamsulosin (FLOMAX) 0.4 mg Other    Chronic back pain    Relevant Medications    gabapentin (NEURONTIN) 100 mg capsule   Other Visit Diagnoses     Stroke-like symptoms        Relevant Medications    atorvastatin (LIPITOR) 80 mg tablet    losartan (COZAAR) 100 MG tablet    Cervicalgia        Relevant Medications    tiZANidine (ZANAFLEX) 2 mg tablet    Symptoms of cerebrovascular accident (CVA)        Relevant Medications    losartan (COZAAR) 100 MG tablet    CVA (cerebral vascular accident) ([address])        Relevant Medications    losartan (COZAAR) 100 MG tablet    Gastroesophageal reflux disease without esophagitis        Relevant Medications    pantoprazole (PROTONIX) 40 mg tablet            S/p thalamic stroke, BP is very high today- goal 130/80, RECOMMED RESUMING Amlodipine 10 mg daily  Will start gabapentin 100 mg tid and then can increase to 200 mg tid titrate up as tolerated and discussed. Tizanidine at bedtime for pain. Tylenol prn for pain  Continue other medications  Reviewed Vascular NPP note and neurology note, no additional contribution to current plan  Continue statin LDL goal <70. Read package inserts for all medications before starting a new medications, call me if you have any questions. Patient was given opportunity to ask questions and all questions were answered. Disclaimer: Portions of the record may have been created with voice recognition software. Occasional wrong word or "sound a like" substitutions may have occurred due to the inherent limitations of voice recognition software. Read the chart carefully and recognize, using context, where substitutions have occurred. I have used the Epic copy/forward function to compose this note. I have reviewed my current note to ensure it reflects the current patient status, exam, assessment and plan.

## 2023-09-29 ENCOUNTER — TELEPHONE (OUTPATIENT)
Age: 53
End: 2023-09-29

## 2023-09-29 DIAGNOSIS — M54.2 CERVICALGIA: Primary | ICD-10-CM

## 2023-09-29 NOTE — TELEPHONE ENCOUNTER
Pts wife called stating that we were going to send a different muscle relaxer in for the pt. Tizanidine was sent in but that is the one he has been taking. Please advise.

## 2023-10-02 RX ORDER — BACLOFEN 10 MG/1
10 TABLET ORAL 3 TIMES DAILY
Qty: 90 TABLET | Refills: 0 | Status: SHIPPED | OUTPATIENT
Start: 2023-10-02

## 2023-10-26 ENCOUNTER — TELEPHONE (OUTPATIENT)
Age: 53
End: 2023-10-26

## 2023-10-26 DIAGNOSIS — M54.2 CERVICALGIA: Primary | ICD-10-CM

## 2023-10-26 DIAGNOSIS — R06.09 DYSPNEA ON EXERTION: ICD-10-CM

## 2023-10-26 RX ORDER — TIZANIDINE 2 MG/1
2 TABLET ORAL EVERY 8 HOURS PRN
Qty: 60 TABLET | Refills: 2 | Status: SHIPPED | OUTPATIENT
Start: 2023-10-26

## 2023-10-26 RX ORDER — ALBUTEROL SULFATE 90 UG/1
AEROSOL, METERED RESPIRATORY (INHALATION)
Qty: 9 G | Refills: 0 | Status: SHIPPED | OUTPATIENT
Start: 2023-10-26

## 2023-10-26 NOTE — TELEPHONE ENCOUNTER
Patient would like to go back on tiZANidine (ZANAFLEX) 2 mg tablet  which works the best for the patient.     Authorizing Provider:  Diego Armijo MD     Please Advise: Call Wife Please: [phone] -Wife    Pharmacy: [facility] [place]

## 2023-11-03 ENCOUNTER — OFFICE VISIT (OUTPATIENT)
Dept: NEUROLOGY | Facility: CLINIC | Age: 53
End: 2023-11-03
Payer: COMMERCIAL

## 2023-11-03 VITALS
WEIGHT: 190 LBS | SYSTOLIC BLOOD PRESSURE: 140 MMHG | DIASTOLIC BLOOD PRESSURE: 84 MMHG | HEIGHT: 67 IN | BODY MASS INDEX: 29.82 KG/M2 | HEART RATE: 87 BPM

## 2023-11-03 DIAGNOSIS — R42 DIZZINESS AND GIDDINESS: ICD-10-CM

## 2023-11-03 DIAGNOSIS — R53.1 LEFT-SIDED WEAKNESS: ICD-10-CM

## 2023-11-03 DIAGNOSIS — I65.22 LEFT CAROTID ARTERY STENOSIS: Primary | ICD-10-CM

## 2023-11-03 DIAGNOSIS — Z98.890 STATUS POST CAROTID SURGERY: ICD-10-CM

## 2023-11-03 PROCEDURE — 99215 OFFICE O/P EST HI 40 MIN: CPT | Performed by: PSYCHIATRY & NEUROLOGY

## 2023-11-03 RX ORDER — AMITRIPTYLINE HYDROCHLORIDE 10 MG/1
10 TABLET, FILM COATED ORAL
Qty: 30 TABLET | Refills: 3 | Status: SHIPPED | OUTPATIENT
Start: 2023-11-03

## 2023-11-03 NOTE — PROGRESS NOTES
Patient ID: Neetu Robledo is a 47 y.o. male. Assessment/Plan: This is a 46 y/o  Male who is here as a follow up for right thalamic stroke, and has left sided numbness and has post stroke pain syndrome. Dizziness is most concerning for either orthostatic hypotension, vs cardiac component. Recommend hydration, and refer patient to cardiology for further workup. Also defer to PCP regarding whether or not it is a medication side effect. PLAN:      Diagnoses and all orders for this visit:    Post stroke pain syndrome  C/w gabapentin 200mg PO TID  Start patient on amitripytline 10mg at bedtime, had a reaction when he tried it in 2000 and cannot remember what it was, but is willing to give it a try again. Left Carotid Artery Stenosis s/p Left TCAR  -continue with aspirin and atorvastatin  -     Ambulatory Referral to Biofeedback Therapy; Future    Left-sided weakness  -patient has post stroke pain syndrome, might benefit from CBT  -     Ambulatory Referral to Biofeedback Therapy; Future    Dizziness and giddiness  -refer patient to cardiology   -     Ambulatory Referral to Cardiology; Future  -     amitriptyline (ELAVIL) 10 mg tablet; Take 1 tablet (10 mg total) by mouth daily at bedtime    Status post carotid surgery    Stroke  -for secondary stroke prevention, recommend continuation of combination of aspirin and atorvastatin  -Blood Pressure goal < 130/80, BP is at goal in the office. -LDL goal <70  -I advised patient to avoid using NSAIDs for headaches or other pain and to stick to tylenol if needed  -Recommend lifestyle modifications such as mediterranean diet & regular exercise regimen atleast 4-5 times a week for 20-30 minutes.    -I educated patient/family regarding medication compliance  -encourage smoking cessation, and control of diabetes and hypertension; defer management to primary        Follow up in 4 months     I would be happy to see the patient sooner if any new questions/concerns arise. Patient/Guardian was advised to the call the office if they have any questions and concerns in the meantime. Patient/Guardian does understand that if they have any new stroke like symptoms such as facial droop on one side, weakness/paralysis on either side, speech trouble, numbness on one side, balance issues, any vision changes, extreme dizziness or any new headache, to call 9-1-1 immediately or to proceed to the nearest ER immediately. I have spent a total time of 40 minutes on 11/03/23 in caring for this patient including Risks and benefits of tx options, Instructions for management, Counseling / Coordination of care, Documenting in the medical record, and Reviewing / ordering tests, medicine, procedures  . Subjective:    HPI    50-year-old male who is here as a follow-up of history of right thalamic stroke with left-sided numbness. He is extremely anxious today and that his left-sided pain is not improving does not feel like he is noticing a difference with gabapentin. He is also having back pain. Not want to see pain management at this time. He is compliant with aspirin and Lipitor and is doing well. Denies any new TIA or CVA-like symptoms but is complaining of dizziness and this left-sided headache and had 2 episodes of passing out which were preceded with like feeling of passing out and not vertigo-like sensation. Is always happening when he is going from sitting to standing and not when he is laying down or sitting. Dates that it is definitely positional dizziness does not change with head movements.     The following portions of the patient's history were reviewed and updated as appropriate: He  has a past medical history of Carotid stenosis, left, Carotid stenosis, right, Cervical disc disease, COVID (05/2020), GERD (gastroesophageal reflux disease), Hyperlipidemia, Hypertension, Kidney stone, Muscle weakness, Spinal stenosis, Stroke ([address]) (09/13/2022), and Weakness of left side of body (09/13/2022). He   Patient Active Problem List    Diagnosis Date Noted    Dizziness and giddiness 11/03/2023    High triglycerides 09/14/2023    Numbness 08/03/2023    Essential hypertension 04/11/2023    Symptomatic carotid artery stenosis, bilateral 12/06/2022    Left Carotid Artery Stenosis s/p Left TCAR 11/17/2022    Acute right arterial ischemic stroke, PCA (posterior cerebral artery) ([address]) 11/08/2022    Primary hypertension 11/03/2022    Thalamic infarction ([address]) 11/03/2022    Abnormal renal function 11/03/2022    Family history of prostate cancer 10/03/2022    Erectile dysfunction due to arterial insufficiency 10/03/2022    Encounter for screening colonoscopy 10/03/2022    Gross hematuria 09/28/2022    Encounter to discuss test results 09/28/2022    Alcohol intake above recommended sensible limits 09/14/2022    Status post carotid surgery 09/13/2022    Chronic back pain 10/18/2021    History of diverticulitis 10/16/2021    Bilateral nephrolithiasis 10/16/2021    Elevated serum creatinine 10/16/2021     He  has a past surgical history that includes Ulnar collateral ligament reconstruction (Bilateral); Carpal tunnel release (Bilateral); Cervical fusion; Hernia repair; Cystoscopy; pr tcat iv stent crv crtd art embolic protecj (Right, 26/2/9602); IR carotid stent (11/8/2022); pr tcat iv stent crv crtd art embolic protecj (Left, 0/56/3472); and IR carotid stent (1/19/2023). His family history includes Colon cancer in his father; Diabetes in his mother; Heart attack in his mother; Hypertension in his mother; No Known Problems in his sister, sister, sister, and son. He  reports that he quit smoking about 15 months ago. His smoking use included cigarettes. He has a 70.00 pack-year smoking history. He has never used smokeless tobacco. He reports current alcohol use of about 12.0 standard drinks of alcohol per week. He reports that he does not currently use drugs.   Current Outpatient Medications Medication Sig Dispense Refill    albuterol (PROVENTIL HFA,VENTOLIN HFA) 90 mcg/act inhaler INHALE 2 PUFFS BY MOUTH EVERY 6 HOURS AS NEEDED FOR WHEEZING 9 g 0    amitriptyline (ELAVIL) 10 mg tablet Take 1 tablet (10 mg total) by mouth daily at bedtime 30 tablet 3    amLODIPine (NORVASC) 10 mg tablet Take 1 tablet (10 mg total) by mouth daily 90 tablet 1    aspirin 81 mg chewable tablet Chew 1 tablet (81 mg total) daily 30 tablet 0    atorvastatin (LIPITOR) 80 mg tablet Take 1 tablet (80 mg total) by mouth in the evening. Take before meals 90 tablet 1    b complex vitamins capsule Take 1 capsule by mouth daily 30 capsule 3    baclofen 10 mg tablet Take 1 tablet (10 mg total) by mouth 3 (three) times a day As needed for muscle spasm 90 tablet 0    co-enzyme Q-10 100 mg capsule Take 1 capsule (100 mg total) by mouth daily 30 capsule 3    Cyanocobalamin (VITAMIN B12 PO) Take by mouth      ferrous sulfate 325 (65 Fe) mg tablet Take 325 mg by mouth daily with breakfast      gabapentin (NEURONTIN) 100 mg capsule Take 2 capsules (200 mg total) by mouth 3 (three) times a day 540 capsule 0    losartan (COZAAR) 100 MG tablet Take 1 tablet (100 mg total) by mouth daily 90 tablet 1    metoprolol succinate (TOPROL-XL) 25 mg 24 hr tablet Take 1 tablet (25 mg total) by mouth daily 90 tablet 1    pantoprazole (PROTONIX) 40 mg tablet Take 1 tablet (40 mg total) by mouth daily 90 tablet 1    tamsulosin (FLOMAX) 0.4 mg Take 1 capsule (0.4 mg total) by mouth daily with dinner 90 capsule 0    Multiple Vitamin (multivitamin) tablet Take 1 tablet by mouth if needed (Patient not taking: Reported on 6/26/2023)      tiZANidine (ZANAFLEX) 2 mg tablet Take 1 tablet (2 mg total) by mouth every 8 (eight) hours as needed for muscle spasms (Patient not taking: Reported on 11/3/2023) 60 tablet 2     No current facility-administered medications for this visit.      Current Outpatient Medications on File Prior to Visit   Medication Sig    albuterol (PROVENTIL HFA,VENTOLIN HFA) 90 mcg/act inhaler INHALE 2 PUFFS BY MOUTH EVERY 6 HOURS AS NEEDED FOR WHEEZING    amLODIPine (NORVASC) 10 mg tablet Take 1 tablet (10 mg total) by mouth daily    aspirin 81 mg chewable tablet Chew 1 tablet (81 mg total) daily    atorvastatin (LIPITOR) 80 mg tablet Take 1 tablet (80 mg total) by mouth in the evening. Take before meals    b complex vitamins capsule Take 1 capsule by mouth daily    baclofen 10 mg tablet Take 1 tablet (10 mg total) by mouth 3 (three) times a day As needed for muscle spasm    co-enzyme Q-10 100 mg capsule Take 1 capsule (100 mg total) by mouth daily    Cyanocobalamin (VITAMIN B12 PO) Take by mouth    ferrous sulfate 325 (65 Fe) mg tablet Take 325 mg by mouth daily with breakfast    gabapentin (NEURONTIN) 100 mg capsule Take 2 capsules (200 mg total) by mouth 3 (three) times a day    losartan (COZAAR) 100 MG tablet Take 1 tablet (100 mg total) by mouth daily    metoprolol succinate (TOPROL-XL) 25 mg 24 hr tablet Take 1 tablet (25 mg total) by mouth daily    pantoprazole (PROTONIX) 40 mg tablet Take 1 tablet (40 mg total) by mouth daily    tamsulosin (FLOMAX) 0.4 mg Take 1 capsule (0.4 mg total) by mouth daily with dinner    Multiple Vitamin (multivitamin) tablet Take 1 tablet by mouth if needed (Patient not taking: Reported on 6/26/2023)    tiZANidine (ZANAFLEX) 2 mg tablet Take 1 tablet (2 mg total) by mouth every 8 (eight) hours as needed for muscle spasms (Patient not taking: Reported on 11/3/2023)     No current facility-administered medications on file prior to visit. He is allergic to penicillins. .    Objective:    Blood pressure 140/84, pulse 87, height 5' 7" (1.702 m), weight 86.2 kg (190 lb). Physical Exam  General - patient is alert   Speech - no dysarthria noted, no aphasia noted.      Neuro:   Cranial nerves: PERRL, EOMI, facial sensation decreased L side of face to soft touch and pinprick, no facial asymmetry noted, uvula/palate midline, tongue midline. Motor: 5/5 throughout, normal tone, no pronator drift noted. Sensory - decreased sensation to the L side of body, arm and leg  to soft touch and pinprick  Coordination - no ataxia/dysmetria noted  Gait - normal      ROS:  Review of Systems   Constitutional:  Negative for appetite change, fatigue and fever. HENT:  Positive for tinnitus and trouble swallowing. Negative for hearing loss and voice change. Eyes:  Positive for pain (left eye pain) and visual disturbance. Negative for photophobia. Left eye , blurry sometimes pressure as the day goes on   Respiratory:  Positive for shortness of breath. Cardiovascular: Negative. Negative for palpitations. Gastrointestinal: Negative. Negative for nausea and vomiting. Endocrine: Negative. Negative for cold intolerance. Genitourinary: Negative. Negative for dysuria, frequency and urgency. Musculoskeletal:  Positive for back pain, gait problem and neck pain. Negative for myalgias. Left side is still not recovered fully since stroke    Skin: Negative. Negative for rash. Allergic/Immunologic: Negative. Neurological:  Positive for dizziness, tremors, syncope (pt states syncope 2 wks ago), speech difficulty, weakness (left side weakness of arms and leg), light-headedness, numbness (pt states left hand 3 digits and lips on left side face numbness and toes of left feet numbness) and headaches (pt states 7 headaches last 2 wks). Negative for seizures and facial asymmetry. Hematological: Negative. Does not bruise/bleed easily. Psychiatric/Behavioral:  Positive for sleep disturbance (pt wife states  sleeps really bad and is going for sleep study). Negative for confusion and hallucinations.

## 2023-11-09 ENCOUNTER — TELEPHONE (OUTPATIENT)
Age: 53
End: 2023-11-09

## 2023-11-09 ENCOUNTER — OFFICE VISIT (OUTPATIENT)
Dept: FAMILY MEDICINE CLINIC | Facility: CLINIC | Age: 53
End: 2023-11-09
Payer: COMMERCIAL

## 2023-11-09 VITALS
DIASTOLIC BLOOD PRESSURE: 90 MMHG | WEIGHT: 189 LBS | HEART RATE: 84 BPM | OXYGEN SATURATION: 98 % | TEMPERATURE: 98.2 F | BODY MASS INDEX: 29.66 KG/M2 | HEIGHT: 67 IN | SYSTOLIC BLOOD PRESSURE: 130 MMHG | RESPIRATION RATE: 16 BRPM

## 2023-11-09 DIAGNOSIS — H53.8 BLURRED VISION, LEFT EYE: ICD-10-CM

## 2023-11-09 DIAGNOSIS — M54.2 CERVICALGIA: ICD-10-CM

## 2023-11-09 DIAGNOSIS — I10 PRIMARY HYPERTENSION: Chronic | ICD-10-CM

## 2023-11-09 DIAGNOSIS — M48.02 SPINAL STENOSIS IN CERVICAL REGION: ICD-10-CM

## 2023-11-09 DIAGNOSIS — Z98.1 HISTORY OF SPINAL FUSION: ICD-10-CM

## 2023-11-09 DIAGNOSIS — M54.12 CERVICAL RADICULOPATHY AT C5: Primary | ICD-10-CM

## 2023-11-09 DIAGNOSIS — R51.0 POSTURAL HEADACHE: ICD-10-CM

## 2023-11-09 DIAGNOSIS — R20.0 LEFT ARM NUMBNESS: ICD-10-CM

## 2023-11-09 PROCEDURE — 99396 PREV VISIT EST AGE 40-64: CPT | Performed by: FAMILY MEDICINE

## 2023-11-09 PROCEDURE — 99214 OFFICE O/P EST MOD 30 MIN: CPT | Performed by: FAMILY MEDICINE

## 2023-11-09 RX ORDER — BACLOFEN 10 MG/1
10 TABLET ORAL 3 TIMES DAILY
Qty: 90 TABLET | Refills: 0 | Status: SHIPPED | OUTPATIENT
Start: 2023-11-09

## 2023-11-09 NOTE — PROGRESS NOTES
[facility]    NAME: Troy Fry  AGE: 47 y.o. SEX: male  : 1970     DATE: 2023     Assessment and Plan:     Problem List Items Addressed This Visit          Cardiovascular and Mediastinum    Primary hypertension (Chronic)    Relevant Orders    CBC and differential    Comprehensive metabolic panel     Other Visit Diagnoses       Cervical radiculopathy at C5    -  Primary    Relevant Orders    XR spine cervical complete 4 or 5 vw non injury    MRI cervical spine wo contrast    CBC and differential    Comprehensive metabolic panel    Spinal stenosis in cervical region        Relevant Orders    XR spine cervical complete 4 or 5 vw non injury    MRI cervical spine wo contrast    Blurred vision, left eye        Relevant Orders    MRI brain w wo mra head wo mra neck w wo    CBC and differential    Comprehensive metabolic panel    Left arm numbness        Relevant Orders    MRI brain w wo mra head wo mra neck w wo    MRI cervical spine wo contrast    Postural headache        Relevant Orders    MRI brain w wo mra head wo mra neck w wo    History of spinal fusion        Relevant Orders    MRI cervical spine wo contrast    Cervicalgia        Relevant Medications    baclofen 10 mg tablet          Given ongoing symptoms recommend MRI and MRA brain and neck d/d carotid steal/subclavian steal syndromes    Xray and mri cervical spine given spinal stenosis and spinal fusion, this may have been exacerbated periop/intra-op while maneuvering neck. Tizanidine hs helps, daytime will try baclofen prn  Monitory BP at home and keep a log as it is borderline today  Can continue amitryptylin hs , may increase to 20 mg in 4-6 weeks if symptoms improve on 10 mg, but not resolved. Previously refused colon cancer screening and lung cancer screening. does not want to do it.     Immunizations and preventive care screenings were discussed with patient today. Appropriate education was printed on patient's after visit summary. Discussed risks and benefits of prostate cancer screening. We discussed the controversial history of PSA screening for prostate cancer in the [facility] as well as the risk of over detection and over treatment of prostate cancer by way of PSA screening. The patient understands that PSA blood testing is an imperfect way to screen for prostate cancer and that elevated PSA levels in the blood may also be caused by infection, inflammation, prostatic trauma or manipulation, urological procedures, or by benign prostatic enlargement. The role of the digital rectal examination in prostate cancer screening was also discussed and I discussed with him that there is large interobserver variability in the findings of digital rectal examination. Counseling:  Alcohol/drug use: discussed moderation in alcohol intake, the recommendations for healthy alcohol use, and avoidance of illicit drug use. Dental Health: discussed importance of regular tooth brushing, flossing, and dental visits. Injury prevention: discussed safety/seat belts, safety helmets, smoke detectors, carbon dioxide detectors, and smoking near bedding or upholstery. Sexual health: discussed sexually transmitted diseases, partner selection, use of condoms, avoidance of unintended pregnancy, and contraceptive alternatives. Exercise: the importance of regular exercise/physical activity was discussed. Recommend exercise 3-5 times per week for at least 30 minutes. Depression Screening and Follow-up Plan: Patient was screened for depression during today's encounter. They screened negative with a PHQ-2 score of 0.            Chief Complaint:     Chief Complaint   Patient presents with    Follow-up      History of Present Illness:     Adult Annual Physical   Patient here for a comprehensive physical exam. The patient reports problems - ongoing persistent postural headache of the left side after standing up radiating to the left upper extremity, base of skull and is associated with blurred vision of the left side, did not have these symptoms before left CEA, surgeon does not think it is post op. He did see neurology and is started on amitryptylin, previously gabapentin was increased to 300 mg tid and that made him loopy and unsteady He does have a history of spinal steonsis and cervical spine fusion in remote past .    Diet and Physical Activity  Diet/Nutrition: well balanced diet and consuming 3-5 servings of fruits/vegetables daily. Exercise: walking. Depression Screening  PHQ-2/9 Depression Screening    Little interest or pleasure in doing things: 0 - not at all  Feeling down, depressed, or hopeless: 0 - not at all  PHQ-2 Score: 0  PHQ-2 Interpretation: Negative depression screen       General Health  Sleep: sleeps poorly. Hearing: requires use of hearing aids. Vision: vision problems: left eye blurred vision . Dental: no dental visits for >1 year and brushes teeth once daily.  Health  Symptoms include: none    Advanced Care Planning  Do you have an advanced directive? no  Do you have a durable medical power of ? no     Review of Systems:     Review of Systems   Constitutional:  Negative for chills and fever. HENT:  Negative for congestion, rhinorrhea and sore throat. Eyes:  Positive for visual disturbance. Negative for discharge, redness and itching. Respiratory:  Negative for chest tightness, shortness of breath and wheezing. Cardiovascular:  Negative for chest pain and palpitations. Gastrointestinal:  Negative for abdominal pain, constipation and diarrhea. Genitourinary:  Negative for dysuria. Musculoskeletal:  Positive for arthralgias, gait problem and neck pain. Negative for joint swelling. Skin:  Negative for pallor and rash. Neurological:  Positive for dizziness, weakness, numbness and headaches.       Past Medical History: Past Medical History:   Diagnosis Date    Carotid stenosis, left     today  2023  L carotid angioplasty    Carotid stenosis, right     Rt angioplasty completed  2023    Cervical disc disease     COVID 2020    GERD (gastroesophageal reflux disease)     Hyperlipidemia     Hypertension     Kidney stone     Muscle weakness     Spinal stenosis     Stroke ([address]) 2022    left sided weakness with pins/needles    Weakness of left side of body 2022    s/p CVA      Past Surgical History:     Past Surgical History:   Procedure Laterality Date    CARPAL TUNNEL RELEASE Bilateral     CERVICAL FUSION      with hardware    CYSTOSCOPY      HERNIA REPAIR      IR CAROTID STENT  2022    IR CAROTID STENT  2023    AR TCAT IV STENT CRV CRTD ART EMBOLIC PROTECJ Right     Procedure: ANGIOPLASTY ARTERY CAROTID W/ STENT TCAR,;  Surgeon: Nader Lino DO;  Location: [facility];  Service: Vascular    AR TCAT IV STENT CRV CRTD ART EMBOLIC PROTECJ Left     Procedure: ANGIOPLASTY ARTERY CAROTID W/ STENT Left TCAR;  Surgeon: Nader Lino DO;  Location: [facility];  Service: Vascular    ULNAR COLLATERAL LIGAMENT RECONSTRUCTION Bilateral       Family History:     Family History   Problem Relation Age of Onset    Heart attack Mother     Diabetes Mother     Hypertension Mother     Colon cancer Father     No Known Problems Sister     No Known Problems Sister     No Known Problems Sister     No Known Problems Son       Social History:     Social History     Socioeconomic History    Marital status: /Civil Union     Spouse name: None    Number of children: None    Years of education: None    Highest education level: None   Occupational History    None   Tobacco Use    Smoking status: Former     Packs/day: 2.00     Years: 35.00     Total pack years: 70.00     Types: Cigarettes     Quit date: 2022     Years since quittin.2    Smokeless tobacco: Never   Vaping Use    Vaping Use: Every day Substances: Nicotine   Substance and Sexual Activity    Alcohol use: Yes     Alcohol/week: 12.0 standard drinks of alcohol     Types: 12 Cans of beer per week     Comment: 12 cans  beer   weekly    Drug use: Not Currently    Sexual activity: Yes   Other Topics Concern    None   Social History Narrative    None     Social Determinants of Health     Financial Resource Strain: Not on file   Food Insecurity: No Food Insecurity (1/20/2023)    Hunger Vital Sign     Worried About Running Out of Food in the Last Year: Never true     Ran Out of Food in the Last Year: Never true   Transportation Needs: No Transportation Needs (1/20/2023)    PRAPARE - Transportation     Lack of Transportation (Medical): No     Lack of Transportation (Non-Medical): No   Physical Activity: Not on file   Stress: Not on file   Social Connections: Not on file   Intimate Partner Violence: Not on file   Housing Stability: Low Risk  (1/20/2023)    Housing Stability Vital Sign     Unable to Pay for Housing in the Last Year: No     Number of Places Lived in the Last Year: 1     Unstable Housing in the Last Year: No      Current Medications:     Current Outpatient Medications   Medication Sig Dispense Refill    albuterol (PROVENTIL HFA,VENTOLIN HFA) 90 mcg/act inhaler INHALE 2 PUFFS BY MOUTH EVERY 6 HOURS AS NEEDED FOR WHEEZING 9 g 0    amitriptyline (ELAVIL) 10 mg tablet Take 1 tablet (10 mg total) by mouth daily at bedtime 30 tablet 3    amLODIPine (NORVASC) 10 mg tablet Take 1 tablet (10 mg total) by mouth daily 90 tablet 1    aspirin 81 mg chewable tablet Chew 1 tablet (81 mg total) daily 30 tablet 0    atorvastatin (LIPITOR) 80 mg tablet Take 1 tablet (80 mg total) by mouth in the evening.  Take before meals 90 tablet 1    b complex vitamins capsule Take 1 capsule by mouth daily 30 capsule 3    baclofen 10 mg tablet Take 1 tablet (10 mg total) by mouth 3 (three) times a day As needed for muscle spasm 90 tablet 0    Cyanocobalamin (VITAMIN B12 PO) Take by mouth      ferrous sulfate 325 (65 Fe) mg tablet Take 325 mg by mouth daily with breakfast      gabapentin (NEURONTIN) 100 mg capsule Take 2 capsules (200 mg total) by mouth 3 (three) times a day 540 capsule 0    losartan (COZAAR) 100 MG tablet Take 1 tablet (100 mg total) by mouth daily 90 tablet 1    metoprolol succinate (TOPROL-XL) 25 mg 24 hr tablet Take 1 tablet (25 mg total) by mouth daily 90 tablet 1    pantoprazole (PROTONIX) 40 mg tablet Take 1 tablet (40 mg total) by mouth daily 90 tablet 1    tamsulosin (FLOMAX) 0.4 mg Take 1 capsule (0.4 mg total) by mouth daily with dinner 90 capsule 0    tiZANidine (ZANAFLEX) 2 mg tablet Take 1 tablet (2 mg total) by mouth every 8 (eight) hours as needed for muscle spasms 60 tablet 2    co-enzyme Q-10 100 mg capsule Take 1 capsule (100 mg total) by mouth daily 30 capsule 3    Multiple Vitamin (multivitamin) tablet Take 1 tablet by mouth if needed (Patient not taking: Reported on 6/26/2023)       No current facility-administered medications for this visit. Allergies: Allergies   Allergen Reactions    Penicillins Anaphylaxis      Physical Exam:     /90 (BP Location: Left arm, Patient Position: Sitting, Cuff Size: Adult)   Pulse 84   Temp 98.2 °F (36.8 °C) (Tympanic)   Resp 16   Ht 5' 7" (1.702 m)   Wt 85.7 kg (189 lb)   SpO2 98%   BMI 29.60 kg/m²     Physical Exam  Vitals and nursing note reviewed. Constitutional:       General: He is not in acute distress. Appearance: Normal appearance. He is well-developed and normal weight. He is not ill-appearing or toxic-appearing. HENT:      Head: Normocephalic and atraumatic. Right Ear: Tympanic membrane, ear canal and external ear normal.      Left Ear: Tympanic membrane, ear canal and external ear normal.      Nose: No mucosal edema or rhinorrhea. Mouth/Throat:      Mouth: Mucous membranes are not pale, dry and not cyanotic. Pharynx: Oropharynx is clear.  No oropharyngeal exudate or posterior oropharyngeal erythema. Eyes:      General: No scleral icterus. Right eye: No discharge. Left eye: No discharge. Extraocular Movements: Extraocular movements intact. Conjunctiva/sclera: Conjunctivae normal.      Pupils: Pupils are equal, round, and reactive to light. Cardiovascular:      Rate and Rhythm: Normal rate and regular rhythm. Heart sounds: Normal heart sounds. No murmur heard. No gallop. Pulmonary:      Effort: Pulmonary effort is normal. No respiratory distress. Breath sounds: Normal breath sounds. No wheezing or rales. Abdominal:      General: Abdomen is flat. Palpations: Abdomen is soft. Tenderness: There is no abdominal tenderness. Musculoskeletal:         General: No swelling, tenderness, deformity or signs of injury. Cervical back: Normal range of motion. Right lower leg: No edema. Left lower leg: No edema. Skin:     General: Skin is warm. Coloration: Skin is not jaundiced or pale. Findings: No rash. Neurological:      Mental Status: He is alert and oriented to person, place, and time. Mental status is at baseline. Comments: Cranial Nerves: Dysarthria and facial asymmetry (RIGHT LOWER FACIAL DROOP) present. Sensory: Sensory deficit (lle) present. Motor: Weakness (4/5 lle) present. Coordination: Coordination abnormal.      Gait: Gait abnormal.      Deep Tendon Reflexes: Reflexes abnormal.    Psychiatric:         Mood and Affect: Mood normal.         Behavior: Behavior normal.         Thought Content:  Thought content normal.         Judgment: Judgment normal.          Umu Childs MD  [id number]

## 2023-11-09 NOTE — TELEPHONE ENCOUNTER
Karina from [facility]. She is filling rx for Baclofen for pt and has a few questions. Mostly she needs documentation as to why pt is no longer using the zanaflex. Dr. Zack Myers note is still open from today. Please call her back once note is completed with her rationale.   Please call after 2pm.

## 2023-11-16 ENCOUNTER — OFFICE VISIT (OUTPATIENT)
Dept: NEPHROLOGY | Facility: CLINIC | Age: 53
End: 2023-11-16

## 2023-11-16 VITALS
HEIGHT: 67 IN | DIASTOLIC BLOOD PRESSURE: 82 MMHG | SYSTOLIC BLOOD PRESSURE: 132 MMHG | BODY MASS INDEX: 29.98 KG/M2 | WEIGHT: 191 LBS | OXYGEN SATURATION: 98 % | HEART RATE: 84 BPM

## 2023-11-16 DIAGNOSIS — N18.2 BENIGN HYPERTENSION WITH CKD (CHRONIC KIDNEY DISEASE), STAGE II: ICD-10-CM

## 2023-11-16 DIAGNOSIS — I10 PRIMARY HYPERTENSION: Chronic | ICD-10-CM

## 2023-11-16 DIAGNOSIS — N20.0 BILATERAL NEPHROLITHIASIS: ICD-10-CM

## 2023-11-16 DIAGNOSIS — I10 ESSENTIAL HYPERTENSION: Primary | ICD-10-CM

## 2023-11-16 DIAGNOSIS — I12.9 BENIGN HYPERTENSION WITH CKD (CHRONIC KIDNEY DISEASE), STAGE II: ICD-10-CM

## 2023-11-16 PROBLEM — R79.89 ELEVATED SERUM CREATININE: Status: RESOLVED | Noted: 2021-10-16 | Resolved: 2023-11-16

## 2023-11-16 PROBLEM — N28.9 ABNORMAL RENAL FUNCTION: Status: RESOLVED | Noted: 2022-11-03 | Resolved: 2023-11-16

## 2023-11-16 NOTE — PATIENT INSTRUCTIONS
1. )  Low 2 g sodium diet    2.)  Monitor weights at home    3.)  Avoid NSAIDs (ibuprofen, Motrin, Advil, Aleve, naproxen)    4.)  Monitor blood pressure at home, call if blood pressure greater than 150/90 persistently    5.) I will plan to discuss all results including blood work, and/or imaging at our next visit, unless there is an urgent indication, in which case I will call you earlier. If you have any questions or concerns about your results, please feel free to call our office.     6.) Get repeat blood work

## 2023-11-16 NOTE — PROGRESS NOTES
NEPHROLOGY OFFICE VISIT   Neetu Robledo 47 y.o. male MRN: [id number]  11/16/2023    Reason for Visit: Chronic kidney disease    History of Present Illness (HPI):    I had the pleasure of seeing Jhonatan Soriano today in the renal clinic for the continued management of chronic kidney disease and hypertension. Since our last visit, there has been no ER visits or hospitilizations. Complaining of lower back pain. He is planned to go for an MRI of the brain and back along with an x-ray. He is planning to get blood work done he did not get it for this visit. Discussed his CKD staging he has mild stage II kidney disease. Blood pressure seems to be controlled. Taking Tylenol and aspirin at this time. Asking about NSAIDs. In general I recommended to avoid NSAIDs as best as possible. Given that he has mild chronic kidney disease based on his last blood work. The risk of NSAID related nephrotoxicity would need to be restratified based on his comorbidities age and level of CKD. Risk of MARCOS is nearly twofold with the use of NSAIDs, especially if the patient has a GFR less than 60 mL/min the risk is even higher for NSAID induced MRACOS. Other risk factors would be volume depletion and other medications such as angiotensin receptor blockers which she is currently on. Given that he has mild CKD stage II appears euvolemic I recommended very infrequent and short acting NSAID if no pain improvement with acetaminophen and aspirin. I recommended excessive daily NSAIDs and long-term basis. And if there is any increase in the creatinine that should prompt discontinuation of NSAIDs immediately. Also not to take it concurrently with the ARB. No longer smokes but continues to vape.     ASSESSMENT and PLAN:    Nephrolithiasis  --duration for the last 20 years  --has not required procedure/intervention  --former smoker who quit approximately 1 month ago  --no prior stone analysis completed  --no prior stone risk profile completed  --recommended he drink plenty of water to maintain a high urine volume of at least 2.5 L per day  --low 2 g sodium diet  --limit caffeine  --avoid smoking  -- Ordered [facility]ol[facility] but never completed. Hypertension  --diagnosed in his 35s  --uncontrolled as an outpatient, had a recent thalamic stroke about 1.5 months ago  --Losartan 100 mg daily, amlodipine 10 mg daily, recently started on metoprolol 25 mg last month  --Blood pressure at target  --Target blood pressure less than 130/80  --BMI 29.9  --low 2 g sodium diet weight loss and exercise strongly recommended  --Elevated renin level of 7.864, now on metoprolol which will reduce this. --Ordered renal artery Doppler, never completed  --Aldosterone/renin ratio 0.5 which is normal.  Does not appear to be consistent with primary aldosteronism  --Ordered catecholamines and metanephrine, never completed  --Continue current management     Chronic kidney disease stage II without proteinuria  --creatinine fluctuating between 1-1.2 mg/dL  --history of excessive NSAID use for the past 6 years now, 1.5 pack per day smoking for over 20 years, uncontrolled hypertension for years, suspect him to have some underlying chronic kidney disease, likely stage II  --Cystatin C with eGFR 62 mL/min  --blood pressure control with an angiotensin receptor blocker  -- Check repeat blood work  --Avoid excessive NSAID use. Infrequent NSAID once or twice a week given his mild CKD and euvolemic status I would not have a strong objections towards if he did not respond to acetaminophen and aspirin. But I did exercise caution.      CVA  --focal right thalamic acute to subacute infarct with linear areas of acute to subacute ischemia involving the medial right temporal occipital region compatible with PCA territory infarcts  --still having some numbness symptoms  --bilateral ICA stenosis  --blood pressure control  --atorvastatin 80 mg daily  --Plavix 75 mg daily, aspirin 81 mg daily  --former smoker  --Plan for repeat MRI     Bilateral carotid artery stenosis  -- s/p right TCAR   -- Advised smoking cessation  --Blood pressure control     Gross hematuria--resolved  --status post cystoscopy  --PSA was normal  --likely secondary to nephrolithiasis  -- Last UA did show hematuria. Overweight  -- BMI 29.9, this is worsening  --Recommend decreasing portion sizes, encouraging healthy choices of fruits and vegetables, consuming healthier snacks and moderation in carbohydrate intake. Exercise recommendations; 3-5 times a week, the American [employer] Association recommends 150 minutes a week moderate exercise  --Strongly recommend evaluation by nutritionist  --Overweight and obesity have been associated with increased mortality and morbidity. Associated with a reduction in life expectancy, associated with increased all cause and cardiovascular mortality  --Metabolic risks, including increased risk of diabetes type 2, insulin resistance with hyperinsulinemia (weight loss of 5 to 7% associated with a decreased risk of type 2 diabetes among individuals with prediabetes and weight loss of 15% can lead to dramatic improvement of diabetes in nearly half of people). Dyslipidemia, hypertension and heart disease are all increased in patients with obesity. Along with increased risk of stroke increased risk of multiple cancer types. Can also be associated with increased renal dysfunction, strongest risk factor for new onset chronic kidney disease. There is also a degree of compensatory hyperfiltration to meet high in the metabolic demands of increased body weight. This can also result in increased increased risk for nephrolithiasis.          PATIENT INSTRUCTIONS:    Patient Instructions   1.)  Low 2 g sodium diet    2.)  Monitor weights at home    3.)  Avoid NSAIDs (ibuprofen, Motrin, Advil, Aleve, naproxen)    4.)  Monitor blood pressure at home, call if blood pressure greater than 150/90 persistently    5.) I will plan to discuss all results including blood work, and/or imaging at our next visit, unless there is an urgent indication, in which case I will call you earlier. If you have any questions or concerns about your results, please feel free to call our office. 6.) Get repeat blood work        Orders Placed This Encounter   Procedures    Comprehensive metabolic panel     This is a patient instruction: Patient fasting for 8 hours or longer recommended. Standing Status:   Future     Standing Expiration Date:   11/16/2024    Cystatin C With eGFR     Standing Status:   Future     Standing Expiration Date:   11/16/2024    Albumin / creatinine urine ratio     Standing Status:   Future     Standing Expiration Date:   11/16/2024       OBJECTIVE:  Current Weight: Weight - Scale: 86.6 kg (191 lb)  Vitals:    11/16/23 0853   BP: 132/82   BP Location: Left arm   Patient Position: Sitting   Cuff Size: Large   Pulse: 84   SpO2: 98%   Weight: 86.6 kg (191 lb)   Height: 5' 7" (1.702 m)    Body mass index is 29.91 kg/m². REVIEW OF SYSTEMS:    Review of Systems   Constitutional:  Negative for activity change and fever. Respiratory:  Negative for cough, chest tightness, shortness of breath and wheezing. Cardiovascular:  Negative for chest pain and leg swelling. Gastrointestinal:  Negative for abdominal pain, diarrhea, nausea and vomiting. Endocrine: Negative for polyuria. Genitourinary:  Negative for difficulty urinating, dysuria, flank pain, frequency and urgency. Skin:  Negative for rash. Neurological:  Negative for dizziness, syncope, light-headedness and headaches. PHYSICAL EXAM:      Physical Exam  Vitals and nursing note reviewed. Constitutional:       General: He is not in acute distress. Appearance: He is well-developed. HENT:      Head: Normocephalic and atraumatic. Eyes:      General: No scleral icterus.      Conjunctiva/sclera: Conjunctivae normal.      Pupils: Pupils are equal, round, and reactive to light. Cardiovascular:      Rate and Rhythm: Normal rate and regular rhythm. Heart sounds: S1 normal and S2 normal. No murmur heard. No friction rub. No gallop. Pulmonary:      Effort: Pulmonary effort is normal. No respiratory distress. Breath sounds: Normal breath sounds. No wheezing or rales. Abdominal:      General: Bowel sounds are normal.      Palpations: Abdomen is soft. Tenderness: There is no abdominal tenderness. There is no rebound. Musculoskeletal:         General: Normal range of motion. Cervical back: Normal range of motion and neck supple. Skin:     Findings: No rash. Neurological:      Mental Status: He is alert and oriented to person, place, and time. Psychiatric:         Behavior: Behavior normal.         Medications:    Current Outpatient Medications:     albuterol (PROVENTIL HFA,VENTOLIN HFA) 90 mcg/act inhaler, INHALE 2 PUFFS BY MOUTH EVERY 6 HOURS AS NEEDED FOR WHEEZING, Disp: 9 g, Rfl: 0    amitriptyline (ELAVIL) 10 mg tablet, Take 1 tablet (10 mg total) by mouth daily at bedtime, Disp: 30 tablet, Rfl: 3    amLODIPine (NORVASC) 10 mg tablet, Take 1 tablet (10 mg total) by mouth daily, Disp: 90 tablet, Rfl: 1    aspirin 81 mg chewable tablet, Chew 1 tablet (81 mg total) daily, Disp: 30 tablet, Rfl: 0    atorvastatin (LIPITOR) 80 mg tablet, Take 1 tablet (80 mg total) by mouth in the evening.  Take before meals, Disp: 90 tablet, Rfl: 1    b complex vitamins capsule, Take 1 capsule by mouth daily, Disp: 30 capsule, Rfl: 3    baclofen 10 mg tablet, Take 1 tablet (10 mg total) by mouth 3 (three) times a day As needed for muscle spasm, Disp: 90 tablet, Rfl: 0    Cyanocobalamin (VITAMIN B12 PO), Take by mouth, Disp: , Rfl:     ferrous sulfate 325 (65 Fe) mg tablet, Take 325 mg by mouth daily with breakfast, Disp: , Rfl:     gabapentin (NEURONTIN) 100 mg capsule, Take 2 capsules (200 mg total) by mouth 3 (three) times a day, Disp: 540 capsule, Rfl: 0    losartan (COZAAR) 100 MG tablet, Take 1 tablet (100 mg total) by mouth daily, Disp: 90 tablet, Rfl: 1    metoprolol succinate (TOPROL-XL) 25 mg 24 hr tablet, Take 1 tablet (25 mg total) by mouth daily, Disp: 90 tablet, Rfl: 1    pantoprazole (PROTONIX) 40 mg tablet, Take 1 tablet (40 mg total) by mouth daily, Disp: 90 tablet, Rfl: 1    tamsulosin (FLOMAX) 0.4 mg, Take 1 capsule (0.4 mg total) by mouth daily with dinner, Disp: 90 capsule, Rfl: 0    tiZANidine (ZANAFLEX) 2 mg tablet, Take 1 tablet (2 mg total) by mouth every 8 (eight) hours as needed for muscle spasms, Disp: 60 tablet, Rfl: 2    co-enzyme Q-10 100 mg capsule, Take 1 capsule (100 mg total) by mouth daily (Patient not taking: Reported on 11/16/2023), Disp: 30 capsule, Rfl: 3    Multiple Vitamin (multivitamin) tablet, Take 1 tablet by mouth if needed (Patient not taking: Reported on 6/26/2023), Disp: , Rfl:     Laboratory Results:        Invalid input(s): "ALBUMIN"    Results for orders placed or performed in visit on 03/01/23   Lipid panel   Result Value Ref Range    Cholesterol 183 See Comment mg/dL    Triglycerides 229 (H) See Comment mg/dL    HDL, Direct 61 >=40 mg/dL    LDL Calculated 76 0 - 100 mg/dL    Non-HDL-Chol (CHOL-HDL) 122 mg/dl

## 2023-11-20 ENCOUNTER — TELEPHONE (OUTPATIENT)
Age: 53
End: 2023-11-20

## 2023-11-20 NOTE — TELEPHONE ENCOUNTER
PT's wife inquiring if she would be able to get a statement of PT's medical condition in order to renew PT's health insurance. Please f/u with wife to inform her if this is possible and when available.     Thank You

## 2023-11-21 NOTE — TELEPHONE ENCOUNTER
Patient wife returned call, she was renewing his medical insurance through the "state". One of the questions was whether or not patient was on any life sustaining medications. She answered yes and now they want a letter from the provider as to what his medical conditions are and what medications he takes and what they are for. She was told yesterday that she was able to access this information on [employer] and she is unable to do that. She stated it does not show her the information she needs.

## 2023-11-22 NOTE — TELEPHONE ENCOUNTER
Please print the previous 3 office visit notes which should have all this information.   Also can print medication list.

## 2023-11-24 ENCOUNTER — APPOINTMENT (OUTPATIENT)
Dept: LAB | Facility: CLINIC | Age: 53
End: 2023-11-24
Payer: COMMERCIAL

## 2023-11-24 ENCOUNTER — HOSPITAL ENCOUNTER (OUTPATIENT)
Dept: RADIOLOGY | Facility: HOSPITAL | Age: 53
Discharge: HOME | End: 2023-11-24
Payer: COMMERCIAL

## 2023-11-24 DIAGNOSIS — H53.8 BLURRED VISION, LEFT EYE: ICD-10-CM

## 2023-11-24 DIAGNOSIS — I12.9 BENIGN HYPERTENSION WITH CKD (CHRONIC KIDNEY DISEASE), STAGE II: ICD-10-CM

## 2023-11-24 DIAGNOSIS — I65.22 LEFT CAROTID ARTERY STENOSIS: ICD-10-CM

## 2023-11-24 DIAGNOSIS — I10 PRIMARY HYPERTENSION: Chronic | ICD-10-CM

## 2023-11-24 DIAGNOSIS — M54.12 CERVICAL RADICULOPATHY AT C5: ICD-10-CM

## 2023-11-24 DIAGNOSIS — N18.2 BENIGN HYPERTENSION WITH CKD (CHRONIC KIDNEY DISEASE), STAGE II: ICD-10-CM

## 2023-11-24 DIAGNOSIS — I10 ESSENTIAL HYPERTENSION: ICD-10-CM

## 2023-11-24 DIAGNOSIS — N20.0 BILATERAL NEPHROLITHIASIS: ICD-10-CM

## 2023-11-24 DIAGNOSIS — M48.02 SPINAL STENOSIS IN CERVICAL REGION: ICD-10-CM

## 2023-11-24 LAB
ALBUMIN SERPL BCP-MCNC: 4.5 G/DL (ref 3.5–5)
ALP SERPL-CCNC: 83 U/L (ref 34–104)
ALT SERPL W P-5'-P-CCNC: 59 U/L (ref 7–52)
ANION GAP SERPL CALCULATED.3IONS-SCNC: 6 MMOL/L
AST SERPL W P-5'-P-CCNC: 35 U/L (ref 13–39)
BASOPHILS # BLD AUTO: 0.09 THOUSANDS/ÂΜL (ref 0–0.1)
BASOPHILS NFR BLD AUTO: 1 % (ref 0–1)
BILIRUB SERPL-MCNC: 0.46 MG/DL (ref 0.2–1)
BUN SERPL-MCNC: 15 MG/DL (ref 5–25)
CALCIUM SERPL-MCNC: 9.2 MG/DL (ref 8.4–10.2)
CHLORIDE SERPL-SCNC: 105 MMOL/L (ref 96–108)
CO2 SERPL-SCNC: 23 MMOL/L (ref 21–32)
CREAT SERPL-MCNC: 1.06 MG/DL (ref 0.6–1.3)
CREAT UR-MCNC: 67.6 MG/DL
EOSINOPHIL # BLD AUTO: 0.14 THOUSAND/ÂΜL (ref 0–0.61)
EOSINOPHIL NFR BLD AUTO: 2 % (ref 0–6)
ERYTHROCYTE [DISTWIDTH] IN BLOOD BY AUTOMATED COUNT: 12.3 % (ref 11.6–15.1)
GFR SERPL CREATININE-BSD FRML MDRD: 80 ML/MIN/1.73SQ M
GLUCOSE SERPL-MCNC: 87 MG/DL (ref 65–140)
HCT VFR BLD AUTO: 47.7 % (ref 36.5–49.3)
HGB BLD-MCNC: 16.5 G/DL (ref 12–17)
IMM GRANULOCYTES # BLD AUTO: 0.04 THOUSAND/UL (ref 0–0.2)
IMM GRANULOCYTES NFR BLD AUTO: 0 % (ref 0–2)
LYMPHOCYTES # BLD AUTO: 1.98 THOUSANDS/ÂΜL (ref 0.6–4.47)
LYMPHOCYTES NFR BLD AUTO: 22 % (ref 14–44)
MCH RBC QN AUTO: 32 PG (ref 26.8–34.3)
MCHC RBC AUTO-ENTMCNC: 34.6 G/DL (ref 31.4–37.4)
MCV RBC AUTO: 92 FL (ref 82–98)
MICROALBUMIN UR-MCNC: 46 MG/L
MICROALBUMIN/CREAT 24H UR: 68 MG/G CREATININE (ref 0–30)
MONOCYTES # BLD AUTO: 0.89 THOUSAND/ÂΜL (ref 0.17–1.22)
MONOCYTES NFR BLD AUTO: 10 % (ref 4–12)
NEUTROPHILS # BLD AUTO: 5.84 THOUSANDS/ÂΜL (ref 1.85–7.62)
NEUTS SEG NFR BLD AUTO: 65 % (ref 43–75)
NRBC BLD AUTO-RTO: 0 /100 WBCS
PLATELET # BLD AUTO: 269 THOUSANDS/UL (ref 149–390)
PMV BLD AUTO: 9.2 FL (ref 8.9–12.7)
POTASSIUM SERPL-SCNC: 3.8 MMOL/L (ref 3.5–5.3)
PROT SERPL-MCNC: 7.5 G/DL (ref 6.4–8.4)
RBC # BLD AUTO: 5.16 MILLION/UL (ref 3.88–5.62)
SODIUM SERPL-SCNC: 134 MMOL/L (ref 135–147)
WBC # BLD AUTO: 8.98 THOUSAND/UL (ref 4.31–10.16)

## 2023-11-24 PROCEDURE — 82043 UR ALBUMIN QUANTITATIVE: CPT

## 2023-11-24 PROCEDURE — 82570 ASSAY OF URINE CREATININE: CPT

## 2023-11-24 PROCEDURE — 85025 COMPLETE CBC W/AUTO DIFF WBC: CPT

## 2023-11-24 PROCEDURE — 72050 X-RAY EXAM NECK SPINE 4/5VWS: CPT

## 2023-11-24 PROCEDURE — 80053 COMPREHEN METABOLIC PANEL: CPT

## 2023-11-24 PROCEDURE — 36415 COLL VENOUS BLD VENIPUNCTURE: CPT

## 2023-11-24 PROCEDURE — 82610 CYSTATIN C: CPT

## 2023-11-28 ENCOUNTER — TELEPHONE (OUTPATIENT)
Age: 53
End: 2023-11-28

## 2023-11-28 DIAGNOSIS — M54.12 CERVICAL RADICULOPATHY AT C5: Primary | ICD-10-CM

## 2023-11-28 DIAGNOSIS — F40.240 CLAUSTROPHOBIA: Primary | ICD-10-CM

## 2023-11-28 LAB
CYSTATIN C SERPL-MCNC: 0.88 MG/L (ref 0.67–1.14)
GFR/BSA.PRED SERPLBLD CYS-BASED-ARV: 96 ML/MIN/1.73

## 2023-11-28 RX ORDER — ALPRAZOLAM 0.25 MG/1
0.25 TABLET ORAL
Qty: 2 TABLET | Refills: 0 | Status: SHIPPED | OUTPATIENT
Start: 2023-11-28 | End: 2023-11-30

## 2023-11-28 NOTE — TELEPHONE ENCOUNTER
Patient calling to let Dr. Hunter Melissa know he is going this Mayur 12/3 for all the MRI and MRA testing that she has ordered. He stated all were approved by insurance. He stated that he is Claustrophobic, and since he will be in the machine for sometime on Sunday having multiple scans done, is there something that she can prescribe to keep him calm?     Please call patent at [phone]

## 2023-12-03 ENCOUNTER — HOSPITAL ENCOUNTER (OUTPATIENT)
Dept: MRI IMAGING | Facility: HOSPITAL | Age: 53
Discharge: HOME | End: 2023-12-03
Attending: FAMILY MEDICINE
Payer: COMMERCIAL

## 2023-12-03 ENCOUNTER — HOSPITAL ENCOUNTER (OUTPATIENT)
Dept: MRI IMAGING | Facility: HOSPITAL | Age: 53
Discharge: HOME | End: 2023-12-03
Payer: COMMERCIAL

## 2023-12-03 DIAGNOSIS — M54.12 CERVICAL RADICULOPATHY AT C5: ICD-10-CM

## 2023-12-03 DIAGNOSIS — R20.0 LEFT ARM NUMBNESS: ICD-10-CM

## 2023-12-03 DIAGNOSIS — H53.8 BLURRED VISION, LEFT EYE: ICD-10-CM

## 2023-12-03 DIAGNOSIS — R51.0 POSTURAL HEADACHE: ICD-10-CM

## 2023-12-03 DIAGNOSIS — M48.02 SPINAL STENOSIS IN CERVICAL REGION: ICD-10-CM

## 2023-12-03 DIAGNOSIS — Z98.1 HISTORY OF SPINAL FUSION: ICD-10-CM

## 2023-12-03 PROCEDURE — G1004 CDSM NDSC: HCPCS

## 2023-12-03 PROCEDURE — 70553 MRI BRAIN STEM W/O & W/DYE: CPT

## 2023-12-03 PROCEDURE — 72141 MRI NECK SPINE W/O DYE: CPT

## 2023-12-03 PROCEDURE — 70544 MR ANGIOGRAPHY HEAD W/O DYE: CPT

## 2023-12-03 PROCEDURE — A9585 GADOBUTROL INJECTION: HCPCS | Performed by: FAMILY MEDICINE

## 2023-12-03 PROCEDURE — 70549 MR ANGIOGRAPH NECK W/O&W/DYE: CPT

## 2023-12-03 RX ORDER — GADOBUTROL 604.72 MG/ML
8 INJECTION INTRAVENOUS
Status: COMPLETED | OUTPATIENT
Start: 2023-12-03 | End: 2023-12-03

## 2023-12-03 RX ADMIN — GADOBUTROL 8 ML: 604.72 INJECTION INTRAVENOUS at 12:53

## 2023-12-04 ENCOUNTER — HOSPITAL ENCOUNTER (OUTPATIENT)
Dept: NON INVASIVE DIAGNOSTICS | Facility: CLINIC | Age: 53
Discharge: HOME | End: 2023-12-04
Payer: COMMERCIAL

## 2023-12-04 DIAGNOSIS — I65.22 LEFT CAROTID ARTERY STENOSIS: ICD-10-CM

## 2023-12-04 DIAGNOSIS — I65.23 SYMPTOMATIC CAROTID ARTERY STENOSIS, BILATERAL: ICD-10-CM

## 2023-12-04 DIAGNOSIS — Z98.890 STATUS POST CAROTID SURGERY: ICD-10-CM

## 2023-12-04 PROCEDURE — 93880 EXTRACRANIAL BILAT STUDY: CPT

## 2023-12-05 PROCEDURE — 93880 EXTRACRANIAL BILAT STUDY: CPT | Performed by: SURGERY

## 2023-12-14 ENCOUNTER — OFFICE VISIT (OUTPATIENT)
Dept: CARDIOLOGY CLINIC | Facility: CLINIC | Age: 53
End: 2023-12-14
Payer: COMMERCIAL

## 2023-12-14 ENCOUNTER — OFFICE VISIT (OUTPATIENT)
Dept: VASCULAR SURGERY | Facility: CLINIC | Age: 53
End: 2023-12-14
Payer: COMMERCIAL

## 2023-12-14 VITALS
BODY MASS INDEX: 30.29 KG/M2 | DIASTOLIC BLOOD PRESSURE: 102 MMHG | RESPIRATION RATE: 16 BRPM | WEIGHT: 193 LBS | OXYGEN SATURATION: 97 % | HEIGHT: 67 IN | SYSTOLIC BLOOD PRESSURE: 166 MMHG | TEMPERATURE: 97.8 F | HEART RATE: 82 BPM

## 2023-12-14 VITALS
BODY MASS INDEX: 30.39 KG/M2 | SYSTOLIC BLOOD PRESSURE: 158 MMHG | WEIGHT: 193.6 LBS | DIASTOLIC BLOOD PRESSURE: 88 MMHG | HEART RATE: 80 BPM | HEIGHT: 67 IN

## 2023-12-14 DIAGNOSIS — R26.2 AMBULATORY DYSFUNCTION: ICD-10-CM

## 2023-12-14 DIAGNOSIS — I63.531 CEREBROVASCULAR ACCIDENT (CVA) DUE TO STENOSIS OF RIGHT POSTERIOR CEREBRAL ARTERY (HCC): ICD-10-CM

## 2023-12-14 DIAGNOSIS — I20.89 STABLE ANGINA PECTORIS: Primary | ICD-10-CM

## 2023-12-14 DIAGNOSIS — R94.31 ABNORMAL ECG: ICD-10-CM

## 2023-12-14 DIAGNOSIS — E78.2 MIXED HYPERLIPIDEMIA: ICD-10-CM

## 2023-12-14 DIAGNOSIS — I65.22 LEFT CAROTID ARTERY STENOSIS: Primary | ICD-10-CM

## 2023-12-14 DIAGNOSIS — I1A.0 RESISTANT HYPERTENSION: ICD-10-CM

## 2023-12-14 DIAGNOSIS — R42 DIZZINESS AND GIDDINESS: ICD-10-CM

## 2023-12-14 PROCEDURE — 93000 ELECTROCARDIOGRAM COMPLETE: CPT | Performed by: INTERNAL MEDICINE

## 2023-12-14 PROCEDURE — 99214 OFFICE O/P EST MOD 30 MIN: CPT | Performed by: NURSE PRACTITIONER

## 2023-12-14 PROCEDURE — 99245 OFF/OP CONSLTJ NEW/EST HI 55: CPT | Performed by: INTERNAL MEDICINE

## 2023-12-14 RX ORDER — TRIAMTERENE AND HYDROCHLOROTHIAZIDE 37.5; 25 MG/1; MG/1
1 CAPSULE ORAL EVERY MORNING
Qty: 30 CAPSULE | Refills: 1 | Status: SHIPPED | OUTPATIENT
Start: 2023-12-14

## 2023-12-14 NOTE — ASSESSMENT & PLAN NOTE
-Elevated triglycerides that are trending down.  -continue statin medication  -continue routine follow-up with family doctor

## 2023-12-14 NOTE — PROGRESS NOTES
Assessment/Plan:    Left Carotid Artery Stenosis s/p Left TCAR  50-year-old male in the office for review of carotid ultrasoundt s/p L TCAR by  1/19/23, R TCAR 11/2022 by  returns to the office for review of his non-invasive study.     -Pt report L proximal neck 'pressure' since his TCAR. Had recent MRA of carotids ordered by PCP. Reviewed carotid ultrasound from 12/4/2023 which demonstrates right ICA stent widely patent. Left ICA stent widely patent. Denies symptoms of numbness/ tingling/ weakness on one side of the body, facial droop, slurred speech or blindness in one eye. He c/o L jaw and chin numbness at times. We discussed that his ultrasound exhibits stenosis of the external carotid artery and that this is treated with medical management. -Reviewed carotid ultrasound in detail with pt and family discussed indications for vascular intervention. Discussed that b/l carotid stents are widely patient with no concerns for any stenosis. Reviewed with . No planned vascular intervention at this time. Discussed continued surveillance with non-invasive imaging in one year. Pt and family verbalized understanding. Recommendations  -Complete carotid ultrasound in 1 year with return visit for review.  -continue to take aspirin 81 mg as per   -continue to take atorvastatin daily as per PCP.  -F/U with PCP for any blood pressure medication adjustments.   -Go to the ED with any S/Sx of TIA/CVA  -D/w     Essential hypertension  -BP elevated in the office today, states he just took his blood pressure medication prior to arriving in the office. States he is going to cardiology today. -Reports frequent headache  -continue current medical regimen  -management per PCP/ cardiology.       High triglycerides  -Elevated triglycerides that are trending down.  -continue statin medication  -continue routine follow-up with family doctor       Diagnoses and all orders for this visit:    Left Carotid Artery Stenosis s/p Left TCAR  -     VAS carotid complete study; Future          Subjective:      Patient ID: Manjinder Yusuf is a 47 y.o. male. Patient presents to review the CV done 12/4/23. Pt reports L sided weakness and longstanding history of lightheadedness and headaches, but denies worsening in symptoms. He also reports occasional blurriness in L eye and pressure in L neck. He denies slurred speech or difficulty speaking. Pt is taking ASA81 and Atorvastatin. He is a former smoker. 60-year-old male with PMHx HTN, hx of R ischemic stroke, HTN, Thalamic infarction, b/l nephrolithiasis, back pain, b/l carotid stenosis he is s/p L TCAR 1/19/23 and R TCAR 11/2022 both done by . States that since his stent in the L carotid he feels a 'pressure' in the left neck and has been having left lower back pain. He states that he has a f/u with ortho and neuro. He reports he recently had an MRA of his carotids and has concerns about stent patency, carotid duplex demonstrates b/l stent patency with no concerns for any stenosis. He states he has a feeling of tired muscle pain in the L leg. States he would have to stop walking after walking 40-50 yards. Denies rest pain, denies wounds tissue loss. Upon assessment he has 2+ b/l DP and PT pulses in his feet with no concerns for peripheral arterial disease, discussed that he should f/u with his pain/ orthopedic doctor in this regard. L toes numbness when he is up and walking. Only when he is up and moving around. No true rest pain, no wounds/ tissue loss. Currently taking ASA and atorvastatin daily. The following portions of the patient's history were reviewed and updated as appropriate: allergies, current medications, past family history, past medical history, past social history, past surgical history, and problem list.    Review of Systems   Eyes:  Positive for visual disturbance.    Respiratory:  Positive for shortness of breath. Cardiovascular:  Negative for chest pain. Neurological:  Positive for light-headedness and headaches. All other systems reviewed and are negative. Objective:      /88 (BP Location: Right arm, Patient Position: Sitting, Cuff Size: Standard)   Pulse 80   Ht 5' 7" (1.702 m)   Wt 87.8 kg (193 lb 9.6 oz)   BMI 30.32 kg/m²          Physical Exam  Vitals (Took his blood pressure medicine 20 min before arriving) and nursing note reviewed. Constitutional:       Appearance: Normal appearance. HENT:      Head: Normocephalic and atraumatic. Cardiovascular:      Rate and Rhythm: Normal rate and regular rhythm. Pulses: Normal pulses. Radial pulses are 2+ on the right side and 2+ on the left side. Dorsalis pedis pulses are 2+ on the right side and 2+ on the left side. Posterior tibial pulses are 2+ on the right side and 2+ on the left side. Heart sounds: Murmur heard. Pulmonary:      Effort: Pulmonary effort is normal.      Breath sounds: Normal breath sounds. Musculoskeletal:      Cervical back: Rigidity and tenderness present. Right lower leg: No edema. Left lower leg: No edema. Skin:     General: Skin is warm and dry. Neurological:      General: No focal deficit present. Mental Status: He is alert and oriented to person, place, and time. Sensory: Sensory deficit (L foot) present. Psychiatric:         Mood and Affect: Mood normal.         Behavior: Behavior normal.           I have reviewed and made appropriate changes to the review of systems input by the medical assistant.     Vitals:    12/14/23 0941 12/14/23 0942   BP: 162/90 158/88   BP Location: Left arm Right arm   Patient Position: Sitting Sitting   Cuff Size: Standard Standard   Pulse: 80    Weight: 87.8 kg (193 lb 9.6 oz)    Height: 5' 7" (1.702 m)        Patient Active Problem List   Diagnosis    History of diverticulitis    Bilateral nephrolithiasis    Chronic back pain    Status post carotid surgery    Alcohol intake above recommended sensible limits    Gross hematuria    Encounter to discuss test results    Family history of prostate cancer    Erectile dysfunction due to arterial insufficiency    Encounter for screening colonoscopy    Primary hypertension    Thalamic infarction ([address])    Acute right arterial ischemic stroke, PCA (posterior cerebral artery) (HCC)    Left Carotid Artery Stenosis s/p Left TCAR    Symptomatic carotid artery stenosis, bilateral    Essential hypertension    Numbness    High triglycerides    Dizziness and giddiness    Benign hypertension with CKD (chronic kidney disease), stage II       Past Surgical History:   Procedure Laterality Date    CARPAL TUNNEL RELEASE Bilateral     CERVICAL FUSION      with hardware    CYSTOSCOPY      HERNIA REPAIR      IR CAROTID STENT  11/8/2022    IR CAROTID STENT  1/19/2023    RI TCAT IV STENT CRV CRTD ART EMBOLIC PROTECJ Right 22/3/4367    Procedure: ANGIOPLASTY ARTERY CAROTID W/ STENT TCAR,;  Surgeon: Gracie Hutchinson DO;  Location: [facility];  Service: Vascular    RI TCAT IV STENT CRV CRTD ART EMBOLIC PROTECJ Left 5/71/1274    Procedure: ANGIOPLASTY ARTERY CAROTID W/ STENT Left TCAR;  Surgeon: Gracie Hutchinson DO;  Location: [facility];  Service: Vascular    ULNAR COLLATERAL LIGAMENT RECONSTRUCTION Bilateral        Family History   Problem Relation Age of Onset    Heart attack Mother     Diabetes Mother     Hypertension Mother     Colon cancer Father     No Known Problems Sister     No Known Problems Sister     No Known Problems Sister     No Known Problems Son        Social History     Socioeconomic History    Marital status: /Civil Union     Spouse name: Not on file    Number of children: Not on file    Years of education: Not on file    Highest education level: Not on file   Occupational History    Not on file   Tobacco Use    Smoking status: Former     Current packs/day: 0.00     Average packs/day: 2.0 packs/day for 35.0 years (70.0 ttl pk-yrs)     Types: Cigarettes     Start date: 1987     Quit date: 2022     Years since quittin.3    Smokeless tobacco: Never   Vaping Use    Vaping status: Every Day    Substances: Nicotine   Substance and Sexual Activity    Alcohol use: Yes     Alcohol/week: 12.0 standard drinks of alcohol     Types: 12 Cans of beer per week     Comment: 12 cans  beer   weekly    Drug use: Not Currently    Sexual activity: Yes   Other Topics Concern    Not on file   Social History Narrative    Not on file     Social Determinants of Health     Financial Resource Strain: Not on file   Food Insecurity: No Food Insecurity (2023)    Hunger Vital Sign     Worried About Running Out of Food in the Last Year: Never true     Ran Out of Food in the Last Year: Never true   Transportation Needs: No Transportation Needs (2023)    PRAPARE - Transportation     Lack of Transportation (Medical): No     Lack of Transportation (Non-Medical): No   Physical Activity: Not on file   Stress: Not on file   Social Connections: Not on file   Intimate Partner Violence: Not on file   Housing Stability: Low Risk  (2023)    Housing Stability Vital Sign     Unable to Pay for Housing in the Last Year: No     Number of Places Lived in the Last Year: 1     Unstable Housing in the Last Year: No       Allergies   Allergen Reactions    Penicillins Anaphylaxis         Current Outpatient Medications:     aspirin 81 mg chewable tablet, Chew 1 tablet (81 mg total) daily, Disp: 30 tablet, Rfl: 0    atorvastatin (LIPITOR) 80 mg tablet, Take 1 tablet (80 mg total) by mouth in the evening.  Take before meals, Disp: 90 tablet, Rfl: 1    albuterol (PROVENTIL HFA,VENTOLIN HFA) 90 mcg/act inhaler, INHALE 2 PUFFS BY MOUTH EVERY 6 HOURS AS NEEDED FOR WHEEZING, Disp: 9 g, Rfl: 0    ALPRAZolam (XANAX) 0.25 mg tablet, Take 1 tablet (0.25 mg total) by mouth once in imaging for anxiety for up to 2 days, Disp: 2 tablet, Rfl: 0 amitriptyline (ELAVIL) 10 mg tablet, Take 1 tablet (10 mg total) by mouth daily at bedtime, Disp: 30 tablet, Rfl: 3    amLODIPine (NORVASC) 10 mg tablet, Take 1 tablet (10 mg total) by mouth daily, Disp: 90 tablet, Rfl: 1    b complex vitamins capsule, Take 1 capsule by mouth daily, Disp: 30 capsule, Rfl: 3    baclofen 10 mg tablet, Take 1 tablet (10 mg total) by mouth 3 (three) times a day As needed for muscle spasm, Disp: 90 tablet, Rfl: 0    co-enzyme Q-10 100 mg capsule, Take 1 capsule (100 mg total) by mouth daily (Patient not taking: Reported on 11/16/2023), Disp: 30 capsule, Rfl: 3    Cyanocobalamin (VITAMIN B12 PO), Take by mouth, Disp: , Rfl:     ferrous sulfate 325 (65 Fe) mg tablet, Take 325 mg by mouth daily with breakfast, Disp: , Rfl:     gabapentin (NEURONTIN) 100 mg capsule, Take 2 capsules (200 mg total) by mouth 3 (three) times a day, Disp: 540 capsule, Rfl: 0    losartan (COZAAR) 100 MG tablet, Take 1 tablet (100 mg total) by mouth daily, Disp: 90 tablet, Rfl: 1    metoprolol succinate (TOPROL-XL) 25 mg 24 hr tablet, Take 1 tablet (25 mg total) by mouth daily, Disp: 90 tablet, Rfl: 1    Multiple Vitamin (multivitamin) tablet, Take 1 tablet by mouth if needed (Patient not taking: Reported on 6/26/2023), Disp: , Rfl:     pantoprazole (PROTONIX) 40 mg tablet, Take 1 tablet (40 mg total) by mouth daily, Disp: 90 tablet, Rfl: 1    tamsulosin (FLOMAX) 0.4 mg, Take 1 capsule (0.4 mg total) by mouth daily with dinner, Disp: 90 capsule, Rfl: 0    tiZANidine (ZANAFLEX) 2 mg tablet, Take 1 tablet (2 mg total) by mouth every 8 (eight) hours as needed for muscle spasms, Disp: 60 tablet, Rfl: 2    triamterene-hydrochlorothiazide (DYAZIDE) 37.5-25 mg per capsule, Take 1 capsule by mouth every morning, Disp: 30 capsule, Rfl: 1  I have spent a total time of 40 minutes on 12/14/23 in caring for this patient including Diagnostic results, Risks and benefits of tx options, Instructions for management, Patient and family education, Importance of tx compliance, Risk factor reductions, Counseling / Coordination of care, Documenting in the medical record, Reviewing / ordering tests, medicine, procedures  , Obtaining or reviewing history  , and Communicating with other healthcare professionals .

## 2023-12-14 NOTE — PROGRESS NOTES
[facility] Kasandra Sandoval  [facility] [id number] [phone]                                            Cardiology Office Consult  Cora Burgos, 47 y.o. male  YOB: 1970  MRN: [id number] Encounter: [id number]      PCP - Leora Gonzalez MD  Referring Provider - Leora Gonzalez MD    Chief Complaint   Patient presents with   • Hypertension       Assessment  Chest tightness/pain  Abnormal ECG  Uncontrolled hypertension   S/p B/L carotid TCAR  Rt TCAR 11/2022, Lt TCAR 1/2022  Ambulatory dysfunction  Has left leg numbness and dysfunction, limiting exercise  H/o stroke (9/2022)  Former smoker  2 PPD. day, quit after stroke  Now vaping    Plan  Chest pain / tightness, Abnormal ECG  Symptoms highly concerning for exertional angina, although have been present for many months  ECG with LVH and repolarization changes possibly related to same  He is unable to exercise due to left leg dysfunction and neuropathy  He does have uncontrolled blood pressure, which may additionally be contributing to his symptoms  Plan  Recommend nuclear Lexiscan stress test for further evaluation  Optimize blood pressure control  Continue aspirin, atorvastatin 80 mg, metoprolol, amlodipine 10    Hypertension, uncontrolled  Bp today 166/102  Already on 3 medications - metoprolol Xl 25, amlodipine 10, losartan 100  Not on any diuretic --> add Triamterene-HCTZ  Repeat BMP in 7-10 days after this  Low salt diet  Avoid smoking, encouraged to quit vaping as well  Keep BP log, and call me if BP persistently > 150/95 after this new medication  Secondary hypertension evaluation  VAS renal ordered in 11/2022 by nephrologist, but not yet completed --> reordered same  Check metanephrine levels, renin, aldosterone  Has multiple features for sleep apnea --> sleep study scheduled for 3/2024    Hyperlipidemia, h/o RT thalamic stroke / PCA territory infarct    Cholesterol levels improving, TG still high  Counseled regarding dietary modifications for same  Tolerating atorvastatin 80  LDL 76, still above goal  Follow up lipid panel  If LDL still > 70, then plan to add ezetimibe        Results for orders placed or performed in visit on 12/14/23   POCT ECG    Impression    Normal sinus rhythm  Left ventricular hypertrophy with QRS widening and repolarization abnormalities       Orders Placed This Encounter   Procedures   • Basic metabolic panel   • Metanephrine, Fractionated Plasma Free   • Renin Activity, Plasma   • Aldosterone/Renin Ratio   • Lipid Panel with Direct LDL reflex   • POCT ECG       Return in about 4 weeks (around 1/11/2024), or if symptoms worsen or fail to improve. 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 These have been ongoing for several months. No associated complaints of nausea, vomiting or diaphoresis. No recent acute change in the symptoms. No known prior history of coronary artery disease but he has not had any ischemic evaluation in the past.    He has additionally had ongoing issues related to uncontrolled hypertension for years as well, despite medical therapy. He does not check BP regularly at home, but notes that it is usually in 150s, whenever checked. In 9/2022, he had presented to hospital with left sided facial/arm and leg numbness and dizziness, associated with increased confusion. He was subsequently found to have an acute rt thalamic stroke, along with occlusion of rt posterior cerebral artery, which was felt to be compatible with right PCA territory infarcts. He also had bilateral cervical artery stenosis at that time and subsequently underwent stenting to both carotids.   He has been treated with antiplatelets and statins subsequently, but has continued to have issues with left leg numbness and has associated ambulatory dysfunction. Family history  Father -  at 46 in accident  He was diagnosed with MV prolapse after another accident, was a . No known CAD , but limited medical history known. Mother -  at 58 of heart attack  had "extra artery in the heart"  had heart attack and needed a balloon procedure for heart in her 46s.   DM2, and was not taking medications  Siblings: 2 older sisters, 1 younger sister  Oldest sister - water retention issues, no known heart conditions    Historical Information   Past Medical History:   Diagnosis Date   • Carotid stenosis, left     today  2023  L carotid angioplasty   • Carotid stenosis, right     Rt angioplasty completed  2023   • Cervical disc disease    • COVID 2020   • GERD (gastroesophageal reflux disease)    • Hyperlipidemia    • Hypertension    • Kidney stone    • Muscle weakness    • Spinal stenosis    • Stroke ([address]) 2022    left sided weakness with pins/needles   • Weakness of left side of body 2022    s/p CVA     Past Surgical History:   Procedure Laterality Date   • CARPAL TUNNEL RELEASE Bilateral    • CERVICAL FUSION      with hardware   • CYSTOSCOPY     • HERNIA REPAIR     • IR CAROTID STENT  2022   • IR CAROTID STENT  2023   • DC TCAT IV STENT CRV CRTD ART EMBOLIC PROTECJ Right     Procedure: ANGIOPLASTY ARTERY CAROTID W/ STENT TCAR,;  Surgeon: Pablo Butterfield DO;  Location: [facility];  Service: Vascular   • DC TCAT IV STENT CRV CRTD ART EMBOLIC PROTECJ Left     Procedure: ANGIOPLASTY ARTERY CAROTID W/ STENT Left TCAR;  Surgeon: Pablo Butterfield DO;  Location: [facility];  Service: Vascular   • ULNAR COLLATERAL LIGAMENT RECONSTRUCTION Bilateral      Family History   Problem Relation Age of Onset   • Heart attack Mother    • Diabetes Mother    • Hypertension Mother    • Colon cancer Father    • No Known Problems Sister    • No Known Problems Sister    • No Known Problems Sister    • No Known Problems Son      Current Outpatient Medications on File Prior to Visit   Medication Sig Dispense Refill   • albuterol (PROVENTIL HFA,VENTOLIN HFA) 90 mcg/act inhaler INHALE 2 PUFFS BY MOUTH EVERY 6 HOURS AS NEEDED FOR WHEEZING 9 g 0   • amitriptyline (ELAVIL) 10 mg tablet Take 1 tablet (10 mg total) by mouth daily at bedtime 30 tablet 3   • amLODIPine (NORVASC) 10 mg tablet Take 1 tablet (10 mg total) by mouth daily 90 tablet 1   • aspirin 81 mg chewable tablet Chew 1 tablet (81 mg total) daily 30 tablet 0   • atorvastatin (LIPITOR) 80 mg tablet Take 1 tablet (80 mg total) by mouth in the evening.  Take before meals 90 tablet 1   • b complex vitamins capsule Take 1 capsule by mouth daily 30 capsule 3   • baclofen 10 mg tablet Take 1 tablet (10 mg total) by mouth 3 (three) times a day As needed for muscle spasm 90 tablet 0   • Cyanocobalamin (VITAMIN B12 PO) Take by mouth     • ferrous sulfate 325 (65 Fe) mg tablet Take 325 mg by mouth daily with breakfast     • gabapentin (NEURONTIN) 100 mg capsule Take 2 capsules (200 mg total) by mouth 3 (three) times a day 540 capsule 0   • losartan (COZAAR) 100 MG tablet Take 1 tablet (100 mg total) by mouth daily 90 tablet 1   • metoprolol succinate (TOPROL-XL) 25 mg 24 hr tablet Take 1 tablet (25 mg total) by mouth daily 90 tablet 1   • pantoprazole (PROTONIX) 40 mg tablet Take 1 tablet (40 mg total) by mouth daily 90 tablet 1   • tamsulosin (FLOMAX) 0.4 mg Take 1 capsule (0.4 mg total) by mouth daily with dinner 90 capsule 0   • tiZANidine (ZANAFLEX) 2 mg tablet Take 1 tablet (2 mg total) by mouth every 8 (eight) hours as needed for muscle spasms 60 tablet 2   • ALPRAZolam (XANAX) 0.25 mg tablet Take 1 tablet (0.25 mg total) by mouth once in imaging for anxiety for up to 2 days 2 tablet 0   • co-enzyme Q-10 100 mg capsule Take 1 capsule (100 mg total) by mouth daily (Patient not taking: Reported on 2023) 30 capsule 3   • Multiple Vitamin (multivitamin) tablet Take 1 tablet by mouth if needed (Patient not taking: Reported on 2023)       No current facility-administered medications on file prior to visit. Allergies   Allergen Reactions   • Penicillins Anaphylaxis     Social History     Socioeconomic History   • Marital status: /Civil Union     Spouse name: None   • Number of children: None   • Years of education: None   • Highest education level: None   Occupational History   • None   Tobacco Use   • Smoking status: Former     Current packs/day: 0.00     Average packs/day: 2.0 packs/day for 35.0 years (70.0 ttl pk-yrs)     Types: Cigarettes     Start date: 1987     Quit date: 2022     Years since quittin.3   • Smokeless tobacco: Never   Vaping Use   • Vaping status: Every Day   • Substances: Nicotine   Substance and Sexual Activity   • Alcohol use: Yes     Alcohol/week: 12.0 standard drinks of alcohol     Types: 12 Cans of beer per week     Comment: 12 cans  beer   weekly   • Drug use: Not Currently   • Sexual activity: Yes   Other Topics Concern   • None   Social History Narrative   • None     Social Determinants of Health     Financial Resource Strain: Not on file   Food Insecurity: No Food Insecurity (2023)    Hunger Vital Sign    • Worried About Running Out of Food in the Last Year: Never true    • Ran Out of Food in the Last Year: Never true   Transportation Needs: No Transportation Needs (2023)    PRAPARE - Transportation    • Lack of Transportation (Medical): No    • Lack of Transportation (Non-Medical):  No   Physical Activity: Not on file   Stress: Not on file   Social Connections: Not on file   Intimate Partner Violence: Not on file   Housing Stability: Low Risk  (2023)    Housing Stability Vital Sign    • Unable to Pay for Housing in the Last Year: No    • Number of Places Lived in the Last Year: 1    • Unstable Housing in the Last Year: No Review of Systems   All other systems reviewed and are negative. Vitals:  Vitals:    12/14/23 1105   BP: (!) 166/102   BP Location: Left arm   Patient Position: Sitting   Cuff Size: Standard   Pulse: 82   Resp: 16   Temp: 97.8 °F (36.6 °C)   TempSrc: Tympanic   SpO2: 97%   Weight: 87.5 kg (193 lb)   Height: 5' 7" (1.702 m)     BMI - Body mass index is 30.23 kg/m². Wt Readings from Last 7 Encounters:   12/14/23 87.5 kg (193 lb)   12/14/23 87.8 kg (193 lb 9.6 oz)   11/16/23 86.6 kg (191 lb)   11/09/23 85.7 kg (189 lb)   11/03/23 86.2 kg (190 lb)   09/28/23 86.2 kg (190 lb)   09/14/23 87.1 kg (192 lb)       Physical Exam  Vitals and nursing note reviewed. Constitutional:       General: He is not in acute distress. Appearance: He is well-developed. He is obese. He is not ill-appearing or diaphoretic. HENT:      Head: Normocephalic and atraumatic. Nose: No congestion. Eyes:      General: No scleral icterus. Conjunctiva/sclera: Conjunctivae normal.   Neck:      Vascular: No carotid bruit or JVD. Cardiovascular:      Rate and Rhythm: Normal rate and regular rhythm. Heart sounds: Normal heart sounds. No murmur heard. No friction rub. No gallop. Pulmonary:      Effort: Pulmonary effort is normal. No respiratory distress. Breath sounds: Normal breath sounds. No wheezing or rales. Chest:      Chest wall: No tenderness. Abdominal:      General: There is no distension. Palpations: Abdomen is soft. Tenderness: There is abdominal tenderness. Comments: Mild tenderness left lateral quadrant   Musculoskeletal:         General: No swelling, tenderness or deformity. Cervical back: Neck supple. No muscular tenderness. Right lower leg: No edema. Left lower leg: No edema. Skin:     General: Skin is warm. Neurological:      Mental Status: He is alert and oriented to person, place, and time. Mental status is at baseline.       Gait: Gait abnormal. Psychiatric:         Mood and Affect: Mood normal.         Behavior: Behavior normal.         Thought Content: Thought content normal.         Labs:  CBC:   Lab Results   Component Value Date    WBC 8.98 11/24/2023    RBC 5.16 11/24/2023    HGB 16.5 11/24/2023    HCT 47.7 11/24/2023    MCV 92 11/24/2023     11/24/2023    RDW 12.3 11/24/2023       CMP:   Lab Results   Component Value Date    K 3.8 11/24/2023     11/24/2023    CO2 23 11/24/2023    BUN 15 11/24/2023    CREATININE 1.06 11/24/2023    EGFR 80 11/24/2023    EGFR 96 11/24/2023    CALCIUM 9.2 11/24/2023    AST 35 11/24/2023    ALT 59 (H) 11/24/2023    ALKPHOS 83 11/24/2023       Magnesium:  Lab Results   Component Value Date    MG 2.0 10/19/2021       Lipid Profile:   Lab Results   Component Value Date    HDL 61 03/01/2023    TRIG 229 (H) 03/01/2023    LDLCALC 76 03/01/2023       Thyroid Studies:   Lab Results   Component Value Date    SVL9EJTETCCA 0.730 09/13/2022       A1c:  No components found for: "HGA1C"    INR:  Lab Results   Component Value Date    INR 0.95 01/20/2023    INR 1.03 11/09/2022    INR 0.90 09/13/2022   5    Imaging: MRI cervical spine wo contrast    Result Date: 12/7/2023  Narrative: MRI CERVICAL SPINE WITHOUT CONTRAST INDICATION: M54.12: Radiculopathy, cervical region M48.02: Spinal stenosis, cervical region R20.0: Anesthesia of skin Z98.1: Arthrodesis status. COMPARISON: 11/24/2023 TECHNIQUE:  Multiplanar, multisequence imaging of the cervical spine was performed. . IMAGE QUALITY:  Diagnostic FINDINGS: ALIGNMENT: The patient is status post anterior cervical discectomy and spinal fusion from C6-C7. There is retrolisthesis of C5-C6 and C3-C4. MARROW SIGNAL:  Normal marrow signal is identified within the visualized bony structures. No discrete marrow lesion. CERVICAL AND VISUALIZED THORACIC CORD:  Normal signal within the visualized cord.  PREVERTEBRAL AND PARASPINAL SOFT TISSUES:  Normal. VISUALIZED POSTERIOR FOSSA:  The visualized posterior fossa demonstrates no abnormal signal. CERVICAL DISC SPACES: C2-C3: There is left-sided uncovertebral spurring. There is facet arthrosis. There is mild left foraminal narrowing. C3-C4: There is a disc osteophyte complex with left-sided uncovertebral spurring. There is facet arthrosis. There is moderate left foraminal narrowing. There is no significant canal stenosis or right foraminal narrowing. C4-C5: There is a disc osteophyte complex with uncovertebral spurring and facet arthrosis. There is mild canal stenosis. There is severe right and moderate left foraminal narrowing. C5-C6: There is a disc osteophyte complex with uncovertebral spurring and facet arthrosis. There is moderate canal stenosis with mild cord impingement. There is severe bilateral foraminal narrowing. C6-C7: There is no significant canal stenosis or foraminal narrowing. C7-T1: There is a disc osteophyte complex with uncovertebral spurring. There is no significant canal stenosis. There is severe left foraminal narrowing. There is mild right foraminal narrowing. UPPER THORACIC DISC SPACES:  Normal. OTHER FINDINGS:  None. Impression: Postoperative changes and cervical spondylosis as described above. Most notable level is at C5-C6 where multifactorial disease results in overall moderate canal stenosis and mild cord impingement. Severe bilateral foraminal narrowing is present at this level. Workstation performed: [id number]     MRA head wo contrast    Result Date: 12/7/2023  Narrative: MRA BRAIN WITHOUT CONTRAST INDICATION:  H53.8: Other visual disturbances R20.0: Anesthesia of skin R51.0: Headache with orthostatic component, not elsewhere classified COMPARISON: 9/13/2022 TECHNIQUE:  Axial 3-D time-of-flight imaging with 3-D reconstructions performed without contrast. IV Contrast:  Not administered. FINDINGS: IMAGE QUALITY:  Diagnostic.  ANATOMY INTERNAL CAROTID ARTERIES:  Normal flow related signal of the distal cervical, petrous and cavernous segments of the internal carotid arteries. Normal ICA terminus. ANTERIOR CIRCULATION:  Normal A1 segments. Normal anterior communicating artery. Normal flow-related signal of the anterior cerebral arteries. MIDDLE CEREBRAL ARTERY CIRCULATION:  The M1 segment and middle cerebral artery branches demonstrate normal flow-related signal. DISTAL VERTEBRAL ARTERIES:  Distal vertebral arteries are patent with a normal vertebrobasilar junction. The posterior inferior cerebellar artery origins are normal. BASILAR ARTERY:  Normal. POSTERIOR CEREBRAL ARTERIES: The left posterior cerebral artery is patent. There is a left-sided posterior communicating artery. Mild irregularity of the left posterior cerebral artery. 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 Impression: No significant interval change. 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 Workstation performed: [id number]     MRA carotids wo and w contrast    Result Date: 12/7/2023  Narrative: MRA NECK - WITH AND WITHOUT CONTRAST INDICATION: H53.8: Other visual disturbances R20.0: Anesthesia of skin R51.0: Headache with orthostatic component, not elsewhere classified COMPARISON: CT angiogram from 9/13/2022. Correlation also made to recent duplex carotid ultrasound performed 12/4/2023. TECHNIQUE:  Gadolinium enhanced and noncontrast examination with 3-D reconstruction. IV Contrast:  8 mL of Gadobutrol injection (SINGLE-DOSE) FINDINGS: IMAGE QUALITY:  Diagnostic. AORTIC ARCH:  Normal. VISUALIZED SUBCLAVIAN ARTERIES:  The subclavian arteries demonstrate normal enhancement with no stenosis. VERTEBRAL ARTERIES:  Normal vertebral artery origins. The vertebral arteries are bilaterally symmetric with normal enhancement and no evidence of stenosis. Normal vertebral basilar junction. RIGHT CAROTID ARTERY: Status post interval right-sided carotid endarterectomy and stent placement.  There is attenuated but grossly preserved flow related signal and enhancement within the right internal carotid artery proximally at the site of the stent. This was shown to be widely patent on recent duplex carotid ultrasound. The more distal ICA demonstrates normal flow related signal. There is external carotid artery stenosis. Common carotid artery is patent. LEFT CAROTID ARTERY: Status post left-sided carotid endarterectomy and stent placement. There is attenuated but grossly patent flow related signal and enhancement within the proximal left ICA shown to be widely patent on recent carotid ultrasound. More distal ICA demonstrates normal flow related signal. There is external carotid artery stenosis. Common carotid artery is patent. VISUALIZED INTRACRANIAL VASCULATURE:  Limited visualization of the intracranial vasculature is grossly unremarkable. NASCET criteria was used to determine the degree of internal carotid artery diameter stenosis. Impression: Status post bilateral carotid endarterectomy and stent placement within the proximal internal carotid arteries. Grossly preserved but attenuated flow related signal and enhancement corresponding to patency on recently performed carotid ultrasound. If warranted CT angiogram could be performed for more definitive characterization of stent patency. More distal internal carotid arteries are widely patent with normal flow related signal and enhancement. Workstation performed: [id number]     MRI brain w wo contrast    Result Date: 12/7/2023  Narrative: MRI BRAIN WITH AND WITHOUT CONTRAST INDICATION: H53.8: Other visual disturbances R20.0: Anesthesia of skin R51.0: Headache with orthostatic component, not elsewhere classified. COMPARISON: 9/14/2022 TECHNIQUE: Multiplanar, multisequence imaging of the brain was performed before and after gadolinium administration. IV Contrast:  8 mL of Gadobutrol injection (SINGLE-DOSE) IMAGE QUALITY:   Diagnostic.  FINDINGS: BRAIN PARENCHYMA: There is no discrete mass, mass effect or midline shift. There is no intracranial hemorrhage. Normal posterior fossa. Diffusion imaging is unremarkable. There is minimal chronic microvascular ischemic change. Tiny chronic lacunar infarct noted within the left dorsal bhumi. There has been evolution of previously seen areas of PCA distribution ischemia. There is a chronic right thalamic infarct with residual gliosis, evolved since the prior examination. There is no significant residual gliosis identified associated with the areas of additional PCA distribution ischemia involving the mesial right temporal and occipital lobe. There has been resolution of previously seen associated diffusion weighted hyperintensity in these regions. Postcontrast imaging of the brain demonstrates no abnormal enhancement. VENTRICLES:  Normal for the patient's age. SELLA AND PITUITARY GLAND:  Normal. ORBITS:  Normal. PARANASAL SINUSES: There is ethmoid air cell mucosal thickening. VASCULATURE:  Evaluation of the major intracranial vasculature demonstrates appropriate flow voids. CALVARIUM AND SKULL BASE:  Normal. EXTRACRANIAL SOFT TISSUES:  Normal.     Impression: No mass effect, acute intracranial hemorrhage or evidence of recent infarction. Evolution of previously seen areas of right PCA distribution ischemia, as described above. Minimal chronic microvascular ischemic change. Workstation performed: [id number]     VAS carotid complete study    Result Date: 12/5/2023  Narrative:  [facility] REPORT CLINICAL: Indications: Yearly surveillance of carotid artery disease s/p bilateral TCAR. Patient reports left sided neck pain and tightness radiating to his jaw and forehead s/p left TCAR procedure. Operative History: 2023-01-19 Left TCAR (Dr. Carlotta Winston) 2022-11-08 Right TCAR (Dr. Carlotta Winston) Risk Factors The patient has history of HTN, Hyperlipidemia, CKD, CVA, CASEY and previous smoking (quit 1-5yrs ago).  Clinical Right Pressure: 152/96 mm Hg, Left Pressure: 144/96 mm Hg. FINDINGS:  Right              Impression       PSV  EDV (cm/s)  Direction of Flow  Dist. ICA                            91          34                     Mid. ICA                             84          31                     Prox. ICA - Stent  Widely Patent                                        Dist CCA                             74          18                     Mid CCA                              78          21                     Prox CCA                            100          26                     Ext Carotid        Severe stenosis  449          85                     Prox Vert                            61          20  Antegrade          Subclavian                          165           0                      Left               Impression       PSV  EDV (cm/s)  Direction of Flow  Dist. ICA                            60          22                     Mid. ICA                             95          38                     Prox. ICA - Stent  Widely Patent                                        Dist CCA                             41          16                     Mid CCA                              70          24                     Prox CCA                            107          28                     Ext Carotid        Severe stenosis  441         121                     Prox Vert                            63          16  Antegrade          Subclavian                          219           0                        CONCLUSION:  Impression  RIGHT: Widely patent internal carotid artery and stent. There is severe stenosis of the proximal external carotid artery. Vertebral artery flow is antegrade. There is no significant subclavian artery disease. LEFT: Widely patent internal carotid artery and stent. There is severe stenosis of the proximal external carotid artery. Vertebral artery flow is antegrade. There is no significant subclavian artery disease.   Compared to previous study on 8/15/2023, there is no significant progression of disease. SIGNATURE: Electronically Signed by: Colby Paul DO on 2023-12-05 02:01:04 PM    XR spine cervical complete 4 or 5 vw non injury    Result Date: 11/27/2023  Narrative: CERVICAL SPINE INDICATION:   Radiculopathy, cervical region. Spinal stenosis, cervical region. COMPARISON: None available VIEWS:  XR SPINE CERVICAL COMPLETE 4 OR 5 VW NON INJURY Images: 5 FINDINGS: No fracture. Straightening of normal cervical lordosis with degenerative retrolisthesis of C5 on C6. Mature ACDF spanning C6-7 without hardware complication. Disc base narrowing C5-6 and C7-T1 with vertebral body endplate spurring. Severe left C7-T1 neural foraminal narrowing with mild left C5-6 narrowing. Moderate right C4-5 and C5-6 neuroforaminal narrowing. The prevertebral soft tissues are within normal limits. The lung apices are clear. Impression: No acute osseous abnormality. Degenerative and postsurgical changes as above. Electronically signed: 11/27/2023 10:10 AM Kira Loza MPH,MD      Cardiac testing:   No results found for this or any previous visit. No results found for this or any previous visit. No results found for this or any previous visit. No results found for this or any previous visit. MRI cervical spine wo contrast  Narrative: MRI CERVICAL SPINE WITHOUT CONTRAST    INDICATION: M54.12: Radiculopathy, cervical region  M48.02: Spinal stenosis, cervical region  R20.0: Anesthesia of skin  Z98.1: Arthrodesis status. COMPARISON: 11/24/2023    TECHNIQUE:  Multiplanar, multisequence imaging of the cervical spine was performed. .    IMAGE QUALITY:  Diagnostic    FINDINGS:    ALIGNMENT: The patient is status post anterior cervical discectomy and spinal fusion from C6-C7. There is retrolisthesis of C5-C6 and C3-C4. MARROW SIGNAL:  Normal marrow signal is identified within the visualized bony structures. No discrete marrow lesion.     CERVICAL AND VISUALIZED THORACIC CORD:  Normal signal within the visualized cord. PREVERTEBRAL AND PARASPINAL SOFT TISSUES:  Normal.    VISUALIZED POSTERIOR FOSSA:  The visualized posterior fossa demonstrates no abnormal signal.    CERVICAL DISC SPACES:    C2-C3: There is left-sided uncovertebral spurring. There is facet arthrosis. There is mild left foraminal narrowing. C3-C4: There is a disc osteophyte complex with left-sided uncovertebral spurring. There is facet arthrosis. There is moderate left foraminal narrowing. There is no significant canal stenosis or right foraminal narrowing. C4-C5: There is a disc osteophyte complex with uncovertebral spurring and facet arthrosis. There is mild canal stenosis. There is severe right and moderate left foraminal narrowing. C5-C6: There is a disc osteophyte complex with uncovertebral spurring and facet arthrosis. There is moderate canal stenosis with mild cord impingement. There is severe bilateral foraminal narrowing. C6-C7: There is no significant canal stenosis or foraminal narrowing. C7-T1: There is a disc osteophyte complex with uncovertebral spurring. There is no significant canal stenosis. There is severe left foraminal narrowing. There is mild right foraminal narrowing. UPPER THORACIC DISC SPACES:  Normal.    OTHER FINDINGS:  None. Impression: Postoperative changes and cervical spondylosis as described above. Most notable level is at C5-C6 where multifactorial disease results in overall moderate canal stenosis and mild cord impingement. Severe bilateral foraminal narrowing is present at this level.     Workstation performed: [id number]  MRA head wo contrast  Narrative: MRA BRAIN WITHOUT CONTRAST    INDICATION:  H53.8: Other visual disturbances  R20.0: Anesthesia of skin  R51.0: Headache with orthostatic component, not elsewhere classified    COMPARISON: 9/13/2022    TECHNIQUE:  Axial 3-D time-of-flight imaging with 3-D reconstructions performed without contrast.    IV Contrast:  Not administered. FINDINGS:    IMAGE QUALITY:  Diagnostic. ANATOMY    INTERNAL CAROTID ARTERIES:  Normal flow related signal of the distal cervical, petrous and cavernous segments of the internal carotid arteries. Normal ICA terminus. ANTERIOR CIRCULATION:  Normal A1 segments. Normal anterior communicating artery. Normal flow-related signal of the anterior cerebral arteries. MIDDLE CEREBRAL ARTERY CIRCULATION:  The M1 segment and middle cerebral artery branches demonstrate normal flow-related signal.    DISTAL VERTEBRAL ARTERIES:  Distal vertebral arteries are patent with a normal vertebrobasilar junction. The posterior inferior cerebellar artery origins are normal.    BASILAR ARTERY:  Normal.    POSTERIOR CEREBRAL ARTERIES: The left posterior cerebral artery is patent. There is a left-sided posterior communicating artery. Mild irregularity of the left posterior cerebral artery. 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 Impression: No significant interval change. 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 Workstation performed: [id number]  MRA carotids wo and w contrast  Narrative: MRA NECK - WITH AND WITHOUT CONTRAST    INDICATION: H53.8: Other visual disturbances  R20.0: Anesthesia of skin  R51.0: Headache with orthostatic component, not elsewhere classified    COMPARISON: CT angiogram from 9/13/2022. Correlation also made to recent duplex carotid ultrasound performed 12/4/2023. TECHNIQUE:  Gadolinium enhanced and noncontrast examination with 3-D reconstruction. IV Contrast:  8 mL of Gadobutrol injection (SINGLE-DOSE)    FINDINGS:    IMAGE QUALITY:  Diagnostic. AORTIC ARCH:  Normal.    VISUALIZED SUBCLAVIAN ARTERIES:  The subclavian arteries demonstrate normal enhancement with no stenosis. VERTEBRAL ARTERIES:  Normal vertebral artery origins.  The vertebral arteries are bilaterally symmetric with normal enhancement and no evidence of stenosis. Normal vertebral basilar junction. RIGHT CAROTID ARTERY: Status post interval right-sided carotid endarterectomy and stent placement. There is attenuated but grossly preserved flow related signal and enhancement within the right internal carotid artery proximally at the site of the stent. This was shown to be widely patent on recent duplex carotid ultrasound. The more distal ICA demonstrates normal flow related signal. There is external carotid artery stenosis. Common carotid artery is patent. LEFT CAROTID ARTERY: Status post left-sided carotid endarterectomy and stent placement. There is attenuated but grossly patent flow related signal and enhancement within the proximal left ICA shown to be widely patent on recent carotid ultrasound. More   distal ICA demonstrates normal flow related signal. There is external carotid artery stenosis. Common carotid artery is patent. VISUALIZED INTRACRANIAL VASCULATURE:  Limited visualization of the intracranial vasculature is grossly unremarkable. NASCET criteria was used to determine the degree of internal carotid artery diameter stenosis. Impression: Status post bilateral carotid endarterectomy and stent placement within the proximal internal carotid arteries. Grossly preserved but attenuated flow related signal and enhancement corresponding to patency on recently performed carotid ultrasound. If   warranted CT angiogram could be performed for more definitive characterization of stent patency. More distal internal carotid arteries are widely patent with normal flow related signal and enhancement. Workstation performed: [id number]  MRI brain w wo contrast  Narrative: MRI BRAIN WITH AND WITHOUT CONTRAST    INDICATION: H53.8: Other visual disturbances  R20.0: Anesthesia of skin  R51.0: Headache with orthostatic component, not elsewhere classified.     COMPARISON: 9/14/2022    TECHNIQUE:  Multiplanar, multisequence imaging of the brain was performed before and after gadolinium administration. IV Contrast:  8 mL of Gadobutrol injection (SINGLE-DOSE)    IMAGE QUALITY:   Diagnostic. FINDINGS:    BRAIN PARENCHYMA:  There is no discrete mass, mass effect or midline shift. There is no intracranial hemorrhage. Normal posterior fossa. Diffusion imaging is unremarkable. There is minimal chronic microvascular ischemic change. Tiny chronic lacunar infarct noted within the left dorsal bhumi. There has been evolution of previously seen areas of PCA distribution ischemia. There is a chronic right thalamic infarct with residual gliosis, evolved since the prior examination. There is no significant residual gliosis identified associated with the   areas of additional PCA distribution ischemia involving the mesial right temporal and occipital lobe. There has been resolution of previously seen associated diffusion weighted hyperintensity in these regions. Postcontrast imaging of the brain demonstrates no abnormal enhancement. VENTRICLES:  Normal for the patient's age. SELLA AND PITUITARY GLAND:  Normal.    ORBITS:  Normal.    PARANASAL SINUSES: There is ethmoid air cell mucosal thickening. VASCULATURE:  Evaluation of the major intracranial vasculature demonstrates appropriate flow voids. CALVARIUM AND SKULL BASE:  Normal.    EXTRACRANIAL SOFT TISSUES:  Normal.  Impression: No mass effect, acute intracranial hemorrhage or evidence of recent infarction. Evolution of previously seen areas of right PCA distribution ischemia, as described above. Minimal chronic microvascular ischemic change.     Workstation performed: [id number]

## 2023-12-14 NOTE — ASSESSMENT & PLAN NOTE
-BP elevated in the office today, states he just took his blood pressure medication prior to arriving in the office. States he is going to cardiology today. -Reports frequent headache  -continue current medical regimen  -management per PCP/ cardiology.

## 2023-12-14 NOTE — ASSESSMENT & PLAN NOTE
76-year-old male in the office for review of carotid ultrasoundt s/p L TCAR by  1/19/23, R TCAR 11/2022 by  returns to the office for review of his non-invasive study.     -Pt report L proximal neck 'pressure' since his TCAR. Had recent MRA of carotids ordered by PCP. Reviewed carotid ultrasound from 12/4/2023 which demonstrates right ICA stent widely patent. Left ICA stent widely patent. Denies symptoms of numbness/ tingling/ weakness on one side of the body, facial droop, slurred speech or blindness in one eye. He c/o L jaw and chin numbness at times. We discussed that his ultrasound exhibits stenosis of the external carotid artery and that this is treated with medical management. -Reviewed carotid ultrasound in detail with pt and family discussed indications for vascular intervention. Discussed that b/l carotid stents are widely patient with no concerns for any stenosis. Reviewed with . No planned vascular intervention at this time. Discussed continued surveillance with non-invasive imaging in one year. Pt and family verbalized understanding.       Recommendations  -Complete carotid ultrasound in 1 year with return visit for review.  -continue to take aspirin 81 mg as per   -continue to take atorvastatin daily as per PCP.  -F/U with PCP for any blood pressure medication adjustments.   -Go to the ED with any S/Sx of TIA/CVA  -D/w

## 2023-12-14 NOTE — PATIENT INSTRUCTIONS
-Continue to take aspirin 81 mg, atorvastatin daily as per PCP. -Go to the ED with any signs or symptoms of stroke such as numbness/ tingling on one side of your body, blindness in one eye, slurred speech, facial droop.    -Complete carotid ultrasound in one year and return to the office for review.

## 2024-01-04 ENCOUNTER — HOSPITAL ENCOUNTER (OUTPATIENT)
Facility: HOSPITAL | Age: 54
End: 2024-01-04
Attending: INTERNAL MEDICINE
Payer: COMMERCIAL

## 2024-01-04 ENCOUNTER — HOSPITAL ENCOUNTER (OUTPATIENT)
Facility: HOSPITAL | Age: 54
Discharge: HOME | End: 2024-01-04
Attending: INTERNAL MEDICINE

## 2024-01-04 ENCOUNTER — HOSPITAL ENCOUNTER (OUTPATIENT)
Dept: NON INVASIVE DIAGNOSTICS | Facility: HOSPITAL | Age: 54
Discharge: HOME | End: 2024-01-04
Attending: INTERNAL MEDICINE
Payer: COMMERCIAL

## 2024-01-04 VITALS
WEIGHT: 193 LBS | RESPIRATION RATE: 16 BRPM | OXYGEN SATURATION: 98 % | HEART RATE: 77 BPM | HEIGHT: 67 IN | BODY MASS INDEX: 30.29 KG/M2 | SYSTOLIC BLOOD PRESSURE: 122 MMHG | DIASTOLIC BLOOD PRESSURE: 98 MMHG

## 2024-01-04 DIAGNOSIS — I20.89 STABLE ANGINA PECTORIS: ICD-10-CM

## 2024-01-04 LAB
MAX HR PERCENT: 66 %
MAX HR: 111 BPM
NUC STRESS EJECTION FRACTION: 51 %
RATE PRESSURE PRODUCT: NORMAL
SL CV REST NUCLEAR ISOTOPE DOSE: 10.6 MCI
SL CV STRESS NUCLEAR ISOTOPE DOSE: 30.5 MCI
SL CV STRESS RECOVERY BP: NORMAL MMHG
SL CV STRESS RECOVERY HR: 81 BPM
SL CV STRESS RECOVERY O2 SAT: 99 %
STRESS ANGINA INDEX: 1
STRESS BASELINE BP: NORMAL MMHG
STRESS BASELINE HR: 79 BPM
STRESS O2 SAT REST: 100 %
STRESS PEAK HR: 111 BPM
STRESS POST ESTIMATED WORKLOAD: 1 METS
STRESS POST EXERCISE DUR MIN: 3 MIN
STRESS POST EXERCISE DUR SEC: 0 SEC
STRESS POST O2 SAT PEAK: 91 %
STRESS POST PEAK BP: 122 MMHG
STRESS/REST PERFUSION RATIO: 1.24

## 2024-01-04 PROCEDURE — 78452 HT MUSCLE IMAGE SPECT MULT: CPT

## 2024-01-04 PROCEDURE — 93018 CV STRESS TEST I&R ONLY: CPT | Performed by: INTERNAL MEDICINE

## 2024-01-04 PROCEDURE — 78452 HT MUSCLE IMAGE SPECT MULT: CPT | Performed by: INTERNAL MEDICINE

## 2024-01-04 PROCEDURE — 93017 CV STRESS TEST TRACING ONLY: CPT

## 2024-01-04 PROCEDURE — 93016 CV STRESS TEST SUPVJ ONLY: CPT | Performed by: INTERNAL MEDICINE

## 2024-01-04 PROCEDURE — G1004 CDSM NDSC: HCPCS

## 2024-01-04 PROCEDURE — A9502 TC99M TETROFOSMIN: HCPCS

## 2024-01-04 RX ORDER — REGADENOSON 0.08 MG/ML
0.4 INJECTION, SOLUTION INTRAVENOUS ONCE
Status: COMPLETED | OUTPATIENT
Start: 2024-01-04 | End: 2024-01-04

## 2024-01-04 RX ADMIN — REGADENOSON 0.4 MG: 0.08 INJECTION, SOLUTION INTRAVENOUS at 10:08

## 2024-01-05 LAB
CHEST PAIN STATEMENT: NORMAL
MAX DIASTOLIC BP: 103 MMHG
MAX PREDICTED HEART RATE: 168 BPM
PROTOCOL NAME: NORMAL
REASON FOR TERMINATION: NORMAL
STRESS POST EXERCISE DUR MIN: 3 MIN
STRESS POST EXERCISE DUR SEC: 0 SEC
STRESS POST PEAK HR: 111 BPM
STRESS POST PEAK SYSTOLIC BP: 164 MMHG
TARGET HR FORMULA: NORMAL
TEST INDICATION: NORMAL

## 2024-01-12 ENCOUNTER — HOSPITAL ENCOUNTER (OUTPATIENT)
Dept: VASCULAR ULTRASOUND | Facility: HOSPITAL | Age: 54
Discharge: HOME | End: 2024-01-12
Attending: INTERNAL MEDICINE
Payer: COMMERCIAL

## 2024-01-12 DIAGNOSIS — I1A.0 RESISTANT HYPERTENSION: ICD-10-CM

## 2024-01-12 DIAGNOSIS — I20.89 STABLE ANGINA PECTORIS: ICD-10-CM

## 2024-01-12 PROCEDURE — 93975 VASCULAR STUDY: CPT

## 2024-01-12 PROCEDURE — 93975 VASCULAR STUDY: CPT | Performed by: SURGERY

## 2024-01-13 DIAGNOSIS — R06.09 DYSPNEA ON EXERTION: ICD-10-CM

## 2024-01-15 RX ORDER — ALBUTEROL SULFATE 90 UG/1
AEROSOL, METERED RESPIRATORY (INHALATION)
Qty: 9 G | Refills: 1 | Status: SHIPPED | OUTPATIENT
Start: 2024-01-15

## 2024-01-16 ENCOUNTER — TELEPHONE (OUTPATIENT)
Dept: VASCULAR SURGERY | Facility: CLINIC | Age: 54
End: 2024-01-16

## 2024-01-16 NOTE — TELEPHONE ENCOUNTER
Called patient to schedule ov to review renal doppler.  Pt was seen in December by Marion.  Left message asking patient to call back and schedule OV

## 2024-01-16 NOTE — TELEPHONE ENCOUNTER
----- Message from Cathleen Llanos sent at 1/15/2024  9:26 AM EST -----  Call patient for OV with PA-C/CRNP to review study and severity of symptoms.  Signed by:  Kodak Panchal MD.

## 2024-01-17 PROBLEM — I70.1 RENAL ARTERY STENOSIS (HCC): Status: ACTIVE | Noted: 2024-01-17

## 2024-01-17 NOTE — ASSESSMENT & PLAN NOTE
Renal DU 1/12/24  RIGHT RENAL:  >60% stenosis in the main renal artery.   Adequate parenchymal flow is noted with a RRI of 0.64.  RAR: 12.04 .  Kidney measures 10.88 cm.     LEFT RENAL:  No evidence of significant arterial occlusive disease in the main renal artery.  Adequate parenchymal flow is noted with a RRI 0.64.  RAR: 3.02 .  The kidney measures approximately 12.64 cm.    -Currently on 3 BP medications - metoprolol Xl 25, amlodipine 10, losartan 100   Just recently started on diuretic  Triamterene-HCTZ    -Most recent lab work demonstrates stable kidney results.  Cr 1.06/ GFR 96 (11/24/23)    Reviewed Renal ultrasound in detail with pt and discussed intidations for vascular intervention with uncontrolled BP with use of >4 blood pressure medications, b/l kidney arterial disease, kidney atrophy. Pt does not exhibit criteria for renal vascular intervention. BP medications recently adjusted, f/u with cardiology and neurology/ PCP for pain symptoms. Pt verbalized understanding.       Recommendations  -Repeat renal ultrasound in one year and return to the office for review.  -Blood pressure control as per Cardiology. Per nephrology Target blood pressure less than 130/80   -Continue atorvastatin as per PCP.  -F/U with PCP for pain symptoms.   -Continue to take ASA and atorvastatin daily.   -Call the office with any questions/ concerns

## 2024-01-18 ENCOUNTER — OFFICE VISIT (OUTPATIENT)
Dept: VASCULAR SURGERY | Facility: CLINIC | Age: 54
End: 2024-01-18
Payer: COMMERCIAL

## 2024-01-18 VITALS
BODY MASS INDEX: 30.2 KG/M2 | HEIGHT: 67 IN | RESPIRATION RATE: 20 BRPM | WEIGHT: 192.4 LBS | DIASTOLIC BLOOD PRESSURE: 100 MMHG | HEART RATE: 82 BPM | SYSTOLIC BLOOD PRESSURE: 158 MMHG

## 2024-01-18 DIAGNOSIS — I70.1 RENAL ARTERY STENOSIS (HCC): Primary | ICD-10-CM

## 2024-01-18 PROCEDURE — 99214 OFFICE O/P EST MOD 30 MIN: CPT | Performed by: NURSE PRACTITIONER

## 2024-01-18 NOTE — Clinical Note
Hello,  I saw a mutual pt of ours in the office today Mr. Brown. He recently completed a renal ultrasound which demonstrated Rt renal artery stenosis. I did not recommend any vascular intervention at this time, however, he does appear to continue to have uncontrolled blood pressures. Cardiology recently made some adjustments to his medications. He also did mention he continues to take NSAIDs for residual stroke symptom pain.  He does not currently fit within the parameters for a vascular intervention and his last set of lab work (November) appears stable. But I wanted to make you aware that he completed the ultrasound; however, his blood pressures are uncontrolled.  For now I scheduled him for a repeat renal study in one year.   Let me know your thoughts,  Thank you  Marion BURTON

## 2024-01-18 NOTE — PROGRESS NOTES
53-year-old male with PMHx HTN, hx of R ischemic stroke, HTN, Thalamic infarction, b/l nephrolithiasis, back pain, b/l carotid stenosis he is s/p L TCAR 1/19/23 and R TCAR 11/2022 both done by  pt now returns to the office for review of his renal ultrasound    Assessment/Plan:    Renal artery stenosis (HCC)  Renal DU 1/12/24  RIGHT RENAL:  >60% stenosis in the main renal artery.   Adequate parenchymal flow is noted with a RRI of 0.64.  RAR: 12.04 .  Kidney measures 10.88 cm.     LEFT RENAL:  No evidence of significant arterial occlusive disease in the main renal artery.  Adequate parenchymal flow is noted with a RRI 0.64.  RAR: 3.02 .  The kidney measures approximately 12.64 cm.    -Currently on 3 BP medications - metoprolol Xl 25, amlodipine 10, losartan 100   Just recently started on diuretic  Triamterene-HCTZ    -Most recent lab work demonstrates stable kidney results.  Cr 1.06/ GFR 96 (11/24/23)    Reviewed Renal ultrasound in detail with pt and discussed intidations for vascular intervention with uncontrolled BP with use of >4 blood pressure medications, b/l kidney arterial disease, kidney atrophy. Pt does not exhibit criteria for renal vascular intervention. BP medications recently adjusted, f/u with cardiology and neurology/ PCP for pain symptoms. Pt verbalized understanding.       Recommendations  -Repeat renal ultrasound in one year and return to the office for review.  -Blood pressure control as per Cardiology. Per nephrology Target blood pressure less than 130/80   -Continue atorvastatin as per PCP.  -F/U with PCP for pain symptoms.   -Continue to take ASA and atorvastatin daily.   -Call the office with any questions/ concerns     Diagnoses and all orders for this visit:    Renal artery stenosis (HCC)  -     VAS renal artery complete; Future          Subjective:      Patient ID: Shaq Brown is a 53 y.o. male.    Patient had a renal artery duplex on 1/12/24. Pt c/o dizziness for the past few  "days. Pt c/o tingling left flank drom hips to ribs, and RLE since stroke, but has been worsening over the past few days.     53-year-old male with PMHx HTN, hx of R ischemic stroke, HTN, Thalamic infarction, b/l nephrolithiasis, back pain, b/l carotid stenosis he is s/p L TCAR 1/19/23 and R TCAR 11/2022 both done by  pt now returns to the office for review of his renal ultrasound.    Reports that he has had issues with the left side of his body since his stroke. He has pain that is constant that gets worse as the day goes on, describes pain as a pressure and burning in the upper thigh. He reports it feels like when something falls asleep. He reports muscle spasms that occur randomly. Family reports he is limping d/t the pain.Recommending f/u with PCP for further evaluation.   He is SOB in the office today and reports this is his baseline since his stroke.   Reports compliance with his medications. He does report taking OTC pain medications such as tylenol around the clock.             The following portions of the patient's history were reviewed and updated as appropriate: allergies, current medications, past family history, past medical history, past social history, past surgical history, and problem list.    Review of Systems   Constitutional: Negative.    HENT: Negative.     Eyes:  Negative for visual disturbance.   Respiratory:  Positive for shortness of breath.    Cardiovascular: Negative.  Negative for chest pain.   Gastrointestinal: Negative.    Endocrine: Negative.    Genitourinary: Negative.    Musculoskeletal:  Positive for back pain.   Skin: Negative.    Allergic/Immunologic: Negative.    Neurological:  Positive for dizziness. Negative for facial asymmetry, weakness, numbness and headaches.        Tingling Rt side   Hematological: Negative.    Psychiatric/Behavioral: Negative.           Objective:      /100 (BP Location: Left arm, Patient Position: Sitting)   Pulse 82   Resp 20   Ht 5' 7\" " "(1.702 m)   Wt 87.3 kg (192 lb 6.4 oz)   BMI 30.13 kg/m²          Physical Exam  Vitals and nursing note reviewed.   Constitutional:       Appearance: Normal appearance.   HENT:      Head: Normocephalic and atraumatic.   Cardiovascular:      Rate and Rhythm: Normal rate and regular rhythm.      Pulses: Normal pulses.           Dorsalis pedis pulses are 2+ on the left side.        Posterior tibial pulses are 2+ on the left side.      Heart sounds: No murmur heard.  Pulmonary:      Effort: Pulmonary effort is normal. No respiratory distress.      Breath sounds: Normal breath sounds.   Musculoskeletal:      Cervical back: Normal range of motion.      Right lower leg: No edema.      Left lower leg: No edema.   Skin:     General: Skin is warm and dry.      Findings: No erythema or rash.   Neurological:      General: No focal deficit present.      Mental Status: He is alert and oriented to person, place, and time.      Sensory: Sensory deficit (Numbness/ tingling bottom of left foot) present.      Gait: Gait abnormal.   Psychiatric:         Mood and Affect: Mood normal.         Behavior: Behavior normal.         I have reviewed and made appropriate changes to the review of systems input by the medical assistant.    Vitals:    01/18/24 0826 01/18/24 0828   BP: (!) 162/104 158/100   BP Location: Right arm Left arm   Patient Position: Sitting Sitting   Pulse: 82    Resp: 20    Weight: 87.3 kg (192 lb 6.4 oz)    Height: 5' 7\" (1.702 m)        Patient Active Problem List   Diagnosis    History of diverticulitis    Bilateral nephrolithiasis    Chronic back pain    Status post carotid surgery    Alcohol intake above recommended sensible limits    Gross hematuria    Encounter to discuss test results    Family history of prostate cancer    Erectile dysfunction due to arterial insufficiency    Encounter for screening colonoscopy    Primary hypertension    Thalamic infarction (HCC)    Acute right arterial ischemic stroke, PCA " (posterior cerebral artery) (HCC)    Left Carotid Artery Stenosis s/p Left TCAR    Symptomatic carotid artery stenosis, bilateral    Essential hypertension    Numbness    High triglycerides    Dizziness and giddiness    Benign hypertension with CKD (chronic kidney disease), stage II    Renal artery stenosis (HCC)       Past Surgical History:   Procedure Laterality Date    CARPAL TUNNEL RELEASE Bilateral     CERVICAL FUSION      with hardware    CYSTOSCOPY      HERNIA REPAIR      IR CAROTID STENT  2022    IR CAROTID STENT  2023    UT TCAT IV STENT CRV CRTD ART EMBOLIC PROTECJ Right 2022    Procedure: ANGIOPLASTY ARTERY CAROTID W/ STENT TCAR,;  Surgeon: Duane Mack DO;  Location: [facility];  Service: Vascular    UT TCAT IV STENT CRV CRTD ART EMBOLIC PROTECJ Left 2023    Procedure: ANGIOPLASTY ARTERY CAROTID W/ STENT Left TCAR;  Surgeon: Duane Mack DO;  Location: [facility];  Service: Vascular    ULNAR COLLATERAL LIGAMENT RECONSTRUCTION Bilateral        Family History   Problem Relation Age of Onset    Heart attack Mother     Diabetes Mother     Hypertension Mother     Colon cancer Father     No Known Problems Sister     No Known Problems Sister     No Known Problems Sister     No Known Problems Son        Social History     Socioeconomic History    Marital status: /Civil Union     Spouse name: Not on file    Number of children: Not on file    Years of education: Not on file    Highest education level: Not on file   Occupational History    Not on file   Tobacco Use    Smoking status: Former     Current packs/day: 0.00     Average packs/day: 2.0 packs/day for 35.0 years (70.0 ttl pk-yrs)     Types: Cigarettes     Start date: 1987     Quit date: 2022     Years since quittin.4    Smokeless tobacco: Never   Vaping Use    Vaping status: Every Day    Substances: Nicotine   Substance and Sexual Activity    Alcohol use: Yes     Alcohol/week: 12.0 standard drinks of alcohol      Types: 12 Cans of beer per week     Comment: 12 cans  beer   weekly    Drug use: Not Currently    Sexual activity: Yes   Other Topics Concern    Not on file   Social History Narrative    Not on file     Social Determinants of Health     Financial Resource Strain: Not on file   Food Insecurity: No Food Insecurity (1/20/2023)    Hunger Vital Sign     Worried About Running Out of Food in the Last Year: Never true     Ran Out of Food in the Last Year: Never true   Transportation Needs: No Transportation Needs (1/20/2023)    PRAPARE - Transportation     Lack of Transportation (Medical): No     Lack of Transportation (Non-Medical): No   Physical Activity: Not on file   Stress: Not on file   Social Connections: Not on file   Intimate Partner Violence: Not on file   Housing Stability: Low Risk  (1/20/2023)    Housing Stability Vital Sign     Unable to Pay for Housing in the Last Year: No     Number of Places Lived in the Last Year: 1     Unstable Housing in the Last Year: No       Allergies   Allergen Reactions    Penicillins Anaphylaxis         Current Outpatient Medications:     albuterol (PROVENTIL HFA,VENTOLIN HFA) 90 mcg/act inhaler, INHALE 2 PUFFS BY MOUTH EVERY 6 HOURS AS NEEDED FOR WHEEZING, Disp: 9 g, Rfl: 1    ALPRAZolam (XANAX) 0.25 mg tablet, Take 1 tablet (0.25 mg total) by mouth once in imaging for anxiety for up to 2 days, Disp: 2 tablet, Rfl: 0    amitriptyline (ELAVIL) 10 mg tablet, Take 1 tablet (10 mg total) by mouth daily at bedtime, Disp: 30 tablet, Rfl: 3    amLODIPine (NORVASC) 10 mg tablet, Take 1 tablet (10 mg total) by mouth daily, Disp: 90 tablet, Rfl: 1    aspirin 81 mg chewable tablet, Chew 1 tablet (81 mg total) daily, Disp: 30 tablet, Rfl: 0    atorvastatin (LIPITOR) 80 mg tablet, Take 1 tablet (80 mg total) by mouth in the evening. Take before meals, Disp: 90 tablet, Rfl: 1    b complex vitamins capsule, Take 1 capsule by mouth daily, Disp: 30 capsule, Rfl: 3    baclofen 10 mg tablet, Take 1  tablet (10 mg total) by mouth 3 (three) times a day As needed for muscle spasm, Disp: 90 tablet, Rfl: 0    co-enzyme Q-10 100 mg capsule, Take 1 capsule (100 mg total) by mouth daily (Patient not taking: Reported on 11/16/2023), Disp: 30 capsule, Rfl: 3    Cyanocobalamin (VITAMIN B12 PO), Take by mouth, Disp: , Rfl:     ferrous sulfate 325 (65 Fe) mg tablet, Take 325 mg by mouth daily with breakfast, Disp: , Rfl:     gabapentin (NEURONTIN) 100 mg capsule, Take 2 capsules (200 mg total) by mouth 3 (three) times a day, Disp: 540 capsule, Rfl: 0    losartan (COZAAR) 100 MG tablet, Take 1 tablet (100 mg total) by mouth daily, Disp: 90 tablet, Rfl: 1    metoprolol succinate (TOPROL-XL) 25 mg 24 hr tablet, Take 1 tablet (25 mg total) by mouth daily, Disp: 90 tablet, Rfl: 1    Multiple Vitamin (multivitamin) tablet, Take 1 tablet by mouth if needed (Patient not taking: Reported on 6/26/2023), Disp: , Rfl:     pantoprazole (PROTONIX) 40 mg tablet, Take 1 tablet (40 mg total) by mouth daily, Disp: 90 tablet, Rfl: 1    tamsulosin (FLOMAX) 0.4 mg, Take 1 capsule (0.4 mg total) by mouth daily with dinner, Disp: 90 capsule, Rfl: 0    tiZANidine (ZANAFLEX) 2 mg tablet, Take 1 tablet (2 mg total) by mouth every 8 (eight) hours as needed for muscle spasms, Disp: 60 tablet, Rfl: 2    triamterene-hydrochlorothiazide (DYAZIDE) 37.5-25 mg per capsule, Take 1 capsule by mouth every morning, Disp: 30 capsule, Rfl: 1  I have spent a total time of 40 minutes on 01/18/24 in caring for this patient including Diagnostic results, Risks and benefits of tx options, Instructions for management, Patient and family education, Importance of tx compliance, Counseling / Coordination of care, Documenting in the medical record, Reviewing / ordering tests, medicine, procedures  , and Obtaining or reviewing history  .

## 2024-01-18 NOTE — PATIENT INSTRUCTIONS
-Repeat Renal ultrasound in one year and return to the office for review at that time.     -Continue taking aspirin 81 mg daily    -Continue taking atorvastatin daily as per PCP    -Call the office for any new or worsening symptoms.

## 2024-01-26 ENCOUNTER — CONSULT (OUTPATIENT)
Dept: PAIN MEDICINE | Facility: CLINIC | Age: 54
End: 2024-01-26
Payer: COMMERCIAL

## 2024-01-26 VITALS
HEART RATE: 95 BPM | BODY MASS INDEX: 30.07 KG/M2 | WEIGHT: 192 LBS | SYSTOLIC BLOOD PRESSURE: 152 MMHG | DIASTOLIC BLOOD PRESSURE: 95 MMHG

## 2024-01-26 DIAGNOSIS — M54.12 CERVICAL RADICULOPATHY: ICD-10-CM

## 2024-01-26 DIAGNOSIS — M48.02 CERVICAL SPINAL STENOSIS: Primary | ICD-10-CM

## 2024-01-26 PROCEDURE — 99244 OFF/OP CNSLTJ NEW/EST MOD 40: CPT | Performed by: ANESTHESIOLOGY

## 2024-01-26 NOTE — PROGRESS NOTES
Assessment  1. Cervical spinal stenosis    2. Cervical radiculopathy        Plan  The patient's symptoms, history/physical are consistent with pain that is multifactorial in origin but predominantly the result of cervical spinal stenosis that has developed above his prior cervical fusion.  Given the compression of his spinal cord, I recommended consultation with Dr. Navarrete of neurosurgery which he was amenable to doing.  I did advise him that a cervical epidural steroid injection can be performed to help with inflammation and pain symptoms but he would likely still need to get surgery.  We will hold off until he sees Dr. Navarrete.    My impressions and treatment recommendations were discussed in detail with the patient who verbalized understanding and had no further questions.  Discharge instructions were provided. I personally saw and examined the patient and I agree with the above discussed plan of care.    Orders Placed This Encounter   Procedures   • Ambulatory referral to Neurosurgery     Standing Status:   Future     Standing Expiration Date:   1/26/2025     Referral Priority:   Routine     Referral Type:   Consult - AMB     Referral Reason:   Specialty Services Required     Referred to Provider:   Jermain Navarrete MD     Requested Specialty:   Neurosurgery     Number of Visits Requested:   1     Expiration Date:   1/26/2025     No orders of the defined types were placed in this encounter.      History of Present Illness    Shaq Brown is a 53 y.o. male referred by Dr. Cruz for neck and body pain worse on the left side of his body including numbness down the entire left side of the body.  He has a history of a prior thalamic stroke in 2022.  He also has a history of prior cervical discectomy and fusion many years ago.  He has pain in the neck that travels down the entire left arm more than the right as well as numbness that is moderate to severe rated 7/10 on numeric rating scale felt constantly.  Pain can be  cramping pressure-like.  Symptoms are aggravated standing, bending, walking, turning his head, coughing, sneezing.  He has tried physical therapy without relief.  Heat/ice provides no relief.  Use of a TENS unit provided some moderately.  He uses Tylenol which is minimally helpful.    I have personally reviewed and/or updated the patient's past medical history, past surgical history, family history, social history, current medications, allergies, and vital signs today.     Review of Systems   Constitutional:  Negative for fever and unexpected weight change.   HENT:  Negative for trouble swallowing.    Eyes:  Negative for visual disturbance.   Respiratory:  Negative for shortness of breath and wheezing.    Cardiovascular:  Negative for chest pain and palpitations.   Gastrointestinal:  Negative for constipation, diarrhea, nausea and vomiting.   Endocrine: Negative for cold intolerance, heat intolerance and polydipsia.   Genitourinary:  Negative for difficulty urinating and frequency.   Musculoskeletal:  Positive for neck pain and neck stiffness. Negative for arthralgias, gait problem, joint swelling and myalgias.   Skin:  Negative for rash.   Neurological:  Positive for dizziness, weakness, numbness and headaches. Negative for seizures and syncope.   Hematological:  Does not bruise/bleed easily.   Psychiatric/Behavioral:  Negative for dysphoric mood.    All other systems reviewed and are negative.      Patient Active Problem List   Diagnosis   • History of diverticulitis   • Bilateral nephrolithiasis   • Chronic back pain   • Status post carotid surgery   • Alcohol intake above recommended sensible limits   • Gross hematuria   • Encounter to discuss test results   • Family history of prostate cancer   • Erectile dysfunction due to arterial insufficiency   • Encounter for screening colonoscopy   • Primary hypertension   • Thalamic infarction (HCC)   • Acute right arterial ischemic stroke, PCA (posterior cerebral  artery) ([facility])   • Left Carotid Artery Stenosis s/p Left TCAR   • Symptomatic carotid artery stenosis, bilateral   • Essential hypertension   • Numbness   • High triglycerides   • Dizziness and giddiness   • Benign hypertension with CKD (chronic kidney disease), stage II   • Renal artery stenosis ([facility])       Past Medical History:   Diagnosis Date   • Carotid stenosis, left     today  1/19/2023  L carotid angioplasty   • Carotid stenosis, right     Rt angioplasty completed  11/2023   • Cervical disc disease    • COVID 05/2020   • GERD (gastroesophageal reflux disease)    • Hyperlipidemia    • Hypertension    • Kidney stone    • Muscle weakness    • Spinal stenosis    • Stroke ([facility]) 09/13/2022    left sided weakness with pins/needles   • Weakness of left side of body 09/13/2022    s/p CVA       Past Surgical History:   Procedure Laterality Date   • CARPAL TUNNEL RELEASE Bilateral    • CERVICAL FUSION      with hardware   • CYSTOSCOPY     • HERNIA REPAIR     • IR CAROTID STENT  11/8/2022   • IR CAROTID STENT  1/19/2023   • NC TCAT IV STENT CRV CRTD ART EMBOLIC PROTECJ Right 11/8/2022    Procedure: ANGIOPLASTY ARTERY CAROTID W/ STENT TCAR,;  Surgeon: Duane Mack DO;  Location: [facility];  Service: Vascular   • NC TCAT IV STENT CRV CRTD ART EMBOLIC PROTECJ Left 1/19/2023    Procedure: ANGIOPLASTY ARTERY CAROTID W/ STENT Left TCAR;  Surgeon: Duane Mack DO;  Location: [facility];  Service: Vascular   • ULNAR COLLATERAL LIGAMENT RECONSTRUCTION Bilateral        Family History   Problem Relation Age of Onset   • Heart attack Mother    • Diabetes Mother    • Hypertension Mother    • Colon cancer Father    • No Known Problems Sister    • No Known Problems Sister    • No Known Problems Sister    • No Known Problems Son        Social History     Occupational History   • Not on file   Tobacco Use   • Smoking status: Former     Current packs/day: 0.00     Average packs/day: 2.0 packs/day for 35.0 years (70.0 ttl pk-yrs)      Types: Cigarettes     Start date: 1987     Quit date: 2022     Years since quittin.4   • Smokeless tobacco: Never   Vaping Use   • Vaping status: Every Day   • Substances: Nicotine   Substance and Sexual Activity   • Alcohol use: Yes     Alcohol/week: 12.0 standard drinks of alcohol     Types: 12 Cans of beer per week     Comment: 12 cans  beer   weekly   • Drug use: Not Currently   • Sexual activity: Yes       Current Outpatient Medications on File Prior to Visit   Medication Sig   • albuterol (PROVENTIL HFA,VENTOLIN HFA) 90 mcg/act inhaler INHALE 2 PUFFS BY MOUTH EVERY 6 HOURS AS NEEDED FOR WHEEZING   • ALPRAZolam (XANAX) 0.25 mg tablet Take 1 tablet (0.25 mg total) by mouth once in imaging for anxiety for up to 2 days   • amitriptyline (ELAVIL) 10 mg tablet Take 1 tablet (10 mg total) by mouth daily at bedtime   • amLODIPine (NORVASC) 10 mg tablet Take 1 tablet (10 mg total) by mouth daily   • aspirin 81 mg chewable tablet Chew 1 tablet (81 mg total) daily   • atorvastatin (LIPITOR) 80 mg tablet Take 1 tablet (80 mg total) by mouth in the evening. Take before meals   • b complex vitamins capsule Take 1 capsule by mouth daily   • baclofen 10 mg tablet Take 1 tablet (10 mg total) by mouth 3 (three) times a day As needed for muscle spasm   • co-enzyme Q-10 100 mg capsule Take 1 capsule (100 mg total) by mouth daily (Patient not taking: Reported on 2023)   • Cyanocobalamin (VITAMIN B12 PO) Take by mouth   • ferrous sulfate 325 (65 Fe) mg tablet Take 325 mg by mouth daily with breakfast   • gabapentin (NEURONTIN) 100 mg capsule Take 2 capsules (200 mg total) by mouth 3 (three) times a day   • losartan (COZAAR) 100 MG tablet Take 1 tablet (100 mg total) by mouth daily   • metoprolol succinate (TOPROL-XL) 25 mg 24 hr tablet Take 1 tablet (25 mg total) by mouth daily   • Multiple Vitamin (multivitamin) tablet Take 1 tablet by mouth if needed (Patient not taking: Reported on 2023)   • pantoprazole  (PROTONIX) 40 mg tablet Take 1 tablet (40 mg total) by mouth daily   • tamsulosin (FLOMAX) 0.4 mg Take 1 capsule (0.4 mg total) by mouth daily with dinner   • tiZANidine (ZANAFLEX) 2 mg tablet Take 1 tablet (2 mg total) by mouth every 8 (eight) hours as needed for muscle spasms   • triamterene-hydrochlorothiazide (DYAZIDE) 37.5-25 mg per capsule Take 1 capsule by mouth every morning     No current facility-administered medications on file prior to visit.       Allergies   Allergen Reactions   • Penicillins Anaphylaxis       Physical Exam    /95   Pulse 95   Wt 87.1 kg (192 lb)   BMI 30.07 kg/m²     Constitutional: normal, well developed, well nourished, alert, in no distress and non-toxic and no overt pain behavior.  Eyes: anicteric  HEENT: grossly intact  Neck: supple, symmetric, trachea midline and no masses   Pulmonary:even and unlabored  Cardiovascular:No edema or pitting edema present  Skin:Normal without rashes or lesions and well hydrated  Psychiatric:Mood and affect appropriate  Neurologic:Cranial Nerves II-XII grossly intact  Musculoskeletal:normal    Cervical Spine Exam  Appearance:  Normal lordosis  Palpation/Tenderness:  left cervical paraspinal tenderness  right cervical paraspinal tenderness  left trapezium tenderness  right trapezium tenderness  Range of Motion:  Flexion:  Moderately limited  with pain  Extension:  Moderately limited  with pain  Rotation - Left:  Moderately limited  with pain  Rotation - Right:  Moderately limited  with pain  Motor Strength:  Left Arm Flexion  4/5  Left Arm Extension  4/5  Right Arm Flexion  5/5  Right Arm Extension  5/5  Left Wrist Flexion  5/5  Left Wrist Extension  4/5  Left Finger Abduction  5/5  Right Finger Abduction  5/5  Left Pincer Grasp  5/5  Right Pincer Grasp  5/5  Reflexes:  Left Biceps:  1+   Right Biceps:  1+   Left Triceps:  1+   Right Triceps:  1+     Imaging    MRI CERVICAL SPINE WITHOUT CONTRAST (12/3/2023)     INDICATION: M54.12:  Radiculopathy, cervical region  M48.02: Spinal stenosis, cervical region  R20.0: Anesthesia of skin  Z98.1: Arthrodesis status.     COMPARISON: 11/24/2023     TECHNIQUE:  Multiplanar, multisequence imaging of the cervical spine was performed. .        IMAGE QUALITY:  Diagnostic     FINDINGS:     ALIGNMENT: The patient is status post anterior cervical discectomy and spinal fusion from C6-C7. There is retrolisthesis of C5-C6 and C3-C4.     MARROW SIGNAL:  Normal marrow signal is identified within the visualized bony structures.  No discrete marrow lesion.     CERVICAL AND VISUALIZED THORACIC CORD:  Normal signal within the visualized cord.     PREVERTEBRAL AND PARASPINAL SOFT TISSUES:  Normal.     VISUALIZED POSTERIOR FOSSA:  The visualized posterior fossa demonstrates no abnormal signal.     CERVICAL DISC SPACES:     C2-C3: There is left-sided uncovertebral spurring. There is facet arthrosis. There is mild left foraminal narrowing.     C3-C4: There is a disc osteophyte complex with left-sided uncovertebral spurring. There is facet arthrosis. There is moderate left foraminal narrowing. There is no significant canal stenosis or right foraminal narrowing.     C4-C5: There is a disc osteophyte complex with uncovertebral spurring and facet arthrosis. There is mild canal stenosis. There is severe right and moderate left foraminal narrowing.     C5-C6: There is a disc osteophyte complex with uncovertebral spurring and facet arthrosis. There is moderate canal stenosis with mild cord impingement. There is severe bilateral foraminal narrowing.     C6-C7: There is no significant canal stenosis or foraminal narrowing.     C7-T1: There is a disc osteophyte complex with uncovertebral spurring. There is no significant canal stenosis. There is severe left foraminal narrowing. There is mild right foraminal narrowing.     UPPER THORACIC DISC SPACES:  Normal.     OTHER FINDINGS:  None.     IMPRESSION:     Postoperative changes and  cervical spondylosis as described above.     Most notable level is at C5-C6 where multifactorial disease results in overall moderate canal stenosis and mild cord impingement. Severe bilateral foraminal narrowing is present at this level.

## 2024-02-01 ENCOUNTER — OFFICE VISIT (OUTPATIENT)
Dept: CARDIOLOGY CLINIC | Facility: CLINIC | Age: 54
End: 2024-02-01
Payer: COMMERCIAL

## 2024-02-01 VITALS
WEIGHT: 192 LBS | OXYGEN SATURATION: 97 % | DIASTOLIC BLOOD PRESSURE: 92 MMHG | TEMPERATURE: 97.6 F | HEART RATE: 91 BPM | SYSTOLIC BLOOD PRESSURE: 138 MMHG | BODY MASS INDEX: 30.13 KG/M2 | HEIGHT: 67 IN

## 2024-02-01 DIAGNOSIS — R07.89 CHEST TIGHTNESS: Primary | ICD-10-CM

## 2024-02-01 DIAGNOSIS — I73.9 PERIPHERAL VASCULAR DISEASE (HCC): ICD-10-CM

## 2024-02-01 DIAGNOSIS — I70.1 RENAL ARTERY STENOSIS (HCC): ICD-10-CM

## 2024-02-01 DIAGNOSIS — I63.81 THALAMIC INFARCTION (HCC): ICD-10-CM

## 2024-02-01 DIAGNOSIS — R06.02 SHORTNESS OF BREATH: ICD-10-CM

## 2024-02-01 DIAGNOSIS — I1A.0 RESISTANT HYPERTENSION: ICD-10-CM

## 2024-02-01 DIAGNOSIS — E66.09 CLASS 1 OBESITY DUE TO EXCESS CALORIES WITH SERIOUS COMORBIDITY AND BODY MASS INDEX (BMI) OF 30.0 TO 30.9 IN ADULT: ICD-10-CM

## 2024-02-01 DIAGNOSIS — I10 PRIMARY HYPERTENSION: ICD-10-CM

## 2024-02-01 PROCEDURE — 99214 OFFICE O/P EST MOD 30 MIN: CPT | Performed by: INTERNAL MEDICINE

## 2024-02-01 RX ORDER — TRIAMTERENE AND HYDROCHLOROTHIAZIDE 37.5; 25 MG/1; MG/1
1 CAPSULE ORAL EVERY MORNING
Qty: 90 CAPSULE | Refills: 1 | Status: SHIPPED | OUTPATIENT
Start: 2024-02-01

## 2024-02-01 RX ORDER — METOPROLOL SUCCINATE 50 MG/1
50 TABLET, EXTENDED RELEASE ORAL DAILY
Qty: 90 TABLET | Refills: 1 | Status: SHIPPED | OUTPATIENT
Start: 2024-02-01

## 2024-02-01 NOTE — PROGRESS NOTES
[facility] [place]   [address] [address] [phone]                                            Cardiology Office Follow up  Shaq Brown, 53 y.o. male  YOB: 1970  MRN: [id number] Encounter: [id number]      PCP - Bessy Cruz MD  Referring Provider - No ref. provider found    No chief complaint on file.      Assessment  Chest tightness/pain  Nuclear Rx: 1/2024: EF 51%, no significant perfusion defect to suggest ischemia or infarct  Abnormal ECG  Uncontrolled hypertension   Renal artery stenosis  S/p B/L carotid TCAR  Rt TCAR 11/2022, Lt TCAR 1/2022  Ambulatory dysfunction  Has left leg numbness and dysfunction, limiting exercise  H/o stroke (9/2022)  Former smoker  2 PPD.day, quit after stroke  Now vaping    Plan  Chest pain / tightness, Shortness of breath, Equivocal stress test  Overview  12/2023: Symptoms highly concerning for exertional angina, progressive for months  ?HTN v CAD  ECG: LVH and repolarization   Started dyazide  1/4/24: Nuclear Rx: EF 51%, LBBB with slight QRS widening with regadenoson. No definite evidence of ischemia. But he did have chest pain during it --> ?equivocal/false-negative stress  2/1/24: He reports continued progressive symptoms of chest tightness with walking even 20 feet  Impression  Continued symptoms despite better control of BP - ?underlying CAD with false negative stress test v COPD  Plan  Recommend proceeding with cardiac CTA for more definite assessment of coronary anatomy  Can take extra metoprolol 50 on morning of cardiac CTA (total 100 mg that day)   Continue to optimize anti-anginal / anti-hypertensive therapy  Increase metoprolol succinate from 25 to 50 mg daily  Continue amlodipine 10 mg daily  Continue aspirin, atorvastatin 80 mg  Check PFT    Resistant Hypertension, Renal artery stenosis  BP improving, but still remains above goal - 138/92 today  Secondary hypertension evaluation  VAS renal - severe Rt renal  artery stenosis  metanephrine levels, renin, aldosterone levels still pending  He is now on 5 antihypertensive medications including 2 diuretics, and I suspect renal artery stenosis is likely contributing to his uncontrolled hypertension and possibly to intermittent symptoms as well --> he will benefit from earlier vascular follow up regarding considerations for renal artery intervention, to help with better overall BP control  Low salt diet  Avoid smoking  Increase metoprolol succinate to 50 mg daily  Continue amlodipine, Dyazide, losartan  Follow-up BMP still pending --> emphasized need to get this completed today/tomorrow    Hyperlipidemia, h/o RT thalamic stroke / PCA territory infarct    Cholesterol levels improving, TG still high  Counseled regarding dietary modifications for same  Tolerating atorvastatin 80  LDL 76, still above goal  Follow-up lipid panel remains pending  Continue atorvastatin 80  If LDL still > 70, then plan to add ezetimibe        No results found for this visit on 02/01/24.      Orders Placed This Encounter   Procedures   • CTA cardiac   • Basic metabolic panel   • Complete PFT with post bronchodilator         Return in about 2 months (around 4/1/2024), or if symptoms worsen or fail to improve.      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  These have been ongoing for several months.  No associated complaints of nausea, vomiting or diaphoresis.  No recent acute change in the symptoms.  No known prior history of coronary artery disease but he has not had any ischemic evaluation in the past.    He has additionally had ongoing issues related to uncontrolled hypertension for years as well, despite medical therapy. He does not check BP  "regularly at home, but notes that it is usually in 150s, whenever checked.    In 2022, he had presented to hospital with left sided facial/arm and leg numbness and dizziness, associated with increased confusion. He was subsequently found to have an acute rt thalamic stroke, along with occlusion of rt posterior cerebral artery, which was felt to be compatible with right PCA territory infarcts. He also had bilateral cervical artery stenosis at that time and subsequently underwent stenting to both carotids.  He has been treated with antiplatelets and statins subsequently, but has continued to have issues with left leg numbness and has associated ambulatory dysfunction.    Family history  Father -  at 51 in accident  He was diagnosed with MV prolapse after another accident, was a .   No known CAD , but limited medical history known.  Mother -  at 62 of heart attack  had \"extra artery in the heart\"  had heart attack and needed a balloon procedure for heart in her 50s.  DM2, and was not taking medications  Siblings: 2 older sisters, 1 younger sister  Oldest sister - water retention issues, no known heart conditions    Interval history - 2024  He returns for follow-up after about 6 weeks.  In the interim he completed his nuclear stress test and this did not reveal any clear signs of ischemia.  Although of note he did have recurrence of chest tightness during the stress test well and continues to report similar symptoms.  He reports feeling short of breath/having chest pains/tightness within walking 20 feet.  No other complaints of palpitations, dizziness or lightheadedness, pedal edema, orthopnea or PND.      Historical Information   Past Medical History:   Diagnosis Date   • Carotid stenosis, left     today  2023  L carotid angioplasty   • Carotid stenosis, right     Rt angioplasty completed  2023   • Cervical disc disease    • COVID 2020   • GERD (gastroesophageal reflux disease)  "   • Hyperlipidemia    • Hypertension    • Kidney stone    • Muscle weakness    • Spinal stenosis    • Stroke (HCC) 09/13/2022    left sided weakness with pins/needles   • Weakness of left side of body 09/13/2022    s/p CVA     Past Surgical History:   Procedure Laterality Date   • CARPAL TUNNEL RELEASE Bilateral    • CERVICAL FUSION      with hardware   • CYSTOSCOPY     • HERNIA REPAIR     • IR CAROTID STENT  11/8/2022   • IR CAROTID STENT  1/19/2023   • [place] TCAT IV STENT CRV CRTD ART EMBOLIC PROTECJ Right 11/8/2022    Procedure: ANGIOPLASTY ARTERY CAROTID W/ STENT TCAR,;  Surgeon: Duane Mack DO;  Location: [facility];  Service: Vascular   • [place] TCAT IV STENT CRV CRTD ART EMBOLIC PROTECJ Left 1/19/2023    Procedure: ANGIOPLASTY ARTERY CAROTID W/ STENT Left TCAR;  Surgeon: Duane Mack DO;  Location: [facility];  Service: Vascular   • ULNAR COLLATERAL LIGAMENT RECONSTRUCTION Bilateral      Family History   Problem Relation Age of Onset   • Heart attack Mother    • Diabetes Mother    • Hypertension Mother    • Colon cancer Father    • No Known Problems Sister    • No Known Problems Sister    • No Known Problems Sister    • No Known Problems Son      Current Outpatient Medications on File Prior to Visit   Medication Sig Dispense Refill   • albuterol (PROVENTIL HFA,VENTOLIN HFA) 90 mcg/act inhaler INHALE 2 PUFFS BY MOUTH EVERY 6 HOURS AS NEEDED FOR WHEEZING 9 g 1   • amitriptyline (ELAVIL) 10 mg tablet Take 1 tablet (10 mg total) by mouth daily at bedtime 30 tablet 3   • amLODIPine (NORVASC) 10 mg tablet Take 1 tablet (10 mg total) by mouth daily 90 tablet 1   • atorvastatin (LIPITOR) 80 mg tablet Take 1 tablet (80 mg total) by mouth in the evening. Take before meals 90 tablet 1   • b complex vitamins capsule Take 1 capsule by mouth daily 30 capsule 3   • baclofen 10 mg tablet Take 1 tablet (10 mg total) by mouth 3 (three) times a day As needed for muscle spasm 90 tablet 0   • Cyanocobalamin (VITAMIN B12 PO) Take by  mouth     • ferrous sulfate 325 (65 Fe) mg tablet Take 325 mg by mouth daily with breakfast     • losartan (COZAAR) 100 MG tablet Take 1 tablet (100 mg total) by mouth daily 90 tablet 1   • pantoprazole (PROTONIX) 40 mg tablet Take 1 tablet (40 mg total) by mouth daily 90 tablet 1   • tamsulosin (FLOMAX) 0.4 mg Take 1 capsule (0.4 mg total) by mouth daily with dinner 90 capsule 0   • tiZANidine (ZANAFLEX) 2 mg tablet Take 1 tablet (2 mg total) by mouth every 8 (eight) hours as needed for muscle spasms 60 tablet 2   • ALPRAZolam (XANAX) 0.25 mg tablet Take 1 tablet (0.25 mg total) by mouth once in imaging for anxiety for up to 2 days 2 tablet 0   • aspirin 81 mg chewable tablet Chew 1 tablet (81 mg total) daily 30 tablet 0   • co-enzyme Q-10 100 mg capsule Take 1 capsule (100 mg total) by mouth daily (Patient not taking: Reported on 2023) 30 capsule 3   • gabapentin (NEURONTIN) 100 mg capsule Take 2 capsules (200 mg total) by mouth 3 (three) times a day 540 capsule 0   • Multiple Vitamin (multivitamin) tablet Take 1 tablet by mouth if needed (Patient not taking: Reported on 2023)       No current facility-administered medications on file prior to visit.     Allergies   Allergen Reactions   • Penicillins Anaphylaxis     Social History     Socioeconomic History   • Marital status: /Civil Union     Spouse name: None   • Number of children: None   • Years of education: None   • Highest education level: None   Occupational History   • None   Tobacco Use   • Smoking status: Former     Current packs/day: 0.00     Average packs/day: 2.0 packs/day for 35.0 years (70.0 ttl pk-yrs)     Types: Cigarettes     Start date: 1987     Quit date: 2022     Years since quittin.5   • Smokeless tobacco: Never   Vaping Use   • Vaping status: Every Day   • Substances: Nicotine   Substance and Sexual Activity   • Alcohol use: Yes     Alcohol/week: 12.0 standard drinks of alcohol     Types: 12 Cans of beer per week  "    Comment: 12 cans  beer   weekly   • Drug use: Not Currently   • Sexual activity: Yes   Other Topics Concern   • None   Social History Narrative   • None     Social Determinants of Health     Financial Resource Strain: Not on file   Food Insecurity: No Food Insecurity (1/20/2023)    Hunger Vital Sign    • Worried About Running Out of Food in the Last Year: Never true    • Ran Out of Food in the Last Year: Never true   Transportation Needs: No Transportation Needs (1/20/2023)    PRAPARE - Transportation    • Lack of Transportation (Medical): No    • Lack of Transportation (Non-Medical): No   Physical Activity: Not on file   Stress: Not on file   Social Connections: Not on file   Intimate Partner Violence: Not on file   Housing Stability: Low Risk  (1/20/2023)    Housing Stability Vital Sign    • Unable to Pay for Housing in the Last Year: No    • Number of Places Lived in the Last Year: 1    • Unstable Housing in the Last Year: No        Review of Systems   All other systems reviewed and are negative.      Vitals:  Vitals:    02/01/24 0809   BP: 138/92   BP Location: Right arm   Patient Position: Sitting   Cuff Size: Large   Pulse: 91   Temp: 97.6 °F (36.4 °C)   SpO2: 97%   Weight: 87.1 kg (192 lb)   Height: 5' 7\" (1.702 m)     BMI - Body mass index is 30.07 kg/m².  Wt Readings from Last 7 Encounters:   02/02/24 87.1 kg (192 lb)   02/01/24 87.1 kg (192 lb)   01/26/24 87.1 kg (192 lb)   01/18/24 87.3 kg (192 lb 6.4 oz)   01/04/24 87.5 kg (193 lb)   12/14/23 87.5 kg (193 lb)   12/14/23 87.8 kg (193 lb 9.6 oz)       Physical Exam  Vitals and nursing note reviewed.   Constitutional:       General: He is not in acute distress.     Appearance: He is well-developed. He is obese. He is not ill-appearing or diaphoretic.   HENT:      Head: Normocephalic and atraumatic.      Nose: No congestion.   Eyes:      General: No scleral icterus.     Conjunctiva/sclera: Conjunctivae normal.   Neck:      Vascular: No carotid bruit or " "JVD.   Cardiovascular:      Rate and Rhythm: Normal rate and regular rhythm.      Heart sounds: Normal heart sounds. No murmur heard.     No friction rub. No gallop.   Pulmonary:      Effort: Pulmonary effort is normal. No respiratory distress.      Breath sounds: Normal breath sounds. No wheezing or rales.   Chest:      Chest wall: No tenderness.   Abdominal:      General: There is no distension.      Palpations: Abdomen is soft.      Tenderness: There is abdominal tenderness.      Comments: Mild tenderness left lateral quadrant   Musculoskeletal:         General: No swelling, tenderness or deformity.      Cervical back: Neck supple. No muscular tenderness.      Right lower leg: No edema.      Left lower leg: No edema.   Skin:     General: Skin is warm.   Neurological:      Mental Status: He is alert and oriented to person, place, and time. Mental status is at baseline.      Gait: Gait abnormal.   Psychiatric:         Mood and Affect: Mood normal.         Behavior: Behavior normal.         Thought Content: Thought content normal.         Labs:  CBC:   Lab Results   Component Value Date    WBC 8.98 11/24/2023    RBC 5.16 11/24/2023    HGB 16.5 11/24/2023    HCT 47.7 11/24/2023    MCV 92 11/24/2023     11/24/2023    RDW 12.3 11/24/2023       CMP:   Lab Results   Component Value Date    K 3.8 11/24/2023     11/24/2023    CO2 23 11/24/2023    BUN 15 11/24/2023    CREATININE 1.06 11/24/2023    EGFR 80 11/24/2023    EGFR 96 11/24/2023    CALCIUM 9.2 11/24/2023    AST 35 11/24/2023    ALT 59 (H) 11/24/2023    ALKPHOS 83 11/24/2023       Magnesium:  Lab Results   Component Value Date    MG 2.0 10/19/2021       Lipid Profile:   Lab Results   Component Value Date    HDL 61 03/01/2023    TRIG 229 (H) 03/01/2023    LDLCALC 76 03/01/2023       Thyroid Studies:   Lab Results   Component Value Date    NJE4MFCIGPUN 0.730 09/13/2022       A1c:  No components found for: \"HGA1C\"    INR:  Lab Results   Component Value " Date    INR 0.95 01/20/2023    INR 1.03 11/09/2022    INR 0.90 09/13/2022   5    Imaging: MRI cervical spine wo contrast    Result Date: 12/7/2023  Narrative: MRI CERVICAL SPINE WITHOUT CONTRAST INDICATION: M54.12: Radiculopathy, cervical region M48.02: Spinal stenosis, cervical region R20.0: Anesthesia of skin Z98.1: Arthrodesis status. COMPARISON: 11/24/2023 TECHNIQUE:  Multiplanar, multisequence imaging of the cervical spine was performed. . IMAGE QUALITY:  Diagnostic FINDINGS: ALIGNMENT: The patient is status post anterior cervical discectomy and spinal fusion from C6-C7. There is retrolisthesis of C5-C6 and C3-C4. MARROW SIGNAL:  Normal marrow signal is identified within the visualized bony structures.  No discrete marrow lesion. CERVICAL AND VISUALIZED THORACIC CORD:  Normal signal within the visualized cord. PREVERTEBRAL AND PARASPINAL SOFT TISSUES:  Normal. VISUALIZED POSTERIOR FOSSA:  The visualized posterior fossa demonstrates no abnormal signal. CERVICAL DISC SPACES: C2-C3: There is left-sided uncovertebral spurring. There is facet arthrosis. There is mild left foraminal narrowing. C3-C4: There is a disc osteophyte complex with left-sided uncovertebral spurring. There is facet arthrosis. There is moderate left foraminal narrowing. There is no significant canal stenosis or right foraminal narrowing. C4-C5: There is a disc osteophyte complex with uncovertebral spurring and facet arthrosis. There is mild canal stenosis. There is severe right and moderate left foraminal narrowing. C5-C6: There is a disc osteophyte complex with uncovertebral spurring and facet arthrosis. There is moderate canal stenosis with mild cord impingement. There is severe bilateral foraminal narrowing. C6-C7: There is no significant canal stenosis or foraminal narrowing. C7-T1: There is a disc osteophyte complex with uncovertebral spurring. There is no significant canal stenosis. There is severe left foraminal narrowing. There is mild  right foraminal narrowing. UPPER THORACIC DISC SPACES:  Normal. OTHER FINDINGS:  None.     Impression: Postoperative changes and cervical spondylosis as described above. Most notable level is at C5-C6 where multifactorial disease results in overall moderate canal stenosis and mild cord impingement. Severe bilateral foraminal narrowing is present at this level. Workstation performed: [id number]     MRA head wo contrast    Result Date: 12/7/2023  Narrative: MRA BRAIN WITHOUT CONTRAST INDICATION:  H53.8: Other visual disturbances R20.0: Anesthesia of skin R51.0: Headache with orthostatic component, not elsewhere classified COMPARISON: 9/13/2022 TECHNIQUE:  Axial 3-D time-of-flight imaging with 3-D reconstructions performed without contrast. IV Contrast:  Not administered. FINDINGS: IMAGE QUALITY:  Diagnostic. ANATOMY INTERNAL CAROTID ARTERIES:  Normal flow related signal of the distal cervical, petrous and cavernous segments of the internal carotid arteries.  Normal ICA terminus. ANTERIOR CIRCULATION:  Normal A1 segments.  Normal anterior communicating artery.  Normal flow-related signal of the anterior cerebral arteries. MIDDLE CEREBRAL ARTERY CIRCULATION:  The M1 segment and middle cerebral artery branches demonstrate normal flow-related signal. DISTAL VERTEBRAL ARTERIES:  Distal vertebral arteries are patent with a normal vertebrobasilar junction.  The posterior inferior cerebellar artery origins are normal. BASILAR ARTERY:  Normal. POSTERIOR CEREBRAL ARTERIES: The left posterior cerebral artery is patent. There is a left-sided posterior communicating artery. Mild irregularity of the left posterior cerebral artery. 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    Impression: No significant interval change. 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 Workstation performed: [id number]     MRA carotids wo and w contrast    Result Date:  12/7/2023  Narrative: MRA NECK - WITH AND WITHOUT CONTRAST INDICATION: H53.8: Other visual disturbances R20.0: Anesthesia of skin R51.0: Headache with orthostatic component, not elsewhere classified COMPARISON: CT angiogram from 9/13/2022. Correlation also made to recent duplex carotid ultrasound performed 12/4/2023. TECHNIQUE:  Gadolinium enhanced and noncontrast examination with 3-D reconstruction. IV Contrast:  8 mL of Gadobutrol injection (SINGLE-DOSE) FINDINGS: IMAGE QUALITY:  Diagnostic. AORTIC ARCH:  Normal. VISUALIZED SUBCLAVIAN ARTERIES:  The subclavian arteries demonstrate normal enhancement with no stenosis. VERTEBRAL ARTERIES:  Normal vertebral artery origins. The vertebral arteries are bilaterally symmetric with normal enhancement and no evidence of stenosis. Normal vertebral basilar junction. RIGHT CAROTID ARTERY: Status post interval right-sided carotid endarterectomy and stent placement. There is attenuated but grossly preserved flow related signal and enhancement within the right internal carotid artery proximally at the site of the stent.  This was shown to be widely patent on recent duplex carotid ultrasound. The more distal ICA demonstrates normal flow related signal. There is external carotid artery stenosis. Common carotid artery is patent. LEFT CAROTID ARTERY: Status post left-sided carotid endarterectomy and stent placement. There is attenuated but grossly patent flow related signal and enhancement within the proximal left ICA shown to be widely patent on recent carotid ultrasound. More distal ICA demonstrates normal flow related signal. There is external carotid artery stenosis. Common carotid artery is patent. VISUALIZED INTRACRANIAL VASCULATURE:  Limited visualization of the intracranial vasculature is grossly unremarkable. NASCET criteria was used to determine the degree of internal carotid artery diameter stenosis.     Impression: Status post bilateral carotid endarterectomy and stent  placement within the proximal internal carotid arteries. Grossly preserved but attenuated flow related signal and enhancement corresponding to patency on recently performed carotid ultrasound. If warranted CT angiogram could be performed for more definitive characterization of stent patency. More distal internal carotid arteries are widely patent with normal flow related signal and enhancement. Workstation performed: [id number]     MRI brain w wo contrast    Result Date: 12/7/2023  Narrative: MRI BRAIN WITH AND WITHOUT CONTRAST INDICATION: H53.8: Other visual disturbances R20.0: Anesthesia of skin R51.0: Headache with orthostatic component, not elsewhere classified. COMPARISON: 9/14/2022 TECHNIQUE: Multiplanar, multisequence imaging of the brain was performed before and after gadolinium administration. IV Contrast:  8 mL of Gadobutrol injection (SINGLE-DOSE) IMAGE QUALITY:   Diagnostic. FINDINGS: BRAIN PARENCHYMA:  There is no discrete mass, mass effect or midline shift. There is no intracranial hemorrhage.  Normal posterior fossa.  Diffusion imaging is unremarkable. There is minimal chronic microvascular ischemic change. Tiny chronic lacunar infarct noted within the left dorsal bhumi. There has been evolution of previously seen areas of PCA distribution ischemia. There is a chronic right thalamic infarct with residual gliosis, evolved since the prior examination. There is no significant residual gliosis identified associated with the areas of additional PCA distribution ischemia involving the mesial right temporal and occipital lobe. There has been resolution of previously seen associated diffusion weighted hyperintensity in these regions. Postcontrast imaging of the brain demonstrates no abnormal enhancement. VENTRICLES:  Normal for the patient's age. SELLA AND PITUITARY GLAND:  Normal. ORBITS:  Normal. PARANASAL SINUSES: There is ethmoid air cell mucosal thickening. VASCULATURE:  Evaluation of the major  intracranial vasculature demonstrates appropriate flow voids. CALVARIUM AND SKULL BASE:  Normal. EXTRACRANIAL SOFT TISSUES:  Normal.     Impression: No mass effect, acute intracranial hemorrhage or evidence of recent infarction. Evolution of previously seen areas of right PCA distribution ischemia, as described above. Minimal chronic microvascular ischemic change. Workstation performed: [id number]     VAS carotid complete study    Result Date: 12/5/2023  Narrative:  [facility] REPORT CLINICAL: Indications: Yearly surveillance of carotid artery disease s/p bilateral TCAR. Patient reports left sided neck pain and tightness radiating to his jaw and forehead s/p left TCAR procedure. Operative History: 2023-01-19 Left TCAR (Dr. Mack) 2022-11-08 Right TCAR (Dr. Mack) Risk Factors The patient has history of HTN, Hyperlipidemia, CKD, CVA, CASEY and previous smoking (quit 1-5yrs ago). Clinical Right Pressure: 152/96 mm Hg, Left Pressure: 144/96 mm Hg.  FINDINGS:  Right              Impression       PSV  EDV (cm/s)  Direction of Flow  Dist. ICA                            91          34                     Mid. ICA                             84          31                     Prox. ICA - Stent  Widely Patent                                        Dist CCA                             74          18                     Mid CCA                              78          21                     Prox CCA                            100          26                     Ext Carotid        Severe stenosis  449          85                     Prox Vert                            61          20  Antegrade          Subclavian                          165           0                      Left               Impression       PSV  EDV (cm/s)  Direction of Flow  Dist. ICA                            60          22                     Mid. ICA                             95          38                     Prox. ICA - Stent  Widely Patent                                         Dist CCA                             41          16                     Mid CCA                              70          24                     Prox CCA                            107          28                     Ext Carotid        Severe stenosis  441         121                     Prox Vert                            63          16  Antegrade          Subclavian                          219           0                        CONCLUSION:  Impression  RIGHT: Widely patent internal carotid artery and stent. There is severe stenosis of the proximal external carotid artery. Vertebral artery flow is antegrade. There is no significant subclavian artery disease.  LEFT: Widely patent internal carotid artery and stent. There is severe stenosis of the proximal external carotid artery. Vertebral artery flow is antegrade. There is no significant subclavian artery disease.  Compared to previous study on 8/15/2023, there is no significant progression of disease.  SIGNATURE: Electronically Signed by: SYLVAIN MOTA DO on 2023-12-05 02:01:04 PM    XR spine cervical complete 4 or 5 vw non injury    Result Date: 11/27/2023  Narrative: CERVICAL SPINE INDICATION:   Radiculopathy, cervical region. Spinal stenosis, cervical region. COMPARISON: None available VIEWS:  XR SPINE CERVICAL COMPLETE 4 OR 5 VW NON INJURY Images: 5 FINDINGS: No fracture. Straightening of normal cervical lordosis with degenerative retrolisthesis of C5 on C6. Mature ACDF spanning C6-7 without hardware complication. Disc base narrowing C5-6 and C7-T1 with vertebral body endplate spurring. Severe left C7-T1 neural foraminal narrowing with mild left C5-6 narrowing. Moderate right C4-5 and C5-6 neuroforaminal narrowing. The prevertebral soft tissues are within normal limits.  The lung apices are clear.     Impression: No acute osseous abnormality. Degenerative and postsurgical changes as above. Electronically signed: 11/27/2023  10:10 AM Alberto Siegel, MPH,MD      Cardiac testing:   No results found for this or any previous visit.    No results found for this or any previous visit.    No results found for this or any previous visit.    No results found for this or any previous visit.      VAS renal artery complete     [facility]PORT  CLINICAL:  Indications:  Patient presents to evaluate the renal arteries secondary to uncontrolled HTN.  Patient is currently on 3 medications for blood pressure.  Operative History:  2023-01-19 Left TCAR (Dr. Mack)  2022-11-08 Right TCAR (Dr. Mack)  Risk Factors  The patient has history of HTN, Hyperlipidemia, CKD and previous smoking (quit  1-5yrs ago).  Clinical  Right Pressure:  165/112 mm Hg, Left Pressure:  186/108 mm Hg.     FINDINGS:     Unilateral     Impression  Diameter AP  PSV (cm/s)  EDV (cm/s)    RI    Sup-Pati Ao     Ectatic             2.8          40          13  0.67    Celiac                                         372         107          Px Inf-Vickey Ao                      1.9          70                      Ds Inf-Vickey Ao                      1.8          72                      Prox. SMA                                      169          28             Right          Impression  PSV (cm/s)  EDV (cm/s)    RAR    RI  Kidney (cm)    Ostial Renal   60 - 99%           314          67   7.95  0.79                 Prox Renal     60 - 99%           476         152  12.04  0.79                 Mid Renal      60 - 99%           263          74   6.66  0.72                 Dist Renal                        130          37   3.29  0.72                 Celiac Artery                      25           9         0.64                 Kidney                                                                10.88    Upper Pole                         18           8         0.55                 Mid Pole                           38          14         0.63                 Lower Pole                          25           9         0.64                    Left           PSV (cm/s)  EDV (cm/s)   RAR    RI  Kidney (cm)    Ostial Renal           79          26  1.99  0.67                 Prox Renal            112          29  2.83  0.74                 Mid Renal             119          25  3.02  0.79                 Dist Renal             64          25  1.62  0.61                 Celiac Artery          47          17        0.64                 Kidney                                                   12.64    Upper Pole             41          15        0.64                 Mid Pole               47          17        0.64                 Lower Pole             21           8        0.62                          CONCLUSION:     Impression  The abdominal aorta is widely patent and normal caliber.     RIGHT RENAL:  There is a >60% stenosis in the main renal artery.  Patent renal vein.  Adequate parenchymal flow is noted with a renovascular resistive index of 0.64.  Renal/Aorta Ratio: 12.04 .  The kidney measures approximately 10.88 cm.     LEFT RENAL:  No evidence of significant arterial occlusive disease in the main renal artery.  Patent renal vein.  Adequate parenchymal flow is noted with a renovascular resistive index of 0.64.  Renal/Aorta Ratio: 3.02 .  The kidney measures approximately 12.64 cm.     MESENTERIC:  Celiac and superior mesenteric arteries are patent.     No prior study to compare.     SIGNATURE:  Electronically Signed by: SHELL ORTIZ MD, RPVI on 2024-01-12 11:07:35 AM

## 2024-02-02 ENCOUNTER — OFFICE VISIT (OUTPATIENT)
Dept: NEUROSURGERY | Facility: CLINIC | Age: 54
End: 2024-02-02
Payer: COMMERCIAL

## 2024-02-02 VITALS
TEMPERATURE: 98.2 F | HEART RATE: 88 BPM | RESPIRATION RATE: 16 BRPM | DIASTOLIC BLOOD PRESSURE: 90 MMHG | OXYGEN SATURATION: 99 % | SYSTOLIC BLOOD PRESSURE: 142 MMHG | BODY MASS INDEX: 30.13 KG/M2 | WEIGHT: 192 LBS | HEIGHT: 67 IN

## 2024-02-02 DIAGNOSIS — M54.12 CERVICAL RADICULOPATHY: ICD-10-CM

## 2024-02-02 DIAGNOSIS — M48.02 CERVICAL SPINAL STENOSIS: ICD-10-CM

## 2024-02-02 PROCEDURE — 99214 OFFICE O/P EST MOD 30 MIN: CPT | Performed by: NEUROLOGICAL SURGERY

## 2024-02-02 RX ORDER — METHOCARBAMOL 750 MG/1
750 TABLET, FILM COATED ORAL EVERY 6 HOURS PRN
Qty: 90 TABLET | Refills: 3 | Status: SHIPPED | OUTPATIENT
Start: 2024-02-02

## 2024-02-02 NOTE — PROGRESS NOTES
Neurosurgery Office Note  Shaq Brown 53 y.o. male MRN: [id number]      Assessment/Plan        Problem List Items Addressed This Visit       Visit Diagnoses       Cervical radiculopathy        Cervical spinal stenosis        Relevant Medications    methocarbamol (Robaxin-750) 750 mg tablet    Other Relevant Orders    Ambulatory Referral to Physical Therapy    CT cervical spine without contrast    XR spine cervical complete 6+ vw flex/ext/obl              Discussion:    Jim is joined by his wife Sneha.    Patient is a 53-year-old male with h/o smoking (quit after stroke in September 2022), HTN, HLD, bilateral carotid stenoses s/p angioplasties in 2023, CVA (residual left-sided paresthesias and weakness), prior C6/7 ACDF at OSH in 1990s who p/w chronic left-sided pain, numbness (~50% compared to right side), weakness since CVA.     No persistent radicular numbness (not specifically limited to C5/6 distributions), new objective weakness (baseline diffuse left arm/leg weakness 4-/5), fine motor dysfunction, gait imbalance, bowel/bladder incontinence, recent trauma.    Taking baclofen, Neurontin 100/200 mg (adverse effects with 300 mg), Zanaflex (ineffective).    On ASA 81 mg daily.    Initial evaluation by Dr. Freitas (Pain Medicine) on 1/26/24, no interventions.    No recent PT, just doing home exercises.    MRI cervical spine on 12/3/23 demonstrated postoperative changes at C6/7 s/p ACDF and adjacent level disease at C5/6 without significant central stenosis, cord compression, or abnormal cord signal changes.    On exam, he has baseline left-sided deficits due to prior stroke, without long tract signs.    At this time, I will obtain CT c-spine wo contrast and XR flexion/extension to assess for OPLL and dynamic instability, respectively.     I recommend initial conservative management per Pain Medicine and PT. Per his request, I will switch Zanaflex to Robaxin for diffuse left-sided muscle cramps.    I explained  to Jim today that if any additional neurosurgical intervention is indicated, it will be more complicated by prior surgery and copious scarring anteriorly (especially if there is evidence of OPLL on CT), which may necessitate a more invasive approach posteriorly (associated with higher intraoperative blood loss, longer recovery, and extensive potential risks/complications).    Return to clinic in 3 months after additional imaging and conservative management.     All questions and concerns were addressed during this visit.          CHIEF COMPLAINT    Chief Complaint   Patient presents with    Consult       HISTORY    History of Present Illness     53 y.o. year old male     HPI    See Discussion    REVIEW OF SYSTEMS    Review of Systems   Constitutional:  Positive for fatigue.   Gastrointestinal: Negative.    Genitourinary: Negative.    Musculoskeletal:  Positive for back pain, gait problem, myalgias, neck pain and neck stiffness.        PREVIOUS CSPINE SX 1990'S AND 1998 FUSION C5/6 with partial at C6/7    Radiates to L arm with N/T/W on entire L side. L sided difficulties with fine motor skills/ dropping items.   Had a fall 3/4 months ago but L leg gives out occasionally. Unstable gait with fatigue and dragging of L foot.      STROKE 9/2022-- PT for stroke   Last CITLALLI in 1998--trigger point only helped for couple weeks and done prior to sx  MRI CSPINE 12/3/23 [facility]  XRAY CSPINE 11/24/23 [facility]   Neurological:  Positive for dizziness, weakness, light-headedness, numbness and headaches. Negative for seizures.   Psychiatric/Behavioral:  Positive for sleep disturbance.          Meds/Allergies     Current Outpatient Medications   Medication Sig Dispense Refill    albuterol (PROVENTIL HFA,VENTOLIN HFA) 90 mcg/act inhaler INHALE 2 PUFFS BY MOUTH EVERY 6 HOURS AS NEEDED FOR WHEEZING 9 g 1    amitriptyline (ELAVIL) 10 mg tablet Take 1 tablet (10 mg total) by mouth daily at bedtime 30 tablet 3    amLODIPine (NORVASC) 10 mg tablet Take  1 tablet (10 mg total) by mouth daily 90 tablet 1    aspirin 81 mg chewable tablet Chew 1 tablet (81 mg total) daily 30 tablet 0    atorvastatin (LIPITOR) 80 mg tablet Take 1 tablet (80 mg total) by mouth in the evening. Take before meals 90 tablet 1    b complex vitamins capsule Take 1 capsule by mouth daily 30 capsule 3    baclofen 10 mg tablet Take 1 tablet (10 mg total) by mouth 3 (three) times a day As needed for muscle spasm 90 tablet 0    Cyanocobalamin (VITAMIN B12 PO) Take by mouth      ferrous sulfate 325 (65 Fe) mg tablet Take 325 mg by mouth daily with breakfast      gabapentin (NEURONTIN) 100 mg capsule Take 2 capsules (200 mg total) by mouth 3 (three) times a day 540 capsule 0    losartan (COZAAR) 100 MG tablet Take 1 tablet (100 mg total) by mouth daily 90 tablet 1    metoprolol succinate (TOPROL-XL) 50 mg 24 hr tablet Take 1 tablet (50 mg total) by mouth daily 90 tablet 1    pantoprazole (PROTONIX) 40 mg tablet Take 1 tablet (40 mg total) by mouth daily 90 tablet 1    tamsulosin (FLOMAX) 0.4 mg Take 1 capsule (0.4 mg total) by mouth daily with dinner 90 capsule 0    tiZANidine (ZANAFLEX) 2 mg tablet Take 1 tablet (2 mg total) by mouth every 8 (eight) hours as needed for muscle spasms 60 tablet 2    triamterene-hydrochlorothiazide (DYAZIDE) 37.5-25 mg per capsule Take 1 capsule by mouth every morning 90 capsule 1    ALPRAZolam (XANAX) 0.25 mg tablet Take 1 tablet (0.25 mg total) by mouth once in imaging for anxiety for up to 2 days 2 tablet 0    co-enzyme Q-10 100 mg capsule Take 1 capsule (100 mg total) by mouth daily (Patient not taking: Reported on 11/16/2023) 30 capsule 3    Multiple Vitamin (multivitamin) tablet Take 1 tablet by mouth if needed (Patient not taking: Reported on 6/26/2023)       No current facility-administered medications for this visit.       Allergies   Allergen Reactions    Penicillins Anaphylaxis       PAST HISTORY    Past Medical History:   Diagnosis Date    Carotid stenosis,  left     today  2023  L carotid angioplasty    Carotid stenosis, right     Rt angioplasty completed  2023    Cervical disc disease     COVID 2020    GERD (gastroesophageal reflux disease)     Hyperlipidemia     Hypertension     Kidney stone     Muscle weakness     Spinal stenosis     Stroke (HCC) 2022    left sided weakness with pins/needles    Weakness of left side of body 2022    s/p CVA       Past Surgical History:   Procedure Laterality Date    CARPAL TUNNEL RELEASE Bilateral     CERVICAL FUSION      with hardware    CYSTOSCOPY      HERNIA REPAIR      IR CAROTID STENT  2022    IR CAROTID STENT  2023    RI TCAT IV STENT CRV CRTD ART EMBOLIC PROTECJ Right 2022    Procedure: ANGIOPLASTY ARTERY CAROTID W/ STENT TCAR,;  Surgeon: Duane Mack DO;  Location: [facility];  Service: Vascular    RI TCAT IV STENT CRV CRTD ART EMBOLIC PROTECJ Left 2023    Procedure: ANGIOPLASTY ARTERY CAROTID W/ STENT Left TCAR;  Surgeon: Duane Mack DO;  Location: [facility];  Service: Vascular    ULNAR COLLATERAL LIGAMENT RECONSTRUCTION Bilateral        Social History     Tobacco Use    Smoking status: Former     Current packs/day: 0.00     Average packs/day: 2.0 packs/day for 35.0 years (70.0 ttl pk-yrs)     Types: Cigarettes     Start date: 1987     Quit date: 2022     Years since quittin.5    Smokeless tobacco: Never   Vaping Use    Vaping status: Every Day    Substances: Nicotine   Substance Use Topics    Alcohol use: Yes     Alcohol/week: 12.0 standard drinks of alcohol     Types: 12 Cans of beer per week     Comment: 12 cans  beer   weekly    Drug use: Not Currently       Family History   Problem Relation Age of Onset    Heart attack Mother     Diabetes Mother     Hypertension Mother     Colon cancer Father     No Known Problems Sister     No Known Problems Sister     No Known Problems Sister     No Known Problems Son          The following portions of the patient's history  "were reviewed in this encounter and updated as appropriate: Past medical, surgical, family, and social history, as well as medications, allergies, and review of systems.      EXAM    Vitals:Blood pressure 142/90, pulse 88, temperature 98.2 °F (36.8 °C), temperature source Temporal, resp. rate 16, height 5' 7\" (1.702 m), weight 87.1 kg (192 lb), SpO2 99%.,Body mass index is 30.07 kg/m².     Physical Exam  Vitals and nursing note reviewed.   Constitutional:       Appearance: Normal appearance. He is normal weight.   HENT:      Head: Normocephalic and atraumatic.   Eyes:      Extraocular Movements: Extraocular movements intact and EOM normal.      Pupils: Pupils are equal, round, and reactive to light.   Cardiovascular:      Rate and Rhythm: Normal rate and regular rhythm.      Pulses: Normal pulses.      Heart sounds: Normal heart sounds.   Pulmonary:      Effort: Pulmonary effort is normal.      Breath sounds: Normal breath sounds.   Abdominal:      General: Abdomen is flat.      Palpations: Abdomen is soft.   Musculoskeletal:         General: Normal range of motion.      Cervical back: Normal range of motion.   Neurological:      General: No focal deficit present.      Mental Status: He is alert and oriented to person, place, and time. Mental status is at baseline.      GCS: GCS eye subscore is 4. GCS verbal subscore is 5. GCS motor subscore is 6.      Sensory: Sensation is intact.      Motor: Motor function is intact.      Coordination: Coordination is intact.      Deep Tendon Reflexes: Reflexes are normal and symmetric.   Psychiatric:         Mood and Affect: Mood normal.         Speech: Speech normal.         Behavior: Behavior normal.         Neurologic Exam     Mental Status   Oriented to person, place, and time.   Attention: normal.   Speech: speech is normal   Level of consciousness: alert    Cranial Nerves     CN II   Visual fields full to confrontation.   Visual acuity: normal  Right visual field deficit: " none  Left visual field deficit: none     CN III, IV, VI   Pupils are equal, round, and reactive to light.  Extraocular motions are normal.   CN III: no CN III palsy  CN VI: no CN VI palsy  Nystagmus: none   Diplopia: none  Ophthalmoparesis: none  Upgaze: normal  Downgaze: normal  Conjugate gaze: present    CN V   Facial sensation intact.   Right facial sensation deficit: none  Left facial sensation deficit: none    CN VII   Facial expression full, symmetric.   Right facial weakness: none  Left facial weakness: none    CN VIII   CN VIII normal.     CN IX, X   CN IX normal.   CN X normal.   Palate: symmetric    CN XI   CN XI normal.   Right sternocleidomastoid strength: normal  Left sternocleidomastoid strength: normal  Right trapezius strength: normal  Left trapezius strength: normal    CN XII   CN XII normal.   Tongue deviation: none    Motor Exam   Muscle bulk: normal  Overall muscle tone: normal  Right arm pronator drift: absent  Left arm pronator drift: absent    Strength   Strength 5/5 except as noted. Baseline left-sided deficits due to prior stroke, diffusely 4-/5 in all muscle groups (not radicular or dermatomal)     Gait, Coordination, and Reflexes     Reflexes   Reflexes 2+ except as noted.         MEDICAL DECISION MAKING    Imaging Studies:     VAS renal artery complete    Result Date: 1/12/2024  Narrative:  [facility] REPORT CLINICAL: Indications: Patient presents to evaluate the renal arteries secondary to uncontrolled HTN. Patient is currently on 3 medications for blood pressure. Operative History: 2023-01-19 Left TCAR (Dr. Mack) 2022-11-08 Right TCAR (Dr. Mack) Risk Factors The patient has history of HTN, Hyperlipidemia, CKD and previous smoking (quit 1-5yrs ago). Clinical Right Pressure:  165/112 mm Hg, Left Pressure:  186/108 mm Hg.  FINDINGS:  Unilateral     Impression  Diameter AP  PSV (cm/s)  EDV (cm/s)    RI  Sup-Pati Ao     Ectatic             2.8          40          13  0.67  Celiac                                          372         107        Px Inf-Vickey Ao                      1.9          70                    Ds Inf-Vickey Ao                      1.8          72                    Prox. SMA                                      169          28         Right          Impression  PSV (cm/s)  EDV (cm/s)    RAR    RI  Kidney (cm)  Ostial Renal   60 - 99%           314          67   7.95  0.79               Prox Renal     60 - 99%           476         152  12.04  0.79               Mid Renal      60 - 99%           263          74   6.66  0.72               Dist Renal                        130          37   3.29  0.72               Celiac Artery                      25           9         0.64               Kidney                                                                10.88  Upper Pole                         18           8         0.55               Mid Pole                           38          14         0.63               Lower Pole                         25           9         0.64                Left           PSV (cm/s)  EDV (cm/s)   RAR    RI  Kidney (cm)  Ostial Renal           79          26  1.99  0.67               Prox Renal            112          29  2.83  0.74               Mid Renal             119          25  3.02  0.79               Dist Renal             64          25  1.62  0.61               Celiac Artery          47          17        0.64               Kidney                                                   12.64  Upper Pole             41          15        0.64               Mid Pole               47          17        0.64               Lower Pole             21           8        0.62                  CONCLUSION:  Impression The abdominal aorta is widely patent and normal caliber.  RIGHT RENAL: There is a >60% stenosis in the main renal artery.  Patent renal vein. Adequate parenchymal flow is noted with a renovascular resistive index of 0.64. Renal/Aorta  Ratio: 12.04 .  The kidney measures approximately 10.88 cm.  LEFT RENAL: No evidence of significant arterial occlusive disease in the main renal artery. Patent renal vein. Adequate parenchymal flow is noted with a renovascular resistive index of 0.64. Renal/Aorta Ratio: 3.02 .  The kidney measures approximately 12.64 cm.  MESENTERIC: Celiac and superior mesenteric arteries are patent.  No prior study to compare.  SIGNATURE: Electronically Signed by: SHELL ORTIZ MD, RPVI on 2024-01-12 11:07:35 AM    Stress strip    Result Date: 1/5/2024  Narrative: Confirmed by WILVER NELSON ([id number]),  Lulú Najera ([id number]) on 1/5/2024 8:09:30 AM    NM myocardial perfusion spect (rx stress and/or rest)    Result Date: 1/4/2024  Narrative:   Stress ECG: The ECG was uninterpretable due to left bundle branch block. Slight widening of the QRS complex with vasodilator infusion.   Perfusion: There are no perfusion defects.   Stress Function: Left ventricular function post-stress is normal. Stress ejection fraction is 51%.   Stress Combined Conclusion: Left ventricular perfusion is normal. No evidence of ischemia or infarction.       I have personally reviewed pertinent reports.   and I have personally reviewed pertinent films in PACS      Quinton Lambert M.D.  Neurosurgeon

## 2024-02-22 ENCOUNTER — HOSPITAL ENCOUNTER (OUTPATIENT)
Dept: PULMONOLOGY | Facility: HOSPITAL | Age: 54
End: 2024-02-22
Attending: INTERNAL MEDICINE
Payer: COMMERCIAL

## 2024-02-22 DIAGNOSIS — I10 PRIMARY HYPERTENSION: ICD-10-CM

## 2024-02-22 DIAGNOSIS — R07.89 CHEST TIGHTNESS: ICD-10-CM

## 2024-02-22 PROCEDURE — 94729 DIFFUSING CAPACITY: CPT

## 2024-02-22 PROCEDURE — 94726 PLETHYSMOGRAPHY LUNG VOLUMES: CPT | Performed by: INTERNAL MEDICINE

## 2024-02-22 PROCEDURE — 94760 N-INVAS EAR/PLS OXIMETRY 1: CPT

## 2024-02-22 PROCEDURE — 94729 DIFFUSING CAPACITY: CPT | Performed by: INTERNAL MEDICINE

## 2024-02-22 PROCEDURE — 94060 EVALUATION OF WHEEZING: CPT

## 2024-02-22 PROCEDURE — 94060 EVALUATION OF WHEEZING: CPT | Performed by: INTERNAL MEDICINE

## 2024-02-22 PROCEDURE — 94726 PLETHYSMOGRAPHY LUNG VOLUMES: CPT

## 2024-02-22 RX ORDER — ALBUTEROL SULFATE 2.5 MG/3ML
2.5 SOLUTION RESPIRATORY (INHALATION) ONCE
Status: COMPLETED | OUTPATIENT
Start: 2024-02-22 | End: 2024-02-22

## 2024-02-22 RX ADMIN — ALBUTEROL SULFATE 2.5 MG: 2.5 SOLUTION RESPIRATORY (INHALATION) at 08:53

## 2024-02-26 DIAGNOSIS — R42 DIZZINESS AND GIDDINESS: ICD-10-CM

## 2024-02-27 RX ORDER — AMITRIPTYLINE HYDROCHLORIDE 10 MG/1
10 TABLET, FILM COATED ORAL
Qty: 30 TABLET | Refills: 0 | Status: SHIPPED | OUTPATIENT
Start: 2024-02-27

## 2024-02-29 ENCOUNTER — TELEPHONE (OUTPATIENT)
Age: 54
End: 2024-02-29

## 2024-03-07 ENCOUNTER — PATIENT OUTREACH (OUTPATIENT)
Dept: FAMILY MEDICINE CLINIC | Facility: CLINIC | Age: 54
End: 2024-03-07

## 2024-03-07 ENCOUNTER — OFFICE VISIT (OUTPATIENT)
Dept: FAMILY MEDICINE CLINIC | Facility: CLINIC | Age: 54
End: 2024-03-07
Payer: COMMERCIAL

## 2024-03-07 VITALS
DIASTOLIC BLOOD PRESSURE: 76 MMHG | RESPIRATION RATE: 16 BRPM | BODY MASS INDEX: 30.83 KG/M2 | HEIGHT: 67 IN | HEART RATE: 70 BPM | SYSTOLIC BLOOD PRESSURE: 116 MMHG | TEMPERATURE: 97.3 F | WEIGHT: 196.4 LBS | OXYGEN SATURATION: 97 %

## 2024-03-07 DIAGNOSIS — I10 PRIMARY HYPERTENSION: ICD-10-CM

## 2024-03-07 DIAGNOSIS — R06.09 DYSPNEA ON EXERTION: ICD-10-CM

## 2024-03-07 DIAGNOSIS — R31.0 GROSS HEMATURIA: ICD-10-CM

## 2024-03-07 DIAGNOSIS — G89.29 CHRONIC LEFT-SIDED LOW BACK PAIN WITHOUT SCIATICA: ICD-10-CM

## 2024-03-07 DIAGNOSIS — Z59.82: Primary | ICD-10-CM

## 2024-03-07 DIAGNOSIS — R29.90 STROKE-LIKE SYMPTOMS: ICD-10-CM

## 2024-03-07 DIAGNOSIS — R42 DIZZINESS AND GIDDINESS: ICD-10-CM

## 2024-03-07 DIAGNOSIS — I63.531 ACUTE RIGHT ARTERIAL ISCHEMIC STROKE, PCA (POSTERIOR CEREBRAL ARTERY) (HCC): ICD-10-CM

## 2024-03-07 DIAGNOSIS — Z59.82 TRANSPORTATION INSECURITY: Primary | ICD-10-CM

## 2024-03-07 DIAGNOSIS — I63.81 THALAMIC INFARCTION (HCC): ICD-10-CM

## 2024-03-07 DIAGNOSIS — R09.89 SYMPTOMS OF CEREBROVASCULAR ACCIDENT (CVA): ICD-10-CM

## 2024-03-07 DIAGNOSIS — M54.50 CHRONIC LEFT-SIDED LOW BACK PAIN WITHOUT SCIATICA: ICD-10-CM

## 2024-03-07 DIAGNOSIS — Z12.2 ENCOUNTER FOR SCREENING FOR LUNG CANCER: ICD-10-CM

## 2024-03-07 DIAGNOSIS — F17.210 SMOKING GREATER THAN 40 PACK YEARS: ICD-10-CM

## 2024-03-07 DIAGNOSIS — I63.9 CVA (CEREBRAL VASCULAR ACCIDENT) (HCC): ICD-10-CM

## 2024-03-07 PROCEDURE — 99214 OFFICE O/P EST MOD 30 MIN: CPT | Performed by: FAMILY MEDICINE

## 2024-03-07 RX ORDER — AMLODIPINE BESYLATE 10 MG/1
10 TABLET ORAL DAILY
Qty: 90 TABLET | Refills: 1 | Status: SHIPPED | OUTPATIENT
Start: 2024-03-07

## 2024-03-07 RX ORDER — ALBUTEROL SULFATE 90 UG/1
AEROSOL, METERED RESPIRATORY (INHALATION)
Qty: 9 G | Refills: 1 | Status: SHIPPED | OUTPATIENT
Start: 2024-03-07

## 2024-03-07 RX ORDER — TAMSULOSIN HYDROCHLORIDE 0.4 MG/1
0.4 CAPSULE ORAL
Qty: 90 CAPSULE | Refills: 1 | Status: SHIPPED | OUTPATIENT
Start: 2024-03-07

## 2024-03-07 RX ORDER — AMITRIPTYLINE HYDROCHLORIDE 10 MG/1
10 TABLET, FILM COATED ORAL
Qty: 90 TABLET | Refills: 1 | Status: SHIPPED | OUTPATIENT
Start: 2024-03-07

## 2024-03-07 RX ORDER — ATORVASTATIN CALCIUM 80 MG/1
80 TABLET, FILM COATED ORAL
Qty: 90 TABLET | Refills: 1 | Status: SHIPPED | OUTPATIENT
Start: 2024-03-07

## 2024-03-07 RX ORDER — LOSARTAN POTASSIUM 100 MG/1
100 TABLET ORAL DAILY
Qty: 90 TABLET | Refills: 1 | Status: SHIPPED | OUTPATIENT
Start: 2024-03-07

## 2024-03-07 RX ORDER — TAMSULOSIN HYDROCHLORIDE 0.4 MG/1
CAPSULE ORAL
Qty: 90 CAPSULE | Refills: 1 | Status: SHIPPED | OUTPATIENT
Start: 2024-03-07 | End: 2024-03-07 | Stop reason: SDUPTHER

## 2024-03-07 SDOH — ECONOMIC STABILITY - TRANSPORTATION SECURITY: TRANSPORTATION INSECURITY: Z59.82

## 2024-03-07 NOTE — PROGRESS NOTES
Subjective:      Patient ID: Shaq Brown is a 53 y.o. male.    With Thalamic stroke, with residual LLE weakness, dysathria -improved, brain fog-recent memory issues- completed PT, imbalance-dizziness and impaired depth perception since his thalamic stroke. Left leg numbness and pressure on left side of neck    Complains of Lower back pain and left sided back stiffness which has been life altering, he did see Neuro  Hematuria-resolved  Does report back pain and right sided stiffness  He is unable to work, walks much slower, does have occasional imbalance, unable to use left upper extremity completely  He worked in construction previously, applied for disability now.  BP is now controlled, suspicion for renal artery stenosis-for now they are monitoring, follows vascular surgery        Past Medical History:   Diagnosis Date    Carotid stenosis, left     today  1/19/2023  L carotid angioplasty    Carotid stenosis, right     Rt angioplasty completed  11/2023    Cervical disc disease     COVID 05/2020    GERD (gastroesophageal reflux disease)     Hyperlipidemia     Hypertension     Kidney stone     Muscle weakness     Spinal stenosis     Stroke (HCC) 09/13/2022    left sided weakness with pins/needles    Weakness of left side of body 09/13/2022    s/p CVA       Family History   Problem Relation Age of Onset    Heart attack Mother     Diabetes Mother     Hypertension Mother     Colon cancer Father     No Known Problems Sister     No Known Problems Sister     No Known Problems Sister     No Known Problems Son        Past Surgical History:   Procedure Laterality Date    CARPAL TUNNEL RELEASE Bilateral     CERVICAL FUSION      with hardware    CYSTOSCOPY      HERNIA REPAIR      IR CAROTID STENT  11/8/2022    IR CAROTID STENT  1/19/2023    SC TCAT IV STENT CRV CRTD ART EMBOLIC PROTECJ Right 11/8/2022    Procedure: ANGIOPLASTY ARTERY CAROTID W/ STENT TCAR,;  Surgeon: Duane Mack DO;  Location: [facility];  Service:  Vascular    NE TCAT IV STENT CRV CRTD ART EMBOLIC PROTECJ Left 1/19/2023    Procedure: ANGIOPLASTY ARTERY CAROTID W/ STENT Left TCAR;  Surgeon: Duane Mack DO;  Location: [facility];  Service: Vascular    ULNAR COLLATERAL LIGAMENT RECONSTRUCTION Bilateral         reports that he quit smoking about 19 months ago. His smoking use included cigarettes. He started smoking about 36 years ago. He has a 70 pack-year smoking history. He has never used smokeless tobacco. He reports current alcohol use of about 12.0 standard drinks of alcohol per week. He reports that he does not currently use drugs.      Current Outpatient Medications:     amitriptyline (ELAVIL) 10 mg tablet, Take 1 tablet (10 mg total) by mouth daily at bedtime, Disp: 90 tablet, Rfl: 1    amLODIPine (NORVASC) 10 mg tablet, Take 1 tablet (10 mg total) by mouth daily, Disp: 90 tablet, Rfl: 1    aspirin 81 mg chewable tablet, Chew 1 tablet (81 mg total) daily, Disp: 30 tablet, Rfl: 0    atorvastatin (LIPITOR) 80 mg tablet, Take 1 tablet (80 mg total) by mouth in the evening. Take before meals, Disp: 90 tablet, Rfl: 1    b complex vitamins capsule, Take 1 capsule by mouth daily, Disp: 30 capsule, Rfl: 3    gabapentin (NEURONTIN) 100 mg capsule, Take 2 capsules (200 mg total) by mouth 3 (three) times a day, Disp: 540 capsule, Rfl: 0    losartan (COZAAR) 100 MG tablet, Take 1 tablet (100 mg total) by mouth daily, Disp: 90 tablet, Rfl: 1    methocarbamol (Robaxin-750) 750 mg tablet, Take 1 tablet (750 mg total) by mouth every 6 (six) hours as needed for muscle spasms, Disp: 90 tablet, Rfl: 3    metoprolol succinate (TOPROL-XL) 50 mg 24 hr tablet, Take 1 tablet (50 mg total) by mouth daily, Disp: 90 tablet, Rfl: 1    pantoprazole (PROTONIX) 40 mg tablet, Take 1 tablet (40 mg total) by mouth daily, Disp: 90 tablet, Rfl: 1    tamsulosin (FLOMAX) 0.4 mg, Take 1 capsule (0.4 mg total) by mouth daily with dinner, Disp: 90 capsule, Rfl: 1     triamterene-hydrochlorothiazide (DYAZIDE) 37.5-25 mg per capsule, Take 1 capsule by mouth every morning, Disp: 90 capsule, Rfl: 1    albuterol (PROVENTIL HFA,VENTOLIN HFA) 90 mcg/act inhaler, INHALE 2 PUFFS BY MOUTH EVERY 6 HOURS AS NEEDED FOR WHEEZING, Disp: 9 g, Rfl: 1    ALPRAZolam (XANAX) 0.25 mg tablet, Take 1 tablet (0.25 mg total) by mouth once in imaging for anxiety for up to 2 days, Disp: 2 tablet, Rfl: 0    co-enzyme Q-10 100 mg capsule, Take 1 capsule (100 mg total) by mouth daily (Patient not taking: Reported on 11/16/2023), Disp: 30 capsule, Rfl: 3    Cyanocobalamin (VITAMIN B12 PO), Take by mouth (Patient not taking: Reported on 3/7/2024), Disp: , Rfl:     ferrous sulfate 325 (65 Fe) mg tablet, Take 325 mg by mouth daily with breakfast (Patient not taking: Reported on 3/7/2024), Disp: , Rfl:     Multiple Vitamin (multivitamin) tablet, Take 1 tablet by mouth if needed (Patient not taking: Reported on 6/26/2023), Disp: , Rfl:     The following portions of the patient's history were reviewed and updated as appropriate: allergies, current medications, past family history, past medical history, past social history, past surgical history and problem list.    Review of Systems   Constitutional:  Negative for chills and fever.   HENT:  Negative for congestion, rhinorrhea and sore throat.    Eyes:  Negative for discharge, redness and itching.   Respiratory:  Negative for chest tightness, shortness of breath and wheezing.    Cardiovascular:  Negative for chest pain and palpitations.   Gastrointestinal:  Negative for abdominal pain, constipation and diarrhea.   Genitourinary:  Negative for dysuria.   Musculoskeletal:  Positive for neck pain and neck stiffness.   Skin:  Negative for pallor and rash.   Neurological:  Positive for weakness. Negative for dizziness, numbness and headaches.         PHQ-2/9 Depression Screening    Little interest or pleasure in doing things: 0 - not at all  Feeling down, depressed, or  "hopeless: 0 - not at all  PHQ-2 Score: 0  PHQ-2 Interpretation: Negative depression screen             Objective:    /76 (BP Location: Left arm, Patient Position: Sitting, Cuff Size: Adult)   Pulse 70   Temp (!) 97.3 °F (36.3 °C) (Tympanic)   Resp 16   Ht 5' 7\" (1.702 m)   Wt 89.1 kg (196 lb 6.4 oz)   SpO2 97%   BMI 30.76 kg/m²      Physical Exam  Vitals and nursing note reviewed.   Constitutional:       Appearance: Normal appearance.   HENT:      Right Ear: Tympanic membrane, ear canal and external ear normal.      Left Ear: Tympanic membrane, ear canal and external ear normal.      Nose: No congestion or rhinorrhea.      Mouth/Throat:      Mouth: Mucous membranes are moist.      Pharynx: No posterior oropharyngeal erythema.   Eyes:      General:         Right eye: No discharge.         Left eye: No discharge.      Conjunctiva/sclera: Conjunctivae normal.   Cardiovascular:      Rate and Rhythm: Normal rate and regular rhythm.      Heart sounds: No murmur heard.  Pulmonary:      Effort: Pulmonary effort is normal.      Breath sounds: Normal breath sounds. No wheezing.   Abdominal:      General: There is no distension.      Palpations: Abdomen is soft.      Tenderness: There is no abdominal tenderness.   Musculoskeletal:         General: Tenderness (left cervical spine) present.      Right lower leg: No edema.      Left lower leg: No edema.   Skin:     Findings: No lesion or rash.   Neurological:      Mental Status: He is alert. Mental status is at baseline.      Motor: Weakness present.   Psychiatric:         Mood and Affect: Mood normal.         Thought Content: Thought content normal.           Recent Results (from the past 8736 hour(s))   Microalbumin / creatinine urine ratio    Collection Time: 11/24/23  1:25 PM   Result Value Ref Range    Creatinine, Ur 67.6 Reference range not established. mg/dL    Albumin,U,Random 46.0 (H) <20.0 mg/L    Albumin Creat Ratio 68 (H) 0 - 30 mg/g creatinine   CBC and " differential    Collection Time: 11/24/23  1:25 PM   Result Value Ref Range    WBC 8.98 4.31 - 10.16 Thousand/uL    RBC 5.16 3.88 - 5.62 Million/uL    Hemoglobin 16.5 12.0 - 17.0 g/dL    Hematocrit 47.7 36.5 - 49.3 %    MCV 92 82 - 98 fL    MCH 32.0 26.8 - 34.3 pg    MCHC 34.6 31.4 - 37.4 g/dL    RDW 12.3 11.6 - 15.1 %    MPV 9.2 8.9 - 12.7 fL    Platelets 269 149 - 390 Thousands/uL    nRBC 0 /100 WBCs    Neutrophils Relative 65 43 - 75 %    Immat GRANS % 0 0 - 2 %    Lymphocytes Relative 22 14 - 44 %    Monocytes Relative 10 4 - 12 %    Eosinophils Relative 2 0 - 6 %    Basophils Relative 1 0 - 1 %    Neutrophils Absolute 5.84 1.85 - 7.62 Thousands/µL    Immature Grans Absolute 0.04 0.00 - 0.20 Thousand/uL    Lymphocytes Absolute 1.98 0.60 - 4.47 Thousands/µL    Monocytes Absolute 0.89 0.17 - 1.22 Thousand/µL    Eosinophils Absolute 0.14 0.00 - 0.61 Thousand/µL    Basophils Absolute 0.09 0.00 - 0.10 Thousands/µL   Comprehensive metabolic panel    Collection Time: 11/24/23  1:25 PM   Result Value Ref Range    Sodium 134 (L) 135 - 147 mmol/L    Potassium 3.8 3.5 - 5.3 mmol/L    Chloride 105 96 - 108 mmol/L    CO2 23 21 - 32 mmol/L    ANION GAP 6 mmol/L    BUN 15 5 - 25 mg/dL    Creatinine 1.06 0.60 - 1.30 mg/dL    Glucose 87 65 - 140 mg/dL    Calcium 9.2 8.4 - 10.2 mg/dL    AST 35 13 - 39 U/L    ALT 59 (H) 7 - 52 U/L    Alkaline Phosphatase 83 34 - 104 U/L    Total Protein 7.5 6.4 - 8.4 g/dL    Albumin 4.5 3.5 - 5.0 g/dL    Total Bilirubin 0.46 0.20 - 1.00 mg/dL    eGFR 80 ml/min/1.73sq m   Cystatin C With eGFR    Collection Time: 11/24/23  1:25 PM   Result Value Ref Range    CYSTATIN C 0.88 0.67 - 1.14 mg/L    eGFR 96 >59 mL/min/1.73   Stress strip    Collection Time: 01/04/24  9:56 AM   Result Value Ref Range    Protocol Name TONY SIT     Exercise duration (min) 3 min    Exercise duration (sec) 0 sec    Post Peak Systolic  mmHg    Max Diastolic Bp 103 mmHg    Peak  BPM    Max Predicted Heart Rate 168  BPM    Reason for Termination Protocol Complete     Test Indication Screening for CAD     Target Hr Formular (220 - Age)*100%     Arrhy During Ex      ECG Interp Before Ex      ECG Interp during Ex      Ex Summary Comment      Chest Pain Statement non-limiting     Overall Hr Response To Exercise      Overall BP Response To Exercise     NM myocardial perfusion spect (rx stress and/or rest)    Collection Time: 01/04/24 11:07 AM   Result Value Ref Range    Rest Nuclear Isotope Dose 10.60 mCi    Stress Nuclear Isotope Dose 30.50 mCi    Baseline HR 79 bpm    Baseline /98 mmHg    O2 sat rest 100 %    Stress peak  bpm    Post peak  mmHg    O2 sat peak 91 %    Recovery HR 81 bpm    Recovery /100 mmHg    O2 sat recovery 99 %    Max  bpm    Max HR Percent 66 %    Exercise duration (min) 3 min    Exercise duration (sec) 0 sec    Estimated workload 1.0 METS    Rate Pressure Product 13,542.0     Angina Index 1     Stress/rest perfusion ratio 1.24     EF (%) 51 %       Laboratory Results: I have personally reviewed the pertinent laboratory results/reports     Radiology/Other Diagnostic Testing Results: I have personally reviewed pertinent reports.      Complete PFT with post bronchodilator    Result Date: 2/22/2024  PULMONARY FUNCTION TEST Date of service: 02/22/24 Physician requesting PFT: Kimani Martinez MD Reason for PFT: Chest tightness; shortness of breath on exertion; history of smoking INTERPRETATION:  Good patient effort and cooperation.. The oxygen saturation 99% on room air at rest. The results of this test meet the ATS standards for acceptability and repeatability.  The spirometry showed normal FEV1, normal FVC and normal FEV1/FVC ratio.  There was no significant response to inhaled bronchodilator.  The FEF 25-75 was normal.  The lung volumes were normal.  The diffusion capacity was mildly reduced (65% of the predicted ) indicating a mild loss of functional alveolar capillary  surface.  The reduction in diffusion capacity persisted even after adjusting for volume..  The flow-volume loop showed obstruction.     Mild diffusion defect.  This could be due to a pulmonary vascular interstitial process or emphysema.  Clinical correlation is advised Tio Orellana M.D.        Assessment/Plan:  Problem List Items Addressed This Visit          Cardiovascular and Mediastinum    Primary hypertension (Chronic)    Relevant Medications    losartan (COZAAR) 100 MG tablet    amLODIPine (NORVASC) 10 mg tablet    Acute right arterial ischemic stroke, PCA (posterior cerebral artery) (HCC)    Relevant Medications    losartan (COZAAR) 100 MG tablet    atorvastatin (LIPITOR) 80 mg tablet       Nervous and Auditory    Thalamic infarction (HCC)       Genitourinary    Gross hematuria    Relevant Medications    tamsulosin (FLOMAX) 0.4 mg       Other    Chronic back pain    Dizziness and giddiness    Relevant Medications    amitriptyline (ELAVIL) 10 mg tablet     Other Visit Diagnoses       Transportation insecurity due to lack of 's license    -  Primary    Relevant Orders    Ambulatory Referral to Social Work Care Management Program    Encounter for screening for lung cancer        Smoking greater than 40 pack years        Relevant Orders    CT lung screening program    Stroke-like symptoms        Relevant Medications    losartan (COZAAR) 100 MG tablet    atorvastatin (LIPITOR) 80 mg tablet    Symptoms of cerebrovascular accident (CVA)        Relevant Medications    losartan (COZAAR) 100 MG tablet    CVA (cerebral vascular accident) (HCC)        Relevant Medications    losartan (COZAAR) 100 MG tablet          Recommend pain management follow up, left neck pain is mainly due to cervical stenosis and nerve impingement  Continue current medications as above  Can resume small dose of gabapentin 100 mg tid   Refer to social work as he has trouble getting to and from appointments as he does not drive      I  "discussed with him that he is a candidate for lung cancer CT screening.     The following Shared Decision-Making points were covered:  Benefits of screening were discussed, including the rates of reduction in death from lung cancer and other causes.  Harms of screening were reviewed, including false positive tests, radiation exposure levels, risks of invasive procedures, risks of complications of screening, and risk of overdiagnosis.  I counseled on the importance of adherence to annual lung cancer LDCT screening, impact of co-morbidities, and ability or willingness to undergo diagnosis and treatment.  I counseled on the importance of maintaining abstinence as a former smoker or was counseled on the importance of smoking cessation if a current smoker    Review of Eligibility Criteria: He meets all of the criteria for Lung Cancer Screening.   He is 53 y.o.   He has 20 pack year tobacco history and is a current smoker or has quit within the past 15 years  He presents no signs or symptoms of lung cancer    After discussion, the patient decided to elect lung cancer screening.           Read package inserts for all medications before starting a new medications, call me if you have any questions.    Patient was given opportunity to ask questions and all questions were answered.    Disclaimer: Portions of the record may have been created with voice recognition software. Occasional wrong word or \"sound a like\" substitutions may have occurred due to the inherent limitations of voice recognition software. Read the chart carefully and recognize, using context, where substitutions have occurred. I have used the Epic copy/forward function to compose this note. I have reviewed my current note to ensure it reflects the current patient status, exam, assessment and plan.    "

## 2024-03-07 NOTE — PROGRESS NOTES
Covering OP CM called to pt in regards to consult for transportation.  Pt states his wife usually takes him to his appts but she works.  Pt will need to start OP PT and states he needs transportation.  Pt s/p stroke.  Pt filed for SSD and saw their Dr about a month ago but pt is still waiting for a determination.

## 2024-03-12 ENCOUNTER — PATIENT OUTREACH (OUTPATIENT)
Dept: FAMILY MEDICINE CLINIC | Facility: CLINIC | Age: 54
End: 2024-03-12

## 2024-03-12 NOTE — PROGRESS NOTES
received referral from KHADIJAH Brown to outreach patient for [employer].     CMOC called patient introduced myself and role. Explained about the process of the [employer] Application.     CMOC attempted to make home visit for tomorrow and patient will not be home. CMOC asked about Thursday and patient was unsure needed to speak to wife. Patient advised will call CMOC back with availability.

## 2024-03-17 NOTE — PROGRESS NOTES
Sleep Consultation   Shaq Brown 53 y.o. male MRN: [id number]      Reason for consultation: Fatigue    Requesting physician: Dr. Adriana Laws MD (Neurology)    Assessment/Plan  Patient is a 53 y.o. male with PMH including  h/o CVA (thalamic infarction) with residual LLE weakness, brain fog, memory issues, imabalance and dizziness, LLE numbness, HTN, L carotid artery stenosis s/p L TCAR, CKD-II, renal artery stenosis, bilateral nephrolithiasis, HLD, chronic L sided low back pain without sciatica who presents for evaluation of insomnia, snoring, risk factors for sleep apnea, excessive daytime sleepiness.     There is a high suspicion of SUDHIR for this patient based on symptoms of loud snoring, excessive daytime sleepiness, elevated neck circumference, Mallampati IV airway and elevated STOP-BANG score 6.    Will pursue in-lab diagnostic polysomnogram at this time. He is agreeable to use of CPAP if he were to be diagnosed with SUDHIR.     I explained to the patient in details the pathophysiology of obstructive sleep apnea.  I also explained the importance of treatment, and the consequences of untreated obstructive sleep apnea on underlying cardiovascular and cerebrovascular risk, morbidity, and mortality.       Treatment options were offered, at this point best would be to try an auto titrating CPAP as it is the most effective therapy to eliminate sleep disordered breathing and improve daytime symptoms of SUDHIR with minimal side effects.  CPAP would also eliminate snoring.       If intolerance will be encountered in the future, will discuss other treatment options such as mandibular advancement device, the importance of weight loss, positional therapy if applicable, or surgery if indicated.      We also discussed his history of both sleep initiation and sleep maintenance insomnia. We discussed that will screen and treat for possible SUDHIR, and will re-evaluate insomnia thereafter. Will prescribe few tabs of Temazepam to be  used on the night of his sleep study to obtain adequate data.     Patient to follow up in office in approximately 1-2 months. Patient instructed to call office or send [email or web address] message with any questions or concerns in the interim.     1. At risk for obstructive sleep apnea  - Diagnostic Sleep Study; Future    2. Snoring  - Diagnostic Sleep Study; Future    3. Excessive daytime sleepiness  - Ambulatory Referral to Sleep Medicine  - Diagnostic Sleep Study; Future    4. Insomnia, unspecified type  - Diagnostic Sleep Study; Future  - temazepam (RESTORIL) 7.5 mg capsule; Take 1 capsule (7.5 mg total) by mouth daily at bedtime as needed for sleep For sleep study.  Start with 1 tablet and if still struggling can use 2 to help with sleep during the sleep study.  Dispense: 3 capsule; Refill: 0    5. History of CVA (cerebrovascular accident)  - Diagnostic Sleep Study; Future       History of Present Illness   HPI:  Shaq Brown is a 53 y.o. male with PMH including h/o CVA (thalamic infarction in 2022) with residual LLE weakness, brain fog, memory issues, imabalance and dizziness, LLE numbness, HTN, L carotid artery stenosis s/p L TCAR, CKD-II, renal artery stenosis, bilateral nephrolithiasis, HLD, chronic L sided low back pain without sciatica. Patient presents for evaluation of fatigue.     Patient has a longstanding history of difficulties initiating sleep and with sleep maintenance ongoing for 20+ years. Insomnia seemed to worsen after he had a stroke in 2022. Sleep latency is sometimes 2-3 hours.Wakes up 6-7x overnight for unknown reason. Sometimes has difficulty falling back asleep. He has excessive daytime sleepiness.     His wife reports loud snoring ongoing since he had a CVA in 2022. Denies witnessed apneic events.  Denies nocturnal choking, shortness of breath, gasping. Admits to morning dry mouth, denies morning headaches. Admits to nasal congestion frequently due to seasonal allergies, takes flonase on PRN  "basis. Denies driving while drowsy.     Work history: is currently not working, awaiting SSI to go through  Living situation: Lives with wife and son age 29    Sleep Schedule: Patient goes to bed between 8:00-10:00PM. Sleep latency is approximately  minutes (typically longer near 120 minutes). Is not typically sleepy when he gets into bed. He watches TV in bed when he has difficulty falling asleep. He has chronic pain in his neck and back which prevents him from falling asleep. He also feels that he \"over thinks\" about bills, etc which causes him stress and difficulty initiating sleep. Wakes up 6-7x overnight for unknown reason. Sometimes not able to fall back asleep. Wakes up between 6:30-700AM with an alarm. Takes a 60 minute nap 2-3x per week. The patient keeps the same sleep schedule on weekends.     Total Sleep Time: The patient thinks he gets 4-5 hours per night  Sleep medications: Has taken Ambien in the past many years ago, however this cause nightmares. He tried OTC sleep aides with little improvement.   Caffeine use: 2-3 cups of coffee   Alcohol use:  1-2 beers per day  Cigarettes: +former tobacco use, quit in 2021   Illicit drug use: denies    Other sleep related behaviors and symptoms:   Restless leg symptoms:  denies  Kicking legs during sleep:  denies  Parasomnias:  +sleep talking on rare occasion   Nightmares:  denies  Acid reflux during sleep:  denies  Teeth grinding:  denies  Sleep paralysis, cataplexy, sleep attacks or hypnagogic or hypnopompic hallucinations:  denies  REM Behavioral Sleep Disorder:  denies    Osburn Score: 4/24  Sitting and reading: Would never doze  Watching TV: Slight chance of dozing  Sitting, inactive in a public place (e.g. a theatre or a meeting): Would never doze  As a passenger in a car for an hour without a break: Would never doze  Lying down to rest in the afternoon when circumstances permit: Moderate chance of dozing  Sitting and talking to someone: Would never " doze  Sitting quietly after a lunch without alcohol: Slight chance of dozing  In a car, while stopped for a few minutes in traffic: Would never doze  Total score: 4    History of tonsillectomy: denies    Family history of SUDHIR: no    STOP-BANG Score for Obstructive Apnea 6/8  0-2 - Low Risk  3-4 - Moderate Risk  >3 - High risk   Do you snore loudly? yes  Do you often feel tired, fatigued, or sleepy during the daytime? yes  Has anyone observed you stop breathing during sleep? no  Do you have (or are you being treated for) high blood pressure? yes  Objective  BMI - >35 kg/m2 - no  Age - >50 years - yes  Neck circumference - >40 cm - +yes  Gender - Male - yes    Historical Information   Past Medical History:   Diagnosis Date    Carotid stenosis, left     today  1/19/2023  L carotid angioplasty    Carotid stenosis, right     Rt angioplasty completed  11/2023    Cervical disc disease     COVID 05/2020    GERD (gastroesophageal reflux disease)     Hyperlipidemia     Hypertension     Kidney stone     Muscle weakness     Spinal stenosis     Stroke (HCC) 09/13/2022    left sided weakness with pins/needles    Weakness of left side of body 09/13/2022    s/p CVA     Past Surgical History:   Procedure Laterality Date    CARPAL TUNNEL RELEASE Bilateral     CERVICAL FUSION      with hardware    CYSTOSCOPY      HERNIA REPAIR      IR CAROTID STENT  11/8/2022    IR CAROTID STENT  1/19/2023    IN TCAT IV STENT CRV CRTD ART EMBOLIC PROTECJ Right 11/8/2022    Procedure: ANGIOPLASTY ARTERY CAROTID W/ STENT TCAR,;  Surgeon: Duane Mack DO;  Location: [facility];  Service: Vascular    IN TCAT IV STENT CRV CRTD ART EMBOLIC PROTECJ Left 1/19/2023    Procedure: ANGIOPLASTY ARTERY CAROTID W/ STENT Left TCAR;  Surgeon: Duane Mack DO;  Location: [facility];  Service: Vascular    ULNAR COLLATERAL LIGAMENT RECONSTRUCTION Bilateral      Family History   Problem Relation Age of Onset    Heart attack Mother     Diabetes Mother     Hypertension  Mother     Colon cancer Father     No Known Problems Sister     No Known Problems Sister     No Known Problems Sister     No Known Problems Son      Social History     Socioeconomic History    Marital status: /Civil Union     Spouse name: Not on file    Number of children: Not on file    Years of education: Not on file    Highest education level: Not on file   Occupational History    Not on file   Tobacco Use    Smoking status: Former     Current packs/day: 0.00     Average packs/day: 2.0 packs/day for 35.0 years (70.0 ttl pk-yrs)     Types: Cigarettes     Start date: 1987     Quit date: 2022     Years since quittin.6    Smokeless tobacco: Never   Vaping Use    Vaping status: Every Day    Substances: Nicotine   Substance and Sexual Activity    Alcohol use: Yes     Alcohol/week: 12.0 standard drinks of alcohol     Types: 12 Cans of beer per week     Comment: 12 cans  beer   weekly    Drug use: Not Currently    Sexual activity: Yes   Other Topics Concern    Not on file   Social History Narrative    Not on file     Social Determinants of Health     Financial Resource Strain: Not on file   Food Insecurity: No Food Insecurity (2023)    Hunger Vital Sign     Worried About Running Out of Food in the Last Year: Never true     Ran Out of Food in the Last Year: Never true   Transportation Needs: No Transportation Needs (2023)    PRAPARE - Transportation     Lack of Transportation (Medical): No     Lack of Transportation (Non-Medical): No   Physical Activity: Not on file   Stress: Not on file   Social Connections: Not on file   Intimate Partner Violence: Not on file   Housing Stability: Low Risk  (2023)    Housing Stability Vital Sign     Unable to Pay for Housing in the Last Year: No     Number of Places Lived in the Last Year: 1     Unstable Housing in the Last Year: No       Meds/Allergies   Allergies   Allergen Reactions    Penicillins Anaphylaxis       Home medications:  Prior to Admission  medications    Medication Sig Start Date End Date Taking? Authorizing Provider   albuterol (PROVENTIL HFA,VENTOLIN HFA) 90 mcg/act inhaler INHALE 2 PUFFS BY MOUTH EVERY 6 HOURS AS NEEDED FOR WHEEZING 3/7/24   Bessy Cruz MD   ALPRAZolam (XANAX) 0.25 mg tablet Take 1 tablet (0.25 mg total) by mouth once in imaging for anxiety for up to 2 days 11/28/23 11/30/23  Bessy Cruz MD   amitriptyline (ELAVIL) 10 mg tablet Take 1 tablet (10 mg total) by mouth daily at bedtime 3/7/24   Bessy Cruz MD   amLODIPine (NORVASC) 10 mg tablet Take 1 tablet (10 mg total) by mouth daily 3/7/24   Bessy Cruz MD   aspirin 81 mg chewable tablet Chew 1 tablet (81 mg total) daily 9/17/22 3/7/24  An Ace DO   atorvastatin (LIPITOR) 80 mg tablet Take 1 tablet (80 mg total) by mouth in the evening. Take before meals 3/7/24   Bessy Cruz MD   b complex vitamins capsule Take 1 capsule by mouth daily 8/3/23   Adriana Laws MD   co-enzyme Q-10 100 mg capsule Take 1 capsule (100 mg total) by mouth daily  Patient not taking: Reported on 11/16/2023 8/3/23   Adriana Laws MD   Cyanocobalamin (VITAMIN B12 PO) Take by mouth  Patient not taking: Reported on 3/7/2024    Historical Provider, MD   ferrous sulfate 325 (65 Fe) mg tablet Take 325 mg by mouth daily with breakfast  Patient not taking: Reported on 3/7/2024    Historical Provider, MD   gabapentin (NEURONTIN) 100 mg capsule Take 2 capsules (200 mg total) by mouth 3 (three) times a day 9/28/23 3/7/24  Bessy Cruz MD   losartan (COZAAR) 100 MG tablet Take 1 tablet (100 mg total) by mouth daily 3/7/24   Bessy Cruz MD   methocarbamol (Robaxin-750) 750 mg tablet Take 1 tablet (750 mg total) by mouth every 6 (six) hours as needed for muscle spasms 2/2/24   Quinton Lambert MD   metoprolol succinate (TOPROL-XL) 50 mg 24 hr tablet Take 1 tablet (50 mg total) by mouth daily 2/1/24   Kimani Martinez MD   Multiple Vitamin (multivitamin) tablet Take 1 tablet by mouth  "if needed  Patient not taking: Reported on 6/26/2023    Historical Provider, MD   pantoprazole (PROTONIX) 40 mg tablet Take 1 tablet (40 mg total) by mouth daily 9/28/23   Bessy Cruz MD   tamsulosin (FLOMAX) 0.4 mg Take 1 capsule (0.4 mg total) by mouth daily with dinner 3/7/24   Bessy Cruz MD   triamterene-hydrochlorothiazide (DYAZIDE) 37.5-25 mg per capsule Take 1 capsule by mouth every morning 2/1/24   Kimani Martinez MD       Vitals:   Blood pressure 122/70, pulse 77, height 5' 7\" (1.702 m), weight 88.5 kg (195 lb), SpO2 98%.,, Body mass index is 30.54 kg/m².       Physical Exam:  General: Sitting in chair, awake alert and oriented to person, place, and time. No acute distress  HEENT: PERRL, Nares patent, no craniofacial abnormalities. Mucous membranes, moist, no oral lesions, and normal dentition. Mallampati class IV, tonsils 1+, +crowded posterior oropharynx, +mild overbite  NECK: Neck circumference 17.5inches, Trachea midline, no accessory muscle use, and no stridor   CARDIAC: Regular rate and rhythm, no murmur   PULM: CTA bilaterally no wheezing, rhonchi or rales. No conversational dyspnea  EXT: No cyanosis, no clubbing, and no peripheral edema    NEURO: No focal neurologic deficits, moving all extremities appropriately    Labs: I have personally reviewed pertinent lab results.  Lab Results   Component Value Date    WBC 8.98 11/24/2023    HGB 16.5 11/24/2023    HCT 47.7 11/24/2023    MCV 92 11/24/2023     11/24/2023      Lab Results   Component Value Date    CALCIUM 9.2 11/24/2023    K 3.8 11/24/2023    CO2 23 11/24/2023     11/24/2023    BUN 15 11/24/2023    CREATININE 1.06 11/24/2023     No results found for: \"IRON\", \"TIBC\", \"FERRITIN\"  No results found for: \"[id number]\"  No results found for: \"FOLATE\"    NM myocardial perfusion spect (stress test):  Interpretation Summary    Stress ECG: The ECG was uninterpretable due to left bundle branch block. Slight widening of the QRS " complex with vasodilator infusion.    Perfusion: There are no perfusion defects.    Stress Function: Left ventricular function post-stress is normal. Stress ejection fraction is 51%.    Stress Combined Conclusion: Left ventricular perfusion is normal.     No evidence of ischemia or infarction.     TTE 9/14/2022:  Interpretation Summary    Left Ventricle: Left ventricular cavity size is normal. Wall thickness is normal. The left ventricular ejection fraction is 60%. Systolic function is normal. Wall motion is normal. Diastolic function is mildly abnormal, consistent with grade I (abnormal) relaxation.    Aortic Valve: There is mild to moderate regurgitation.    Pericardium: There is a trivial pericardial effusion anterior to the heart. There is no echocardiographic evidence of tamponade.     Findings    Left Ventricle Left ventricular cavity size is normal. Wall thickness is normal. The left ventricular ejection fraction is 60%. Systolic function is normal.  Wall motion is normal. Diastolic function is mildly abnormal, consistent with grade I (abnormal) relaxation.   Right Ventricle Right ventricular cavity size is normal. Systolic function is normal. Wall thickness is normal.   Left Atrium The atrium is normal in size.   Right Atrium The atrium is normal in size.   Aortic Valve The aortic valve is trileaflet. The leaflets are not thickened. The leaflets are not calcified. The leaflets exhibit normal mobility. There is mild to moderate regurgitation. The aortic valve has no significant stenosis.   Mitral Valve There is trace regurgitation. There is no evidence of stenosis. The mitral valve has normal structure and normal function.   Tricuspid Valve Tricuspid valve structure is normal. There is trace regurgitation. There is no evidence of stenosis.   Pulmonic Valve Pulmonic valve structure is normal. There is trace regurgitation. There is no evidence of stenosis.   Ascending Aorta The aortic root is normal in size.    IVC/SVC The inferior vena cava is normal in size. Respirophasic changes were normal.   Pericardium There is a trivial pericardial effusion anterior to the heart. There is no echocardiographic evidence of tamponade. The pericardium is normal in appearance.       PULMONARY FUNCTION TEST  Date of service: 02/22/24   Physician requesting PFT: Kimani Martinez MD   Reason for PFT: Chest tightness; shortness of breath on exertion; history of smoking  INTERPRETATION:  Good patient effort and cooperation.. The oxygen saturation 99% on room air at rest.  The results of this test meet the ATS standards for acceptability and repeatability.     The spirometry showed normal FEV1, normal FVC and normal FEV1/FVC ratio.  There was no significant response to inhaled bronchodilator.  The FEF 25-75 was normal.  The lung volumes were normal.  The diffusion capacity was mildly reduced (65% of the predicted ) indicating a mild loss of functional alveolar capillary surface.  The reduction in diffusion capacity persisted even after adjusting for volume..  The flow-volume loop showed obstruction.  IMPRESSION:  Mild diffusion defect.  This could be due to a pulmonary vascular interstitial process or emphysema.  Clinical correlation is advised        Arterial Blood Gas result:  N/A        Sleep studies:   N/A          Aundrea ReevesG[facility]'s Sleep Fellow

## 2024-03-18 ENCOUNTER — OFFICE VISIT (OUTPATIENT)
Age: 54
End: 2024-03-18
Payer: COMMERCIAL

## 2024-03-18 VITALS
HEIGHT: 67 IN | HEART RATE: 77 BPM | WEIGHT: 195 LBS | DIASTOLIC BLOOD PRESSURE: 70 MMHG | OXYGEN SATURATION: 98 % | BODY MASS INDEX: 30.61 KG/M2 | SYSTOLIC BLOOD PRESSURE: 122 MMHG

## 2024-03-18 DIAGNOSIS — G47.00 INSOMNIA, UNSPECIFIED TYPE: ICD-10-CM

## 2024-03-18 DIAGNOSIS — G47.19 EXCESSIVE DAYTIME SLEEPINESS: ICD-10-CM

## 2024-03-18 DIAGNOSIS — Z86.73 HISTORY OF CVA (CEREBROVASCULAR ACCIDENT): ICD-10-CM

## 2024-03-18 DIAGNOSIS — Z91.89 AT RISK FOR OBSTRUCTIVE SLEEP APNEA: Primary | ICD-10-CM

## 2024-03-18 DIAGNOSIS — R06.83 SNORING: ICD-10-CM

## 2024-03-18 PROCEDURE — 99204 OFFICE O/P NEW MOD 45 MIN: CPT | Performed by: INTERNAL MEDICINE

## 2024-03-18 RX ORDER — TEMAZEPAM 7.5 MG/1
7.5 CAPSULE ORAL
Qty: 3 CAPSULE | Refills: 0 | Status: SHIPPED | OUTPATIENT
Start: 2024-03-18

## 2024-03-18 NOTE — LETTER
March 18, 2024     Bessy Cruz MD  [address]  [address]  [facility] [address]    Patient: hSaq Brown   YOB: 1970   Date of Visit: 3/18/2024       Dear Dr. Cruz:    Thank you for referring Shaq Brown to me for evaluation. Below are my notes for this consultation.    If you have questions, please do not hesitate to call me. I look forward to following your patient along with you.         Sincerely,        Aundrea Solaers MD        CC: No Recipients    Aundrea Solares MD  3/18/2024 12:50 PM  Attested  Sleep Consultation   Shaq Brown 53 y.o. male MRN: [id number]      Reason for consultation: Fatigue    Requesting physician: Dr. Adriana Laws MD (Neurology)    Assessment/Plan  Patient is a 53 y.o. male with PMH including  h/o CVA (thalamic infarction) with residual LLE weakness, brain fog, memory issues, imabalance and dizziness, LLE numbness, HTN, L carotid artery stenosis s/p L TCAR, CKD-II, renal artery stenosis, bilateral nephrolithiasis, HLD, chronic L sided low back pain without sciatica who presents for evaluation of insomnia, snoring, risk factors for sleep apnea, excessive daytime sleepiness.     There is a high suspicion of SUDHIR for this patient based on symptoms of loud snoring, excessive daytime sleepiness, elevated neck circumference, Mallampati IV airway and elevated STOP-BANG score 6.    Will pursue in-lab diagnostic polysomnogram at this time. He is agreeable to use of CPAP if he were to be diagnosed with SUDHIR.     I explained to the patient in details the pathophysiology of obstructive sleep apnea.  I also explained the importance of treatment, and the consequences of untreated obstructive sleep apnea on underlying cardiovascular and cerebrovascular risk, morbidity, and mortality.       Treatment options were offered, at this point best would be to try an auto titrating CPAP as it is the most effective therapy to eliminate sleep disordered breathing and  improve daytime symptoms of SUDHIR with minimal side effects.  CPAP would also eliminate snoring.       If intolerance will be encountered in the future, will discuss other treatment options such as mandibular advancement device, the importance of weight loss, positional therapy if applicable, or surgery if indicated.      We also discussed his history of both sleep initiation and sleep maintenance insomnia. We discussed that will screen and treat for possible SUDHIR, and will re-evaluate insomnia thereafter. Will prescribe few tabs of Temazepam to be used on the night of his sleep study to obtain adequate data.     Patient to follow up in office in approximately 1-2 months. Patient instructed to call office or send [employer] message with any questions or concerns in the interim.     1. At risk for obstructive sleep apnea  - Diagnostic Sleep Study; Future    2. Snoring  - Diagnostic Sleep Study; Future    3. Excessive daytime sleepiness  - Ambulatory Referral to Sleep Medicine  - Diagnostic Sleep Study; Future    4. Insomnia, unspecified type  - Diagnostic Sleep Study; Future  - temazepam (RESTORIL) 7.5 mg capsule; Take 1 capsule (7.5 mg total) by mouth daily at bedtime as needed for sleep For sleep study.  Start with 1 tablet and if still struggling can use 2 to help with sleep during the sleep study.  Dispense: 3 capsule; Refill: 0    5. History of CVA (cerebrovascular accident)  - Diagnostic Sleep Study; Future       History of Present Illness  HPI:  Shaq Brown is a 53 y.o. male with PMH including h/o CVA (thalamic infarction in 2022) with residual LLE weakness, brain fog, memory issues, imabalance and dizziness, LLE numbness, HTN, L carotid artery stenosis s/p L TCAR, CKD-II, renal artery stenosis, bilateral nephrolithiasis, HLD, chronic L sided low back pain without sciatica. Patient presents for evaluation of fatigue.     Patient has a longstanding history of difficulties initiating sleep and with sleep  "maintenance ongoing for 20+ years. Insomnia seemed to worsen after he had a stroke in 2022. Sleep latency is sometimes 2-3 hours.Wakes up 6-7x overnight for unknown reason. Sometimes has difficulty falling back asleep. He has excessive daytime sleepiness.     His wife reports loud snoring ongoing since he had a CVA in 2022. Denies witnessed apneic events.  Denies nocturnal choking, shortness of breath, gasping. Admits to morning dry mouth, denies morning headaches. Admits to nasal congestion frequently due to seasonal allergies, takes flonase on PRN basis. Denies driving while drowsy.     Work history: is currently not working, awaiting SSI to go through  Living situation: Lives with wife and son age 29    Sleep Schedule: Patient goes to bed between 8:00-10:00PM. Sleep latency is approximately  minutes (typically longer near 120 minutes). Is not typically sleepy when he gets into bed. He watches TV in bed when he has difficulty falling asleep. He has chronic pain in his neck and back which prevents him from falling asleep. He also feels that he \"over thinks\" about bills, etc which causes him stress and difficulty initiating sleep. Wakes up 6-7x overnight for unknown reason. Sometimes not able to fall back asleep. Wakes up between 6:30-700AM with an alarm. Takes a 60 minute nap 2-3x per week. The patient keeps the same sleep schedule on weekends.     Total Sleep Time: The patient thinks he gets 4-5 hours per night  Sleep medications: Has taken Ambien in the past many years ago, however this cause nightmares. He tried OTC sleep aides with little improvement.   Caffeine use: 2-3 cups of coffee   Alcohol use:  1-2 beers per day  Cigarettes: +former tobacco use, quit in 2021   Illicit drug use: denies    Other sleep related behaviors and symptoms:   Restless leg symptoms:  denies  Kicking legs during sleep:  denies  Parasomnias:  +sleep talking on rare occasion   Nightmares:  denies  Acid reflux during sleep:  " denies  Teeth grinding:  denies  Sleep paralysis, cataplexy, sleep attacks or hypnagogic or hypnopompic hallucinations:  denies  REM Behavioral Sleep Disorder:  denies    Reston Score: 4/24  Sitting and reading: Would never doze  Watching TV: Slight chance of dozing  Sitting, inactive in a public place (e.g. a theatre or a meeting): Would never doze  As a passenger in a car for an hour without a break: Would never doze  Lying down to rest in the afternoon when circumstances permit: Moderate chance of dozing  Sitting and talking to someone: Would never doze  Sitting quietly after a lunch without alcohol: Slight chance of dozing  In a car, while stopped for a few minutes in traffic: Would never doze  Total score: 4    History of tonsillectomy: denies    Family history of SUDHIR: no    STOP-BANG Score for Obstructive Apnea 6/8  0-2 - Low Risk  3-4 - Moderate Risk  >3 - High risk   Do you snore loudly? yes  Do you often feel tired, fatigued, or sleepy during the daytime? yes  Has anyone observed you stop breathing during sleep? no  Do you have (or are you being treated for) high blood pressure? yes  Objective  BMI - >35 kg/m2 - no  Age - >50 years - yes  Neck circumference - >40 cm - +yes  Gender - Male - yes    Historical Information  Past Medical History:   Diagnosis Date   • Carotid stenosis, left     today  1/19/2023  L carotid angioplasty   • Carotid stenosis, right     Rt angioplasty completed  11/2023   • Cervical disc disease    • COVID 05/2020   • GERD (gastroesophageal reflux disease)    • Hyperlipidemia    • Hypertension    • Kidney stone    • Muscle weakness    • Spinal stenosis    • Stroke (HCC) 09/13/2022    left sided weakness with pins/needles   • Weakness of left side of body 09/13/2022    s/p CVA     Past Surgical History:   Procedure Laterality Date   • CARPAL TUNNEL RELEASE Bilateral    • CERVICAL FUSION      with hardware   • CYSTOSCOPY     • HERNIA REPAIR     • IR CAROTID STENT  11/8/2022   • IR  CAROTID STENT  2023   • MO TCAT IV STENT CRV CRTD ART EMBOLIC PROTECJ Right 2022    Procedure: ANGIOPLASTY ARTERY CAROTID W/ STENT TCAR,;  Surgeon: Duane Mack DO;  Location: [facility];  Service: Vascular   • MO TCAT IV STENT CRV CRTD ART EMBOLIC PROTECJ Left 2023    Procedure: ANGIOPLASTY ARTERY CAROTID W/ STENT Left TCAR;  Surgeon: Duane Mack DO;  Location: [facility];  Service: Vascular   • ULNAR COLLATERAL LIGAMENT RECONSTRUCTION Bilateral      Family History   Problem Relation Age of Onset   • Heart attack Mother    • Diabetes Mother    • Hypertension Mother    • Colon cancer Father    • No Known Problems Sister    • No Known Problems Sister    • No Known Problems Sister    • No Known Problems Son      Social History     Socioeconomic History   • Marital status: /Civil Union     Spouse name: Not on file   • Number of children: Not on file   • Years of education: Not on file   • Highest education level: Not on file   Occupational History   • Not on file   Tobacco Use   • Smoking status: Former     Current packs/day: 0.00     Average packs/day: 2.0 packs/day for 35.0 years (70.0 ttl pk-yrs)     Types: Cigarettes     Start date: 1987     Quit date: 2022     Years since quittin.6   • Smokeless tobacco: Never   Vaping Use   • Vaping status: Every Day   • Substances: Nicotine   Substance and Sexual Activity   • Alcohol use: Yes     Alcohol/week: 12.0 standard drinks of alcohol     Types: 12 Cans of beer per week     Comment: 12 cans  beer   weekly   • Drug use: Not Currently   • Sexual activity: Yes   Other Topics Concern   • Not on file   Social History Narrative   • Not on file     Social Determinants of Health     Financial Resource Strain: Not on file   Food Insecurity: No Food Insecurity (2023)    Hunger Vital Sign    • Worried About Running Out of Food in the Last Year: Never true    • Ran Out of Food in the Last Year: Never true   Transportation Needs: No  Transportation Needs (1/20/2023)    PRAPARE - Transportation    • Lack of Transportation (Medical): No    • Lack of Transportation (Non-Medical): No   Physical Activity: Not on file   Stress: Not on file   Social Connections: Not on file   Intimate Partner Violence: Not on file   Housing Stability: Low Risk  (1/20/2023)    Housing Stability Vital Sign    • Unable to Pay for Housing in the Last Year: No    • Number of Places Lived in the Last Year: 1    • Unstable Housing in the Last Year: No       Meds/Allergies  Allergies   Allergen Reactions   • Penicillins Anaphylaxis       Home medications:  Prior to Admission medications    Medication Sig Start Date End Date Taking? Authorizing Provider   albuterol (PROVENTIL HFA,VENTOLIN HFA) 90 mcg/act inhaler INHALE 2 PUFFS BY MOUTH EVERY 6 HOURS AS NEEDED FOR WHEEZING 3/7/24   Bessy Cruz MD   ALPRAZolam (XANAX) 0.25 mg tablet Take 1 tablet (0.25 mg total) by mouth once in imaging for anxiety for up to 2 days 11/28/23 11/30/23  Bessy Cruz MD   amitriptyline (ELAVIL) 10 mg tablet Take 1 tablet (10 mg total) by mouth daily at bedtime 3/7/24   Bessy Cruz MD   amLODIPine (NORVASC) 10 mg tablet Take 1 tablet (10 mg total) by mouth daily 3/7/24   Bessy Cruz MD   aspirin 81 mg chewable tablet Chew 1 tablet (81 mg total) daily 9/17/22 3/7/24  An Ace DO   atorvastatin (LIPITOR) 80 mg tablet Take 1 tablet (80 mg total) by mouth in the evening. Take before meals 3/7/24   Bessy Cruz MD   b complex vitamins capsule Take 1 capsule by mouth daily 8/3/23   Adriana Laws MD   co-enzyme Q-10 100 mg capsule Take 1 capsule (100 mg total) by mouth daily  Patient not taking: Reported on 11/16/2023 8/3/23   Adriana Laws MD   Cyanocobalamin (VITAMIN B12 PO) Take by mouth  Patient not taking: Reported on 3/7/2024    Historical Provider, MD   ferrous sulfate 325 (65 Fe) mg tablet Take 325 mg by mouth daily with breakfast  Patient not taking: Reported on 3/7/2024   "  Historical Provider, MD   gabapentin (NEURONTIN) 100 mg capsule Take 2 capsules (200 mg total) by mouth 3 (three) times a day 9/28/23 3/7/24  Bessy Cruz MD   losartan (COZAAR) 100 MG tablet Take 1 tablet (100 mg total) by mouth daily 3/7/24   Bessy Cruz MD   methocarbamol (Robaxin-750) 750 mg tablet Take 1 tablet (750 mg total) by mouth every 6 (six) hours as needed for muscle spasms 2/2/24   Quinton Lambert MD   metoprolol succinate (TOPROL-XL) 50 mg 24 hr tablet Take 1 tablet (50 mg total) by mouth daily 2/1/24   Kimani Martinez MD   Multiple Vitamin (multivitamin) tablet Take 1 tablet by mouth if needed  Patient not taking: Reported on 6/26/2023    Historical Provider, MD   pantoprazole (PROTONIX) 40 mg tablet Take 1 tablet (40 mg total) by mouth daily 9/28/23   Bessy Cruz MD   tamsulosin (FLOMAX) 0.4 mg Take 1 capsule (0.4 mg total) by mouth daily with dinner 3/7/24   Bessy Cruz MD   triamterene-hydrochlorothiazide (DYAZIDE) 37.5-25 mg per capsule Take 1 capsule by mouth every morning 2/1/24   Kimani Martinez MD       Vitals:   Blood pressure 122/70, pulse 77, height 5' 7\" (1.702 m), weight 88.5 kg (195 lb), SpO2 98%.,, Body mass index is 30.54 kg/m².       Physical Exam:  General: Sitting in chair, awake alert and oriented to person, place, and time. No acute distress  HEENT: PERRL, Nares patent, no craniofacial abnormalities. Mucous membranes, moist, no oral lesions, and normal dentition. Mallampati class IV, tonsils 1+, +crowded posterior oropharynx, +mild overbite  NECK: Neck circumference 17.5inches, Trachea midline, no accessory muscle use, and no stridor   CARDIAC: Regular rate and rhythm, no murmur   PULM: CTA bilaterally no wheezing, rhonchi or rales. No conversational dyspnea  EXT: No cyanosis, no clubbing, and no peripheral edema    NEURO: No focal neurologic deficits, moving all extremities appropriately    Labs: I have personally reviewed pertinent lab results.  Lab " "Results   Component Value Date    WBC 8.98 11/24/2023    HGB 16.5 11/24/2023    HCT 47.7 11/24/2023    MCV 92 11/24/2023     11/24/2023      Lab Results   Component Value Date    CALCIUM 9.2 11/24/2023    K 3.8 11/24/2023    CO2 23 11/24/2023     11/24/2023    BUN 15 11/24/2023    CREATININE 1.06 11/24/2023     No results found for: \"IRON\", \"TIBC\", \"FERRITIN\"  No results found for: \"LEAYQDKE36\"  No results found for: \"FOLATE\"    NM myocardial perfusion spect (stress test):  Interpretation Summary  •  Stress ECG: The ECG was uninterpretable due to left bundle branch block. Slight widening of the QRS complex with vasodilator infusion.  •  Perfusion: There are no perfusion defects.  •  Stress Function: Left ventricular function post-stress is normal. Stress ejection fraction is 51%.  •  Stress Combined Conclusion: Left ventricular perfusion is normal.     No evidence of ischemia or infarction.     TTE 9/14/2022:  Interpretation Summary  •  Left Ventricle: Left ventricular cavity size is normal. Wall thickness is normal. The left ventricular ejection fraction is 60%. Systolic function is normal. Wall motion is normal. Diastolic function is mildly abnormal, consistent with grade I (abnormal) relaxation.  •  Aortic Valve: There is mild to moderate regurgitation.  •  Pericardium: There is a trivial pericardial effusion anterior to the heart. There is no echocardiographic evidence of tamponade.     Findings    Left Ventricle Left ventricular cavity size is normal. Wall thickness is normal. The left ventricular ejection fraction is 60%. Systolic function is normal.  Wall motion is normal. Diastolic function is mildly abnormal, consistent with grade I (abnormal) relaxation.   Right Ventricle Right ventricular cavity size is normal. Systolic function is normal. Wall thickness is normal.   Left Atrium The atrium is normal in size.   Right Atrium The atrium is normal in size.   Aortic Valve The aortic valve is " trileaflet. The leaflets are not thickened. The leaflets are not calcified. The leaflets exhibit normal mobility. There is mild to moderate regurgitation. The aortic valve has no significant stenosis.   Mitral Valve There is trace regurgitation. There is no evidence of stenosis. The mitral valve has normal structure and normal function.   Tricuspid Valve Tricuspid valve structure is normal. There is trace regurgitation. There is no evidence of stenosis.   Pulmonic Valve Pulmonic valve structure is normal. There is trace regurgitation. There is no evidence of stenosis.   Ascending Aorta The aortic root is normal in size.   IVC/SVC The inferior vena cava is normal in size. Respirophasic changes were normal.   Pericardium There is a trivial pericardial effusion anterior to the heart. There is no echocardiographic evidence of tamponade. The pericardium is normal in appearance.       PULMONARY FUNCTION TEST  Date of service: 02/22/24   Physician requesting PFT: Kimani Martinez MD   Reason for PFT: Chest tightness; shortness of breath on exertion; history of smoking  INTERPRETATION:  Good patient effort and cooperation.. The oxygen saturation 99% on room air at rest.  The results of this test meet the ATS standards for acceptability and repeatability.     The spirometry showed normal FEV1, normal FVC and normal FEV1/FVC ratio.  There was no significant response to inhaled bronchodilator.  The FEF 25-75 was normal.  The lung volumes were normal.  The diffusion capacity was mildly reduced (65% of the predicted ) indicating a mild loss of functional alveolar capillary surface.  The reduction in diffusion capacity persisted even after adjusting for volume..  The flow-volume loop showed obstruction.  IMPRESSION:  Mild diffusion defect.  This could be due to a pulmonary vascular interstitial process or emphysema.  Clinical correlation is advised        Arterial Blood Gas result:  N/A        Sleep studies:    N/A          Aundrea Solares   [facility] Sleep Fellow    Attestation signed by Chai Paez DO at 3/18/2024  7:32 PM:  Attending Attestation:  I have personally seen and examined.  I discussed the patient with the  fellow including, but not limited to, verifying findings; reviewing labs and x-rays;developing the plan of care with patient.  I have reviewed the note and assessment performed by the fellow and agree with the fellow's documented findings and plan of care with the following additions. Please see my following comments for details and adjustments.       Assessment/Plan  53 y.o. M with PMHx of h/o CVA (thalamic infarction) with residual LLE weakness, brain fog, memory issues, imabalance and dizziness, LLE numbness, HTN, L carotid artery stenosis s/p L TCAR, CKD-II, renal artery stenosis, bilateral nephrolithiasis, HLD, chronic L sided low back pain without sciatica who comes in for evaluation of snoring and excessive daytime sleepiness and insomnia.  1.  Snoring/Excessive daytime sleepiness -  Mallampati class 4, Body mass index is 30.54 kg/m².,  .  He is at risk for obstructive sleep apnea based on STOP BANG survey.      -  Check a baseline polysomnogram to assess for obstructive sleep apnea      -  I discussed in depth the diagnostic studies and treatment options involved with obstructive sleep apnea      -  I also discussed in depth the risk of leaving sleep apnea untreated including hypertension, heart failure, arrhythmia, MI and stroke.      -  The patient is agreeable to undergo testing and treatment of obstructive sleep apnea.  He understands that pitfalls she may encounter along the way and is willing to attempt CPAP treatment.     2.  Sleep onset and maintenance insomnia - Provided some Restoril 7.5 mg qHS to help with sleep onset at sleep study.  However, will likely need to consider treatment with CBT I and possible amnestic agents in the future.      HPI:  Shaq Brown is a 53  "y.o. male with PMHx as below who comes in for evaluation of snoring and daytime sleepiness.  Patient notes symptoms of difficulty falling asleep, difficulty staying asleep, snoring, excessive daytime sleepiness despite an Stanwood score of 4, awakenings with gasping, and awakenings with dry mouth.  he denies symptoms of restless legs.  he denies symptoms of cataplexy, sleep paralysis, hypnopompic or hypnagogic hallucinations.         Sleep Schedule: Patient goes to bed between 8:00-10:00PM. Sleep latency is approximately  minutes (typically longer near 120 minutes). Is not typically sleepy when he gets into bed. He watches TV in bed when he has difficulty falling asleep. He has chronic pain in his neck and back which prevents him from falling asleep. He also feels that he \"over thinks\" about bills, etc which causes him stress and difficulty initiating sleep. Wakes up 6-7x overnight for unknown reason. Sometimes not able to fall back asleep. Wakes up between 6:30-700AM with an alarm. Takes a 60 minute nap 2-3x per week. The patient keeps the same sleep schedule on weekends.      Total Sleep Time: The patient thinks he gets 4-5 hours per night  Sleep medications: Has taken Ambien in the past many years ago, however this cause nightmares. He tried OTC sleep aides with little improvement.   Caffeine use: 2-3 cups of coffee   Alcohol use:  1-2 beers per day  Cigarettes: +former tobacco use, quit in 2021   Illicit drug use: denies    Vitals:    03/18/24 0854   BP: 122/70   Pulse: 77   SpO2: 98%   RA  General:  Patient is awake, alert, non-toxic and in no acute respiratory distress  Eyes: PERRL, no scleral icterus  Neck: No JVD  CV:  Regular, +S1 and S2, No murmurs, gallops or rubs appreciated  Lungs: Clear to auscultation bilateral without wheeze, rales or rhonci  Abdomen: Soft, +BS, Non-tender, non-distended  Extremities: No clubbing, cyanosis or edema  Neuro: No focal motor/sensory deficits  Skin: Warm, No rashes " or ulcerations  Lab Results   Component Value Date    WBC 8.98 11/24/2023    HGB 16.5 11/24/2023    HCT 47.7 11/24/2023    MCV 92 11/24/2023     11/24/2023     Lab Results   Component Value Date    SODIUM 134 (L) 11/24/2023    K 3.8 11/24/2023     11/24/2023    CO2 23 11/24/2023    BUN 15 11/24/2023    CREATININE 1.06 11/24/2023    GLUC 87 11/24/2023    CALCIUM 9.2 11/24/2023     Lab Results   Component Value Date    ALT 59 (H) 11/24/2023    AST 35 11/24/2023    ALKPHOS 83 11/24/2023        NM myocardial perfusion spect (stress test):  Interpretation Summary  •  Stress ECG: The ECG was uninterpretable due to left bundle branch block. Slight widening of the QRS complex with vasodilator infusion.  •  Perfusion: There are no perfusion defects.  •  Stress Function: Left ventricular function post-stress is normal. Stress ejection fraction is 51%.  •  Stress Combined Conclusion: Left ventricular perfusion is normal.     No evidence of ischemia or infarction.     TTE 9/14/2022:  Interpretation Summary  •  Left Ventricle: Left ventricular cavity size is normal. Wall thickness is normal. The left ventricular ejection fraction is 60%. Systolic function is normal. Wall motion is normal. Diastolic function is mildly abnormal, consistent with grade I (abnormal) relaxation.  •  Aortic Valve: There is mild to moderate regurgitation.  •  Pericardium: There is a trivial pericardial effusion anterior to the heart. There is no echocardiographic evidence of tamponade.     Findings            Left Ventricle Left ventricular cavity size is normal. Wall thickness is normal. The left ventricular ejection fraction is 60%. Systolic function is normal.  Wall motion is normal. Diastolic function is mildly abnormal, consistent with grade I (abnormal) relaxation.   Right Ventricle Right ventricular cavity size is normal. Systolic function is normal. Wall thickness is normal.   Left Atrium The atrium is normal in size.   Right Atrium  The atrium is normal in size.   Aortic Valve The aortic valve is trileaflet. The leaflets are not thickened. The leaflets are not calcified. The leaflets exhibit normal mobility. There is mild to moderate regurgitation. The aortic valve has no significant stenosis.   Mitral Valve There is trace regurgitation. There is no evidence of stenosis. The mitral valve has normal structure and normal function.   Tricuspid Valve Tricuspid valve structure is normal. There is trace regurgitation. There is no evidence of stenosis.   Pulmonic Valve Pulmonic valve structure is normal. There is trace regurgitation. There is no evidence of stenosis.   Ascending Aorta The aortic root is normal in size.   IVC/SVC The inferior vena cava is normal in size. Respirophasic changes were normal.   Pericardium There is a trivial pericardial effusion anterior to the heart. There is no echocardiographic evidence of tamponade. The pericardium is normal in appearance.         PULMONARY FUNCTION TEST  Date of service: 02/22/24   Physician requesting PFT: Kimani Martinez MD   Reason for PFT: Chest tightness; shortness of breath on exertion; history of smoking  INTERPRETATION:  Good patient effort and cooperation.. The oxygen saturation 99% on room air at rest.  The results of this test meet the ATS standards for acceptability and repeatability.     The spirometry showed normal FEV1, normal FVC and normal FEV1/FVC ratio.  There was no significant response to inhaled bronchodilator.  The FEF 25-75 was normal.  The lung volumes were normal.  The diffusion capacity was mildly reduced (65% of the predicted ) indicating a mild loss of functional alveolar capillary surface.  The reduction in diffusion capacity persisted even after adjusting for volume..  The flow-volume loop showed obstruction.  IMPRESSION:  Mild diffusion defect.  This could be due to a pulmonary vascular interstitial process or emphysema.  Clinical correlation is advised

## 2024-03-18 NOTE — PATIENT INSTRUCTIONS

## 2024-03-21 ENCOUNTER — HOSPITAL ENCOUNTER (OUTPATIENT)
Dept: CT IMAGING | Facility: HOSPITAL | Age: 54
End: 2024-03-21
Attending: FAMILY MEDICINE
Payer: COMMERCIAL

## 2024-03-21 DIAGNOSIS — F17.210 SMOKING GREATER THAN 40 PACK YEARS: ICD-10-CM

## 2024-03-21 PROCEDURE — 71271 CT THORAX LUNG CANCER SCR C-: CPT

## 2024-03-22 ENCOUNTER — TELEPHONE (OUTPATIENT)
Dept: SLEEP CENTER | Facility: CLINIC | Age: 54
End: 2024-03-22

## 2024-03-22 NOTE — TELEPHONE ENCOUNTER
Patient's wife left a message that the Temazepam is not covered by insurance.  I called her back.  I explained that the script is for 3 tablets.  I pulled up [employer] and told her some of the prices.  She is going to pay the $9 for the meds at [facility].  I advised her to call us with any other questions or issues.

## 2024-03-26 DIAGNOSIS — G89.29 CHRONIC LEFT-SIDED LOW BACK PAIN WITHOUT SCIATICA: ICD-10-CM

## 2024-03-26 DIAGNOSIS — M54.50 CHRONIC LEFT-SIDED LOW BACK PAIN WITHOUT SCIATICA: ICD-10-CM

## 2024-03-26 DIAGNOSIS — I63.81 THALAMIC INFARCTION (HCC): ICD-10-CM

## 2024-03-26 RX ORDER — GABAPENTIN 100 MG/1
200 CAPSULE ORAL 3 TIMES DAILY
Qty: 540 CAPSULE | Refills: 1 | Status: SHIPPED | OUTPATIENT
Start: 2024-03-26

## 2024-03-28 ENCOUNTER — CONSULT (OUTPATIENT)
Dept: PULMONOLOGY | Facility: CLINIC | Age: 54
End: 2024-03-28
Payer: COMMERCIAL

## 2024-03-28 ENCOUNTER — APPOINTMENT (OUTPATIENT)
Dept: LAB | Facility: CLINIC | Age: 54
End: 2024-03-28
Payer: COMMERCIAL

## 2024-03-28 VITALS
BODY MASS INDEX: 30.85 KG/M2 | TEMPERATURE: 97.4 F | DIASTOLIC BLOOD PRESSURE: 70 MMHG | OXYGEN SATURATION: 97 % | HEART RATE: 77 BPM | WEIGHT: 197 LBS | SYSTOLIC BLOOD PRESSURE: 114 MMHG

## 2024-03-28 DIAGNOSIS — R06.02 SHORTNESS OF BREATH: ICD-10-CM

## 2024-03-28 DIAGNOSIS — R94.2 ABNORMAL PFT: ICD-10-CM

## 2024-03-28 DIAGNOSIS — G47.19 EXCESSIVE DAYTIME SLEEPINESS: ICD-10-CM

## 2024-03-28 DIAGNOSIS — J43.2 CENTRILOBULAR EMPHYSEMA (HCC): Primary | ICD-10-CM

## 2024-03-28 DIAGNOSIS — I10 PRIMARY HYPERTENSION: ICD-10-CM

## 2024-03-28 DIAGNOSIS — I1A.0 RESISTANT HYPERTENSION: ICD-10-CM

## 2024-03-28 DIAGNOSIS — E78.2 MIXED HYPERLIPIDEMIA: ICD-10-CM

## 2024-03-28 LAB
ANION GAP SERPL CALCULATED.3IONS-SCNC: 6 MMOL/L (ref 4–13)
BUN SERPL-MCNC: 16 MG/DL (ref 5–25)
CALCIUM SERPL-MCNC: 9.7 MG/DL (ref 8.4–10.2)
CHLORIDE SERPL-SCNC: 103 MMOL/L (ref 96–108)
CO2 SERPL-SCNC: 29 MMOL/L (ref 21–32)
CREAT SERPL-MCNC: 1.17 MG/DL (ref 0.6–1.3)
GFR SERPL CREATININE-BSD FRML MDRD: 71 ML/MIN/1.73SQ M
GLUCOSE SERPL-MCNC: 89 MG/DL (ref 65–140)
POTASSIUM SERPL-SCNC: 4 MMOL/L (ref 3.5–5.3)
SODIUM SERPL-SCNC: 138 MMOL/L (ref 135–147)

## 2024-03-28 PROCEDURE — 99244 OFF/OP CNSLTJ NEW/EST MOD 40: CPT | Performed by: STUDENT IN AN ORGANIZED HEALTH CARE EDUCATION/TRAINING PROGRAM

## 2024-03-28 PROCEDURE — 84244 ASSAY OF RENIN: CPT

## 2024-03-28 PROCEDURE — 82088 ASSAY OF ALDOSTERONE: CPT

## 2024-03-28 PROCEDURE — 80048 BASIC METABOLIC PNL TOTAL CA: CPT

## 2024-03-28 RX ORDER — FLUTICASONE FUROATE, UMECLIDINIUM BROMIDE AND VILANTEROL TRIFENATATE 100; 62.5; 25 UG/1; UG/1; UG/1
1 POWDER RESPIRATORY (INHALATION) DAILY
Qty: 180 BLISTER | Refills: 0 | Status: SHIPPED | OUTPATIENT
Start: 2024-03-28 | End: 2024-06-26

## 2024-03-28 NOTE — ASSESSMENT & PLAN NOTE
Recent PFTs showed no obstruction  No eosinophilia noted on prior labs  Imaging showed emphysematous pattern suggestive of obstructive process  Patient reports using his rescue inhaler multiple times daily (q6h)   Diffuse wheezing noted on exam    Plan:   Will escalate inhaled regimen to Trelegy in this setting   Follow up with this clinic to assess therapeutic effect  Continue rescue inhaler as needed  Recommended smoking/vaporizing cessation  Repeat screening CT for lung CA in approximately 12 months given smoking history

## 2024-03-28 NOTE — PROGRESS NOTES
Pulmonary Follow Up Note   Shaq Brown 53 y.o. male MRN: [id number]  3/28/2024    Assessment:    Excessive daytime sleepiness  Patient reports daytime somnolence and frequent awakenings during the night  Spouse reports snoring, patient also reports latency of sleep onset  Had tried Ambien in the past, but reports this gave him nightmares    Plan:   Follow up diagnostic sleep study with consideration for CPAP   Counseled patient on CPAP, verbalizes understanding of and agreement to plan at this time      Centrilobular emphysema (HCC)  Recent PFTs showed no obstruction  No eosinophilia noted on prior labs  Imaging showed emphysematous pattern suggestive of obstructive process  Patient reports using his rescue inhaler multiple times daily (q6h)   Diffuse wheezing noted on exam    Plan:   Will escalate inhaled regimen to Trelegy in this setting   Follow up with this clinic to assess therapeutic effect  Continue rescue inhaler as needed  Recommended smoking/vaporizing cessation  Repeat screening CT for lung CA in approximately 12 months given smoking history        Return in about 6 months (around 9/28/2024).    History of Present Illness   HPI:  Shaq Brown is a 53 y.o. male with history of smoking, HTN, carotid artery stenosis and CVA in 2022 s/p bilateral carotid stenting who presents with several months history of daily shortness of breath with activity. Patient reports he uses his rescue inhaler every 6 hours. Patient reports he stopped smoking cigarettes 2 years ago but continues to vaporize nicotine. Also reports daytime somnolence and snoring with frequent bouts of insomnia with both latency of sleep onset and nocturnal awakenings. Currently pending evaluation for SUDHIR. Recent low-dose screening CT for lung cancer was negative. Remainder of HPI as above.     Review of Systems   Constitutional:  Negative for chills and fever.   HENT:  Negative for ear pain and sore throat.    Eyes:  Negative for pain and  visual disturbance.   Respiratory:  Positive for cough and shortness of breath.    Cardiovascular:  Negative for chest pain and palpitations.   Gastrointestinal:  Negative for abdominal pain and vomiting.   Genitourinary:  Negative for dysuria and hematuria.   Musculoskeletal:  Negative for arthralgias and back pain.   Skin:  Negative for color change and rash.   Neurological:  Negative for seizures and syncope.        Sleep disturbance   Psychiatric/Behavioral: Negative.     All other systems reviewed and are negative.    Historical Information   Past Medical History:   Diagnosis Date    Carotid stenosis, left     today  1/19/2023  L carotid angioplasty    Carotid stenosis, right     Rt angioplasty completed  11/2023    Cervical disc disease     COVID 05/2020    GERD (gastroesophageal reflux disease)     Hyperlipidemia     Hypertension     Kidney stone     Muscle weakness     Spinal stenosis     Stroke (HCC) 09/13/2022    left sided weakness with pins/needles    Weakness of left side of body 09/13/2022    s/p CVA     Past Surgical History:   Procedure Laterality Date    CARPAL TUNNEL RELEASE Bilateral     CERVICAL FUSION      with hardware    CYSTOSCOPY      HERNIA REPAIR      IR CAROTID STENT  11/8/2022    IR CAROTID STENT  1/19/2023    [place] TCAT IV STENT CRV CRTD ART EMBOLIC PROTECJ Right 11/8/2022    Procedure: ANGIOPLASTY ARTERY CAROTID W/ STENT TCAR,;  Surgeon: Duane Mack DO;  Location: [facility];  Service: Vascular    WV TCAT IV STENT CRV CRTD ART EMBOLIC PROTECJ Left 1/19/2023    Procedure: ANGIOPLASTY ARTERY CAROTID W/ STENT Left TCAR;  Surgeon: Duane Mack DO;  Location: [facility];  Service: Vascular    ULNAR COLLATERAL LIGAMENT RECONSTRUCTION Bilateral      Family History   Problem Relation Age of Onset    Heart attack Mother     Diabetes Mother     Hypertension Mother     Colon cancer Father     No Known Problems Sister     No Known Problems Sister     No Known Problems Sister     No Known  Problems Son        Meds/Allergies     Current Outpatient Medications:     albuterol (PROVENTIL HFA,VENTOLIN HFA) 90 mcg/act inhaler, INHALE 2 PUFFS BY MOUTH EVERY 6 HOURS AS NEEDED FOR WHEEZING, Disp: 9 g, Rfl: 1    amitriptyline (ELAVIL) 10 mg tablet, Take 1 tablet (10 mg total) by mouth daily at bedtime, Disp: 90 tablet, Rfl: 1    amLODIPine (NORVASC) 10 mg tablet, Take 1 tablet (10 mg total) by mouth daily, Disp: 90 tablet, Rfl: 1    atorvastatin (LIPITOR) 80 mg tablet, Take 1 tablet (80 mg total) by mouth in the evening. Take before meals, Disp: 90 tablet, Rfl: 1    co-enzyme Q-10 100 mg capsule, Take 1 capsule (100 mg total) by mouth daily, Disp: 30 capsule, Rfl: 3    fluticasone-umeclidinium-vilanterol (Trelegy Ellipta) 100-62.5-25 mcg/actuation inhaler, Inhale 1 puff daily Rinse mouth after use., Disp: 180 blister, Rfl: 0    gabapentin (NEURONTIN) 100 mg capsule, TAKE 2 CAPSULES BY MOUTH THREE TIMES DAILY, Disp: 540 capsule, Rfl: 1    losartan (COZAAR) 100 MG tablet, Take 1 tablet (100 mg total) by mouth daily, Disp: 90 tablet, Rfl: 1    methocarbamol (Robaxin-750) 750 mg tablet, Take 1 tablet (750 mg total) by mouth every 6 (six) hours as needed for muscle spasms, Disp: 90 tablet, Rfl: 3    metoprolol succinate (TOPROL-XL) 50 mg 24 hr tablet, Take 1 tablet (50 mg total) by mouth daily, Disp: 90 tablet, Rfl: 1    pantoprazole (PROTONIX) 40 mg tablet, Take 1 tablet (40 mg total) by mouth daily, Disp: 90 tablet, Rfl: 1    tamsulosin (FLOMAX) 0.4 mg, Take 1 capsule (0.4 mg total) by mouth daily with dinner, Disp: 90 capsule, Rfl: 1    temazepam (RESTORIL) 7.5 mg capsule, Take 1 capsule (7.5 mg total) by mouth daily at bedtime as needed for sleep For sleep study.  Start with 1 tablet and if still struggling can use 2 to help with sleep during the sleep study., Disp: 3 capsule, Rfl: 0    triamterene-hydrochlorothiazide (DYAZIDE) 37.5-25 mg per capsule, Take 1 capsule by mouth every morning, Disp: 90 capsule, Rfl:  1    ALPRAZolam (XANAX) 0.25 mg tablet, Take 1 tablet (0.25 mg total) by mouth once in imaging for anxiety for up to 2 days (Patient not taking: Reported on 3/18/2024), Disp: 2 tablet, Rfl: 0    aspirin 81 mg chewable tablet, Chew 1 tablet (81 mg total) daily, Disp: 30 tablet, Rfl: 0    b complex vitamins capsule, Take 1 capsule by mouth daily (Patient not taking: Reported on 3/28/2024), Disp: 30 capsule, Rfl: 3    Cyanocobalamin (VITAMIN B12 PO), Take by mouth (Patient not taking: Reported on 3/28/2024), Disp: , Rfl:     ferrous sulfate 325 (65 Fe) mg tablet, Take 325 mg by mouth daily with breakfast (Patient not taking: Reported on 3/28/2024), Disp: , Rfl:     Multiple Vitamin (multivitamin) tablet, Take 1 tablet by mouth if needed (Patient not taking: Reported on 6/26/2023), Disp: , Rfl:   Allergies   Allergen Reactions    Penicillins Anaphylaxis       Vitals: Blood pressure 114/70, pulse 77, temperature (!) 97.4 °F (36.3 °C), temperature source Tympanic, weight 89.4 kg (197 lb), SpO2 97%. Body mass index is 30.85 kg/m². Oxygen Therapy  SpO2: 97 %    Physical Exam  Physical Exam  Vitals and nursing note reviewed.   Constitutional:       General: He is not in acute distress.     Appearance: He is well-developed.   HENT:      Head: Normocephalic and atraumatic.   Eyes:      Conjunctiva/sclera: Conjunctivae normal.   Cardiovascular:      Rate and Rhythm: Normal rate and regular rhythm.      Heart sounds: No murmur heard.  Pulmonary:      Effort: Pulmonary effort is normal. No respiratory distress.      Breath sounds: Wheezing present.   Abdominal:      Palpations: Abdomen is soft.      Tenderness: There is no abdominal tenderness.   Musculoskeletal:         General: No swelling.      Cervical back: Neck supple.   Skin:     General: Skin is warm and dry.      Capillary Refill: Capillary refill takes less than 2 seconds.   Neurological:      General: No focal deficit present.      Mental Status: He is alert and  "oriented to person, place, and time.   Psychiatric:         Mood and Affect: Mood normal.         Labs: I have personally reviewed pertinent lab results.  Lab Results   Component Value Date    WBC 8.98 11/24/2023    HGB 16.5 11/24/2023    HCT 47.7 11/24/2023    MCV 92 11/24/2023     11/24/2023     Lab Results   Component Value Date    CALCIUM 9.7 03/28/2024    K 4.0 03/28/2024    CO2 29 03/28/2024     03/28/2024    BUN 16 03/28/2024    CREATININE 1.17 03/28/2024     No results found for: \"IGE\"  Lab Results   Component Value Date    ALT 59 (H) 11/24/2023    AST 35 11/24/2023    ALKPHOS 83 11/24/2023       Imaging and other studies: I have personally reviewed relevant films in PACS.    EKG, Pathology, and Other Studies: I have personally reviewed relevant reports in Epic.    Darien Vaughn M.D.  [facility]      "

## 2024-03-28 NOTE — ASSESSMENT & PLAN NOTE
Patient reports daytime somnolence and frequent awakenings during the night  Spouse reports snoring, patient also reports latency of sleep onset  Had tried Ambien in the past, but reports this gave him nightmares    Plan:   Follow up diagnostic sleep study with consideration for CPAP   Counseled patient on CPAP, verbalizes understanding of and agreement to plan at this time

## 2024-03-28 NOTE — PATIENT INSTRUCTIONS
It was a pleasure seeing you today!    Trial Trelegy   Use rescue albuterol as needed   CT Chest in 1 year for lung nodules  Follow up sleep study  Follow up in 6 months     Latoya Childers MD  Pulmonary and Critical Care Medicine

## 2024-04-04 LAB
METANEPH FREE SERPL-MCNC: <25 PG/ML (ref 0–88)
NORMETANEPHRINE SERPL-MCNC: 64.5 PG/ML (ref 0–244)
RENIN PLAS-CCNC: 86.17 NG/ML/HR (ref 0.17–5.38)

## 2024-04-05 ENCOUNTER — HOSPITAL ENCOUNTER (OUTPATIENT)
Dept: CT IMAGING | Facility: HOSPITAL | Age: 54
Discharge: HOME | End: 2024-04-05
Attending: INTERNAL MEDICINE

## 2024-04-05 LAB
ALDOST SERPL-MCNC: 8.9 NG/DL (ref 0–30)
ALDOST/RENIN PLAS-RTO: 0.1 {RATIO} (ref 0–30)
RENIN PLAS-CCNC: 79.87 NG/ML/HR (ref 0.17–5.38)

## 2024-04-11 ENCOUNTER — PATIENT OUTREACH (OUTPATIENT)
Dept: FAMILY MEDICINE CLINIC | Facility: CLINIC | Age: 54
End: 2024-04-11

## 2024-04-11 NOTE — PROGRESS NOTES
KHADIJAHCM reviewed chart.  Pt was to contact [facility] for home visit to complete [employer] application.     SW placed phone call to pt.  Pt states that his wife is driving him to appointments.  SW discussed [employer] as a secondary resource.  Pt is agreeable to this SW placing referral electronically at [employer].      Application submitted through [employer]:  [facility]  Program Medical Assistance Transportation Program  Application Number [id number]    Referral also placed in [email or web address].

## 2024-04-18 ENCOUNTER — PATIENT OUTREACH (OUTPATIENT)
Dept: FAMILY MEDICINE CLINIC | Facility: CLINIC | Age: 54
End: 2024-04-18

## 2024-04-18 NOTE — PROGRESS NOTES
Per [employer] web page, pt's application is under review.      SW placed phone call to [employer] for status.  SW spoke with Shawn.  Application was returned for proof of age.  SW emailed copy of pt's 's license to [employer].    Your message has been delivered to the following recipients:  [email or web address] ([email or web address])    Pt denied needing transportation in last phone call as wife is transporting.  [employer] application was placed as back up.      Pt is able to complete any follow up with [employer] if needed.      SW will close referral at this time.

## 2024-04-24 DIAGNOSIS — R06.09 DYSPNEA ON EXERTION: ICD-10-CM

## 2024-04-24 RX ORDER — ALBUTEROL SULFATE 90 UG/1
AEROSOL, METERED RESPIRATORY (INHALATION)
Qty: 9 G | Refills: 2 | Status: SHIPPED | OUTPATIENT
Start: 2024-04-24

## 2024-04-26 ENCOUNTER — HOSPITAL ENCOUNTER (OUTPATIENT)
Dept: SLEEP CENTER | Facility: CLINIC | Age: 54
Discharge: HOME | End: 2024-04-26
Payer: COMMERCIAL

## 2024-04-26 DIAGNOSIS — Z91.89 AT RISK FOR OBSTRUCTIVE SLEEP APNEA: ICD-10-CM

## 2024-04-26 DIAGNOSIS — Z86.73 HISTORY OF CVA (CEREBROVASCULAR ACCIDENT): ICD-10-CM

## 2024-04-26 DIAGNOSIS — G47.19 EXCESSIVE DAYTIME SLEEPINESS: ICD-10-CM

## 2024-04-26 DIAGNOSIS — G47.00 INSOMNIA, UNSPECIFIED TYPE: ICD-10-CM

## 2024-04-26 DIAGNOSIS — R06.83 SNORING: ICD-10-CM

## 2024-04-26 PROCEDURE — 95810 POLYSOM 6/> YRS 4/> PARAM: CPT

## 2024-04-26 PROCEDURE — 95810 POLYSOM 6/> YRS 4/> PARAM: CPT | Performed by: INTERNAL MEDICINE

## 2024-04-27 PROBLEM — G47.33 OSA (OBSTRUCTIVE SLEEP APNEA): Status: ACTIVE | Noted: 2024-04-27

## 2024-04-27 NOTE — PROGRESS NOTES
Sleep Study Documentation    Pre-Sleep Study       Sleep testing procedure explained to patient:YES    Patient napped prior to study:NO    Caffeine:Dayshift worker after 12PM.  Caffeine use:NO    Alcohol:Dayshift workers after 5PM: Alcohol use:NO    Typical day for patient:YES       Study Documentation    Sleep Study Indications: EDS    Sleep Study: Diagnostic   Snore:Severe  Supplemental O2: no    Minimum SaO2 84  Baseline SaO2 92    EKG abnormalities: no     EEG abnormalities: no    Were abnormal behaviors in sleep observed:NO    Is Total Sleep Study Recording Time < 2 hours: N/A    Is Total Sleep Study Recording Time > 2 hours but study is incomplete: N/A    Is Total Sleep Study Recording Time 6 hours or more but sleep was not obtained: NO    Patient classification: disabled       Post-Sleep Study    Medication used at bedtime or during sleep study:YES prescription sleep aid    Patient reports time it took to fall asleep:greater than 60 minutes    Patient reports waking up during study:3 or more times.  Patient reports returning to sleep in 10 to 30 minutes.    Patient reports sleeping 4 to 6 hours without dreaming.    Does the Patient feel this is a typical night of sleep:typical    Patient rated sleepiness: Somewhat sleepy or tired    PAP treatment:no.

## 2024-05-01 ENCOUNTER — TELEPHONE (OUTPATIENT)
Dept: NEUROSURGERY | Facility: CLINIC | Age: 54
End: 2024-05-01

## 2024-05-01 NOTE — TELEPHONE ENCOUNTER
I see this pt has apt coming up 5/09/24 but cx his ct with patric dept -S/w patient, [facility] termed 04/27/24. Patient asked to cancel appt. He will call to reschedule once he has new ins.   So I called pt lm to see if he is cancelling ours as well because he cx his study that is needed for our apt ? LM to call us BACK -BA     SPOKE WITH PT HE WANTS ME TO CX HIS APT WITH US UNTIL HE GETS HIS INSURANCE SITUATION FIGURED OUT HE WILL CALL US BACK I CX APT FOR DR CUEVAS FOR HIM -BA

## 2024-05-10 ENCOUNTER — HOSPITAL ENCOUNTER (OUTPATIENT)
Dept: CT IMAGING | Facility: HOSPITAL | Age: 54
Discharge: HOME | End: 2024-05-10
Attending: INTERNAL MEDICINE

## 2024-05-24 ENCOUNTER — TELEPHONE (OUTPATIENT)
Dept: NEUROSURGERY | Facility: CLINIC | Age: 54
End: 2024-05-24

## 2024-05-29 ENCOUNTER — TELEPHONE (OUTPATIENT)
Dept: CARDIOLOGY CLINIC | Facility: CLINIC | Age: 54
End: 2024-05-29

## 2024-05-29 NOTE — TELEPHONE ENCOUNTER
Spoke with pt does not have medical right now.  Spouse is filling out paperwork.  Will call office to sched when he is ready.

## 2024-05-31 ENCOUNTER — TELEPHONE (OUTPATIENT)
Dept: SLEEP CENTER | Facility: CLINIC | Age: 54
End: 2024-05-31

## 2024-05-31 NOTE — TELEPHONE ENCOUNTER
Called patient and advised sleep study resulted and does not show sleep apnea.      Patient saw Dr. Solares in the [place] sleep [facility] office and has follow up scheduled in Sept in [place]. Appointment was change since the sleep fellows will no longer be seeing patient's in [place].    Advised patient of change in office locations.  Patient does not want to travel to [place] office.  Rescheduled follow up to 10/10/24 in [place] with Dr. Roman.

## 2024-06-23 DIAGNOSIS — K21.9 GASTROESOPHAGEAL REFLUX DISEASE WITHOUT ESOPHAGITIS: ICD-10-CM

## 2024-06-23 RX ORDER — PANTOPRAZOLE SODIUM 40 MG/1
40 TABLET, DELAYED RELEASE ORAL DAILY
Qty: 90 TABLET | Refills: 1 | Status: SHIPPED | OUTPATIENT
Start: 2024-06-23

## 2024-07-13 DIAGNOSIS — R06.09 DYSPNEA ON EXERTION: ICD-10-CM

## 2024-07-13 RX ORDER — ALBUTEROL SULFATE 90 UG/1
AEROSOL, METERED RESPIRATORY (INHALATION)
Qty: 9 G | Refills: 2 | Status: SHIPPED | OUTPATIENT
Start: 2024-07-13

## 2024-08-01 DIAGNOSIS — J43.2 CENTRILOBULAR EMPHYSEMA (HCC): ICD-10-CM

## 2024-08-01 RX ORDER — FLUTICASONE FUROATE, UMECLIDINIUM BROMIDE AND VILANTEROL TRIFENATATE 100; 62.5; 25 UG/1; UG/1; UG/1
1 POWDER RESPIRATORY (INHALATION) DAILY
Qty: 180 BLISTER | Refills: 0 | Status: SHIPPED | OUTPATIENT
Start: 2024-08-01 | End: 2024-10-30

## 2024-08-08 ENCOUNTER — OFFICE VISIT (OUTPATIENT)
Dept: FAMILY MEDICINE CLINIC | Facility: CLINIC | Age: 54
End: 2024-08-08
Payer: COMMERCIAL

## 2024-08-08 VITALS
WEIGHT: 190 LBS | HEIGHT: 67 IN | HEART RATE: 102 BPM | SYSTOLIC BLOOD PRESSURE: 138 MMHG | DIASTOLIC BLOOD PRESSURE: 98 MMHG | BODY MASS INDEX: 29.82 KG/M2 | OXYGEN SATURATION: 99 % | TEMPERATURE: 95.6 F

## 2024-08-08 DIAGNOSIS — G89.29 CHRONIC LEFT-SIDED LOW BACK PAIN WITHOUT SCIATICA: ICD-10-CM

## 2024-08-08 DIAGNOSIS — M54.50 CHRONIC LEFT-SIDED LOW BACK PAIN WITHOUT SCIATICA: ICD-10-CM

## 2024-08-08 DIAGNOSIS — R42 DIZZINESS AND GIDDINESS: ICD-10-CM

## 2024-08-08 DIAGNOSIS — Z12.11 COLON CANCER SCREENING: ICD-10-CM

## 2024-08-08 DIAGNOSIS — F17.209 TOBACCO USE DISORDER, CONTINUOUS: ICD-10-CM

## 2024-08-08 DIAGNOSIS — I10 PRIMARY HYPERTENSION: Primary | ICD-10-CM

## 2024-08-08 DIAGNOSIS — R29.90 STROKE-LIKE SYMPTOMS: ICD-10-CM

## 2024-08-08 DIAGNOSIS — R09.89 SYMPTOMS OF CEREBROVASCULAR ACCIDENT (CVA): ICD-10-CM

## 2024-08-08 DIAGNOSIS — I1A.0 RESISTANT HYPERTENSION: ICD-10-CM

## 2024-08-08 DIAGNOSIS — I63.9 CVA (CEREBRAL VASCULAR ACCIDENT) (HCC): ICD-10-CM

## 2024-08-08 PROCEDURE — 99214 OFFICE O/P EST MOD 30 MIN: CPT | Performed by: FAMILY MEDICINE

## 2024-08-08 RX ORDER — AMLODIPINE BESYLATE 10 MG/1
10 TABLET ORAL DAILY
Qty: 90 TABLET | Refills: 1 | Status: SHIPPED | OUTPATIENT
Start: 2024-08-08

## 2024-08-08 RX ORDER — METOPROLOL SUCCINATE 100 MG/1
100 TABLET, EXTENDED RELEASE ORAL DAILY
Qty: 90 TABLET | Refills: 1 | Status: SHIPPED | OUTPATIENT
Start: 2024-08-08

## 2024-08-08 RX ORDER — AMITRIPTYLINE HYDROCHLORIDE 10 MG/1
10 TABLET, FILM COATED ORAL
Qty: 90 TABLET | Refills: 1 | Status: SHIPPED | OUTPATIENT
Start: 2024-08-08

## 2024-08-08 RX ORDER — BACLOFEN 10 MG/1
10 TABLET ORAL 2 TIMES DAILY PRN
Qty: 60 TABLET | Refills: 0 | Status: SHIPPED | OUTPATIENT
Start: 2024-08-08

## 2024-08-08 RX ORDER — LOSARTAN POTASSIUM 100 MG/1
100 TABLET ORAL DAILY
Qty: 90 TABLET | Refills: 1 | Status: SHIPPED | OUTPATIENT
Start: 2024-08-08

## 2024-08-08 RX ORDER — TRIAMTERENE AND HYDROCHLOROTHIAZIDE 37.5; 25 MG/1; MG/1
1 CAPSULE ORAL EVERY MORNING
Qty: 90 CAPSULE | Refills: 1 | Status: SHIPPED | OUTPATIENT
Start: 2024-08-08

## 2024-08-08 RX ORDER — METOPROLOL SUCCINATE 100 MG/1
100 TABLET, EXTENDED RELEASE ORAL DAILY
Qty: 90 TABLET | Refills: 1 | Status: SHIPPED | OUTPATIENT
Start: 2024-08-08 | End: 2024-08-08 | Stop reason: SDUPTHER

## 2024-08-08 NOTE — PROGRESS NOTES
Subjective:      Patient ID: Shaq Brown is a 53 y.o. male.    With Thalamic stroke, with residual LLE weakness, dysathria -improved, brain fog-recent memory issues- completed PT, imbalance-dizziness and impaired depth perception since his thalamic stroke. Left leg numbness and pressure on left side of neck    Complains of Lower back pain and left sided low back pain radiating to the groin sometimes down the legs as well now, he did see Neuro  Hematuria-resolved  He is unable to work, walks much slower, does have occasional imbalance, unable to use left upper extremity completely  He worked in construction previously, applied for disability now.  BP is under control again he started smoking and feels anxious        Past Medical History:   Diagnosis Date    Carotid stenosis, left     today  1/19/2023  L carotid angioplasty    Carotid stenosis, right     Rt angioplasty completed  11/2023    Cervical disc disease     COVID 05/2020    GERD (gastroesophageal reflux disease)     Hyperlipidemia     Hypertension     Kidney stone     Muscle weakness     Spinal stenosis     Stroke (HCC) 09/13/2022    left sided weakness with pins/needles    Weakness of left side of body 09/13/2022    s/p CVA       Family History   Problem Relation Age of Onset    Heart attack Mother     Diabetes Mother     Hypertension Mother     Colon cancer Father     No Known Problems Sister     No Known Problems Sister     No Known Problems Sister     No Known Problems Son        Past Surgical History:   Procedure Laterality Date    CARPAL TUNNEL RELEASE Bilateral     CERVICAL FUSION      with hardware    CYSTOSCOPY      HERNIA REPAIR      IR CAROTID STENT  11/8/2022    IR CAROTID STENT  1/19/2023    IN TCAT IV STENT CRV CRTD ART EMBOLIC PROTECJ Right 11/8/2022    Procedure: ANGIOPLASTY ARTERY CAROTID W/ STENT TCAR,;  Surgeon: Duane Mack DO;  Location: [facility];  Service: Vascular    IN TCAT IV STENT CRV CRTD ART EMBOLIC PROTECJ Left 1/19/2023     Procedure: ANGIOPLASTY ARTERY CAROTID W/ STENT Left TCAR;  Surgeon: Duane Mack DO;  Location: [facility];  Service: Vascular    ULNAR COLLATERAL LIGAMENT RECONSTRUCTION Bilateral         reports that he has been smoking cigarettes. He started smoking about 37 years ago. He has a 70.1 pack-year smoking history. He has never used smokeless tobacco. He reports current alcohol use of about 12.0 standard drinks of alcohol per week. He reports that he does not currently use drugs.      Current Outpatient Medications:     albuterol (PROVENTIL HFA,VENTOLIN HFA) 90 mcg/act inhaler, INHALE 2 PUFFS BY MOUTH EVERY 6 HOURS AS NEEDED FOR WHEEZING, Disp: 9 g, Rfl: 2    amitriptyline (ELAVIL) 10 mg tablet, Take 1 tablet (10 mg total) by mouth daily at bedtime, Disp: 90 tablet, Rfl: 1    amLODIPine (NORVASC) 10 mg tablet, Take 1 tablet (10 mg total) by mouth daily, Disp: 90 tablet, Rfl: 1    aspirin 81 mg chewable tablet, Chew 1 tablet (81 mg total) daily, Disp: 30 tablet, Rfl: 0    atorvastatin (LIPITOR) 80 mg tablet, Take 1 tablet (80 mg total) by mouth in the evening. Take before meals, Disp: 90 tablet, Rfl: 1    baclofen 10 mg tablet, Take 1 tablet (10 mg total) by mouth 2 (two) times a day as needed for muscle spasms, Disp: 60 tablet, Rfl: 0    fluticasone-umeclidinium-vilanterol (Trelegy Ellipta) 100-62.5-25 mcg/actuation inhaler, Inhale 1 puff daily Rinse mouth after use., Disp: 180 blister, Rfl: 0    gabapentin (NEURONTIN) 100 mg capsule, TAKE 2 CAPSULES BY MOUTH THREE TIMES DAILY, Disp: 540 capsule, Rfl: 1    losartan (COZAAR) 100 MG tablet, Take 1 tablet (100 mg total) by mouth daily, Disp: 90 tablet, Rfl: 1    metoprolol succinate (TOPROL-XL) 100 mg 24 hr tablet, Take 1 tablet (100 mg total) by mouth daily, Disp: 90 tablet, Rfl: 1    nicotine polacrilex (NICORETTE) 2 mg gum, Chew 1 each (2 mg total) as needed for smoking cessation, Disp: 100 each, Rfl: 0    pantoprazole (PROTONIX) 40 mg tablet, Take 1 tablet by  "mouth once daily, Disp: 90 tablet, Rfl: 1    temazepam (RESTORIL) 7.5 mg capsule, Take 1 capsule (7.5 mg total) by mouth daily at bedtime as needed for sleep For sleep study.  Start with 1 tablet and if still struggling can use 2 to help with sleep during the sleep study., Disp: 3 capsule, Rfl: 0    triamterene-hydrochlorothiazide (DYAZIDE) 37.5-25 mg per capsule, Take 1 capsule by mouth every morning, Disp: 90 capsule, Rfl: 1    The following portions of the patient's history were reviewed and updated as appropriate: allergies, current medications, past family history, past medical history, past social history, past surgical history and problem list.    Review of Systems   Constitutional:  Negative for chills and fever.   HENT:  Negative for congestion, rhinorrhea and sore throat.    Eyes:  Negative for discharge, redness and itching.   Respiratory:  Negative for chest tightness, shortness of breath and wheezing.    Cardiovascular:  Negative for chest pain and palpitations.   Gastrointestinal:  Positive for abdominal pain. Negative for constipation and diarrhea.   Genitourinary:  Negative for dysuria.   Musculoskeletal:  Positive for arthralgias, neck pain and neck stiffness.   Skin:  Negative for pallor and rash.   Neurological:  Positive for weakness. Negative for dizziness, numbness and headaches.   Psychiatric/Behavioral:  The patient is nervous/anxious.          PHQ-2/9 Depression Screening    Little interest or pleasure in doing things: 0 - not at all  Feeling down, depressed, or hopeless: 0 - not at all  PHQ-2 Score: 0  PHQ-2 Interpretation: Negative depression screen             Objective:    /98 (BP Location: Left arm, Patient Position: Sitting, Cuff Size: Standard)   Pulse 102   Temp (!) 95.6 °F (35.3 °C) (Tympanic)   Ht 5' 7\" (1.702 m)   Wt 86.2 kg (190 lb)   SpO2 99%   BMI 29.76 kg/m²      Physical Exam  Vitals and nursing note reviewed.   Constitutional:       Appearance: Normal appearance. "   HENT:      Head: Normocephalic and atraumatic.      Right Ear: Tympanic membrane, ear canal and external ear normal.      Left Ear: Tympanic membrane, ear canal and external ear normal.      Nose: No congestion or rhinorrhea.      Mouth/Throat:      Mouth: Mucous membranes are moist.      Pharynx: No posterior oropharyngeal erythema.   Eyes:      General:         Right eye: No discharge.         Left eye: No discharge.      Conjunctiva/sclera: Conjunctivae normal.   Cardiovascular:      Rate and Rhythm: Normal rate and regular rhythm.      Heart sounds: No murmur heard.  Pulmonary:      Effort: Pulmonary effort is normal.      Breath sounds: Normal breath sounds. No wheezing.   Abdominal:      General: There is no distension.      Palpations: Abdomen is soft.      Tenderness: There is no abdominal tenderness.   Musculoskeletal:         General: Tenderness (left cervical spine) present.      Right lower leg: No edema.      Left lower leg: No edema.   Skin:     Findings: No lesion or rash.   Neurological:      Mental Status: He is alert and oriented to person, place, and time. Mental status is at baseline.      Cranial Nerves: Cranial nerve deficit present.      Motor: Weakness present.      Coordination: Coordination abnormal.      Gait: Gait abnormal.      Deep Tendon Reflexes: Reflexes abnormal.   Psychiatric:         Mood and Affect: Mood normal.         Thought Content: Thought content normal.           Recent Results (from the past 8736 hour(s))   Microalbumin / creatinine urine ratio    Collection Time: 11/24/23  1:25 PM   Result Value Ref Range    Creatinine, Ur 67.6 Reference range not established. mg/dL    Albumin,U,Random 46.0 (H) <20.0 mg/L    Albumin Creat Ratio 68 (H) 0 - 30 mg/g creatinine   CBC and differential    Collection Time: 11/24/23  1:25 PM   Result Value Ref Range    WBC 8.98 4.31 - 10.16 Thousand/uL    RBC 5.16 3.88 - 5.62 Million/uL    Hemoglobin 16.5 12.0 - 17.0 g/dL    Hematocrit 47.7  36.5 - 49.3 %    MCV 92 82 - 98 fL    MCH 32.0 26.8 - 34.3 pg    MCHC 34.6 31.4 - 37.4 g/dL    RDW 12.3 11.6 - 15.1 %    MPV 9.2 8.9 - 12.7 fL    Platelets 269 149 - 390 Thousands/uL    nRBC 0 /100 WBCs    Segmented % 65 43 - 75 %    Immature Grans % 0 0 - 2 %    Lymphocytes % 22 14 - 44 %    Monocytes % 10 4 - 12 %    Eosinophils Relative 2 0 - 6 %    Basophils Relative 1 0 - 1 %    Absolute Neutrophils 5.84 1.85 - 7.62 Thousands/µL    Absolute Immature Grans 0.04 0.00 - 0.20 Thousand/uL    Absolute Lymphocytes 1.98 0.60 - 4.47 Thousands/µL    Absolute Monocytes 0.89 0.17 - 1.22 Thousand/µL    Eosinophils Absolute 0.14 0.00 - 0.61 Thousand/µL    Basophils Absolute 0.09 0.00 - 0.10 Thousands/µL   Comprehensive metabolic panel    Collection Time: 11/24/23  1:25 PM   Result Value Ref Range    Sodium 134 (L) 135 - 147 mmol/L    Potassium 3.8 3.5 - 5.3 mmol/L    Chloride 105 96 - 108 mmol/L    CO2 23 21 - 32 mmol/L    ANION GAP 6 mmol/L    BUN 15 5 - 25 mg/dL    Creatinine 1.06 0.60 - 1.30 mg/dL    Glucose 87 65 - 140 mg/dL    Calcium 9.2 8.4 - 10.2 mg/dL    AST 35 13 - 39 U/L    ALT 59 (H) 7 - 52 U/L    Alkaline Phosphatase 83 34 - 104 U/L    Total Protein 7.5 6.4 - 8.4 g/dL    Albumin 4.5 3.5 - 5.0 g/dL    Total Bilirubin 0.46 0.20 - 1.00 mg/dL    eGFR 80 ml/min/1.73sq m   Cystatin C With eGFR    Collection Time: 11/24/23  1:25 PM   Result Value Ref Range    CYSTATIN C 0.88 0.67 - 1.14 mg/L    eGFR 96 >59 mL/min/1.73   Stress strip    Collection Time: 01/04/24  9:56 AM   Result Value Ref Range    Protocol Name TONY SIT     Exercise duration (min) 3 min    Exercise duration (sec) 0 sec    Post Peak Systolic  mmHg    Max Diastolic Bp 103 mmHg    Peak  BPM    Max Predicted Heart Rate 168 BPM    Reason for Termination Protocol Complete     Test Indication Screening for CAD     Target Hr Formular (220 - Age)*100%     Arrhy During Ex      ECG Interp Before Ex      ECG Interp during Ex      Ex Summary Comment       Chest Pain Statement non-limiting     Overall Hr Response To Exercise      Overall BP Response To Exercise     NM myocardial perfusion spect (rx stress and/or rest)    Collection Time: 01/04/24 11:07 AM   Result Value Ref Range    Rest Nuclear Isotope Dose 10.60 mCi    Stress Nuclear Isotope Dose 30.50 mCi    Baseline HR 79 bpm    Baseline /98 mmHg    O2 sat rest 100 %    Stress peak  bpm    Post peak  mmHg    O2 sat peak 91 %    Recovery HR 81 bpm    Recovery /100 mmHg    O2 sat recovery 99 %    Max  bpm    Max HR Percent 66 %    Exercise duration (min) 3 min    Exercise duration (sec) 0 sec    Estimated workload 1.0 METS    Rate Pressure Product 13,542.0     Angina Index 1     Stress/rest perfusion ratio 1.24     EF (%) 51 %   Metanephrine, Fractionated Plasma Free    Collection Time: 03/28/24 10:45 AM   Result Value Ref Range    Normetanephrine, Free 64.5 0.0 - 244.0 pg/mL    Metanephrine, Free <25.0 0.0 - 88.0 pg/mL   Basic metabolic panel    Collection Time: 03/28/24 10:45 AM   Result Value Ref Range    Sodium 138 135 - 147 mmol/L    Potassium 4.0 3.5 - 5.3 mmol/L    Chloride 103 96 - 108 mmol/L    CO2 29 21 - 32 mmol/L    ANION GAP 6 4 - 13 mmol/L    BUN 16 5 - 25 mg/dL    Creatinine 1.17 0.60 - 1.30 mg/dL    Glucose 89 65 - 140 mg/dL    Calcium 9.7 8.4 - 10.2 mg/dL    eGFR 71 ml/min/1.73sq m   Renin Activity, Plasma    Collection Time: 03/28/24 10:45 AM   Result Value Ref Range    Renin 86.167 (H) 0.167 - 5.380 ng/mL/hr   Aldosterone/Renin Ratio    Collection Time: 03/28/24 10:45 AM   Result Value Ref Range    Renin 79.872 (H) 0.167 - 5.380 ng/mL/hr    Aldosterone 8.9 0.0 - 30.0 ng/dL    Aldos/Renin Ratio 0.1 0.0 - 30.0       Laboratory Results: I have personally reviewed the pertinent laboratory results/reports         Assessment/Plan:  1. Primary hypertension  -     metoprolol succinate (TOPROL-XL) 100 mg 24 hr tablet; Take 1 tablet (100 mg total) by mouth daily  -      "amLODIPine (NORVASC) 10 mg tablet; Take 1 tablet (10 mg total) by mouth daily  2. Colon cancer screening  -     Cologuard  3. Chronic left-sided low back pain without sciatica  -     baclofen 10 mg tablet; Take 1 tablet (10 mg total) by mouth 2 (two) times a day as needed for muscle spasms  4. Tobacco use disorder, continuous  -     nicotine polacrilex (NICORETTE) 2 mg gum; Chew 1 each (2 mg total) as needed for smoking cessation  5. Dizziness and giddiness  -     amitriptyline (ELAVIL) 10 mg tablet; Take 1 tablet (10 mg total) by mouth daily at bedtime  6. Stroke-like symptoms  -     losartan (COZAAR) 100 MG tablet; Take 1 tablet (100 mg total) by mouth daily  7. Symptoms of cerebrovascular accident (CVA)  -     losartan (COZAAR) 100 MG tablet; Take 1 tablet (100 mg total) by mouth daily  8. CVA (cerebral vascular accident) (HCC)  -     losartan (COZAAR) 100 MG tablet; Take 1 tablet (100 mg total) by mouth daily  9. Resistant hypertension  -     triamterene-hydrochlorothiazide (DYAZIDE) 37.5-25 mg per capsule; Take 1 capsule by mouth every morning      Increase metoprolol to 100 mg daily given increased heart rate and blood pressure.  Counseled extensively regarding smoking cessation.  Use gums as needed.  Discussed increased risk of recurrent stroke.  Patient verbalized understanding.  Willing to do Cologuard again prescription sent             Read package inserts for all medications before starting a new medications, call me if you have any questions.    Patient was given opportunity to ask questions and all questions were answered.    Disclaimer: Portions of the record may have been created with voice recognition software. Occasional wrong word or \"sound a like\" substitutions may have occurred due to the inherent limitations of voice recognition software. Read the chart carefully and recognize, using context, where substitutions have occurred. I have used the Epic copy/forward function to compose this note. I " have reviewed my current note to ensure it reflects the current patient status, exam, assessment and plan.

## 2024-08-26 ENCOUNTER — OFFICE VISIT (OUTPATIENT)
Dept: NEPHROLOGY | Facility: CLINIC | Age: 54
End: 2024-08-26
Payer: COMMERCIAL

## 2024-08-26 VITALS
BODY MASS INDEX: 29.66 KG/M2 | HEART RATE: 82 BPM | HEIGHT: 67 IN | OXYGEN SATURATION: 97 % | WEIGHT: 189 LBS | SYSTOLIC BLOOD PRESSURE: 116 MMHG | DIASTOLIC BLOOD PRESSURE: 80 MMHG

## 2024-08-26 DIAGNOSIS — N18.2 BENIGN HYPERTENSION WITH CKD (CHRONIC KIDNEY DISEASE), STAGE II: ICD-10-CM

## 2024-08-26 DIAGNOSIS — I12.9 BENIGN HYPERTENSION WITH CKD (CHRONIC KIDNEY DISEASE), STAGE II: ICD-10-CM

## 2024-08-26 DIAGNOSIS — I15.0 RENOVASCULAR HYPERTENSION: ICD-10-CM

## 2024-08-26 DIAGNOSIS — I10 PRIMARY HYPERTENSION: Chronic | ICD-10-CM

## 2024-08-26 DIAGNOSIS — I70.1 RENAL ARTERY STENOSIS (HCC): Primary | ICD-10-CM

## 2024-08-26 PROCEDURE — 99406 BEHAV CHNG SMOKING 3-10 MIN: CPT | Performed by: INTERNAL MEDICINE

## 2024-08-26 PROCEDURE — 99214 OFFICE O/P EST MOD 30 MIN: CPT | Performed by: INTERNAL MEDICINE

## 2024-08-26 NOTE — PROGRESS NOTES
NEPHROLOGY OFFICE VISIT   Shaq Brown 53 y.o. male MRN: [id number]  8/26/2024    Reason for Visit: Chronic kidney disease stage II and hypertension    History of Present Illness (HPI):    I had the pleasure of seeing Jim today in the renal clinic for the continued management of chronic kidney disease stage II and hypertension. Since our last visit, there has been no ER visits or hospitilizations. He currently has no complaints at this time and is feeling well. Patient denies any chest pain, shortness of breath and swelling. The last blood work was done on March 28, 2024 which included aldosterone/renin ratio, plasma renin activity and a BMP along with metanephrines, which we have reviewed together.    He has elevated plasma renin activity of 86.1 and his aldosterone/renin ratio is not elevated and normal at 0.1.  This is consistent with his underlying right renal artery stenosis which was seen on Doppler which I personally reviewed from January 2024.  He has evidence of a greater than 60% stenosis of the right renal artery.  Does not appear to have any significant stenosis of the left renal artery but I did discuss with the patient that this is not the most sensitive testing and that there could be potentially mild development of renovascular disease of that artery.    Unfortunately he is back to smoking about 1/4 pack/day.  He will be trying the nicotine gum once he gets the prescription filled at [facility].  I did advise him that continued smoking would cause worsening renal vascular disease.    His electrolytes were normal including serum potassium.  His renal function was stable with a creatinine of 1.17 mg/dL GFR was greater than 70 mL/min.    His metanephrines and normetanephrine's were within normal limits.    ASSESSMENT and PLAN:    Nephrolithiasis  --duration for the last 20 years  --has not required procedure/intervention  --Has restarted smoking strongly advised  --no prior stone analysis completed  --no  prior stone risk profile completed  --recommended he drink plenty of water to maintain a high urine volume of at least 2.5 L per day  --low 2 g sodium diet  --limit caffeine  --avoid smoking  -- Ordered [facility] but never completed in the past  --Continue current management but strongly recommend smoking cessation     Hypertension  --diagnosed in his 30s  --History of thalamic stroke  --Losartan 100 mg daily, triamterene-hydrochlorothiazide 37.5-25 mg daily, metoprolol  mg daily, amlodipine 10 mg daily  --Blood pressure at target with an angiotensin receptor blocker  --Target blood pressure less than 130/80  --BMI 29.60 stable from last time  --low 2 g sodium diet weight loss and exercise strongly recommended  --Smoking cessation strongly advised as he is now smoking quarter pack per day.  --Renal artery Doppler from January 2024: Greater than 60% stenosis of the right renal artery  --Aldosterone/renin ratio 0.1 which is normal.  Aldosterone level is not elevated.  Renin and renin activity is elevated.  Renin level was 79.872, and renin activity was 86.1.  Not consistent with primary aldosteronism but consistent with renovascular hypertension  --Metanephrines and normetanephrine's were normal  --No indication for revascularization as the renal function and blood pressure are at target with RAAS blockade.  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 and uncontrolled hypertension and renovascular disease  --GFR greater than 60 mL/min  --blood pressure control with an angiotensin receptor blocker  --Smoking cessation strongly advised  -- Renal function stable and at baseline continue current management     CVA  --focal right thalamic acute to subacute infarct with linear areas of  acute to subacute ischemia involving the medial right temporal occipital region compatible with PCA territory infarcts  --bilateral ICA stenosis  --Continue blood pressure control and goal-directed therapy  --atorvastatin 80 mg daily  --Currently on aspirin 81 mg  -- Restarted smoking plan to start nicotine gum     Bilateral carotid artery stenosis  -- s/p right TCAR   -- Advised smoking cessation  --Blood pressure control     Gross hematuria--resolved  --status post cystoscopy  --PSA was normal  --likely secondary to nephrolithiasis  -- Last UA did show hematuria.    Smoker/Tobacco Dependence  -- Actively smoking: Yes  -- Smoking quit date: On and off  -- Duration of smokin+ years  -- Spent approximately: 3 to 4 minutes discussing smoking cessation at length, and advised to quitting smoking.  -- Eager to quit smoking: Yes we will plan to start the nicotine gum  -- Offered nicotine patch, recommend cognitive behavioral therapy   -- Smoking can affect medications used to treat high blood pressure.  Smoking increases the risk of strokes and heart attacks in people with high blood pressure.  Smoking is a well-known risk factor for chronic kidney disease.  Active smoking increases development and progression of chronic kidney disease.  It can lead to an increased risk of worsening microalbuminuria.  Can lead to increased risk of developing some forms of kidney malignancies.  Causes damage to the cardiovascular system leading to poor blood flow resulting in worsening kidney function.  It can perpetuate worsening diabetic kidney disease.  It can also lead to idiopathic nodular glomerulosclerosis.       Overweight  -- BMI 29.6 and this is stable  --Recommend decreasing portion sizes, encouraging healthy choices of fruits and vegetables, consuming healthier snacks and moderation in carbohydrate intake. Exercise recommendations; 3-5 times a week, the American Heart Association recommends 150 minutes a week moderate  exercise  --Strongly recommend evaluation by nutritionist  --Overweight and obesity have been associated with increased mortality and morbidity.  Associated with a reduction in life expectancy, associated with increased all cause and cardiovascular mortality  --Metabolic risks, including increased risk of diabetes type 2, insulin resistance with hyperinsulinemia (weight loss of 5 to 7% associated with a decreased risk of type 2 diabetes among individuals with prediabetes and weight loss of 15% can lead to dramatic improvement of diabetes in nearly half of people).  Dyslipidemia, hypertension and heart disease are all increased in patients with obesity.  Along with increased risk of stroke increased risk of multiple cancer types.  Can also be associated with increased renal dysfunction, strongest risk factor for new onset chronic kidney disease.  There is also a degree of compensatory hyperfiltration to meet high in the metabolic demands of increased body weight.  This can also result in increased increased risk for nephrolithiasis.         PATIENT INSTRUCTIONS:    Patient Instructions   1.)  Low 2 g sodium diet    2.)  Monitor weights at home    3.)  Avoid NSAIDs (ibuprofen, Motrin, Advil, Aleve, naproxen)    4.)  Monitor blood pressure at home, call if blood pressure greater than 150/90 persistently    5.) I will plan to discuss all results including blood work, and/or imaging at our next visit, unless there is an urgent indication, in which case I will call you earlier. If you have any questions or concerns about your results, please feel free to call our office.            Orders Placed This Encounter   Procedures    Albumin / creatinine urine ratio     Standing Status:   Future     Standing Expiration Date:   8/26/2025    Comprehensive metabolic panel     This is a patient instruction: Patient fasting for 8 hours or longer recommended.     Standing Status:   Future     Standing Expiration Date:   8/26/2025  "      OBJECTIVE:  Current Weight: Weight - Scale: 85.7 kg (189 lb)  Vitals:    08/26/24 0820   BP: 116/80   BP Location: Left arm   Patient Position: Sitting   Cuff Size: Standard   Pulse: 82   SpO2: 97%   Weight: 85.7 kg (189 lb)   Height: 5' 7\" (1.702 m)    Body mass index is 29.6 kg/m².      REVIEW OF SYSTEMS:    Review of Systems   Constitutional:  Negative for activity change and fever.   Respiratory:  Negative for cough, chest tightness, shortness of breath and wheezing.    Cardiovascular:  Negative for chest pain and leg swelling.   Gastrointestinal:  Negative for abdominal pain, diarrhea, nausea and vomiting.   Endocrine: Negative for polyuria.   Genitourinary:  Negative for difficulty urinating, dysuria, flank pain, frequency and urgency.   Skin:  Negative for rash.   Neurological:  Negative for dizziness, syncope, light-headedness and headaches.   All other systems reviewed and are negative.      PHYSICAL EXAM:      Physical Exam  Vitals and nursing note reviewed.   Constitutional:       General: He is not in acute distress.     Appearance: He is well-developed.   HENT:      Head: Normocephalic and atraumatic.   Eyes:      General: No scleral icterus.     Conjunctiva/sclera: Conjunctivae normal.      Pupils: Pupils are equal, round, and reactive to light.   Cardiovascular:      Rate and Rhythm: Normal rate and regular rhythm.      Heart sounds: S1 normal and S2 normal. No murmur heard.     No friction rub. No gallop.   Pulmonary:      Effort: Pulmonary effort is normal. No respiratory distress.      Breath sounds: Normal breath sounds. No wheezing or rales.   Abdominal:      General: Bowel sounds are normal.      Palpations: Abdomen is soft.      Tenderness: There is no abdominal tenderness. There is no rebound.   Musculoskeletal:         General: Normal range of motion.      Cervical back: Normal range of motion and neck supple.   Skin:     Findings: No rash.   Neurological:      Mental Status: He is " alert and oriented to person, place, and time.   Psychiatric:         Behavior: Behavior normal.         Medications:    Current Outpatient Medications:     albuterol (PROVENTIL HFA,VENTOLIN HFA) 90 mcg/act inhaler, INHALE 2 PUFFS BY MOUTH EVERY 6 HOURS AS NEEDED FOR WHEEZING, Disp: 9 g, Rfl: 2    amitriptyline (ELAVIL) 10 mg tablet, Take 1 tablet (10 mg total) by mouth daily at bedtime, Disp: 90 tablet, Rfl: 1    amLODIPine (NORVASC) 10 mg tablet, Take 1 tablet (10 mg total) by mouth daily, Disp: 90 tablet, Rfl: 1    aspirin 81 mg chewable tablet, Chew 1 tablet (81 mg total) daily, Disp: 30 tablet, Rfl: 0    atorvastatin (LIPITOR) 80 mg tablet, Take 1 tablet (80 mg total) by mouth in the evening. Take before meals, Disp: 90 tablet, Rfl: 1    baclofen 10 mg tablet, Take 1 tablet (10 mg total) by mouth 2 (two) times a day as needed for muscle spasms, Disp: 60 tablet, Rfl: 0    fluticasone-umeclidinium-vilanterol (Trelegy Ellipta) 100-62.5-25 mcg/actuation inhaler, Inhale 1 puff daily Rinse mouth after use., Disp: 180 blister, Rfl: 0    gabapentin (NEURONTIN) 100 mg capsule, TAKE 2 CAPSULES BY MOUTH THREE TIMES DAILY, Disp: 540 capsule, Rfl: 1    losartan (COZAAR) 100 MG tablet, Take 1 tablet (100 mg total) by mouth daily, Disp: 90 tablet, Rfl: 1    metoprolol succinate (TOPROL-XL) 100 mg 24 hr tablet, Take 1 tablet (100 mg total) by mouth daily, Disp: 90 tablet, Rfl: 1    pantoprazole (PROTONIX) 40 mg tablet, Take 1 tablet by mouth once daily, Disp: 90 tablet, Rfl: 1    triamterene-hydrochlorothiazide (DYAZIDE) 37.5-25 mg per capsule, Take 1 capsule by mouth every morning, Disp: 90 capsule, Rfl: 1    nicotine polacrilex (NICORETTE) 2 mg gum, Chew 1 each (2 mg total) as needed for smoking cessation (Patient not taking: Reported on 8/26/2024), Disp: 100 each, Rfl: 0    temazepam (RESTORIL) 7.5 mg capsule, Take 1 capsule (7.5 mg total) by mouth daily at bedtime as needed for sleep For sleep study.  Start with 1 tablet  "and if still struggling can use 2 to help with sleep during the sleep study. (Patient not taking: Reported on 8/26/2024), Disp: 3 capsule, Rfl: 0    Laboratory Results:        Invalid input(s): \"ALBUMIN\"    Results for orders placed or performed in visit on 03/28/24   Basic metabolic panel   Result Value Ref Range    Sodium 138 135 - 147 mmol/L    Potassium 4.0 3.5 - 5.3 mmol/L    Chloride 103 96 - 108 mmol/L    CO2 29 21 - 32 mmol/L    ANION GAP 6 4 - 13 mmol/L    BUN 16 5 - 25 mg/dL    Creatinine 1.17 0.60 - 1.30 mg/dL    Glucose 89 65 - 140 mg/dL    Calcium 9.7 8.4 - 10.2 mg/dL    eGFR 71 ml/min/1.73sq m   Renin Activity, Plasma   Result Value Ref Range    Renin 86.167 (H) 0.167 - 5.380 ng/mL/hr   Aldosterone/Renin Ratio   Result Value Ref Range    Renin 79.872 (H) 0.167 - 5.380 ng/mL/hr    Aldosterone 8.9 0.0 - 30.0 ng/dL    Aldos/Renin Ratio 0.1 0.0 - 30.0         "

## 2024-09-02 LAB — COLOGUARD RESULT REPORTABLE: POSITIVE

## 2024-09-03 ENCOUNTER — DOCUMENTATION (OUTPATIENT)
Dept: FAMILY MEDICINE CLINIC | Facility: CLINIC | Age: 54
End: 2024-09-03

## 2024-09-03 DIAGNOSIS — R19.5 POSITIVE COLORECTAL CANCER SCREENING USING COLOGUARD TEST: Primary | ICD-10-CM

## 2024-09-15 DIAGNOSIS — G89.29 CHRONIC LEFT-SIDED LOW BACK PAIN WITHOUT SCIATICA: ICD-10-CM

## 2024-09-15 DIAGNOSIS — M54.50 CHRONIC LEFT-SIDED LOW BACK PAIN WITHOUT SCIATICA: ICD-10-CM

## 2024-09-16 RX ORDER — BACLOFEN 10 MG/1
TABLET ORAL
Qty: 60 TABLET | Refills: 0 | Status: SHIPPED | OUTPATIENT
Start: 2024-09-16

## 2024-09-19 ENCOUNTER — TELEPHONE (OUTPATIENT)
Age: 54
End: 2024-09-19

## 2024-09-19 ENCOUNTER — PREP FOR PROCEDURE (OUTPATIENT)
Age: 54
End: 2024-09-19

## 2024-09-19 DIAGNOSIS — Z12.11 SPECIAL SCREENING FOR MALIGNANT NEOPLASMS, COLON: Primary | ICD-10-CM

## 2024-09-19 NOTE — TELEPHONE ENCOUNTER
Called and spoke to Jim -  his Stroke was over 3 years ago - his Stents where alittle under a year  I let him know I would be obtaining clearance for the Colonoscopy from his [facility]

## 2024-09-19 NOTE — TELEPHONE ENCOUNTER
09/19/24      POSITIVE COLOGUARD    Screened by: Maryanne Serrano MA    Referring Provider PCP    Pre- Screening:     There is no height or weight on file to calculate BMI.  Has patient been referred for a routine screening Colonoscopy? yes  Is the patient between 45-75 years old? yes      Previous Colonoscopy no   If yes:    Date:     Facility:     Reason:         Does the patient want to see a Gastroenterologist prior to their procedure OR are they having any GI symptoms? no    Has the patient been hospitalized or had abdominal surgery in the past 6 months? no    Does the patient use supplemental oxygen? no    Does the patient take Coumadin, Lovenox, Plavix, Elliquis, Xarelto, or other blood thinning medication? no    Has the patient had a stroke, cardiac event, or stent placed in the past year? no      If patient is between 45yrs - 49yrs, please advise patient that we will have to confirm benefits & coverage with their insurance company for a routine screening colonoscopy.

## 2024-09-19 NOTE — TELEPHONE ENCOUNTER
Scheduled date of colonoscopy (as of today): 10/10/2024  Physician performing colonoscopy: tiffany  Location of colonoscopy: [place]  Bowel prep reviewed with patient: MIRALAX/DULCOLAX  Instructions reviewed with patient by: Maryanne via telephone with wife. Procedure directions sent via [employer].    Clearances: hx of stroke and stents. Please review if clearance is necessary. Patient did indication not in past year.

## 2024-09-19 NOTE — TELEPHONE ENCOUNTER
Pt's wife called for phone number for gastro. She wanted the one near [place]. Provided [phone].

## 2024-09-23 ENCOUNTER — TELEPHONE (OUTPATIENT)
Dept: VASCULAR SURGERY | Facility: CLINIC | Age: 54
End: 2024-09-23

## 2024-09-23 NOTE — TELEPHONE ENCOUNTER
Chart reviewed.  Patient s/p bilateral ICA stenting, TCAR : Right '22 and left in January '23.    Patient is no longer on dual antiplatelet  He is taking aspirin 81mg only. (Plavix was discontinued)    Last Carotid duplex b/l ICA stents widely patent.     Are these the stents he is referring to?  Are they requesting ASA hold?

## 2024-09-23 NOTE — TELEPHONE ENCOUNTER
----- Message from Cathleen DUFFY sent at 9/23/2024  9:06 AM EDT -----  Regarding: FW: procedure clearance    ----- Message -----  From: Keturah Estrada  Sent: 9/19/2024   1:22 PM EDT  To: [facility]  Subject: procedure clearance                              Hello,  this patient scheduled a Colonoscopy with Dr Jasmine 10/10/24 at [facility]. Jim mentioned he has recently has stents placed. We are looking for clearance for him to have a Colonoscopy with anesthesia.  Please advise    Thank you  Keturah

## 2024-09-24 NOTE — TELEPHONE ENCOUNTER
Keturah Estrada  You10 minutes ago (11:19 AM)     RAY Fernandez there,  our provider said we do not nee an ASA hold. We just wanted a general clearance for patient since he had stents placed recently.      LUCIE Salinas38 minutes ago (10:51 AM)       No      MURIEL Ling5 minutes ago (10:45 AM)     RAY arellano   please see message below  would we need this for the clearance?

## 2024-09-28 DIAGNOSIS — R31.0 GROSS HEMATURIA: ICD-10-CM

## 2024-09-28 RX ORDER — TAMSULOSIN HYDROCHLORIDE 0.4 MG/1
CAPSULE ORAL
Qty: 90 CAPSULE | Refills: 1 | Status: SHIPPED | OUTPATIENT
Start: 2024-09-28

## 2024-10-08 DIAGNOSIS — R06.09 DYSPNEA ON EXERTION: ICD-10-CM

## 2024-10-09 RX ORDER — ALBUTEROL SULFATE 90 UG/1
INHALANT RESPIRATORY (INHALATION)
Qty: 9 G | Refills: 1 | Status: SHIPPED | OUTPATIENT
Start: 2024-10-09

## 2024-10-10 ENCOUNTER — HOSPITAL ENCOUNTER (OUTPATIENT)
Dept: GASTROENTEROLOGY | Facility: HOSPITAL | Age: 54
Setting detail: OUTPATIENT SURGERY
End: 2024-10-10
Attending: INTERNAL MEDICINE
Payer: COMMERCIAL

## 2024-10-10 ENCOUNTER — ANESTHESIA EVENT (OUTPATIENT)
Dept: GASTROENTEROLOGY | Facility: HOSPITAL | Age: 54
End: 2024-10-10
Payer: COMMERCIAL

## 2024-10-10 ENCOUNTER — ANESTHESIA (OUTPATIENT)
Dept: GASTROENTEROLOGY | Facility: HOSPITAL | Age: 54
End: 2024-10-10
Payer: COMMERCIAL

## 2024-10-10 VITALS
OXYGEN SATURATION: 94 % | SYSTOLIC BLOOD PRESSURE: 136 MMHG | HEART RATE: 59 BPM | DIASTOLIC BLOOD PRESSURE: 67 MMHG | WEIGHT: 184.53 LBS | RESPIRATION RATE: 21 BRPM | HEIGHT: 69 IN | TEMPERATURE: 97.5 F | BODY MASS INDEX: 27.33 KG/M2

## 2024-10-10 DIAGNOSIS — R19.5 POSITIVE COLORECTAL CANCER SCREENING USING COLOGUARD TEST: ICD-10-CM

## 2024-10-10 PROCEDURE — 88305 TISSUE EXAM BY PATHOLOGIST: CPT | Performed by: STUDENT IN AN ORGANIZED HEALTH CARE EDUCATION/TRAINING PROGRAM

## 2024-10-10 PROCEDURE — 45380 COLONOSCOPY AND BIOPSY: CPT | Performed by: INTERNAL MEDICINE

## 2024-10-10 PROCEDURE — 45385 COLONOSCOPY W/LESION REMOVAL: CPT | Performed by: INTERNAL MEDICINE

## 2024-10-10 RX ORDER — SODIUM CHLORIDE, SODIUM LACTATE, POTASSIUM CHLORIDE, CALCIUM CHLORIDE 600; 310; 30; 20 MG/100ML; MG/100ML; MG/100ML; MG/100ML
75 INJECTION, SOLUTION INTRAVENOUS CONTINUOUS
Status: DISCONTINUED | OUTPATIENT
Start: 2024-10-10 | End: 2024-10-14 | Stop reason: HOSPADM

## 2024-10-10 RX ORDER — LIDOCAINE HYDROCHLORIDE 20 MG/ML
INJECTION, SOLUTION EPIDURAL; INFILTRATION; INTRACAUDAL; PERINEURAL AS NEEDED
Status: DISCONTINUED | OUTPATIENT
Start: 2024-10-10 | End: 2024-10-10

## 2024-10-10 RX ORDER — FERROUS SULFATE 325(65) MG
325 TABLET ORAL
COMMUNITY

## 2024-10-10 RX ORDER — PROPOFOL 10 MG/ML
INJECTION, EMULSION INTRAVENOUS AS NEEDED
Status: DISCONTINUED | OUTPATIENT
Start: 2024-10-10 | End: 2024-10-10

## 2024-10-10 RX ADMIN — PROPOFOL 20 MG: 10 INJECTION, EMULSION INTRAVENOUS at 11:46

## 2024-10-10 RX ADMIN — PROPOFOL 30 MG: 10 INJECTION, EMULSION INTRAVENOUS at 11:32

## 2024-10-10 RX ADMIN — PROPOFOL 20 MG: 10 INJECTION, EMULSION INTRAVENOUS at 11:38

## 2024-10-10 RX ADMIN — PROPOFOL 30 MG: 10 INJECTION, EMULSION INTRAVENOUS at 11:30

## 2024-10-10 RX ADMIN — PROPOFOL 20 MG: 10 INJECTION, EMULSION INTRAVENOUS at 11:37

## 2024-10-10 RX ADMIN — PROPOFOL 20 MG: 10 INJECTION, EMULSION INTRAVENOUS at 11:41

## 2024-10-10 RX ADMIN — PROPOFOL 20 MG: 10 INJECTION, EMULSION INTRAVENOUS at 11:40

## 2024-10-10 RX ADMIN — PROPOFOL 130 MG: 10 INJECTION, EMULSION INTRAVENOUS at 11:28

## 2024-10-10 RX ADMIN — PROPOFOL 10 MG: 10 INJECTION, EMULSION INTRAVENOUS at 11:35

## 2024-10-10 RX ADMIN — PROPOFOL 30 MG: 10 INJECTION, EMULSION INTRAVENOUS at 11:44

## 2024-10-10 RX ADMIN — PROPOFOL 30 MG: 10 INJECTION, EMULSION INTRAVENOUS at 11:34

## 2024-10-10 RX ADMIN — PROPOFOL 30 MG: 10 INJECTION, EMULSION INTRAVENOUS at 11:36

## 2024-10-10 RX ADMIN — SODIUM CHLORIDE, SODIUM LACTATE, POTASSIUM CHLORIDE, AND CALCIUM CHLORIDE 75 ML/HR: .6; .31; .03; .02 INJECTION, SOLUTION INTRAVENOUS at 10:16

## 2024-10-10 RX ADMIN — PROPOFOL 30 MG: 10 INJECTION, EMULSION INTRAVENOUS at 11:42

## 2024-10-10 RX ADMIN — LIDOCAINE HYDROCHLORIDE 100 MG: 20 INJECTION, SOLUTION EPIDURAL; INFILTRATION; INTRACAUDAL; PERINEURAL at 11:28

## 2024-10-10 RX ADMIN — SODIUM CHLORIDE, SODIUM LACTATE, POTASSIUM CHLORIDE, AND CALCIUM CHLORIDE: .6; .31; .03; .02 INJECTION, SOLUTION INTRAVENOUS at 11:33

## 2024-10-10 NOTE — H&P
History and Physical - [facility]  Shaq Brown 53 y.o. male MRN: [id number]      HPI: Shaq Brown is a 53 y.o. year old male who presents for screening colonoscopy      REVIEW OF SYSTEMS: Per the HPI, and otherwise unremarkable.    Historical Information   Past Medical History:   Diagnosis Date    Carotid stenosis, left     today  1/19/2023  L carotid angioplasty    Carotid stenosis, right     Rt angioplasty completed  11/2023    Cervical disc disease     COVID 05/2020    GERD (gastroesophageal reflux disease)     Hyperlipidemia     Hypertension     Kidney stone     Muscle weakness     Spinal stenosis     Stroke (HCC) 09/13/2022    left sided weakness with pins/needles    Weakness of left side of body 09/13/2022    s/p CVA     Past Surgical History:   Procedure Laterality Date    CARPAL TUNNEL RELEASE Bilateral     CERVICAL FUSION      with hardware    CYSTOSCOPY      HERNIA REPAIR      IR CAROTID STENT  11/8/2022    IR CAROTID STENT  1/19/2023    FL TCAT IV STENT CRV CRTD ART EMBOLIC PROTECJ Right 11/8/2022    Procedure: ANGIOPLASTY ARTERY CAROTID W/ STENT TCAR,;  Surgeon: Duane Mack DO;  Location: [facility];  Service: Vascular    FL TCAT IV STENT CRV CRTD ART EMBOLIC PROTECJ Left 1/19/2023    Procedure: ANGIOPLASTY ARTERY CAROTID W/ STENT Left TCAR;  Surgeon: Duane Mack DO;  Location: [facility];  Service: Vascular    ULNAR COLLATERAL LIGAMENT RECONSTRUCTION Bilateral      Social History   Social History     Substance and Sexual Activity   Alcohol Use Yes    Alcohol/week: 12.0 standard drinks of alcohol    Types: 12 Cans of beer per week    Comment: 2-3 cans per day     Social History     Substance and Sexual Activity   Drug Use Not Currently     Social History     Tobacco Use   Smoking Status Every Day    Current packs/day: 0.50    Average packs/day: 2.0 packs/day for 35.4 years (70.2 ttl pk-yrs)    Types: Cigarettes    Start date: 08/1987    Last attempt to quit: 08/2022  "  Smokeless Tobacco Never     Family History   Problem Relation Age of Onset    Heart attack Mother     Diabetes Mother     Hypertension Mother     Colon cancer Father     No Known Problems Sister     No Known Problems Sister     No Known Problems Sister     No Known Problems Son        Meds/Allergies     Not in a hospital admission.    Allergies   Allergen Reactions    Penicillins Anaphylaxis       Objective     Blood pressure 126/81, pulse 63, temperature (!) 97.1 °F (36.2 °C), temperature source Temporal, resp. rate 20, height 5' 9\" (1.753 m), weight 83.7 kg (184 lb 8.4 oz), SpO2 100%.      PHYSICAL EXAM    Gen: NAD  CV: RRR  CHEST: Clear  ABD: soft, NT/ND  EXT: no edema      ASSESSMENT/PLAN:  This is a 53 y.o. year old male here for colonoscopy, and he is stable and optimized for his procedure.          "

## 2024-10-10 NOTE — ANESTHESIA POSTPROCEDURE EVALUATION
Post-Op Assessment Note    CV Status:  Stable  Pain Score: 0    Pain management: adequate       Mental Status:  Sleepy and arousable   Hydration Status:  Euvolemic and stable   PONV Controlled:  Controlled   Airway Patency:  Patent and adequate     Post Op Vitals Reviewed: Yes    No anethesia notable event occurred.    Staff: Anesthesiologist, CRNA           Last Filed PACU Vitals:  Vitals Value Taken Time   Temp 97.5 °F (36.4 °C) 10/10/24 1152   Pulse 58 10/10/24 1152   BP 99/55 10/10/24 1152   Resp 16 10/10/24 1152   SpO2 95 % 10/10/24 1152       Modified Cash:  Activity: 2 (10/10/2024  9:51 AM)  Respiration: 2 (10/10/2024  9:51 AM)  Circulation: 2 (10/10/2024  9:51 AM)  Consciousness: 2 (10/10/2024  9:51 AM)  Oxygen Saturation: 2 (10/10/2024  9:51 AM)  Modified Cash Score: 10 (10/10/2024  9:51 AM)

## 2024-10-10 NOTE — ANESTHESIA POSTPROCEDURE EVALUATION
Post-Op Assessment Note    CV Status:  Stable    Pain management: adequate       Mental Status:  Alert and awake   Hydration Status:  Euvolemic   PONV Controlled:  Controlled   Airway Patency:  Patent     Post Op Vitals Reviewed: Yes    No anethesia notable event occurred.    Staff: Anesthesiologist           Last Filed PACU Vitals:  Vitals Value Taken Time   Temp 97.5 °F (36.4 °C) 10/10/24 1152   Pulse 59 10/10/24 1212   /67 10/10/24 1212   Resp 21 10/10/24 1212   SpO2 94 % 10/10/24 1212       Modified Cash:  Activity: 2 (10/10/2024 12:12 PM)  Respiration: 2 (10/10/2024 12:12 PM)  Circulation: 2 (10/10/2024 12:12 PM)  Consciousness: 2 (10/10/2024 12:12 PM)  Oxygen Saturation: 2 (10/10/2024 12:12 PM)  Modified Cash Score: 10 (10/10/2024 12:12 PM)

## 2024-10-10 NOTE — ANESTHESIA PREPROCEDURE EVALUATION
Procedure:  COLONOSCOPY    Relevant Problems   CARDIO   (+) Essential hypertension   (+) High triglycerides   (+) Left Carotid Artery Stenosis s/p Left TCAR   (+) Primary hypertension   (+) Renovascular hypertension      /RENAL   (+) Benign hypertension with CKD (chronic kidney disease), stage II   (+) Bilateral nephrolithiasis      MUSCULOSKELETAL   (+) Chronic back pain      NEURO/PSYCH   (+) Acute right arterial ischemic stroke, PCA (posterior cerebral artery) (HCC) (Residual left sided weakness)   (+) Chronic back pain      PULMONARY   (+) Centrilobular emphysema (HCC)   (+) SUDHIR (obstructive sleep apnea)      Neurology/Sleep   (+) Thalamic infarction (HCC)        Physical Exam    Airway      TM Distance: >3 FB  Neck ROM: full     Dental       Cardiovascular      Pulmonary      Other Findings        Anesthesia Plan  ASA Score- 3     Anesthesia Type- IV sedation with anesthesia with ASA Monitors.         Additional Monitors:     Airway Plan:            Plan Factors-Exercise tolerance (METS): >4 METS.    Chart reviewed.   Existing labs reviewed. Patient summary reviewed.    Patient is a current smoker.  Patient instructed to abstain from smoking on day of procedure. Patient smoked on day of surgery.            Induction- intravenous.    Postoperative Plan-         Informed Consent- Anesthetic plan and risks discussed with patient.  I personally reviewed this patient with the CRNA. Discussed and agreed on the Anesthesia Plan with the CRNA..

## 2024-10-15 PROCEDURE — 88305 TISSUE EXAM BY PATHOLOGIST: CPT | Performed by: STUDENT IN AN ORGANIZED HEALTH CARE EDUCATION/TRAINING PROGRAM

## 2024-10-16 ENCOUNTER — TELEPHONE (OUTPATIENT)
Dept: NEPHROLOGY | Facility: CLINIC | Age: 54
End: 2024-10-16

## 2024-10-16 NOTE — TELEPHONE ENCOUNTER
Spoke with patient about scheduling May f/u with Dr. Minaya. Patient is not with his wife and is requesting call back later in the week.

## 2024-10-21 DIAGNOSIS — J43.2 CENTRILOBULAR EMPHYSEMA (HCC): ICD-10-CM

## 2024-10-22 RX ORDER — FLUTICASONE FUROATE, UMECLIDINIUM BROMIDE AND VILANTEROL TRIFENATATE 100; 62.5; 25 UG/1; UG/1; UG/1
POWDER RESPIRATORY (INHALATION)
Qty: 180 EACH | Refills: 0 | Status: SHIPPED | OUTPATIENT
Start: 2024-10-22

## 2024-10-22 NOTE — TELEPHONE ENCOUNTER
Refill must be reviewed and completed by the office or provider. The refill is unable to be approved or denied by the medication management team.     Off protocol

## 2024-11-19 ENCOUNTER — OFFICE VISIT (OUTPATIENT)
Dept: FAMILY MEDICINE CLINIC | Facility: CLINIC | Age: 54
End: 2024-11-19
Payer: COMMERCIAL

## 2024-11-19 VITALS
HEIGHT: 69 IN | RESPIRATION RATE: 18 BRPM | BODY MASS INDEX: 27.46 KG/M2 | HEART RATE: 84 BPM | SYSTOLIC BLOOD PRESSURE: 122 MMHG | OXYGEN SATURATION: 97 % | WEIGHT: 185.4 LBS | DIASTOLIC BLOOD PRESSURE: 82 MMHG | TEMPERATURE: 97.3 F

## 2024-11-19 DIAGNOSIS — J43.2 CENTRILOBULAR EMPHYSEMA (HCC): ICD-10-CM

## 2024-11-19 DIAGNOSIS — I63.9 CVA (CEREBRAL VASCULAR ACCIDENT) (HCC): ICD-10-CM

## 2024-11-19 DIAGNOSIS — R29.90 STROKE-LIKE SYMPTOMS: ICD-10-CM

## 2024-11-19 DIAGNOSIS — I10 PRIMARY HYPERTENSION: ICD-10-CM

## 2024-11-19 DIAGNOSIS — R42 DIZZINESS AND GIDDINESS: ICD-10-CM

## 2024-11-19 DIAGNOSIS — R31.0 GROSS HEMATURIA: ICD-10-CM

## 2024-11-19 DIAGNOSIS — G89.29 CHRONIC LEFT-SIDED LOW BACK PAIN WITHOUT SCIATICA: ICD-10-CM

## 2024-11-19 DIAGNOSIS — I63.531 ACUTE RIGHT ARTERIAL ISCHEMIC STROKE, PCA (POSTERIOR CEREBRAL ARTERY) (HCC): ICD-10-CM

## 2024-11-19 DIAGNOSIS — F17.200 TOBACCO USE DISORDER: ICD-10-CM

## 2024-11-19 DIAGNOSIS — Z13.1 ENCOUNTER FOR SCREENING FOR DIABETES MELLITUS: ICD-10-CM

## 2024-11-19 DIAGNOSIS — N52.9 ERECTILE DYSFUNCTION OF ORGANIC ORIGIN: ICD-10-CM

## 2024-11-19 DIAGNOSIS — E78.2 MIXED HYPERLIPIDEMIA: ICD-10-CM

## 2024-11-19 DIAGNOSIS — R09.89 SYMPTOMS OF CEREBROVASCULAR ACCIDENT (CVA): ICD-10-CM

## 2024-11-19 DIAGNOSIS — D50.8 IRON DEFICIENCY ANEMIA DUE TO DIETARY CAUSES: ICD-10-CM

## 2024-11-19 DIAGNOSIS — Z00.01 ENCOUNTER FOR GENERAL ADULT MEDICAL EXAMINATION WITH ABNORMAL FINDINGS: Primary | ICD-10-CM

## 2024-11-19 DIAGNOSIS — M54.50 CHRONIC LEFT-SIDED LOW BACK PAIN WITHOUT SCIATICA: ICD-10-CM

## 2024-11-19 DIAGNOSIS — I1A.0 RESISTANT HYPERTENSION: ICD-10-CM

## 2024-11-19 DIAGNOSIS — Z11.59 NEED FOR HEPATITIS C SCREENING TEST: ICD-10-CM

## 2024-11-19 DIAGNOSIS — D12.6 TUBULAR ADENOMA OF COLON: ICD-10-CM

## 2024-11-19 DIAGNOSIS — R06.09 DYSPNEA ON EXERTION: ICD-10-CM

## 2024-11-19 DIAGNOSIS — Z11.4 SCREENING FOR HIV (HUMAN IMMUNODEFICIENCY VIRUS): ICD-10-CM

## 2024-11-19 DIAGNOSIS — K21.9 GASTROESOPHAGEAL REFLUX DISEASE WITHOUT ESOPHAGITIS: ICD-10-CM

## 2024-11-19 DIAGNOSIS — Z12.5 PROSTATE CANCER SCREENING ENCOUNTER, OPTIONS AND RISKS DISCUSSED: ICD-10-CM

## 2024-11-19 DIAGNOSIS — I63.81 THALAMIC INFARCTION (HCC): ICD-10-CM

## 2024-11-19 PROCEDURE — 99396 PREV VISIT EST AGE 40-64: CPT | Performed by: FAMILY MEDICINE

## 2024-11-19 PROCEDURE — 99214 OFFICE O/P EST MOD 30 MIN: CPT | Performed by: FAMILY MEDICINE

## 2024-11-19 RX ORDER — ATORVASTATIN CALCIUM 80 MG/1
80 TABLET, FILM COATED ORAL
Qty: 90 TABLET | Refills: 1 | Status: SHIPPED | OUTPATIENT
Start: 2024-11-19

## 2024-11-19 RX ORDER — LOSARTAN POTASSIUM 100 MG/1
100 TABLET ORAL DAILY
Qty: 90 TABLET | Refills: 1 | Status: SHIPPED | OUTPATIENT
Start: 2024-11-19

## 2024-11-19 RX ORDER — TRIAMTERENE AND HYDROCHLOROTHIAZIDE 37.5; 25 MG/1; MG/1
1 CAPSULE ORAL EVERY MORNING
Qty: 90 CAPSULE | Refills: 1 | Status: SHIPPED | OUTPATIENT
Start: 2024-11-19

## 2024-11-19 RX ORDER — METOPROLOL SUCCINATE 100 MG/1
100 TABLET, EXTENDED RELEASE ORAL DAILY
Qty: 90 TABLET | Refills: 1 | Status: SHIPPED | OUTPATIENT
Start: 2024-11-19

## 2024-11-19 RX ORDER — AMLODIPINE BESYLATE 10 MG/1
10 TABLET ORAL DAILY
Qty: 90 TABLET | Refills: 1 | Status: SHIPPED | OUTPATIENT
Start: 2024-11-19

## 2024-11-19 RX ORDER — SILDENAFIL 25 MG/1
25 TABLET, FILM COATED ORAL DAILY PRN
Qty: 10 TABLET | Refills: 0 | Status: SHIPPED | OUTPATIENT
Start: 2024-11-19

## 2024-11-19 RX ORDER — FERROUS SULFATE 325(65) MG
325 TABLET ORAL
Qty: 90 TABLET | Refills: 1 | Status: SHIPPED | OUTPATIENT
Start: 2024-11-19

## 2024-11-19 RX ORDER — PANTOPRAZOLE SODIUM 40 MG/1
40 TABLET, DELAYED RELEASE ORAL DAILY
Qty: 90 TABLET | Refills: 1 | Status: SHIPPED | OUTPATIENT
Start: 2024-11-19

## 2024-11-19 RX ORDER — AMITRIPTYLINE HYDROCHLORIDE 10 MG/1
10 TABLET ORAL
Qty: 90 TABLET | Refills: 1 | Status: SHIPPED | OUTPATIENT
Start: 2024-11-19

## 2024-11-19 RX ORDER — GABAPENTIN 100 MG/1
200 CAPSULE ORAL 3 TIMES DAILY
Qty: 540 CAPSULE | Refills: 1 | Status: SHIPPED | OUTPATIENT
Start: 2024-11-19

## 2024-11-19 RX ORDER — FLUTICASONE FUROATE, UMECLIDINIUM BROMIDE AND VILANTEROL TRIFENATATE 100; 62.5; 25 UG/1; UG/1; UG/1
1 POWDER RESPIRATORY (INHALATION) DAILY
Qty: 180 EACH | Refills: 0 | Status: SHIPPED | OUTPATIENT
Start: 2024-11-19

## 2024-11-19 RX ORDER — ALBUTEROL SULFATE 90 UG/1
2 INHALANT RESPIRATORY (INHALATION) EVERY 6 HOURS PRN
Qty: 9 G | Refills: 1 | Status: SHIPPED | OUTPATIENT
Start: 2024-11-19

## 2024-11-19 RX ORDER — TAMSULOSIN HYDROCHLORIDE 0.4 MG/1
0.4 CAPSULE ORAL
Qty: 90 CAPSULE | Refills: 1 | Status: SHIPPED | OUTPATIENT
Start: 2024-11-19

## 2024-11-19 NOTE — PROGRESS NOTES
Adult Annual Physical  Name: Shaq Brown      : 1970      MRN: [id number]  Encounter Provider: Bessy Cruz MD  Encounter Date: 2024   Encounter department: [facility]    Assessment & Plan  Encounter for general adult medical examination with abnormal findings         Tubular adenoma of colon  Without high-grade dysplasia to in  , repeat colonoscopy in .  Discussed with patient.       Centrilobular emphysema (HCC)  Continue Trelegy currently controlled smoking cessation discussed interested in nicotine patches as lozenges did not work.  Discussed risks and benefits of nicotine patches given in the setting of hypertension as well as history of stroke.  Will try low-dose 7 mg daily patch  Orders:    fluticasone-umeclidinium-vilanterol (Trelegy Ellipta) 100-62.5-25 mcg/actuation inhaler; Inhale 1 puff daily Rinse mouth after use.    Thalamic infarction (HCC)    Orders:    TSH, 3rd generation with Free T4 reflex; Future    gabapentin (NEURONTIN) 100 mg capsule; Take 2 capsules (200 mg total) by mouth 3 (three) times a day    Resistant hypertension  Currently controlled continue triamterene hydrochlorothiazide, losartan  Orders:    CBC and differential; Future    Comprehensive metabolic panel; Future    TSH, 3rd generation with Free T4 reflex; Future    triamterene-hydrochlorothiazide (DYAZIDE) 37.5-25 mg per capsule; Take 1 capsule by mouth every morning    Mixed hyperlipidemia  Check lipid panel  Orders:    Lipid panel; Future    Encounter for screening for diabetes mellitus  Check A1c  Orders:    Hemoglobin A1C; Future    Tobacco use disorder  Nicotine patch low-dose as above.  Counseled ,discussed cannot smoke while on the patch  Orders:    nicotine (NICODERM CQ) 7 mg/24hr TD 24 hr patch; Place 1 patch on the skin over 24 hours every 24 hours    Prostate cancer screening encounter, options and risks discussed    Orders:    PSA, total and free; Future    Need  for hepatitis C screening test    Orders:    Hepatitis C Antibody; Future    Screening for HIV (human immunodeficiency virus)    Orders:    HIV 1/2 AG/AB w Reflex [facility] for 2 yr old and above; Future    Erectile dysfunction of organic origin  Recommend follow-up with urology will check testosterone levels recommend trying sildenafil low-dose.  Discussed risk and benefit due to CVA and risk of hypertension.  Wait atleast 2 hours after taking antihypertensive to avoid hypotension.  Orders:    Testosterone, free, total; Future    Ambulatory Referral to Urology; Future    sildenafil (VIAGRA) 25 MG tablet; Take 1 tablet (25 mg total) by mouth daily as needed for erectile dysfunction    Dyspnea on exertion    Orders:    albuterol (PROVENTIL HFA,VENTOLIN HFA) 90 mcg/act inhaler; Inhale 2 puffs every 6 (six) hours as needed for wheezing    ferrous sulfate 325 (65 Fe) mg tablet; Take 1 tablet (325 mg total) by mouth daily with breakfast    Chronic left-sided low back pain without sciatica    Orders:    gabapentin (NEURONTIN) 100 mg capsule; Take 2 capsules (200 mg total) by mouth 3 (three) times a day    Dizziness and giddiness    Orders:    amitriptyline (ELAVIL) 10 mg tablet; Take 1 tablet (10 mg total) by mouth daily at bedtime    Stroke-like symptoms    Orders:    atorvastatin (LIPITOR) 80 mg tablet; Take 1 tablet (80 mg total) by mouth in the evening. Take before meals    losartan (COZAAR) 100 MG tablet; Take 1 tablet (100 mg total) by mouth daily    Acute right arterial ischemic stroke, PCA (posterior cerebral artery) (HCC)    Orders:    atorvastatin (LIPITOR) 80 mg tablet; Take 1 tablet (80 mg total) by mouth in the evening. Take before meals    Primary hypertension    Orders:    metoprolol succinate (TOPROL-XL) 100 mg 24 hr tablet; Take 1 tablet (100 mg total) by mouth daily    amLODIPine (NORVASC) 10 mg tablet; Take 1 tablet (10 mg total) by mouth daily    Symptoms of cerebrovascular accident (CVA)    Orders:     losartan (COZAAR) 100 MG tablet; Take 1 tablet (100 mg total) by mouth daily    CVA (cerebral vascular accident) ([facility])    Orders:    losartan (COZAAR) 100 MG tablet; Take 1 tablet (100 mg total) by mouth daily    Gross hematuria    Orders:    tamsulosin (FLOMAX) 0.4 mg; Take 1 capsule (0.4 mg total) by mouth daily with dinner    Gastroesophageal reflux disease without esophagitis    Orders:    pantoprazole (PROTONIX) 40 mg tablet; Take 1 tablet (40 mg total) by mouth daily    Iron deficiency anemia due to dietary causes    Orders:    ferrous sulfate 325 (65 Fe) mg tablet; Take 1 tablet (325 mg total) by mouth daily with breakfast    Immunizations and preventive care screenings were discussed with patient today. Appropriate education was printed on patient's after visit summary.    Discussed risks and benefits of prostate cancer screening. We discussed the controversial history of PSA screening for prostate cancer in the [place] as well as the risk of over detection and over treatment of prostate cancer by way of PSA screening.  The patient understands that PSA blood testing is an imperfect way to screen for prostate cancer and that elevated PSA levels in the blood may also be caused by infection, inflammation, prostatic trauma or manipulation, urological procedures, or by benign prostatic enlargement.    The role of the digital rectal examination in prostate cancer screening was also discussed and I discussed with him that there is large interobserver variability in the findings of digital rectal examination.    Counseling:  Alcohol/drug use: discussed moderation in alcohol intake, the recommendations for healthy alcohol use, and avoidance of illicit drug use.  Dental Health: discussed importance of regular tooth brushing, flossing, and dental visits.  Injury prevention: discussed safety/seat belts, safety helmets, smoke detectors, carbon monoxide detectors, and smoking near bedding or upholstery.  Sexual  health: discussed sexually transmitted diseases, partner selection, use of condoms, avoidance of unintended pregnancy, and contraceptive alternatives.  Exercise: the importance of regular exercise/physical activity was discussed. Recommend exercise 3-5 times per week for at least 30 minutes.          History of Present Illness     Adult Annual Physical:  Patient presents for annual physical. With history of thalamic infarct, emphysema, chronic back pain, chronic shoulder pain, CKD stage II, resistant hypertension with questionable renal artery stenosis, symptomatic carotid artery stenosis bilateral, tobacco use, has currently cut back cigarette smoking to half pack per day and is interested in smoking cessation,Renovascular hypertension, obstructive sleep apnea, tubular adenoma, hyperlipidemia, obesity presents for follow-up as well as annual physical.  Overall is doing okay however does have severe sexual dysfunction since thalamic stroke and does not get morning erections as well.  Interim history had colonoscopy with 3 polyps removed follow-up recommended in 3 years    He does follow with cardiology, neurology, nephrology   he tried nicotine lozenges and gums however it does not help.  He is interested in nicotine patches.     Diet and Physical Activity:  - Diet/Nutrition: well balanced diet and consuming 3-5 servings of fruits/vegetables daily.  - Exercise: no formal exercise and walking.    Depression Screening:  - PHQ-2 Score: 0    General Health:  - Sleep: sleeps well.  - Hearing: normal hearing bilateral ears.  - Vision: vision problems.  - Dental: brushes teeth twice daily.     Health:  - History of STDs: no.   - Urinary symptoms: erectile dysfunction.     Review of Systems   Constitutional:  Negative for chills and fever.   HENT:  Negative for congestion, rhinorrhea and sore throat.    Eyes:  Negative for discharge, redness and itching.   Respiratory:  Negative for chest tightness, shortness of breath  and wheezing.    Cardiovascular:  Negative for chest pain and palpitations.   Gastrointestinal:  Negative for abdominal pain, constipation and diarrhea.   Genitourinary:  Negative for dysuria.        Erectile dysfunction+   Skin:  Negative for pallor and rash.   Neurological:  Positive for weakness. Negative for dizziness, numbness and headaches.     Medical History Reviewed by provider this encounter:  Tobacco  Allergies  Meds  Problems  Med Hx  Surg Hx  Fam Hx     .  Current Outpatient Medications on File Prior to Visit   Medication Sig Dispense Refill    aspirin 81 mg chewable tablet Chew 1 tablet (81 mg total) daily 30 tablet 0    baclofen 10 mg tablet TAKE 1 TABLET BY MOUTH TWICE DAILY AS NEEDED FOR MUSCLE SPASM 60 tablet 0    [DISCONTINUED] albuterol (PROVENTIL HFA,VENTOLIN HFA) 90 mcg/act inhaler INHALE 2 PUFFS BY MOUTH EVERY 6 HOURS AS NEEDED FOR WHEEZING 9 g 1    [DISCONTINUED] amitriptyline (ELAVIL) 10 mg tablet Take 1 tablet (10 mg total) by mouth daily at bedtime 90 tablet 1    [DISCONTINUED] amLODIPine (NORVASC) 10 mg tablet Take 1 tablet (10 mg total) by mouth daily 90 tablet 1    [DISCONTINUED] atorvastatin (LIPITOR) 80 mg tablet Take 1 tablet (80 mg total) by mouth in the evening. Take before meals 90 tablet 1    [DISCONTINUED] ferrous sulfate 325 (65 Fe) mg tablet Take 325 mg by mouth daily with breakfast      [DISCONTINUED] gabapentin (NEURONTIN) 100 mg capsule TAKE 2 CAPSULES BY MOUTH THREE TIMES DAILY 540 capsule 1    [DISCONTINUED] losartan (COZAAR) 100 MG tablet Take 1 tablet (100 mg total) by mouth daily 90 tablet 1    [DISCONTINUED] metoprolol succinate (TOPROL-XL) 100 mg 24 hr tablet Take 1 tablet (100 mg total) by mouth daily 90 tablet 1    [DISCONTINUED] pantoprazole (PROTONIX) 40 mg tablet Take 1 tablet by mouth once daily 90 tablet 1    [DISCONTINUED] tamsulosin (FLOMAX) 0.4 mg TAKE 1 CAPSULE BY MOUTH ONCE DAILY WITH SUPPER 90 capsule 1    [DISCONTINUED] Trelegy Ellipta  "100-62.5-25 MCG/ACT inhaler INHALE 1 PUFF ONCE DAILY RINSE MOUTH AFTER  each 0    [DISCONTINUED] triamterene-hydrochlorothiazide (DYAZIDE) 37.5-25 mg per capsule Take 1 capsule by mouth every morning 90 capsule 1    [DISCONTINUED] nicotine polacrilex (NICORETTE) 2 mg gum Chew 1 each (2 mg total) as needed for smoking cessation (Patient not taking: Reported on 8/26/2024) 100 each 0    [DISCONTINUED] temazepam (RESTORIL) 7.5 mg capsule Take 1 capsule (7.5 mg total) by mouth daily at bedtime as needed for sleep For sleep study.  Start with 1 tablet and if still struggling can use 2 to help with sleep during the sleep study. (Patient not taking: Reported on 8/26/2024) 3 capsule 0     No current facility-administered medications on file prior to visit.      Social History     Tobacco Use    Smoking status: Every Day     Current packs/day: 0.50     Average packs/day: 2.0 packs/day for 35.5 years (70.2 ttl pk-yrs)     Types: Cigarettes     Start date: 08/1987     Last attempt to quit: 08/2022    Smokeless tobacco: Never   Vaping Use    Vaping status: Every Day    Substances: Nicotine   Substance and Sexual Activity    Alcohol use: Yes     Alcohol/week: 12.0 standard drinks of alcohol     Types: 12 Cans of beer per week     Comment: 2-3 cans per day    Drug use: Not Currently    Sexual activity: Yes       Objective   /82 (BP Location: Right arm, Patient Position: Sitting, Cuff Size: Adult)   Pulse 84   Temp (!) 97.3 °F (36.3 °C) (Tympanic)   Resp 18   Ht 5' 9\" (1.753 m)   Wt 84.1 kg (185 lb 6.4 oz)   SpO2 97%   BMI 27.38 kg/m²     Physical Exam  Vitals and nursing note reviewed.   Constitutional:       General: He is not in acute distress.     Appearance: Normal appearance. He is well-developed and normal weight. He is not ill-appearing or toxic-appearing.   HENT:      Head: Normocephalic and atraumatic.      Right Ear: Tympanic membrane, ear canal and external ear normal.      Left Ear: Tympanic " membrane, ear canal and external ear normal.      Nose: No mucosal edema or rhinorrhea.      Mouth/Throat:      Mouth: Mucous membranes are not pale, dry and not cyanotic.      Pharynx: Oropharynx is clear. No oropharyngeal exudate or posterior oropharyngeal erythema.   Eyes:      General: No scleral icterus.        Right eye: No discharge.         Left eye: No discharge.      Extraocular Movements: Extraocular movements intact.      Conjunctiva/sclera: Conjunctivae normal.      Pupils: Pupils are equal, round, and reactive to light.   Cardiovascular:      Rate and Rhythm: Normal rate and regular rhythm.      Heart sounds: Normal heart sounds. No murmur heard.     No gallop.   Pulmonary:      Effort: Pulmonary effort is normal. No respiratory distress.      Breath sounds: Normal breath sounds. No wheezing or rales.   Abdominal:      General: Abdomen is flat.      Palpations: Abdomen is soft.      Tenderness: There is no abdominal tenderness.   Musculoskeletal:         General: No swelling, tenderness, deformity or signs of injury.      Cervical back: Normal range of motion.      Right lower leg: No edema.      Left lower leg: No edema.   Skin:     General: Skin is warm.      Coloration: Skin is not jaundiced or pale.      Findings: No rash.   Neurological:      Mental Status: He is alert and oriented to person, place, and time. Mental status is at baseline.      Comments: Cranial Nerves: Dysarthria and facial asymmetry (RIGHT LOWER FACIAL DROOP) present.      Sensory: Sensory deficit (lle) present.      Motor: Weakness (4/5 lle) present.      Coordination: Coordination abnormal.      Gait: Gait abnormal.      Deep Tendon Reflexes: Reflexes abnormal.    Psychiatric:         Mood and Affect: Mood normal.         Behavior: Behavior normal.         Thought Content: Thought content normal.         Judgment: Judgment normal.

## 2024-11-19 NOTE — ASSESSMENT & PLAN NOTE
Continue Trelegy currently controlled smoking cessation discussed interested in nicotine patches as lozenges did not work.  Discussed risks and benefits of nicotine patches given in the setting of hypertension as well as history of stroke.  Will try low-dose 7 mg daily patch  Orders:    fluticasone-umeclidinium-vilanterol (Trelegy Ellipta) 100-62.5-25 mcg/actuation inhaler; Inhale 1 puff daily Rinse mouth after use.

## 2024-11-19 NOTE — ASSESSMENT & PLAN NOTE
Orders:    TSH, 3rd generation with Free T4 reflex; Future    gabapentin (NEURONTIN) 100 mg capsule; Take 2 capsules (200 mg total) by mouth 3 (three) times a day

## 2024-11-19 NOTE — ASSESSMENT & PLAN NOTE
Orders:    metoprolol succinate (TOPROL-XL) 100 mg 24 hr tablet; Take 1 tablet (100 mg total) by mouth daily    amLODIPine (NORVASC) 10 mg tablet; Take 1 tablet (10 mg total) by mouth daily

## 2024-11-19 NOTE — ASSESSMENT & PLAN NOTE
Orders:    tamsulosin (FLOMAX) 0.4 mg; Take 1 capsule (0.4 mg total) by mouth daily with dinner

## 2024-11-19 NOTE — ASSESSMENT & PLAN NOTE
Orders:    amitriptyline (ELAVIL) 10 mg tablet; Take 1 tablet (10 mg total) by mouth daily at bedtime

## 2024-11-19 NOTE — ASSESSMENT & PLAN NOTE
Orders:    atorvastatin (LIPITOR) 80 mg tablet; Take 1 tablet (80 mg total) by mouth in the evening. Take before meals

## 2024-11-19 NOTE — ASSESSMENT & PLAN NOTE
Without high-grade dysplasia to in 2023 , repeat colonoscopy in 2026.  Discussed with patient.

## 2024-11-19 NOTE — ASSESSMENT & PLAN NOTE
Orders:    gabapentin (NEURONTIN) 100 mg capsule; Take 2 capsules (200 mg total) by mouth 3 (three) times a day

## 2024-11-22 ENCOUNTER — APPOINTMENT (OUTPATIENT)
Dept: LAB | Facility: CLINIC | Age: 54
End: 2024-11-22
Payer: COMMERCIAL

## 2024-11-22 DIAGNOSIS — I12.9 BENIGN HYPERTENSION WITH CKD (CHRONIC KIDNEY DISEASE), STAGE II: ICD-10-CM

## 2024-11-22 DIAGNOSIS — I70.1 RENAL ARTERY STENOSIS (HCC): ICD-10-CM

## 2024-11-22 DIAGNOSIS — E78.2 MIXED HYPERLIPIDEMIA: ICD-10-CM

## 2024-11-22 DIAGNOSIS — Z11.59 NEED FOR HEPATITIS C SCREENING TEST: ICD-10-CM

## 2024-11-22 DIAGNOSIS — Z13.1 ENCOUNTER FOR SCREENING FOR DIABETES MELLITUS: ICD-10-CM

## 2024-11-22 DIAGNOSIS — I10 PRIMARY HYPERTENSION: Chronic | ICD-10-CM

## 2024-11-22 DIAGNOSIS — I1A.0 RESISTANT HYPERTENSION: ICD-10-CM

## 2024-11-22 DIAGNOSIS — N18.2 BENIGN HYPERTENSION WITH CKD (CHRONIC KIDNEY DISEASE), STAGE II: ICD-10-CM

## 2024-11-22 DIAGNOSIS — I63.81 THALAMIC INFARCTION (HCC): ICD-10-CM

## 2024-11-22 DIAGNOSIS — Z11.4 SCREENING FOR HIV (HUMAN IMMUNODEFICIENCY VIRUS): ICD-10-CM

## 2024-11-22 DIAGNOSIS — I15.0 RENOVASCULAR HYPERTENSION: ICD-10-CM

## 2024-11-22 DIAGNOSIS — N52.9 ERECTILE DYSFUNCTION OF ORGANIC ORIGIN: ICD-10-CM

## 2024-11-22 DIAGNOSIS — Z12.5 PROSTATE CANCER SCREENING ENCOUNTER, OPTIONS AND RISKS DISCUSSED: ICD-10-CM

## 2024-11-22 LAB
ALBUMIN SERPL BCG-MCNC: 4.7 G/DL (ref 3.5–5)
ALP SERPL-CCNC: 70 U/L (ref 34–104)
ALT SERPL W P-5'-P-CCNC: 54 U/L (ref 7–52)
ANION GAP SERPL CALCULATED.3IONS-SCNC: 7 MMOL/L (ref 4–13)
AST SERPL W P-5'-P-CCNC: 31 U/L (ref 13–39)
BASOPHILS # BLD AUTO: 0.11 THOUSANDS/ÂΜL (ref 0–0.1)
BASOPHILS NFR BLD AUTO: 1 % (ref 0–1)
BILIRUB SERPL-MCNC: 0.5 MG/DL (ref 0.2–1)
BUN SERPL-MCNC: 16 MG/DL (ref 5–25)
CALCIUM SERPL-MCNC: 9.9 MG/DL (ref 8.4–10.2)
CHLORIDE SERPL-SCNC: 105 MMOL/L (ref 96–108)
CHOLEST SERPL-MCNC: 166 MG/DL (ref ?–200)
CO2 SERPL-SCNC: 26 MMOL/L (ref 21–32)
CREAT SERPL-MCNC: 1.12 MG/DL (ref 0.6–1.3)
CREAT UR-MCNC: 137 MG/DL
EOSINOPHIL # BLD AUTO: 0.24 THOUSAND/ÂΜL (ref 0–0.61)
EOSINOPHIL NFR BLD AUTO: 2 % (ref 0–6)
ERYTHROCYTE [DISTWIDTH] IN BLOOD BY AUTOMATED COUNT: 12.7 % (ref 11.6–15.1)
EST. AVERAGE GLUCOSE BLD GHB EST-MCNC: 123 MG/DL
GFR SERPL CREATININE-BSD FRML MDRD: 75 ML/MIN/1.73SQ M
GLUCOSE P FAST SERPL-MCNC: 102 MG/DL (ref 65–99)
HBA1C MFR BLD: 5.9 %
HCT VFR BLD AUTO: 49.2 % (ref 36.5–49.3)
HCV AB SER QL: NORMAL
HDLC SERPL-MCNC: 49 MG/DL
HGB BLD-MCNC: 17.1 G/DL (ref 12–17)
HIV 1+2 AB+HIV1 P24 AG SERPL QL IA: NORMAL
HIV 2 AB SERPL QL IA: NORMAL
HIV1 AB SERPL QL IA: NORMAL
HIV1 P24 AG SERPL QL IA: NORMAL
IMM GRANULOCYTES # BLD AUTO: 0.02 THOUSAND/UL (ref 0–0.2)
IMM GRANULOCYTES NFR BLD AUTO: 0 % (ref 0–2)
LDLC SERPL CALC-MCNC: 88 MG/DL (ref 0–100)
LYMPHOCYTES # BLD AUTO: 2.03 THOUSANDS/ÂΜL (ref 0.6–4.47)
LYMPHOCYTES NFR BLD AUTO: 19 % (ref 14–44)
MCH RBC QN AUTO: 33.3 PG (ref 26.8–34.3)
MCHC RBC AUTO-ENTMCNC: 34.8 G/DL (ref 31.4–37.4)
MCV RBC AUTO: 96 FL (ref 82–98)
MICROALBUMIN UR-MCNC: 24.8 MG/L
MICROALBUMIN/CREAT 24H UR: 18 MG/G CREATININE (ref 0–30)
MONOCYTES # BLD AUTO: 0.84 THOUSAND/ÂΜL (ref 0.17–1.22)
MONOCYTES NFR BLD AUTO: 8 % (ref 4–12)
NEUTROPHILS # BLD AUTO: 7.65 THOUSANDS/ÂΜL (ref 1.85–7.62)
NEUTS SEG NFR BLD AUTO: 70 % (ref 43–75)
NRBC BLD AUTO-RTO: 0 /100 WBCS
PLATELET # BLD AUTO: 286 THOUSANDS/UL (ref 149–390)
PMV BLD AUTO: 10.5 FL (ref 8.9–12.7)
POTASSIUM SERPL-SCNC: 3.6 MMOL/L (ref 3.5–5.3)
PROT SERPL-MCNC: 7.8 G/DL (ref 6.4–8.4)
PSA FREE MFR SERPL: 25.29 %
PSA FREE SERPL-MCNC: 0.24 NG/ML
PSA SERPL-MCNC: 0.95 NG/ML (ref 0–4)
RBC # BLD AUTO: 5.13 MILLION/UL (ref 3.88–5.62)
SODIUM SERPL-SCNC: 138 MMOL/L (ref 135–147)
T4 FREE SERPL-MCNC: 0.91 NG/DL (ref 0.61–1.12)
TRIGL SERPL-MCNC: 143 MG/DL (ref ?–150)
TSH SERPL DL<=0.05 MIU/L-ACNC: 0.33 UIU/ML (ref 0.45–4.5)
WBC # BLD AUTO: 10.89 THOUSAND/UL (ref 4.31–10.16)

## 2024-11-22 PROCEDURE — 84153 ASSAY OF PSA TOTAL: CPT

## 2024-11-22 PROCEDURE — 84439 ASSAY OF FREE THYROXINE: CPT

## 2024-11-22 PROCEDURE — 86803 HEPATITIS C AB TEST: CPT

## 2024-11-22 PROCEDURE — 84154 ASSAY OF PSA FREE: CPT

## 2024-11-22 PROCEDURE — 85025 COMPLETE CBC W/AUTO DIFF WBC: CPT

## 2024-11-22 PROCEDURE — 36415 COLL VENOUS BLD VENIPUNCTURE: CPT

## 2024-11-22 PROCEDURE — 84402 ASSAY OF FREE TESTOSTERONE: CPT

## 2024-11-22 PROCEDURE — 87389 HIV-1 AG W/HIV-1&-2 AB AG IA: CPT

## 2024-11-22 PROCEDURE — 84403 ASSAY OF TOTAL TESTOSTERONE: CPT

## 2024-11-22 PROCEDURE — 84443 ASSAY THYROID STIM HORMONE: CPT

## 2024-11-22 PROCEDURE — 83036 HEMOGLOBIN GLYCOSYLATED A1C: CPT

## 2024-11-23 LAB
TESTOST FREE SERPL-MCNC: 6 PG/ML (ref 7.2–24)
TESTOST SERPL-MCNC: 444 NG/DL (ref 264–916)

## 2024-11-25 ENCOUNTER — RESULTS FOLLOW-UP (OUTPATIENT)
Dept: CARDIOLOGY CLINIC | Facility: CLINIC | Age: 54
End: 2024-11-25

## 2024-11-25 ENCOUNTER — RESULTS FOLLOW-UP (OUTPATIENT)
Dept: FAMILY MEDICINE CLINIC | Facility: CLINIC | Age: 54
End: 2024-11-25

## 2024-11-26 ENCOUNTER — RESULTS FOLLOW-UP (OUTPATIENT)
Dept: OTHER | Facility: HOSPITAL | Age: 54
End: 2024-11-26

## 2024-11-26 LAB
CYSTATIN C SERPL-MCNC: 1.22 MG/L (ref 0.67–1.14)
GFR/BSA.PRED SERPLBLD CYS-BASED-ARV: 62 ML/MIN/1.73

## 2024-11-26 NOTE — TELEPHONE ENCOUNTER
Pt advised that labs are stable; kidney function at baseline.  Per Zenia, adequate hydration.  Repeat labs as scheduled.    ----- Message from Zenia Oquendo PA-C sent at 11/26/2024 12:56 PM EST -----  Labs reviewed.  Please call pt and let him know his labs are stable.  Kidney function is stable and at baseline.  Continue with adequate hydration. F/u in May with Dr Minaya with repeat labs as scheduled.

## 2024-11-26 NOTE — RESULT ENCOUNTER NOTE
Labs reviewed.  Please call pt and let him know his labs are stable.  Kidney function is stable and at baseline.  Continue with adequate hydration. F/u in May with Dr Minaya with repeat labs as scheduled.

## 2024-11-29 ENCOUNTER — TELEPHONE (OUTPATIENT)
Age: 54
End: 2024-11-29

## 2024-11-29 NOTE — TELEPHONE ENCOUNTER
Pt under care of: Karon     Last Seen: 10/24/22     Ref received for patient for Erectile dysfunction of organic origin     Scheduled: 1/21/25 with Tanya at our [place] office.

## 2024-12-06 ENCOUNTER — RESULTS FOLLOW-UP (OUTPATIENT)
Dept: OTHER | Facility: HOSPITAL | Age: 54
End: 2024-12-06

## 2024-12-06 ENCOUNTER — HOSPITAL ENCOUNTER (OUTPATIENT)
Dept: NON INVASIVE DIAGNOSTICS | Facility: CLINIC | Age: 54
Discharge: HOME | End: 2024-12-06
Payer: COMMERCIAL

## 2024-12-06 DIAGNOSIS — I65.22 LEFT CAROTID ARTERY STENOSIS: ICD-10-CM

## 2024-12-06 PROCEDURE — 93880 EXTRACRANIAL BILAT STUDY: CPT

## 2024-12-06 PROCEDURE — 93880 EXTRACRANIAL BILAT STUDY: CPT | Performed by: SURGERY

## 2025-01-09 ENCOUNTER — TELEPHONE (OUTPATIENT)
Dept: FAMILY MEDICINE CLINIC | Facility: CLINIC | Age: 55
End: 2025-01-09

## 2025-01-09 NOTE — TELEPHONE ENCOUNTER
Fax from [facility] requesting refill for Albuterol HFA 90mcg  Inhale 2 puffs by mouth every 6 hours as needed for wheezing.

## 2025-01-10 DIAGNOSIS — R06.09 DYSPNEA ON EXERTION: ICD-10-CM

## 2025-01-10 RX ORDER — ALBUTEROL SULFATE 90 UG/1
2 INHALANT RESPIRATORY (INHALATION) EVERY 6 HOURS PRN
Qty: 8 G | Refills: 1 | Status: SHIPPED | OUTPATIENT
Start: 2025-01-10

## 2025-02-25 ENCOUNTER — OFFICE VISIT (OUTPATIENT)
Dept: FAMILY MEDICINE CLINIC | Facility: CLINIC | Age: 55
End: 2025-02-25
Payer: COMMERCIAL

## 2025-02-25 ENCOUNTER — TELEPHONE (OUTPATIENT)
Dept: FAMILY MEDICINE CLINIC | Facility: CLINIC | Age: 55
End: 2025-02-25

## 2025-02-25 VITALS
HEART RATE: 85 BPM | BODY MASS INDEX: 27.31 KG/M2 | TEMPERATURE: 97.6 F | SYSTOLIC BLOOD PRESSURE: 110 MMHG | OXYGEN SATURATION: 98 % | RESPIRATION RATE: 18 BRPM | WEIGHT: 184.4 LBS | HEIGHT: 69 IN | DIASTOLIC BLOOD PRESSURE: 80 MMHG

## 2025-02-25 DIAGNOSIS — R29.90 STROKE-LIKE SYMPTOMS: ICD-10-CM

## 2025-02-25 DIAGNOSIS — J43.2 CENTRILOBULAR EMPHYSEMA (HCC): ICD-10-CM

## 2025-02-25 DIAGNOSIS — I10 ESSENTIAL HYPERTENSION: ICD-10-CM

## 2025-02-25 DIAGNOSIS — I63.81 THALAMIC INFARCTION (HCC): ICD-10-CM

## 2025-02-25 DIAGNOSIS — F17.200 TOBACCO USE DISORDER: ICD-10-CM

## 2025-02-25 DIAGNOSIS — I10 PRIMARY HYPERTENSION: ICD-10-CM

## 2025-02-25 DIAGNOSIS — G89.29 CHRONIC LEFT-SIDED LOW BACK PAIN WITHOUT SCIATICA: ICD-10-CM

## 2025-02-25 DIAGNOSIS — I65.22 LEFT CAROTID ARTERY STENOSIS: Primary | ICD-10-CM

## 2025-02-25 DIAGNOSIS — I1A.0 RESISTANT HYPERTENSION: ICD-10-CM

## 2025-02-25 DIAGNOSIS — M54.42 CHRONIC LEFT-SIDED LOW BACK PAIN WITH LEFT-SIDED SCIATICA: ICD-10-CM

## 2025-02-25 DIAGNOSIS — I70.1 RENAL ARTERY STENOSIS (HCC): ICD-10-CM

## 2025-02-25 DIAGNOSIS — R09.89 SYMPTOMS OF CEREBROVASCULAR ACCIDENT (CVA): ICD-10-CM

## 2025-02-25 DIAGNOSIS — E34.9 HYPOTESTOSTERONEMIA: ICD-10-CM

## 2025-02-25 DIAGNOSIS — M54.50 CHRONIC LEFT-SIDED LOW BACK PAIN WITHOUT SCIATICA: ICD-10-CM

## 2025-02-25 DIAGNOSIS — I15.0 RENOVASCULAR HYPERTENSION: ICD-10-CM

## 2025-02-25 DIAGNOSIS — I63.531 ACUTE RIGHT ARTERIAL ISCHEMIC STROKE, PCA (POSTERIOR CEREBRAL ARTERY) (HCC): ICD-10-CM

## 2025-02-25 DIAGNOSIS — R31.0 GROSS HEMATURIA: ICD-10-CM

## 2025-02-25 DIAGNOSIS — G89.29 CHRONIC LEFT-SIDED LOW BACK PAIN WITH LEFT-SIDED SCIATICA: ICD-10-CM

## 2025-02-25 DIAGNOSIS — I63.9 CVA (CEREBRAL VASCULAR ACCIDENT) (HCC): ICD-10-CM

## 2025-02-25 DIAGNOSIS — E05.90 SUBCLINICAL HYPERTHYROIDISM: ICD-10-CM

## 2025-02-25 DIAGNOSIS — Z23 ENCOUNTER FOR IMMUNIZATION: ICD-10-CM

## 2025-02-25 DIAGNOSIS — K21.9 GASTROESOPHAGEAL REFLUX DISEASE WITHOUT ESOPHAGITIS: ICD-10-CM

## 2025-02-25 PROCEDURE — 99214 OFFICE O/P EST MOD 30 MIN: CPT | Performed by: FAMILY MEDICINE

## 2025-02-25 RX ORDER — LOSARTAN POTASSIUM 100 MG/1
100 TABLET ORAL DAILY
Qty: 90 TABLET | Refills: 1 | Status: SHIPPED | OUTPATIENT
Start: 2025-02-25

## 2025-02-25 RX ORDER — TAMSULOSIN HYDROCHLORIDE 0.4 MG/1
0.4 CAPSULE ORAL
Qty: 90 CAPSULE | Refills: 1 | Status: SHIPPED | OUTPATIENT
Start: 2025-02-25

## 2025-02-25 RX ORDER — METOPROLOL SUCCINATE 100 MG/1
100 TABLET, EXTENDED RELEASE ORAL DAILY
Qty: 90 TABLET | Refills: 1 | Status: SHIPPED | OUTPATIENT
Start: 2025-02-25

## 2025-02-25 RX ORDER — FLUTICASONE FUROATE, UMECLIDINIUM BROMIDE AND VILANTEROL TRIFENATATE 100; 62.5; 25 UG/1; UG/1; UG/1
1 POWDER RESPIRATORY (INHALATION) DAILY
Qty: 180 EACH | Refills: 0 | Status: SHIPPED | OUTPATIENT
Start: 2025-02-25

## 2025-02-25 RX ORDER — AMLODIPINE BESYLATE 10 MG/1
10 TABLET ORAL DAILY
Qty: 90 TABLET | Refills: 1 | Status: SHIPPED | OUTPATIENT
Start: 2025-02-25

## 2025-02-25 RX ORDER — PANTOPRAZOLE SODIUM 40 MG/1
40 TABLET, DELAYED RELEASE ORAL DAILY
Qty: 90 TABLET | Refills: 1 | Status: SHIPPED | OUTPATIENT
Start: 2025-02-25

## 2025-02-25 RX ORDER — BACLOFEN 10 MG/1
10 TABLET ORAL 2 TIMES DAILY
Qty: 60 TABLET | Refills: 3 | Status: SHIPPED | OUTPATIENT
Start: 2025-02-25

## 2025-02-25 RX ORDER — TRIAMTERENE AND HYDROCHLOROTHIAZIDE 37.5; 25 MG/1; MG/1
1 CAPSULE ORAL EVERY MORNING
Qty: 90 CAPSULE | Refills: 1 | Status: SHIPPED | OUTPATIENT
Start: 2025-02-25

## 2025-02-25 RX ORDER — DULOXETIN HYDROCHLORIDE 30 MG/1
CAPSULE, DELAYED RELEASE ORAL
Qty: 49 CAPSULE | Refills: 0 | Status: SHIPPED | OUTPATIENT
Start: 2025-02-25 | End: 2025-03-25

## 2025-02-25 RX ORDER — ATORVASTATIN CALCIUM 80 MG/1
80 TABLET, FILM COATED ORAL
Qty: 90 TABLET | Refills: 1 | Status: SHIPPED | OUTPATIENT
Start: 2025-02-25

## 2025-02-25 NOTE — PATIENT INSTRUCTIONS
Wean off gabapentin 100 mg every 3 days.  Start cymbalta 30 mg daily in the morning and thereafter increase to 60 mg once daily or 30 mg twice daily  Increase AMITRYPTYLIN 25 MG DAILY at bedtime  Continue current medications

## 2025-02-26 DIAGNOSIS — D50.8 IRON DEFICIENCY ANEMIA DUE TO DIETARY CAUSES: ICD-10-CM

## 2025-02-26 DIAGNOSIS — R06.09 DYSPNEA ON EXERTION: ICD-10-CM

## 2025-02-26 RX ORDER — FERROUS SULFATE 325(65) MG
325 TABLET ORAL
Qty: 90 TABLET | Refills: 1 | Status: SHIPPED | OUTPATIENT
Start: 2025-02-26

## 2025-02-26 NOTE — ASSESSMENT & PLAN NOTE
Given cervical radicular pain post surgery would recommend increasing amitriptyline to 25 mg daily.  Will wean off gabapentin 100 mg every 3 days reduction starting with the morning dose and taper off.  May still require nighttime dose.  Will start Cymbalta 30 mg daily in the morning for a week and thereafter can take 60 mg once daily or 30 mg twice daily.  Orders:    DULoxetine (CYMBALTA) 30 mg delayed release capsule; Take 1 capsule (30 mg total) by mouth daily for 7 days, THEN 2 capsules (60 mg total) daily for 21 days.    amitriptyline (ELAVIL) 25 mg tablet; Take 1 tablet (25 mg total) by mouth daily at bedtime

## 2025-02-26 NOTE — TELEPHONE ENCOUNTER
"Fax from [facility] on [facility], requesting \"clarification\" on Ferrous Sulf 325mg, one daily with breakfast.  Requesting 90 day supply.  "

## 2025-02-26 NOTE — PROGRESS NOTES
Name: Shaq Brown      : 1970     MRN: [id number]  Encounter Provider: Bessy rCuz MD  Encounter Date: 2025  Encounter department: [facility]    Assessment & Plan  Left Carotid Artery Stenosis s/p Left TCAR  Given cervical radicular pain post surgery would recommend increasing amitriptyline to 25 mg daily.  Will wean off gabapentin 100 mg every 3 days reduction starting with the morning dose and taper off.  May still require nighttime dose.  Will start Cymbalta 30 mg daily in the morning for a week and thereafter can take 60 mg once daily or 30 mg twice daily.  Orders:    DULoxetine (CYMBALTA) 30 mg delayed release capsule; Take 1 capsule (30 mg total) by mouth daily for 7 days, THEN 2 capsules (60 mg total) daily for 21 days.    amitriptyline (ELAVIL) 25 mg tablet; Take 1 tablet (25 mg total) by mouth daily at bedtime    Renovascular hypertension    Orders:    Comprehensive metabolic panel; Future    Renal artery stenosis (HCC)    Orders:    Comprehensive metabolic panel; Future    Essential hypertension         Chronic left-sided low back pain with left-sided sciatica    Orders:    DULoxetine (CYMBALTA) 30 mg delayed release capsule; Take 1 capsule (30 mg total) by mouth daily for 7 days, THEN 2 capsules (60 mg total) daily for 21 days.    amitriptyline (ELAVIL) 25 mg tablet; Take 1 tablet (25 mg total) by mouth daily at bedtime    Thalamic infarction (HCC)         Centrilobular emphysema (HCC)    Orders:    fluticasone-umeclidinium-vilanterol (Trelegy Ellipta) 100-62.5-25 mcg/actuation inhaler; Inhale 1 puff daily Rinse mouth after use.    Stroke-like symptoms    Orders:    atorvastatin (LIPITOR) 80 mg tablet; Take 1 tablet (80 mg total) by mouth in the evening. Take before meals    losartan (COZAAR) 100 MG tablet; Take 1 tablet (100 mg total) by mouth daily    Acute right arterial ischemic stroke, PCA (posterior cerebral artery) (HCC)    Orders:    atorvastatin  (LIPITOR) 80 mg tablet; Take 1 tablet (80 mg total) by mouth in the evening. Take before meals    Chronic left-sided low back pain without sciatica    Orders:    baclofen 10 mg tablet; Take 1 tablet (10 mg total) by mouth 2 (two) times a day    Symptoms of cerebrovascular accident (CVA)    Orders:    losartan (COZAAR) 100 MG tablet; Take 1 tablet (100 mg total) by mouth daily    CVA (cerebral vascular accident) (HCC)    Orders:    losartan (COZAAR) 100 MG tablet; Take 1 tablet (100 mg total) by mouth daily    Gastroesophageal reflux disease without esophagitis    Orders:    pantoprazole (PROTONIX) 40 mg tablet; Take 1 tablet (40 mg total) by mouth daily    Primary hypertension    Orders:    metoprolol succinate (TOPROL-XL) 100 mg 24 hr tablet; Take 1 tablet (100 mg total) by mouth daily    amLODIPine (NORVASC) 10 mg tablet; Take 1 tablet (10 mg total) by mouth daily    Gross hematuria    Orders:    tamsulosin (FLOMAX) 0.4 mg; Take 1 capsule (0.4 mg total) by mouth daily with dinner    Resistant hypertension    Orders:    triamterene-hydrochlorothiazide (DYAZIDE) 37.5-25 mg per capsule; Take 1 capsule by mouth every morning    Tobacco use disorder         Subclinical hyperthyroidism    Orders:    TSH + Free T4; Future    T3, free; Future    Thyroid Antibodies Panel; Future    Hypotestosteronemia    Orders:    Testosterone, free, total; Future    Ambulatory Referral to Urology; Future    Encounter for immunization  Would like to hold off on Shingrix vaccination                      Read package inserts for all medications before starting a new medications, call me if you have any questions.    Patient was given opportunity to ask questions and all questions were answered.    Subjective:     Shaq Brown is a 54 y.o. male.    With Thalamic stroke, with residual LLE weakness, dysathria -improved, brain fog-recent memory issues- completed PT, imbalance-dizziness and impaired depth perception since his thalamic stroke.  Left leg numbness and pressure on left side of neck pain are mainly affecting his quality of life as well as constant and severe.  Gabapentin and amitriptyline help little bit.    Chronic low back pain with left-sided sciatica  Hematuria-resolved  He is unable to work, walks much slower, does have occasional imbalance, unable to use left upper extremity completely  He worked in construction previously, applied for disability now.  Tobacco use disorder -he has cut back on smoking significantly.  He is motivated to quit smoking        Past Medical History:   Diagnosis Date    Carotid stenosis, left     today  1/19/2023  L carotid angioplasty    Carotid stenosis, right     Rt angioplasty completed  11/2023    Cervical disc disease     COVID 05/2020    GERD (gastroesophageal reflux disease)     Hyperlipidemia     Hypertension     Kidney stone     Muscle weakness     Spinal stenosis     Stroke (HCC) 09/13/2022    left sided weakness with pins/needles    Weakness of left side of body 09/13/2022    s/p CVA       Family History   Problem Relation Age of Onset    Heart attack Mother     Diabetes Mother     Hypertension Mother     Colon cancer Father     No Known Problems Sister     No Known Problems Sister     No Known Problems Sister     No Known Problems Son        Past Surgical History:   Procedure Laterality Date    CARPAL TUNNEL RELEASE Bilateral     CERVICAL FUSION      with hardware    CYSTOSCOPY      HERNIA REPAIR      IR CAROTID STENT  11/8/2022    IR CAROTID STENT  1/19/2023    OR TCAT IV STENT CRV CRTD ART EMBOLIC PROTECJ Right 11/8/2022    Procedure: ANGIOPLASTY ARTERY CAROTID W/ STENT TCAR,;  Surgeon: Duane Mack DO;  Location: [facility];  Service: Vascular    OR TCAT IV STENT CRV CRTD ART EMBOLIC PROTECJ Left 1/19/2023    Procedure: ANGIOPLASTY ARTERY CAROTID W/ STENT Left TCAR;  Surgeon: Duane Mack DO;  Location: [facility];  Service: Vascular    ULNAR COLLATERAL LIGAMENT RECONSTRUCTION Bilateral          reports that he has been smoking cigarettes. He started smoking about 37 years ago. He has a 70.4 pack-year smoking history. He has never used smokeless tobacco. He reports current alcohol use of about 12.0 standard drinks of alcohol per week. He reports that he does not currently use drugs.      Current Outpatient Medications:     albuterol (PROVENTIL HFA,VENTOLIN HFA) 90 mcg/act inhaler, Inhale 2 puffs every 6 (six) hours as needed for wheezing, Disp: 8 g, Rfl: 1    amitriptyline (ELAVIL) 25 mg tablet, Take 1 tablet (25 mg total) by mouth daily at bedtime, Disp: 30 tablet, Rfl: 5    amLODIPine (NORVASC) 10 mg tablet, Take 1 tablet (10 mg total) by mouth daily, Disp: 90 tablet, Rfl: 1    atorvastatin (LIPITOR) 80 mg tablet, Take 1 tablet (80 mg total) by mouth in the evening. Take before meals, Disp: 90 tablet, Rfl: 1    baclofen 10 mg tablet, Take 1 tablet (10 mg total) by mouth 2 (two) times a day, Disp: 60 tablet, Rfl: 3    DULoxetine (CYMBALTA) 30 mg delayed release capsule, Take 1 capsule (30 mg total) by mouth daily for 7 days, THEN 2 capsules (60 mg total) daily for 21 days., Disp: 49 capsule, Rfl: 0    ferrous sulfate 325 (65 Fe) mg tablet, Take 1 tablet (325 mg total) by mouth daily with breakfast, Disp: 90 tablet, Rfl: 1    fluticasone-umeclidinium-vilanterol (Trelegy Ellipta) 100-62.5-25 mcg/actuation inhaler, Inhale 1 puff daily Rinse mouth after use., Disp: 180 each, Rfl: 0    losartan (COZAAR) 100 MG tablet, Take 1 tablet (100 mg total) by mouth daily, Disp: 90 tablet, Rfl: 1    metoprolol succinate (TOPROL-XL) 100 mg 24 hr tablet, Take 1 tablet (100 mg total) by mouth daily, Disp: 90 tablet, Rfl: 1    pantoprazole (PROTONIX) 40 mg tablet, Take 1 tablet (40 mg total) by mouth daily, Disp: 90 tablet, Rfl: 1    sildenafil (VIAGRA) 25 MG tablet, Take 1 tablet (25 mg total) by mouth daily as needed for erectile dysfunction, Disp: 10 tablet, Rfl: 0    tamsulosin (FLOMAX) 0.4 mg, Take 1 capsule (0.4  "mg total) by mouth daily with dinner, Disp: 90 capsule, Rfl: 1    triamterene-hydrochlorothiazide (DYAZIDE) 37.5-25 mg per capsule, Take 1 capsule by mouth every morning, Disp: 90 capsule, Rfl: 1    aspirin 81 mg chewable tablet, Chew 1 tablet (81 mg total) daily, Disp: 30 tablet, Rfl: 0    The following portions of the patient's history were reviewed and updated as appropriate: allergies, current medications, past family history, past medical history, past social history, past surgical history and problem list.    Review of Systems   Constitutional:  Negative for chills and fever.   HENT:  Negative for congestion, rhinorrhea and sore throat.    Eyes:  Negative for discharge, redness and itching.   Respiratory:  Negative for chest tightness, shortness of breath and wheezing.    Cardiovascular:  Negative for chest pain and palpitations.   Gastrointestinal:  Negative for abdominal pain, constipation and diarrhea.   Genitourinary:  Negative for dysuria.   Musculoskeletal:  Positive for arthralgias, back pain, gait problem and neck pain.   Skin:  Negative for pallor and rash.   Neurological:  Positive for speech difficulty and weakness. Negative for dizziness, numbness and headaches.         PHQ-2/9 Depression Screening    Little interest or pleasure in doing things: 0 - not at all  Feeling down, depressed, or hopeless: 0 - not at all  PHQ-2 Score: 0  PHQ-2 Interpretation: Negative depression screen             Objective:    /80 (BP Location: Left arm, Patient Position: Sitting, Cuff Size: Adult)   Pulse 85   Temp 97.6 °F (36.4 °C) (Tympanic)   Resp 18   Ht 5' 9\" (1.753 m)   Wt 83.6 kg (184 lb 6.4 oz)   SpO2 98%   BMI 27.23 kg/m²      Physical Exam  Vitals and nursing note reviewed.   Constitutional:       Appearance: Normal appearance.   HENT:      Head: Normocephalic and atraumatic.      Right Ear: Tympanic membrane, ear canal and external ear normal.      Left Ear: Tympanic membrane, ear canal and " external ear normal.      Nose: No congestion or rhinorrhea.      Mouth/Throat:      Mouth: Mucous membranes are moist.      Pharynx: No posterior oropharyngeal erythema.   Eyes:      General:         Right eye: No discharge.         Left eye: No discharge.      Conjunctiva/sclera: Conjunctivae normal.   Neck:     Cardiovascular:      Rate and Rhythm: Normal rate and regular rhythm.      Heart sounds: No murmur heard.  Pulmonary:      Effort: Pulmonary effort is normal.      Breath sounds: Normal breath sounds. No wheezing.   Abdominal:      General: There is no distension.      Palpations: Abdomen is soft.      Tenderness: There is no abdominal tenderness.   Musculoskeletal:         General: Tenderness and deformity present.      Right lower leg: No edema.      Left lower leg: No edema.   Skin:     Findings: No lesion or rash.   Neurological:      Mental Status: He is alert and oriented to person, place, and time. Mental status is at baseline.      Cranial Nerves: Cranial nerve deficit present.      Motor: Weakness present.      Coordination: Coordination abnormal.      Gait: Gait abnormal.      Deep Tendon Reflexes: Reflexes abnormal.   Psychiatric:         Mood and Affect: Mood normal.         Thought Content: Thought content normal.           Recent Results (from the past 52 weeks)   Metanephrine, Fractionated Plasma Free    Collection Time: 03/28/24 10:45 AM   Result Value Ref Range    Normetanephrine, Free 64.5 0.0 - 244.0 pg/mL    Metanephrine, Free <25.0 0.0 - 88.0 pg/mL   Basic metabolic panel    Collection Time: 03/28/24 10:45 AM   Result Value Ref Range    Sodium 138 135 - 147 mmol/L    Potassium 4.0 3.5 - 5.3 mmol/L    Chloride 103 96 - 108 mmol/L    CO2 29 21 - 32 mmol/L    ANION GAP 6 4 - 13 mmol/L    BUN 16 5 - 25 mg/dL    Creatinine 1.17 0.60 - 1.30 mg/dL    Glucose 89 65 - 140 mg/dL    Calcium 9.7 8.4 - 10.2 mg/dL    eGFR 71 ml/min/1.73sq m   Renin Activity, Plasma    Collection Time: 03/28/24 10:45  AM   Result Value Ref Range    Renin 86.167 (H) 0.167 - 5.380 ng/mL/hr   Aldosterone/Renin Ratio    Collection Time: 03/28/24 10:45 AM   Result Value Ref Range    Renin 79.872 (H) 0.167 - 5.380 ng/mL/hr    Aldosterone 8.9 0.0 - 30.0 ng/dL    Aldos/Renin Ratio 0.1 0.0 - 30.0   COLOGUARD    Collection Time: 08/26/24 10:04 AM   Result Value Ref Range    Cologuard Result Positive (A) Negative   Cologuard    Collection Time: 08/26/24  2:04 PM   Result Value Ref Range    Cologuard Result Positive (A) Negative   Tissue Exam    Collection Time: 10/10/24 11:43 AM   Result Value Ref Range    Case Report       Surgical Pathology Report                         Case: [id number]                                  Authorizing Provider:  Michael Jasmine DO        Collected:           10/10/2024 1143              Ordering Location:     [facility] Received:            10/10/2024 1420                                     Endoscopy                                                                    Pathologist:           Arnoldo Palomares MD                                                            Specimens:   A) - Polyp, Colorectal, proximal sigmoid; polyp x1                                                  B) - Polyp, Colorectal, sigmoid @ 40; polyp x1                                                      C) - Polyp, Colorectal, rectum; polyp x1                                                   Final Diagnosis       A. Colon, proximal sigmoid, polyp, biopsy:   - Tubular adenoma, negative for high-grade dysplasia.     B. Colon, sigmoid, 40 cm, polyp, cold snare:   - Tubular adenoma, negative for high-grade dysplasia.     C. Rectum, polyp, cold snare:   - Hyperplastic polyp.         Additional Information       All reported additional testing was performed with appropriately reactive controls.  These tests were developed and their performance characteristics determined by [facility] or appropriate  "performing facility, though some tests may be performed on tissues which have not been validated for performance characteristics (such as staining performed on alcohol exposed cell blocks and decalcified tissues).  Results should be interpreted with caution and in the context of the patients’ clinical condition. These tests may not be cleared or approved by the U.S. Food and Drug Administration, though the FDA has determined that such clearance or approval is not necessary. These tests are used for clinical purposes and they should not be regarded as investigational or for research. This laboratory has been approved by [place] 88, designated as a high-complexity laboratory and is qualified to perform these tests.    Interpretation performed at [facility], [address] [address].      Synoptic Checklist          COLON/RECTUM POLYP FORM - GI - A, B          :    Adenoma(s)      Gross Description       A. The specimen is received in formalin, labeled with the patient's name and hospital number, and is designated \" proximal sigmoid polyp x 1\".  The specimen consists of 1 tan soft tissue fragment measuring 0.3 cm.  Entirely submitted. Screened cassette.  B. The specimen is received in formalin, labeled with the patient's name and hospital number, and is designated \" sigmoid at 40 polyp x 1\".  The specimen consists of 1 tan polyp measuring 0.5 cm.  Entirely submitted. Screened cassette.  C. The specimen is received in formalin, labeled with the patient's name and hospital number, and is designated \" rectum polyp x 1\".  The specimen consists of 1 tan polyp measuring 0.6 cm admixed with mucinous material.  Entirely submitted. Screened cassette.    Note: The estimated total formalin fixation time based upon information provided by the submitting clinician and the standard processing schedule is under 72.0 hours.  RRavotti      Clinical Information       FINDINGS:  · One sessile polyp measuring smaller than 5 " mm in the proximal sigmoid colon; bleeding occurred after intervention; performed cold forceps biopsy with complete en bloc removal  · One sessile polyp measuring smaller than 5 mm in the sigmoid colon 40 cm from the anal verge; bleeding occurred after intervention; performed cold snare with complete en bloc removal and retrieved specimen  · Sessile polyp measuring smaller than 5 mm in the rectum; bleeding occurred after intervention; performed cold snare with complete en bloc removal and retrieved specimen  · The cecum, ascending colon, hepatic flexure, transverse colon, splenic flexure and descending colon appeared normal.  · Multiple medium diverticula of moderate severity with no inflammation containing no content in the proximal sigmoid colon, mid sigmoid colon and distal sigmoid colon; no bleeding was identified   Comprehensive metabolic panel    Collection Time: 11/22/24  8:44 AM   Result Value Ref Range    Sodium 138 135 - 147 mmol/L    Potassium 3.6 3.5 - 5.3 mmol/L    Chloride 105 96 - 108 mmol/L    CO2 26 21 - 32 mmol/L    ANION GAP 7 4 - 13 mmol/L    BUN 16 5 - 25 mg/dL    Creatinine 1.12 0.60 - 1.30 mg/dL    Glucose, Fasting 102 (H) 65 - 99 mg/dL    Calcium 9.9 8.4 - 10.2 mg/dL    AST 31 13 - 39 U/L    ALT 54 (H) 7 - 52 U/L    Alkaline Phosphatase 70 34 - 104 U/L    Total Protein 7.8 6.4 - 8.4 g/dL    Albumin 4.7 3.5 - 5.0 g/dL    Total Bilirubin 0.50 0.20 - 1.00 mg/dL    eGFR 75 ml/min/1.73sq m   Cystatin C With eGFR    Collection Time: 11/22/24  8:44 AM   Result Value Ref Range    CYSTATIN C 1.22 (H) 0.67 - 1.14 mg/L    eGFR 62 >59 mL/min/1.73   Albumin / creatinine urine ratio    Collection Time: 11/22/24  8:44 AM   Result Value Ref Range    Creatinine, Ur 137.0 Reference range not established. mg/dL    Albumin,U,Random 24.8 (H) <20.0 mg/L    Albumin Creat Ratio 18 0 - 30 mg/g creatinine   Lipid Panel with Direct LDL reflex    Collection Time: 11/22/24  8:44 AM   Result Value Ref Range    Cholesterol  166 See Comment mg/dL    Triglycerides 143 See Comment mg/dL    HDL, Direct 49 >=40 mg/dL    LDL Calculated 88 0 - 100 mg/dL   CBC and differential    Collection Time: 11/22/24  8:44 AM   Result Value Ref Range    WBC 10.89 (H) 4.31 - 10.16 Thousand/uL    RBC 5.13 3.88 - 5.62 Million/uL    Hemoglobin 17.1 (H) 12.0 - 17.0 g/dL    Hematocrit 49.2 36.5 - 49.3 %    MCV 96 82 - 98 fL    MCH 33.3 26.8 - 34.3 pg    MCHC 34.8 31.4 - 37.4 g/dL    RDW 12.7 11.6 - 15.1 %    MPV 10.5 8.9 - 12.7 fL    Platelets 286 149 - 390 Thousands/uL    nRBC 0 /100 WBCs    Segmented % 70 43 - 75 %    Immature Grans % 0 0 - 2 %    Lymphocytes % 19 14 - 44 %    Monocytes % 8 4 - 12 %    Eosinophils Relative 2 0 - 6 %    Basophils Relative 1 0 - 1 %    Absolute Neutrophils 7.65 (H) 1.85 - 7.62 Thousands/µL    Absolute Immature Grans 0.02 0.00 - 0.20 Thousand/uL    Absolute Lymphocytes 2.03 0.60 - 4.47 Thousands/µL    Absolute Monocytes 0.84 0.17 - 1.22 Thousand/µL    Eosinophils Absolute 0.24 0.00 - 0.61 Thousand/µL    Basophils Absolute 0.11 (H) 0.00 - 0.10 Thousands/µL   Hemoglobin A1C    Collection Time: 11/22/24  8:44 AM   Result Value Ref Range    Hemoglobin A1C 5.9 (H) Normal 4.0-5.6%; PreDiabetic 5.7-6.4%; Diabetic >=6.5%; Glycemic control for adults with diabetes <7.0% %     mg/dl   TSH, 3rd generation with Free T4 reflex    Collection Time: 11/22/24  8:44 AM   Result Value Ref Range    TSH 3RD GENERATON 0.333 (L) 0.450 - 4.500 uIU/mL   PSA, total and free    Collection Time: 11/22/24  8:44 AM   Result Value Ref Range    PSA, Diagnostic 0.953 0.000 - 4.000 ng/mL    PSA, Free 0.241 ng/mL    PSA % Free 25.289 %   Hepatitis C Antibody    Collection Time: 11/22/24  8:44 AM   Result Value Ref Range    Hepatitis C Ab Non-reactive Non-Reactive   HIV 1/2 AG/AB w Reflex [facility] for 2 yr old and above    Collection Time: 11/22/24  8:44 AM   Result Value Ref Range    HIV-1 p24 Antigen Non-Reactive Non-Reactive    HIV-1 Antibody Non-Reactive  Non-Reactive    HIV-2 Antibody Non-Reactive Non-Reactive    HIV Ag-Ab 5th Gen Non-Reactive Non-Reactive   Testosterone, free, total    Collection Time: 11/22/24  8:44 AM   Result Value Ref Range    Testosterone, Free 6.0 (L) 7.2 - 24.0 pg/mL    TESTOSTERONE TOTAL 444 264 - 916 ng/dL   T4, free    Collection Time: 11/22/24  8:44 AM   Result Value Ref Range    Free T4 0.91 0.61 - 1.12 ng/dL       Laboratory Results: I have personally reviewed the pertinent laboratory results/reports     Radiology/Other Diagnostic Testing Results: I have reviewed the following imaging and agree with the interpretation below.    VAS carotid complete study  Result Date: 12/6/2024   [facility] REPORT CLINICAL: Indications: Yearly surveillance of carotid artery disease.  Patient reports having a pressure sensation on the left side of his neck that radiates towards his jaw. Operative History: 2023-01-19 Left TCAR (Dr. Mack) 2022-11-08 Right TCAR (Dr. Mack) Risk Factors The patient has history of HTN, Hyperlipidemia, CKD, CVA, and smoking (current) 0.5 ppd. Clinical Right Pressure:  116/80 mm Hg, Left Pressure:  120/80 mm Hg.  FINDINGS:  Right              Impression     PSV  EDV (cm/s)  Direction of Flow  Dist. ICA                          51          20                     Mid. ICA                           79          32                     Prox. ICA - Stent  Widely Patent   73          24                     Dist CCA                           59          23                     Mid CCA                            77          27                     Prox CCA                           91          25                     Ext Carotid        Severe         452         130                     Prox Vert                          96          25  Antegrade          Subclavian                        241           0                      Left               Impression     PSV  EDV (cm/s)  Direction of Flow  Dist. ICA                          60  "         22                     Mid. ICA                           71          27                     Prox. ICA - Stent  Widely Patent   61          24                     Dist CCA                           50          19                     Mid CCA                            65          22                     Prox CCA                           82          26                     Ext Carotid        Moderate       211          10                     Prox Vert                          64          22  Antegrade          Subclavian                        213           0                        CONCLUSION:  Impression  RIGHT: Widely patent internal carotid artery and stent. Severe stenosis noted in the external carotid artery. Vertebral artery flow is antegrade. There is no significant subclavian artery disease.  LEFT: Widely patent internal carotid artery and stent. Moderate stenosis noted in the external carotid artery. Vertebral artery flow is antegrade. There is no significant subclavian artery disease.  Compared to previous study on 12/4/2023, there is no significant progression of disease.  SIGNATURE: Electronically Signed by: HUNTER LACEY on 2024-12-06 10:34:24 AM       Disclaimer: Portions of the record may have been created with voice recognition software. Occasional wrong word or \"sound a like\" substitutions may have occurred due to the inherent limitations of voice recognition software. Read the chart carefully and recognize, using context, where substitutions have occurred. I have used the Epic copy/forward function to compose this note. I have reviewed my current note to ensure it reflects the current patient status, exam, assessment and plan.  "

## 2025-02-26 NOTE — ASSESSMENT & PLAN NOTE
Orders:    DULoxetine (CYMBALTA) 30 mg delayed release capsule; Take 1 capsule (30 mg total) by mouth daily for 7 days, THEN 2 capsules (60 mg total) daily for 21 days.    amitriptyline (ELAVIL) 25 mg tablet; Take 1 tablet (25 mg total) by mouth daily at bedtime

## 2025-02-27 ENCOUNTER — OFFICE VISIT (OUTPATIENT)
Dept: VASCULAR SURGERY | Facility: CLINIC | Age: 55
End: 2025-02-27
Payer: COMMERCIAL

## 2025-02-27 ENCOUNTER — HOSPITAL ENCOUNTER (OUTPATIENT)
Dept: RADIOLOGY | Facility: HOSPITAL | Age: 55
Discharge: HOME | End: 2025-02-27
Payer: COMMERCIAL

## 2025-02-27 VITALS
RESPIRATION RATE: 20 BRPM | HEART RATE: 99 BPM | BODY MASS INDEX: 27.43 KG/M2 | OXYGEN SATURATION: 97 % | SYSTOLIC BLOOD PRESSURE: 126 MMHG | DIASTOLIC BLOOD PRESSURE: 76 MMHG | WEIGHT: 185.2 LBS | HEIGHT: 69 IN

## 2025-02-27 DIAGNOSIS — Z98.890 STATUS POST CAROTID SURGERY: ICD-10-CM

## 2025-02-27 DIAGNOSIS — I65.22 LEFT CAROTID ARTERY STENOSIS: Primary | ICD-10-CM

## 2025-02-27 DIAGNOSIS — I70.1 RENAL ARTERY STENOSIS (HCC): ICD-10-CM

## 2025-02-27 DIAGNOSIS — M48.02 CERVICAL SPINAL STENOSIS: ICD-10-CM

## 2025-02-27 PROCEDURE — 72052 X-RAY EXAM NECK SPINE 6/>VWS: CPT

## 2025-02-27 PROCEDURE — 99214 OFFICE O/P EST MOD 30 MIN: CPT | Performed by: NURSE PRACTITIONER

## 2025-02-27 RX ORDER — GABAPENTIN 100 MG/1
100 CAPSULE ORAL
COMMUNITY

## 2025-02-27 NOTE — ASSESSMENT & PLAN NOTE
Renal DU 1/12/24  RIGHT RENAL:  >60% stenosis in the main renal artery.   Adequate parenchymal flow is noted with a RRI of 0.64.  RAR: 12.04 .  Kidney measures 10.88 cm.     LEFT RENAL:  No evidence of significant arterial occlusive disease in the main renal artery.  Adequate parenchymal flow is noted with a RRI 0.64.  RAR: 3.02 .  The kidney measures approximately 12.64 cm.     -Currently on 3 BP medications - metoprolol Xl 25, amlodipine 10, losartan 100   Just recently started on diuretic  Triamterene-HCTZ     -Most recent lab work demonstrates stable kidney results.  Cr 1.23/ GFR 62 (11/22/24)     Reviewed Renal ultrasound in detail with pt and discussed intidations for vascular intervention with uncontrolled BP with use of >4 blood pressure medications, b/l kidney arterial disease, kidney atrophy. Pt does not exhibit criteria for renal vascular intervention. BP medications recently adjusted, f/u with cardiology and neurology/ PCP for pain symptoms. Pt verbalized understanding.         Recommendations  -Repeat renal ultrasound due now and if unchanged repeat in one year.  -Blood pressure control as per Cardiology. Per nephrology Target blood pressure less than 130/80   -Continue atorvastatin as per PCP.  -Continue to take ASA and atorvastatin daily.   -Call the office with any questions/ concerns    Orders:    VAS renal artery complete; Future

## 2025-02-27 NOTE — PROGRESS NOTES
Name: Shaq Brown      : 1970      MRN: [id number]  Encounter Provider: JOSEPHINE Garcia  Encounter Date: 2025   Encounter department: [facility]    54-year-old male with PMHx HTN, hx of R ischemic stroke, HTN, Thalamic infarction, b/l nephrolithiasis, back pain, b/l carotid stenosis he is s/p L TCAR 23 and R TCAR 2022 both done by  pt now returns to the office for review of his carotid ultrasound   Assessment & Plan  Left Carotid Artery Stenosis s/p Left TCAR  Reviewed carotid ultrasound from 2024 which demonstrates right ICA stent is widely patent with velocities 73/24.  Left ICA stent is widely patent with velocities 61/24    Denies symptoms of numbness/ tingling/ weakness on one side of the body, facial droop, slurred speech or blindness in one eye.     -Reviewed carotid ultrasound in detail with pt discussed indications for vascular intervention. Discussed that b/l carotid stents are widely patient with no concerns for any stenosis. No planned vascular intervention at this time. Discussed continued surveillance with non-invasive imaging in one year. Pt verbalized understanding.      Recommendations  -Complete carotid ultrasound in 1 year with return visit for review.  -continue to take aspirin 81 mg as per   -continue to take atorvastatin daily as per PCP.  -F/U with PCP for any blood pressure medication adjustments.   -Go to the ED with any S/Sx of TIA/CVA      Orders:    VAS carotid complete study; Future    Status post carotid surgery    Orders:    VAS carotid complete study; Future    Renal artery stenosis (HCC)  Renal DU 24  RIGHT RENAL:  >60% stenosis in the main renal artery.   Adequate parenchymal flow is noted with a RRI of 0.64.  RAR: 12.04 .  Kidney measures 10.88 cm.     LEFT RENAL:  No evidence of significant arterial occlusive disease in the main renal artery.  Adequate parenchymal flow is noted with a RRI 0.64.  RAR:  3.02 .  The kidney measures approximately 12.64 cm.     -Currently on 3 BP medications - metoprolol Xl 25, amlodipine 10, losartan 100   Just recently started on diuretic  Triamterene-HCTZ     -Most recent lab work demonstrates stable kidney results.  Cr 1.23/ GFR 62 (11/22/24)     Reviewed Renal ultrasound in detail with pt and discussed intidations for vascular intervention with uncontrolled BP with use of >4 blood pressure medications, b/l kidney arterial disease, kidney atrophy. Pt does not exhibit criteria for renal vascular intervention. BP medications recently adjusted, f/u with cardiology and neurology/ PCP for pain symptoms. Pt verbalized understanding.         Recommendations  -Repeat renal ultrasound due now and if unchanged repeat in one year.  -Blood pressure control as per Cardiology. Per nephrology Target blood pressure less than 130/80   -Continue atorvastatin as per PCP.  -Continue to take ASA and atorvastatin daily.   -Call the office with any questions/ concerns    Orders:    VAS renal artery complete; Future        History of Present Illness   HPI  Shaq Brown is a 54 y.o. male who presents     Reports that his left leg is having some numbness/ tingling, muscles spasms. L thigh pain.   Denies life limiting claudication, at night he has R sided back pain.  Cramping in the L calf when standing. Discussed that he has b/l DP and PT palpable pulses with no concerns for arterial cause of his pain and reviewed that he was seen by neuro in the past who recommended Xray which he has yet to complete. Encouraged him to completed recommended f/u studies.   -Denies symptoms of numbness/ tingling/ weakness on one side of the body, facial droop, slurred speech or blindness in one eye.   -Reports controlled BP  -he is compliant with ASA and statin therapy.      History obtained from: patient    Review of Systems   Eyes:  Negative for visual disturbance.   Respiratory:  Negative for shortness of breath.     Cardiovascular:  Negative for chest pain.   Musculoskeletal:  Positive for arthralgias and back pain.   Neurological:  Negative for dizziness, facial asymmetry, speech difficulty, weakness, light-headedness and numbness.     Current Outpatient Medications on File Prior to Visit   Medication Sig Dispense Refill    albuterol (PROVENTIL HFA,VENTOLIN HFA) 90 mcg/act inhaler Inhale 2 puffs every 6 (six) hours as needed for wheezing 8 g 1    amitriptyline (ELAVIL) 25 mg tablet Take 1 tablet (25 mg total) by mouth daily at bedtime 30 tablet 5    amLODIPine (NORVASC) 10 mg tablet Take 1 tablet (10 mg total) by mouth daily 90 tablet 1    aspirin 81 mg chewable tablet Chew 1 tablet (81 mg total) daily 30 tablet 0    atorvastatin (LIPITOR) 80 mg tablet Take 1 tablet (80 mg total) by mouth in the evening. Take before meals 90 tablet 1    B Complex-C (SUPER B COMPLEX PO) Take by mouth      baclofen 10 mg tablet Take 1 tablet (10 mg total) by mouth 2 (two) times a day 60 tablet 3    DULoxetine (CYMBALTA) 30 mg delayed release capsule Take 1 capsule (30 mg total) by mouth daily for 7 days, THEN 2 capsules (60 mg total) daily for 21 days. 49 capsule 0    ferrous sulfate 325 (65 Fe) mg tablet Take 1 tablet (325 mg total) by mouth daily with breakfast 90 tablet 1    fluticasone-umeclidinium-vilanterol (Trelegy Ellipta) 100-62.5-25 mcg/actuation inhaler Inhale 1 puff daily Rinse mouth after use. 180 each 0    gabapentin (NEURONTIN) 100 mg capsule Take 100 mg by mouth daily at bedtime      losartan (COZAAR) 100 MG tablet Take 1 tablet (100 mg total) by mouth daily 90 tablet 1    metoprolol succinate (TOPROL-XL) 100 mg 24 hr tablet Take 1 tablet (100 mg total) by mouth daily 90 tablet 1    pantoprazole (PROTONIX) 40 mg tablet Take 1 tablet (40 mg total) by mouth daily 90 tablet 1    tamsulosin (FLOMAX) 0.4 mg Take 1 capsule (0.4 mg total) by mouth daily with dinner 90 capsule 1    triamterene-hydrochlorothiazide (DYAZIDE) 37.5-25 mg  "per capsule Take 1 capsule by mouth every morning 90 capsule 1    sildenafil (VIAGRA) 25 MG tablet Take 1 tablet (25 mg total) by mouth daily as needed for erectile dysfunction (Patient not taking: Reported on 2/27/2025) 10 tablet 0     No current facility-administered medications on file prior to visit.         Objective   /76 (BP Location: Left arm, Patient Position: Sitting)   Pulse 99   Resp 20   Ht 5' 9\" (1.753 m)   Wt 84 kg (185 lb 3.2 oz)   SpO2 97%   BMI 27.35 kg/m²      Physical Exam  Vitals reviewed.   Constitutional:       General: He is not in acute distress.     Appearance: Normal appearance. He is obese. He is not ill-appearing.   HENT:      Head: Normocephalic and atraumatic.   Cardiovascular:      Rate and Rhythm: Normal rate and regular rhythm.      Pulses:           Radial pulses are 2+ on the right side and 1+ on the left side.        Dorsalis pedis pulses are 2+ on the right side and 2+ on the left side.        Posterior tibial pulses are 2+ on the right side and 2+ on the left side.      Heart sounds: No murmur heard.  Pulmonary:      Effort: Pulmonary effort is normal. No respiratory distress.   Musculoskeletal:      Right lower leg: No edema.      Left lower leg: No edema.   Skin:     General: Skin is warm and dry.      Findings: No erythema or rash.   Neurological:      General: No focal deficit present.      Mental Status: He is alert and oriented to person, place, and time.      Sensory: Sensory deficit (L toes) present.      Motor: No weakness.      Gait: Gait normal.   Psychiatric:         Mood and Affect: Mood normal.         Behavior: Behavior normal.         Administrative Statements   I have spent a total time of 30 minutes in caring for this patient on the day of the visit/encounter including Diagnostic results, Risks and benefits of tx options, Instructions for management, Patient and family education, Counseling / Coordination of care, Documenting in the medical record, " Reviewing/placing orders in the medical record (including tests, medications, and/or procedures), and Obtaining or reviewing history  .

## 2025-02-27 NOTE — ASSESSMENT & PLAN NOTE
Reviewed carotid ultrasound from 12/6/2024 which demonstrates right ICA stent is widely patent with velocities 73/24.  Left ICA stent is widely patent with velocities 61/24    Denies symptoms of numbness/ tingling/ weakness on one side of the body, facial droop, slurred speech or blindness in one eye.     -Reviewed carotid ultrasound in detail with pt discussed indications for vascular intervention. Discussed that b/l carotid stents are widely patient with no concerns for any stenosis. No planned vascular intervention at this time. Discussed continued surveillance with non-invasive imaging in one year. Pt verbalized understanding.      Recommendations  -Complete carotid ultrasound in 1 year with return visit for review.  -continue to take aspirin 81 mg as per   -continue to take atorvastatin daily as per PCP.  -F/U with PCP for any blood pressure medication adjustments.   -Go to the ED with any S/Sx of TIA/CVA      Orders:    VAS carotid complete study; Future

## 2025-03-04 ENCOUNTER — TELEPHONE (OUTPATIENT)
Age: 55
End: 2025-03-04

## 2025-03-10 DIAGNOSIS — R06.09 DYSPNEA ON EXERTION: ICD-10-CM

## 2025-03-11 RX ORDER — ALBUTEROL SULFATE 90 UG/1
2 INHALANT RESPIRATORY (INHALATION) EVERY 6 HOURS PRN
Qty: 9 G | Refills: 3 | Status: SHIPPED | OUTPATIENT
Start: 2025-03-11

## 2025-03-31 DIAGNOSIS — M54.42 CHRONIC LEFT-SIDED LOW BACK PAIN WITH LEFT-SIDED SCIATICA: ICD-10-CM

## 2025-03-31 DIAGNOSIS — I65.22 LEFT CAROTID ARTERY STENOSIS: ICD-10-CM

## 2025-03-31 DIAGNOSIS — G89.29 CHRONIC LEFT-SIDED LOW BACK PAIN WITH LEFT-SIDED SCIATICA: ICD-10-CM

## 2025-04-01 RX ORDER — DULOXETIN HYDROCHLORIDE 30 MG/1
CAPSULE, DELAYED RELEASE ORAL
Qty: 60 CAPSULE | Refills: 0 | Status: SHIPPED | OUTPATIENT
Start: 2025-04-01

## 2025-04-02 DIAGNOSIS — N52.9 ERECTILE DYSFUNCTION OF ORGANIC ORIGIN: ICD-10-CM

## 2025-04-02 RX ORDER — SILDENAFIL 25 MG/1
25 TABLET, FILM COATED ORAL DAILY PRN
Qty: 10 TABLET | Refills: 1 | Status: SHIPPED | OUTPATIENT
Start: 2025-04-02

## 2025-04-02 NOTE — TELEPHONE ENCOUNTER
Reason for call:   [x] Refill   [] Prior Auth  [] Other:     Office:   [x] PCP/Provider -   [] Specialty/Provider -     Medication: sildenafil (VIAGRA) 25 MG tablet Take 1 tablet (25 mg total) by mouth daily as needed for erectile dysfunction       Pharmacy: [id number] - [facility] [place] - [id number]      Local Pharmacy   Does the patient have enough for 3 days?   [x] Yes   [] No - Send as HP to POD    Mail Away Pharmacy   Does the patient have enough for 10 days?   [] Yes   [] No - Send as HP to POD

## 2025-04-04 ENCOUNTER — HOSPITAL ENCOUNTER (OUTPATIENT)
Dept: NON INVASIVE DIAGNOSTICS | Facility: CLINIC | Age: 55
Discharge: HOME | End: 2025-04-04
Payer: COMMERCIAL

## 2025-04-04 DIAGNOSIS — I70.1 RENAL ARTERY STENOSIS (HCC): ICD-10-CM

## 2025-04-04 PROCEDURE — 93975 VASCULAR STUDY: CPT | Performed by: SURGERY

## 2025-04-04 PROCEDURE — 93975 VASCULAR STUDY: CPT

## 2025-04-07 ENCOUNTER — TRANSCRIBE ORDERS (OUTPATIENT)
Dept: VASCULAR SURGERY | Facility: CLINIC | Age: 55
End: 2025-04-07

## 2025-04-07 DIAGNOSIS — I70.1 RENAL ARTERY STENOSIS (HCC): Primary | ICD-10-CM

## (undated) DEVICE — LIGACLIP MCA MULTIPLE CLIP APPLIERS, 20 SMALL CLIPS: Brand: LIGACLIP

## (undated) DEVICE — INTRODUCER KIT MICO 4FR 21G X 7CM

## (undated) DEVICE — UMBILICAL TAPE: Brand: DEROYAL

## (undated) DEVICE — SYRINGE 20ML LL

## (undated) DEVICE — BETHL CAROTID ENDARTERECTOMY: Brand: CARDINAL HEALTH

## (undated) DEVICE — INTENDED FOR TISSUE SEPARATION, AND OTHER PROCEDURES THAT REQUIRE A SHARP SURGICAL BLADE TO PUNCTURE OR CUT.: Brand: BARD-PARKER SAFETY BLADES SIZE 15, STERILE

## (undated) DEVICE — SURGIFOAM 7 X 12 SPONGE ABS

## (undated) DEVICE — AVIATOR PLUS .014 4.0X30 142CM: Brand: AVIATOR

## (undated) DEVICE — HEMOCLIP CARTRIDGE SM

## (undated) DEVICE — DRAPE SURGIKIT SADDLE BAG

## (undated) DEVICE — PRESTO™ INFLATION DEVICE: Brand: PRESTO

## (undated) DEVICE — SUT SILK 2-0 18 IN A185H

## (undated) DEVICE — FLUID MANAGEMENT KIT - IR

## (undated) DEVICE — NEEDLE 25G X 1 1/2

## (undated) DEVICE — INTENDED FOR TISSUE SEPARATION, AND OTHER PROCEDURES THAT REQUIRE A SHARP SURGICAL BLADE TO PUNCTURE OR CUT.: Brand: BARD-PARKER ® CARBON RIB-BACK BLADES

## (undated) DEVICE — SUT MONOCRYL 4-0 PS-2 18 IN Y496G

## (undated) DEVICE — SNAP KOVER: Brand: UNBRANDED

## (undated) DEVICE — GUIDEWIRE THRUWAY 0.014 IN 130CM LONG STR

## (undated) DEVICE — SUT SILK 0 CT-1 30 IN 424H

## (undated) DEVICE — HEMOSTATIC MATRIX SURGIFLO 8ML W/THROMBIN

## (undated) DEVICE — ADHESIVE SKIN HIGH VISCOSITY EXOFIN 1ML

## (undated) DEVICE — 40529 DERMAPROX PAD 11'' X 15'' X 1'': Brand: 40529 DERMAPROX PAD 11'' X 15'' X 1''

## (undated) DEVICE — PETRI DISH STERILE

## (undated) DEVICE — Device

## (undated) DEVICE — SUT MONOCRYL 4-0 PS-2 27 IN Y426H

## (undated) DEVICE — SUT SILK 3-0 18 IN A184H

## (undated) DEVICE — GUIDEWIRE THRUWAY 0.014 IN 190CM SHRT STR

## (undated) DEVICE — SUT PROLENE 6-0 BV130 30 IN 8709H

## (undated) DEVICE — 2000CC GUARDIAN II: Brand: GUARDIAN

## (undated) DEVICE — SURGICEL 4 X 8

## (undated) DEVICE — 3M™ IOBAN™ 2 ANTIMICROBIAL INCISE DRAPE 6640EZ: Brand: IOBAN™ 2

## (undated) DEVICE — MICROPUNCTURE 501

## (undated) DEVICE — COVER PROBE INTRAOPERATIVE 6 X 96 IN

## (undated) DEVICE — SUT PROLENE 6-0 C-1/C-1 30 IN 8307H

## (undated) DEVICE — CAROTID ARTERY SHUNT KIT,RADIOPAQUE LINE, STRAIGHT: Brand: ARGYLE

## (undated) DEVICE — SUT SILK 2-0 30 IN A305H

## (undated) DEVICE — BAG DECANTER

## (undated) DEVICE — ADHESIVE SKIN CLOSR DERMABOND PRINEO

## (undated) DEVICE — SUT VICRYL 2-0 SH 27 IN UNDYED J417H

## (undated) DEVICE — GLOVE SRG BIOGEL 6

## (undated) DEVICE — ANGLED-TIP ARTERIAL SHEATH CONFIGURATION: Brand: ENROUTE TRANSCAROTID NEUROPROTECTION SYSTEM

## (undated) DEVICE — CHLORAPREP HI-LITE 26ML ORANGE

## (undated) DEVICE — ELECTRODE BLADE E-Z CLEAN 4IN -0014A

## (undated) DEVICE — SURGICEL FIBRILLAR 1 X 2

## (undated) DEVICE — SYRINGE 1ML SLIP TIP

## (undated) DEVICE — PROXIMATE SKIN STAPLERS (35 WIDE) CONTAINS 35 STAINLESS STEEL STAPLES (FIXED HEAD): Brand: PROXIMATE

## (undated) DEVICE — GLOVE SRG BIOGEL 7.5

## (undated) DEVICE — 3M™ STERI-STRIP™ REINFORCED ADHESIVE SKIN CLOSURES, R1547, 1/2 IN X 4 IN (12 MM X 100 MM), 6 STRIPS/ENVELOPE: Brand: 3M™ STERI-STRIP™

## (undated) DEVICE — SUT MONOCRYL 3-0 SH 27 IN Y416H

## (undated) DEVICE — TELFA NON-ADHERENT ABSORBENT DRESSING: Brand: TELFA

## (undated) DEVICE — 3M™ TEGADERM™ TRANSPARENT FILM DRESSING FRAME STYLE, 1626W, 4 IN X 4-3/4 IN (10 CM X 12 CM), 50/CT 4CT/CASE: Brand: 3M™ TEGADERM™

## (undated) DEVICE — SET EXT 20IN IV LS SLIDE CLAMP LF NON DEHP

## (undated) DEVICE — 3M™ IOBAN™ 2 ANTIMICROBIAL INCISE DRAPE 6650EZ: Brand: IOBAN™ 2

## (undated) DEVICE — STERILE ICS CARDIOVASCULAR PK: Brand: CARDINAL HEALTH

## (undated) DEVICE — SUT PROLENE 5-0 C-1/C-1 24 IN 8725H

## (undated) DEVICE — GLOVE SRG BIOGEL ECLIPSE 7.5

## (undated) DEVICE — SUT VICRYL 3-0 SH 27 IN J416H

## (undated) DEVICE — AVIATOR PLUS .014 4.0X20 142CM: Brand: AVIATOR

## (undated) DEVICE — PAD GROUNDING ADULT

## (undated) DEVICE — SUT SILK 2-0 SH 30 IN K833H

## (undated) DEVICE — THYROID SHEET: Brand: CONVERTORS

## (undated) DEVICE — ELECTRODE BLADE MOD E-Z CLEAN  2.75IN 7CM -0012AM